# Patient Record
Sex: FEMALE | Race: WHITE | NOT HISPANIC OR LATINO | Employment: FULL TIME | URBAN - METROPOLITAN AREA
[De-identification: names, ages, dates, MRNs, and addresses within clinical notes are randomized per-mention and may not be internally consistent; named-entity substitution may affect disease eponyms.]

---

## 2019-04-25 ENCOUNTER — HOSPITAL ENCOUNTER (OUTPATIENT)
Facility: HOSPITAL | Age: 44
Setting detail: OBSERVATION
End: 2019-04-27
Attending: EMERGENCY MEDICINE | Admitting: INTERNAL MEDICINE
Payer: COMMERCIAL

## 2019-04-25 DIAGNOSIS — N88.8 CERVICAL MASS: Primary | ICD-10-CM

## 2019-04-25 DIAGNOSIS — N93.9 ABNORMAL VAGINAL BLEEDING: ICD-10-CM

## 2019-04-25 PROCEDURE — 99285 EMERGENCY DEPT VISIT HI MDM: CPT

## 2019-04-26 ENCOUNTER — APPOINTMENT (OUTPATIENT)
Dept: CT IMAGING | Facility: HOSPITAL | Age: 44
End: 2019-04-26
Payer: COMMERCIAL

## 2019-04-26 DIAGNOSIS — N88.8 CERVICAL MASS: Primary | ICD-10-CM

## 2019-04-26 PROBLEM — I10 HIGH BLOOD PRESSURE: Status: ACTIVE | Noted: 2019-04-26

## 2019-04-26 PROBLEM — E87.6 HYPOKALEMIA: Status: ACTIVE | Noted: 2019-04-26

## 2019-04-26 PROBLEM — N93.9 VAGINAL BLEEDING: Status: ACTIVE | Noted: 2019-04-26

## 2019-04-26 LAB
ABO GROUP BLD: NORMAL
ALBUMIN SERPL BCP-MCNC: 3.1 G/DL (ref 3.5–5)
ALP SERPL-CCNC: 45 U/L (ref 46–116)
ALT SERPL W P-5'-P-CCNC: 17 U/L (ref 12–78)
ANION GAP SERPL CALCULATED.3IONS-SCNC: 6 MMOL/L (ref 4–13)
ANION GAP SERPL CALCULATED.3IONS-SCNC: 8 MMOL/L (ref 4–13)
APTT PPP: 30 SECONDS (ref 26–38)
AST SERPL W P-5'-P-CCNC: 12 U/L (ref 5–45)
BACTERIA UR QL AUTO: ABNORMAL /HPF
BASOPHILS # BLD AUTO: 0.06 THOUSANDS/ΜL (ref 0–0.1)
BASOPHILS # BLD AUTO: 0.07 THOUSANDS/ΜL (ref 0–0.1)
BASOPHILS NFR BLD AUTO: 1 % (ref 0–1)
BASOPHILS NFR BLD AUTO: 1 % (ref 0–1)
BILIRUB SERPL-MCNC: 0.5 MG/DL (ref 0.2–1)
BILIRUB UR QL STRIP: NEGATIVE
BLD GP AB SCN SERPL QL: NEGATIVE
BUN SERPL-MCNC: 6 MG/DL (ref 5–25)
BUN SERPL-MCNC: 7 MG/DL (ref 5–25)
CALCIUM SERPL-MCNC: 9.6 MG/DL (ref 8.3–10.1)
CALCIUM SERPL-MCNC: 9.7 MG/DL (ref 8.3–10.1)
CHLORIDE SERPL-SCNC: 104 MMOL/L (ref 100–108)
CHLORIDE SERPL-SCNC: 106 MMOL/L (ref 100–108)
CLARITY UR: ABNORMAL
CO2 SERPL-SCNC: 29 MMOL/L (ref 21–32)
CO2 SERPL-SCNC: 29 MMOL/L (ref 21–32)
COLOR UR: YELLOW
CREAT SERPL-MCNC: 0.66 MG/DL (ref 0.6–1.3)
CREAT SERPL-MCNC: 0.76 MG/DL (ref 0.6–1.3)
EOSINOPHIL # BLD AUTO: 0.29 THOUSAND/ΜL (ref 0–0.61)
EOSINOPHIL # BLD AUTO: 0.34 THOUSAND/ΜL (ref 0–0.61)
EOSINOPHIL NFR BLD AUTO: 3 % (ref 0–6)
EOSINOPHIL NFR BLD AUTO: 5 % (ref 0–6)
ERYTHROCYTE [DISTWIDTH] IN BLOOD BY AUTOMATED COUNT: 13.9 % (ref 11.6–15.1)
ERYTHROCYTE [DISTWIDTH] IN BLOOD BY AUTOMATED COUNT: 13.9 % (ref 11.6–15.1)
EXT PREG TEST URINE: NEGATIVE
GFR SERPL CREATININE-BSD FRML MDRD: 109 ML/MIN/1.73SQ M
GFR SERPL CREATININE-BSD FRML MDRD: 97 ML/MIN/1.73SQ M
GLUCOSE SERPL-MCNC: 124 MG/DL (ref 65–140)
GLUCOSE SERPL-MCNC: 90 MG/DL (ref 65–140)
GLUCOSE UR STRIP-MCNC: NEGATIVE MG/DL
HCT VFR BLD AUTO: 39.3 % (ref 34.8–46.1)
HCT VFR BLD AUTO: 41.4 % (ref 34.8–46.1)
HGB BLD-MCNC: 12.9 G/DL (ref 11.5–15.4)
HGB BLD-MCNC: 13.7 G/DL (ref 11.5–15.4)
HGB UR QL STRIP.AUTO: ABNORMAL
IMM GRANULOCYTES # BLD AUTO: 0.03 THOUSAND/UL (ref 0–0.2)
IMM GRANULOCYTES # BLD AUTO: 0.03 THOUSAND/UL (ref 0–0.2)
IMM GRANULOCYTES NFR BLD AUTO: 0 % (ref 0–2)
IMM GRANULOCYTES NFR BLD AUTO: 0 % (ref 0–2)
INR PPP: 1.11 (ref 0.86–1.17)
KETONES UR STRIP-MCNC: ABNORMAL MG/DL
LEUKOCYTE ESTERASE UR QL STRIP: ABNORMAL
LYMPHOCYTES # BLD AUTO: 1.76 THOUSANDS/ΜL (ref 0.6–4.47)
LYMPHOCYTES # BLD AUTO: 2.29 THOUSANDS/ΜL (ref 0.6–4.47)
LYMPHOCYTES NFR BLD AUTO: 23 % (ref 14–44)
LYMPHOCYTES NFR BLD AUTO: 24 % (ref 14–44)
MAGNESIUM SERPL-MCNC: 2 MG/DL (ref 1.6–2.6)
MCH RBC QN AUTO: 27.7 PG (ref 26.8–34.3)
MCH RBC QN AUTO: 27.8 PG (ref 26.8–34.3)
MCHC RBC AUTO-ENTMCNC: 32.8 G/DL (ref 31.4–37.4)
MCHC RBC AUTO-ENTMCNC: 33.1 G/DL (ref 31.4–37.4)
MCV RBC AUTO: 84 FL (ref 82–98)
MCV RBC AUTO: 84 FL (ref 82–98)
MONOCYTES # BLD AUTO: 0.64 THOUSAND/ΜL (ref 0.17–1.22)
MONOCYTES # BLD AUTO: 0.84 THOUSAND/ΜL (ref 0.17–1.22)
MONOCYTES NFR BLD AUTO: 9 % (ref 4–12)
MONOCYTES NFR BLD AUTO: 9 % (ref 4–12)
NEUTROPHILS # BLD AUTO: 4.51 THOUSANDS/ΜL (ref 1.85–7.62)
NEUTROPHILS # BLD AUTO: 6.33 THOUSANDS/ΜL (ref 1.85–7.62)
NEUTS SEG NFR BLD AUTO: 61 % (ref 43–75)
NEUTS SEG NFR BLD AUTO: 64 % (ref 43–75)
NITRITE UR QL STRIP: NEGATIVE
NON-SQ EPI CELLS URNS QL MICRO: ABNORMAL /HPF
NRBC BLD AUTO-RTO: 0 /100 WBCS
NRBC BLD AUTO-RTO: 0 /100 WBCS
PH UR STRIP.AUTO: 7 [PH]
PHOSPHATE SERPL-MCNC: 3.7 MG/DL (ref 2.7–4.5)
PLATELET # BLD AUTO: 208 THOUSANDS/UL (ref 149–390)
PLATELET # BLD AUTO: 227 THOUSANDS/UL (ref 149–390)
PMV BLD AUTO: 11.2 FL (ref 8.9–12.7)
PMV BLD AUTO: 11.8 FL (ref 8.9–12.7)
POTASSIUM SERPL-SCNC: 3 MMOL/L (ref 3.5–5.3)
POTASSIUM SERPL-SCNC: 3.3 MMOL/L (ref 3.5–5.3)
PROT SERPL-MCNC: 6.1 G/DL (ref 6.4–8.2)
PROT UR STRIP-MCNC: ABNORMAL MG/DL
PROTHROMBIN TIME: 14.2 SECONDS (ref 11.8–14.2)
RBC # BLD AUTO: 4.66 MILLION/UL (ref 3.81–5.12)
RBC # BLD AUTO: 4.93 MILLION/UL (ref 3.81–5.12)
RBC #/AREA URNS AUTO: ABNORMAL /HPF
RH BLD: POSITIVE
SODIUM SERPL-SCNC: 141 MMOL/L (ref 136–145)
SODIUM SERPL-SCNC: 141 MMOL/L (ref 136–145)
SP GR UR STRIP.AUTO: 1.02 (ref 1–1.03)
SPECIMEN EXPIRATION DATE: NORMAL
UROBILINOGEN UR QL STRIP.AUTO: 0.2 E.U./DL
WBC # BLD AUTO: 7.35 THOUSAND/UL (ref 4.31–10.16)
WBC # BLD AUTO: 9.84 THOUSAND/UL (ref 4.31–10.16)
WBC #/AREA URNS AUTO: ABNORMAL /HPF

## 2019-04-26 PROCEDURE — 84100 ASSAY OF PHOSPHORUS: CPT | Performed by: PHYSICIAN ASSISTANT

## 2019-04-26 PROCEDURE — 86850 RBC ANTIBODY SCREEN: CPT | Performed by: EMERGENCY MEDICINE

## 2019-04-26 PROCEDURE — 88305 TISSUE EXAM BY PATHOLOGIST: CPT | Performed by: PATHOLOGY

## 2019-04-26 PROCEDURE — 86901 BLOOD TYPING SEROLOGIC RH(D): CPT | Performed by: EMERGENCY MEDICINE

## 2019-04-26 PROCEDURE — 85730 THROMBOPLASTIN TIME PARTIAL: CPT | Performed by: EMERGENCY MEDICINE

## 2019-04-26 PROCEDURE — 85025 COMPLETE CBC W/AUTO DIFF WBC: CPT | Performed by: PHYSICIAN ASSISTANT

## 2019-04-26 PROCEDURE — 88342 IMHCHEM/IMCYTCHM 1ST ANTB: CPT | Performed by: PATHOLOGY

## 2019-04-26 PROCEDURE — 81025 URINE PREGNANCY TEST: CPT | Performed by: EMERGENCY MEDICINE

## 2019-04-26 PROCEDURE — 99218 PR INITIAL OBSERVATION CARE/DAY 30 MINUTES: CPT | Performed by: INTERNAL MEDICINE

## 2019-04-26 PROCEDURE — 83735 ASSAY OF MAGNESIUM: CPT | Performed by: PHYSICIAN ASSISTANT

## 2019-04-26 PROCEDURE — 36415 COLL VENOUS BLD VENIPUNCTURE: CPT | Performed by: EMERGENCY MEDICINE

## 2019-04-26 PROCEDURE — 74177 CT ABD & PELVIS W/CONTRAST: CPT

## 2019-04-26 PROCEDURE — 99244 OFF/OP CNSLTJ NEW/EST MOD 40: CPT | Performed by: OBSTETRICS & GYNECOLOGY

## 2019-04-26 PROCEDURE — 85025 COMPLETE CBC W/AUTO DIFF WBC: CPT | Performed by: EMERGENCY MEDICINE

## 2019-04-26 PROCEDURE — 88344 IMHCHEM/IMCYTCHM EA MLT ANTB: CPT | Performed by: PATHOLOGY

## 2019-04-26 PROCEDURE — 85610 PROTHROMBIN TIME: CPT | Performed by: EMERGENCY MEDICINE

## 2019-04-26 PROCEDURE — 80053 COMPREHEN METABOLIC PANEL: CPT | Performed by: PHYSICIAN ASSISTANT

## 2019-04-26 PROCEDURE — 87147 CULTURE TYPE IMMUNOLOGIC: CPT | Performed by: INTERNAL MEDICINE

## 2019-04-26 PROCEDURE — 57500 BIOPSY OF CERVIX: CPT | Performed by: OBSTETRICS & GYNECOLOGY

## 2019-04-26 PROCEDURE — 86900 BLOOD TYPING SEROLOGIC ABO: CPT | Performed by: EMERGENCY MEDICINE

## 2019-04-26 PROCEDURE — 81001 URINALYSIS AUTO W/SCOPE: CPT | Performed by: EMERGENCY MEDICINE

## 2019-04-26 PROCEDURE — 80048 BASIC METABOLIC PNL TOTAL CA: CPT | Performed by: EMERGENCY MEDICINE

## 2019-04-26 PROCEDURE — 99285 EMERGENCY DEPT VISIT HI MDM: CPT | Performed by: EMERGENCY MEDICINE

## 2019-04-26 PROCEDURE — 87086 URINE CULTURE/COLONY COUNT: CPT | Performed by: INTERNAL MEDICINE

## 2019-04-26 RX ORDER — POTASSIUM CHLORIDE 20 MEQ/1
40 TABLET, EXTENDED RELEASE ORAL
Status: DISPENSED | OUTPATIENT
Start: 2019-04-26 | End: 2019-04-27

## 2019-04-26 RX ORDER — POTASSIUM CHLORIDE 20 MEQ/1
40 TABLET, EXTENDED RELEASE ORAL ONCE
Status: COMPLETED | OUTPATIENT
Start: 2019-04-26 | End: 2019-04-26

## 2019-04-26 RX ORDER — ACETAMINOPHEN 325 MG/1
650 TABLET ORAL EVERY 6 HOURS PRN
Status: DISCONTINUED | OUTPATIENT
Start: 2019-04-26 | End: 2019-04-27 | Stop reason: HOSPADM

## 2019-04-26 RX ORDER — SODIUM CHLORIDE, SODIUM LACTATE, POTASSIUM CHLORIDE, CALCIUM CHLORIDE 600; 310; 30; 20 MG/100ML; MG/100ML; MG/100ML; MG/100ML
100 INJECTION, SOLUTION INTRAVENOUS CONTINUOUS
Status: DISCONTINUED | OUTPATIENT
Start: 2019-04-26 | End: 2019-04-27

## 2019-04-26 RX ADMIN — POTASSIUM CHLORIDE 40 MEQ: 1500 TABLET, EXTENDED RELEASE ORAL at 09:53

## 2019-04-26 RX ADMIN — POTASSIUM CHLORIDE 40 MEQ: 1500 TABLET, EXTENDED RELEASE ORAL at 15:44

## 2019-04-26 RX ADMIN — IOHEXOL 100 ML: 350 INJECTION, SOLUTION INTRAVENOUS at 14:16

## 2019-04-26 RX ADMIN — SODIUM CHLORIDE, SODIUM LACTATE, POTASSIUM CHLORIDE, AND CALCIUM CHLORIDE 100 ML/HR: .6; .31; .03; .02 INJECTION, SOLUTION INTRAVENOUS at 21:38

## 2019-04-26 RX ADMIN — POTASSIUM CHLORIDE 40 MEQ: 1500 TABLET, EXTENDED RELEASE ORAL at 03:26

## 2019-04-26 RX ADMIN — SODIUM CHLORIDE, SODIUM LACTATE, POTASSIUM CHLORIDE, AND CALCIUM CHLORIDE 100 ML/HR: .6; .31; .03; .02 INJECTION, SOLUTION INTRAVENOUS at 11:55

## 2019-04-27 ENCOUNTER — HOSPITAL ENCOUNTER (OUTPATIENT)
Facility: HOSPITAL | Age: 44
Setting detail: OBSERVATION
LOS: 1 days | Discharge: HOME | End: 2019-04-29
Attending: OBSTETRICS & GYNECOLOGY | Admitting: OBSTETRICS & GYNECOLOGY
Payer: COMMERCIAL

## 2019-04-27 ENCOUNTER — APPOINTMENT (OUTPATIENT)
Dept: MRI IMAGING | Facility: HOSPITAL | Age: 44
End: 2019-04-27
Payer: COMMERCIAL

## 2019-04-27 VITALS
WEIGHT: 126 LBS | DIASTOLIC BLOOD PRESSURE: 92 MMHG | OXYGEN SATURATION: 99 % | HEART RATE: 63 BPM | SYSTOLIC BLOOD PRESSURE: 154 MMHG | RESPIRATION RATE: 18 BRPM | HEIGHT: 61 IN | BODY MASS INDEX: 23.79 KG/M2 | TEMPERATURE: 98.4 F

## 2019-04-27 PROBLEM — R82.90 ABNORMAL URINE: Status: ACTIVE | Noted: 2019-04-27

## 2019-04-27 LAB
ANION GAP SERPL CALCULATED.3IONS-SCNC: 7 MMOL/L (ref 4–13)
BASOPHILS # BLD AUTO: 0.05 THOUSANDS/ΜL (ref 0–0.1)
BASOPHILS NFR BLD AUTO: 1 % (ref 0–1)
BUN SERPL-MCNC: 9 MG/DL (ref 5–25)
CALCIUM SERPL-MCNC: 9.3 MG/DL (ref 8.3–10.1)
CHLORIDE SERPL-SCNC: 106 MMOL/L (ref 100–108)
CO2 SERPL-SCNC: 27 MMOL/L (ref 21–32)
CREAT SERPL-MCNC: 0.71 MG/DL (ref 0.6–1.3)
EOSINOPHIL # BLD AUTO: 0.32 THOUSAND/ΜL (ref 0–0.61)
EOSINOPHIL NFR BLD AUTO: 3 % (ref 0–6)
ERYTHROCYTE [DISTWIDTH] IN BLOOD BY AUTOMATED COUNT: 13.6 % (ref 11.6–15.1)
ERYTHROCYTE [DISTWIDTH] IN BLOOD BY AUTOMATED COUNT: 13.9 % (ref 11.6–15.1)
GFR SERPL CREATININE-BSD FRML MDRD: 105 ML/MIN/1.73SQ M
GLUCOSE P FAST SERPL-MCNC: 98 MG/DL (ref 65–99)
GLUCOSE SERPL-MCNC: 98 MG/DL (ref 65–140)
HCT VFR BLD AUTO: 36.8 % (ref 34.8–46.1)
HCT VFR BLD AUTO: 37.1 % (ref 34.8–46.1)
HGB BLD-MCNC: 11.9 G/DL (ref 11.5–15.4)
HGB BLD-MCNC: 12.3 G/DL (ref 11.5–15.4)
IMM GRANULOCYTES # BLD AUTO: 0.05 THOUSAND/UL (ref 0–0.2)
IMM GRANULOCYTES NFR BLD AUTO: 1 % (ref 0–2)
LYMPHOCYTES # BLD AUTO: 1.98 THOUSANDS/ΜL (ref 0.6–4.47)
LYMPHOCYTES NFR BLD AUTO: 21 % (ref 14–44)
MAGNESIUM SERPL-MCNC: 1.9 MG/DL (ref 1.6–2.6)
MCH RBC QN AUTO: 27.6 PG (ref 26.8–34.3)
MCH RBC QN AUTO: 28.1 PG (ref 26.8–34.3)
MCHC RBC AUTO-ENTMCNC: 32.3 G/DL (ref 31.4–37.4)
MCHC RBC AUTO-ENTMCNC: 33.2 G/DL (ref 31.4–37.4)
MCV RBC AUTO: 85 FL (ref 82–98)
MCV RBC AUTO: 85 FL (ref 82–98)
MONOCYTES # BLD AUTO: 0.86 THOUSAND/ΜL (ref 0.17–1.22)
MONOCYTES NFR BLD AUTO: 9 % (ref 4–12)
NEUTROPHILS # BLD AUTO: 6.18 THOUSANDS/ΜL (ref 1.85–7.62)
NEUTS SEG NFR BLD AUTO: 65 % (ref 43–75)
NRBC BLD AUTO-RTO: 0 /100 WBCS
PLATELET # BLD AUTO: 187 THOUSANDS/UL (ref 149–390)
PLATELET # BLD AUTO: 213 THOUSANDS/UL (ref 149–390)
PMV BLD AUTO: 11.4 FL (ref 8.9–12.7)
PMV BLD AUTO: 11.5 FL (ref 8.9–12.7)
POTASSIUM SERPL-SCNC: 3.8 MMOL/L (ref 3.5–5.3)
RBC # BLD AUTO: 4.31 MILLION/UL (ref 3.81–5.12)
RBC # BLD AUTO: 4.38 MILLION/UL (ref 3.81–5.12)
SODIUM SERPL-SCNC: 140 MMOL/L (ref 136–145)
WBC # BLD AUTO: 8.07 THOUSAND/UL (ref 4.31–10.16)
WBC # BLD AUTO: 9.44 THOUSAND/UL (ref 4.31–10.16)

## 2019-04-27 PROCEDURE — 86900 BLOOD TYPING SEROLOGIC ABO: CPT | Performed by: OBSTETRICS & GYNECOLOGY

## 2019-04-27 PROCEDURE — 86901 BLOOD TYPING SEROLOGIC RH(D): CPT | Performed by: OBSTETRICS & GYNECOLOGY

## 2019-04-27 PROCEDURE — 85025 COMPLETE CBC W/AUTO DIFF WBC: CPT | Performed by: OBSTETRICS & GYNECOLOGY

## 2019-04-27 PROCEDURE — 86850 RBC ANTIBODY SCREEN: CPT | Performed by: OBSTETRICS & GYNECOLOGY

## 2019-04-27 PROCEDURE — 80048 BASIC METABOLIC PNL TOTAL CA: CPT | Performed by: INTERNAL MEDICINE

## 2019-04-27 PROCEDURE — 86920 COMPATIBILITY TEST SPIN: CPT

## 2019-04-27 PROCEDURE — 72197 MRI PELVIS W/O & W/DYE: CPT

## 2019-04-27 PROCEDURE — 85027 COMPLETE CBC AUTOMATED: CPT | Performed by: INTERNAL MEDICINE

## 2019-04-27 PROCEDURE — 83735 ASSAY OF MAGNESIUM: CPT | Performed by: INTERNAL MEDICINE

## 2019-04-27 PROCEDURE — 99217 PR OBSERVATION CARE DISCHARGE MANAGEMENT: CPT | Performed by: INTERNAL MEDICINE

## 2019-04-27 PROCEDURE — A9585 GADOBUTROL INJECTION: HCPCS | Performed by: OBSTETRICS & GYNECOLOGY

## 2019-04-27 RX ORDER — ACETAMINOPHEN 325 MG/1
650 TABLET ORAL EVERY 6 HOURS PRN
Status: DISCONTINUED | OUTPATIENT
Start: 2019-04-27 | End: 2019-04-29 | Stop reason: HOSPADM

## 2019-04-27 RX ADMIN — GADOBUTROL 5 ML: 604.72 INJECTION INTRAVENOUS at 13:21

## 2019-04-27 RX ADMIN — CEFTRIAXONE SODIUM 1000 MG: 10 INJECTION, POWDER, FOR SOLUTION INTRAVENOUS at 11:45

## 2019-04-27 RX ADMIN — POTASSIUM CHLORIDE 40 MEQ: 1500 TABLET, EXTENDED RELEASE ORAL at 08:15

## 2019-04-28 PROBLEM — N88.8 CERVICAL MASS: Status: ACTIVE | Noted: 2019-04-28

## 2019-04-28 LAB
ANION GAP SERPL CALCULATED.3IONS-SCNC: 4 MMOL/L (ref 4–13)
BACTERIA UR CULT: ABNORMAL
BACTERIA UR CULT: ABNORMAL
BASOPHILS # BLD AUTO: 0.06 THOUSANDS/ΜL (ref 0–0.1)
BASOPHILS NFR BLD AUTO: 1 % (ref 0–1)
BUN SERPL-MCNC: 11 MG/DL (ref 5–25)
CALCIUM SERPL-MCNC: 8.9 MG/DL (ref 8.3–10.1)
CHLORIDE SERPL-SCNC: 107 MMOL/L (ref 100–108)
CO2 SERPL-SCNC: 27 MMOL/L (ref 21–32)
CREAT SERPL-MCNC: 0.71 MG/DL (ref 0.6–1.3)
EOSINOPHIL # BLD AUTO: 0.3 THOUSAND/ΜL (ref 0–0.61)
EOSINOPHIL NFR BLD AUTO: 4 % (ref 0–6)
ERYTHROCYTE [DISTWIDTH] IN BLOOD BY AUTOMATED COUNT: 13.8 % (ref 11.6–15.1)
GFR SERPL CREATININE-BSD FRML MDRD: 105 ML/MIN/1.73SQ M
GLUCOSE SERPL-MCNC: 94 MG/DL (ref 65–140)
HCT VFR BLD AUTO: 36.8 % (ref 34.8–46.1)
HGB BLD-MCNC: 11.6 G/DL (ref 11.5–15.4)
IMM GRANULOCYTES # BLD AUTO: 0.05 THOUSAND/UL (ref 0–0.2)
IMM GRANULOCYTES NFR BLD AUTO: 1 % (ref 0–2)
LYMPHOCYTES # BLD AUTO: 1.94 THOUSANDS/ΜL (ref 0.6–4.47)
LYMPHOCYTES NFR BLD AUTO: 24 % (ref 14–44)
MCH RBC QN AUTO: 27.5 PG (ref 26.8–34.3)
MCHC RBC AUTO-ENTMCNC: 31.5 G/DL (ref 31.4–37.4)
MCV RBC AUTO: 87 FL (ref 82–98)
MONOCYTES # BLD AUTO: 0.72 THOUSAND/ΜL (ref 0.17–1.22)
MONOCYTES NFR BLD AUTO: 9 % (ref 4–12)
NEUTROPHILS # BLD AUTO: 5.19 THOUSANDS/ΜL (ref 1.85–7.62)
NEUTS SEG NFR BLD AUTO: 61 % (ref 43–75)
NRBC BLD AUTO-RTO: 0 /100 WBCS
PLATELET # BLD AUTO: 185 THOUSANDS/UL (ref 149–390)
PMV BLD AUTO: 12 FL (ref 8.9–12.7)
POTASSIUM SERPL-SCNC: 3.7 MMOL/L (ref 3.5–5.3)
RBC # BLD AUTO: 4.22 MILLION/UL (ref 3.81–5.12)
SODIUM SERPL-SCNC: 138 MMOL/L (ref 136–145)
WBC # BLD AUTO: 8.26 THOUSAND/UL (ref 4.31–10.16)

## 2019-04-28 PROCEDURE — 99218 PR INITIAL OBSERVATION CARE/DAY 30 MINUTES: CPT | Performed by: OBSTETRICS & GYNECOLOGY

## 2019-04-28 PROCEDURE — 85025 COMPLETE CBC W/AUTO DIFF WBC: CPT | Performed by: OBSTETRICS & GYNECOLOGY

## 2019-04-28 PROCEDURE — 80048 BASIC METABOLIC PNL TOTAL CA: CPT | Performed by: OBSTETRICS & GYNECOLOGY

## 2019-04-28 PROCEDURE — G0379 DIRECT REFER HOSPITAL OBSERV: HCPCS

## 2019-04-28 PROCEDURE — NC001 PR NO CHARGE: Performed by: OBSTETRICS & GYNECOLOGY

## 2019-04-29 VITALS
WEIGHT: 127.21 LBS | DIASTOLIC BLOOD PRESSURE: 66 MMHG | RESPIRATION RATE: 18 BRPM | OXYGEN SATURATION: 98 % | BODY MASS INDEX: 24.02 KG/M2 | SYSTOLIC BLOOD PRESSURE: 123 MMHG | TEMPERATURE: 97.9 F | HEART RATE: 50 BPM | HEIGHT: 61 IN

## 2019-04-29 LAB
ABO GROUP BLD: NORMAL
BLD GP AB SCN SERPL QL: NEGATIVE
ERYTHROCYTE [DISTWIDTH] IN BLOOD BY AUTOMATED COUNT: 13.8 % (ref 11.6–15.1)
HCT VFR BLD AUTO: 36.4 % (ref 34.8–46.1)
HGB BLD-MCNC: 12 G/DL (ref 11.5–15.4)
MCH RBC QN AUTO: 28.2 PG (ref 26.8–34.3)
MCHC RBC AUTO-ENTMCNC: 33 G/DL (ref 31.4–37.4)
MCV RBC AUTO: 85 FL (ref 82–98)
PLATELET # BLD AUTO: 203 THOUSANDS/UL (ref 149–390)
PMV BLD AUTO: 12 FL (ref 8.9–12.7)
RBC # BLD AUTO: 4.26 MILLION/UL (ref 3.81–5.12)
RH BLD: POSITIVE
SPECIMEN EXPIRATION DATE: NORMAL
WBC # BLD AUTO: 8.55 THOUSAND/UL (ref 4.31–10.16)

## 2019-04-29 PROCEDURE — NC001 PR NO CHARGE: Performed by: OBSTETRICS & GYNECOLOGY

## 2019-04-29 PROCEDURE — 99217 PR OBSERVATION CARE DISCHARGE MANAGEMENT: CPT | Performed by: OBSTETRICS & GYNECOLOGY

## 2019-04-29 PROCEDURE — 85027 COMPLETE CBC AUTOMATED: CPT | Performed by: OBSTETRICS & GYNECOLOGY

## 2019-05-01 ENCOUNTER — APPOINTMENT (OUTPATIENT)
Dept: LAB | Facility: HOSPITAL | Age: 44
End: 2019-05-01
Attending: OBSTETRICS & GYNECOLOGY
Payer: COMMERCIAL

## 2019-05-01 ENCOUNTER — OFFICE VISIT (OUTPATIENT)
Dept: GYNECOLOGIC ONCOLOGY | Facility: CLINIC | Age: 44
End: 2019-05-01
Payer: COMMERCIAL

## 2019-05-01 VITALS
HEIGHT: 61 IN | BODY MASS INDEX: 24.55 KG/M2 | RESPIRATION RATE: 16 BRPM | WEIGHT: 130 LBS | TEMPERATURE: 99 F | HEART RATE: 84 BPM | SYSTOLIC BLOOD PRESSURE: 140 MMHG | DIASTOLIC BLOOD PRESSURE: 102 MMHG

## 2019-05-01 DIAGNOSIS — C53.8 MALIGNANT NEOPLASM OF OVERLAPPING SITES OF CERVIX (HCC): Primary | ICD-10-CM

## 2019-05-01 DIAGNOSIS — C53.8 MALIGNANT NEOPLASM OF OVERLAPPING SITES OF CERVIX (HCC): ICD-10-CM

## 2019-05-01 LAB
ABO GROUP BLD BPU: NORMAL
ABO GROUP BLD BPU: NORMAL
ALBUMIN SERPL BCP-MCNC: 3.9 G/DL (ref 3.5–5)
ALP SERPL-CCNC: 50 U/L (ref 46–116)
ALT SERPL W P-5'-P-CCNC: 20 U/L (ref 12–78)
ANION GAP SERPL CALCULATED.3IONS-SCNC: 7 MMOL/L (ref 4–13)
AST SERPL W P-5'-P-CCNC: 11 U/L (ref 5–45)
BASOPHILS # BLD AUTO: 0.07 THOUSANDS/ΜL (ref 0–0.1)
BASOPHILS NFR BLD AUTO: 1 % (ref 0–1)
BILIRUB SERPL-MCNC: 0.5 MG/DL (ref 0.2–1)
BPU ID: NORMAL
BPU ID: NORMAL
BUN SERPL-MCNC: 10 MG/DL (ref 5–25)
CALCIUM SERPL-MCNC: 9.7 MG/DL (ref 8.3–10.1)
CHLORIDE SERPL-SCNC: 105 MMOL/L (ref 100–108)
CO2 SERPL-SCNC: 27 MMOL/L (ref 21–32)
CREAT SERPL-MCNC: 0.74 MG/DL (ref 0.6–1.3)
CROSSMATCH: NORMAL
CROSSMATCH: NORMAL
EOSINOPHIL # BLD AUTO: 0.17 THOUSAND/ΜL (ref 0–0.61)
EOSINOPHIL NFR BLD AUTO: 2 % (ref 0–6)
ERYTHROCYTE [DISTWIDTH] IN BLOOD BY AUTOMATED COUNT: 14 % (ref 11.6–15.1)
GFR SERPL CREATININE-BSD FRML MDRD: 100 ML/MIN/1.73SQ M
GLUCOSE SERPL-MCNC: 92 MG/DL (ref 65–140)
HCT VFR BLD AUTO: 39.5 % (ref 34.8–46.1)
HGB BLD-MCNC: 12.8 G/DL (ref 11.5–15.4)
IMM GRANULOCYTES # BLD AUTO: 0.07 THOUSAND/UL (ref 0–0.2)
IMM GRANULOCYTES NFR BLD AUTO: 1 % (ref 0–2)
LYMPHOCYTES # BLD AUTO: 1.72 THOUSANDS/ΜL (ref 0.6–4.47)
LYMPHOCYTES NFR BLD AUTO: 15 % (ref 14–44)
MCH RBC QN AUTO: 27.4 PG (ref 26.8–34.3)
MCHC RBC AUTO-ENTMCNC: 32.4 G/DL (ref 31.4–37.4)
MCV RBC AUTO: 85 FL (ref 82–98)
MONOCYTES # BLD AUTO: 0.75 THOUSAND/ΜL (ref 0.17–1.22)
MONOCYTES NFR BLD AUTO: 7 % (ref 4–12)
NEUTROPHILS # BLD AUTO: 8.72 THOUSANDS/ΜL (ref 1.85–7.62)
NEUTS SEG NFR BLD AUTO: 74 % (ref 43–75)
NRBC BLD AUTO-RTO: 0 /100 WBCS
PLATELET # BLD AUTO: 243 THOUSANDS/UL (ref 149–390)
PMV BLD AUTO: 11.9 FL (ref 8.9–12.7)
POTASSIUM SERPL-SCNC: 3.7 MMOL/L (ref 3.5–5.3)
PROT SERPL-MCNC: 7.3 G/DL (ref 6.4–8.2)
RBC # BLD AUTO: 4.67 MILLION/UL (ref 3.81–5.12)
SODIUM SERPL-SCNC: 139 MMOL/L (ref 136–145)
UNIT DISPENSE STATUS: NORMAL
UNIT DISPENSE STATUS: NORMAL
UNIT PRODUCT CODE: NORMAL
UNIT PRODUCT CODE: NORMAL
UNIT RH: NORMAL
UNIT RH: NORMAL
WBC # BLD AUTO: 11.5 THOUSAND/UL (ref 4.31–10.16)

## 2019-05-01 PROCEDURE — 36415 COLL VENOUS BLD VENIPUNCTURE: CPT

## 2019-05-01 PROCEDURE — 85025 COMPLETE CBC W/AUTO DIFF WBC: CPT

## 2019-05-01 PROCEDURE — 80053 COMPREHEN METABOLIC PANEL: CPT

## 2019-05-01 PROCEDURE — 99215 OFFICE O/P EST HI 40 MIN: CPT | Performed by: OBSTETRICS & GYNECOLOGY

## 2019-05-03 ENCOUNTER — CLINICAL SUPPORT (OUTPATIENT)
Dept: RADIATION ONCOLOGY | Facility: CLINIC | Age: 44
End: 2019-05-03
Attending: RADIOLOGY

## 2019-05-03 ENCOUNTER — DOCUMENTATION (OUTPATIENT)
Dept: INFUSION CENTER | Facility: CLINIC | Age: 44
End: 2019-05-03

## 2019-05-03 ENCOUNTER — RADIATION ONCOLOGY CONSULT (OUTPATIENT)
Dept: RADIATION ONCOLOGY | Facility: CLINIC | Age: 44
End: 2019-05-03
Attending: RADIOLOGY

## 2019-05-03 VITALS
DIASTOLIC BLOOD PRESSURE: 80 MMHG | WEIGHT: 128 LBS | BODY MASS INDEX: 24.17 KG/M2 | RESPIRATION RATE: 16 BRPM | HEIGHT: 61 IN | HEART RATE: 67 BPM | SYSTOLIC BLOOD PRESSURE: 152 MMHG

## 2019-05-03 DIAGNOSIS — C53.8 MALIGNANT NEOPLASM OF OVERLAPPING SITES OF CERVIX (HCC): ICD-10-CM

## 2019-05-03 DIAGNOSIS — C53.8 MALIGNANT NEOPLASM OF OVERLAPPING SITES OF CERVIX (HCC): Primary | ICD-10-CM

## 2019-05-06 ENCOUNTER — TELEPHONE (OUTPATIENT)
Dept: INTERVENTIONAL RADIOLOGY/VASCULAR | Facility: HOSPITAL | Age: 44
End: 2019-05-06

## 2019-05-06 ENCOUNTER — HOSPITAL ENCOUNTER (OUTPATIENT)
Dept: RADIOLOGY | Age: 44
Discharge: HOME | End: 2019-05-06
Payer: COMMERCIAL

## 2019-05-06 DIAGNOSIS — C53.8 MALIGNANT NEOPLASM OF OVERLAPPING SITES OF CERVIX (HCC): ICD-10-CM

## 2019-05-06 DIAGNOSIS — C53.8 MALIGNANT NEOPLASM OF OVERLAPPING SITES OF CERVIX (HCC): Primary | ICD-10-CM

## 2019-05-06 LAB — GLUCOSE SERPL-MCNC: 92 MG/DL (ref 65–140)

## 2019-05-06 PROCEDURE — A9552 F18 FDG: HCPCS

## 2019-05-06 PROCEDURE — 82948 REAGENT STRIP/BLOOD GLUCOSE: CPT

## 2019-05-06 PROCEDURE — 78815 PET IMAGE W/CT SKULL-THIGH: CPT

## 2019-05-06 RX ORDER — ONDANSETRON HYDROCHLORIDE 8 MG/1
8 TABLET, FILM COATED ORAL EVERY 8 HOURS PRN
Qty: 30 TABLET | Refills: 1 | Status: SHIPPED | OUTPATIENT
Start: 2019-05-06 | End: 2019-08-20

## 2019-05-06 RX ORDER — LORAZEPAM 1 MG/1
1 TABLET ORAL EVERY 6 HOURS PRN
Qty: 20 TABLET | Refills: 1 | Status: SHIPPED | OUTPATIENT
Start: 2019-05-06 | End: 2019-10-23 | Stop reason: HOSPADM

## 2019-05-08 ENCOUNTER — APPOINTMENT (OUTPATIENT)
Dept: RADIATION ONCOLOGY | Facility: CLINIC | Age: 44
End: 2019-05-08
Attending: RADIOLOGY
Payer: COMMERCIAL

## 2019-05-08 PROCEDURE — 77470 SPECIAL RADIATION TREATMENT: CPT | Performed by: RADIOLOGY

## 2019-05-08 PROCEDURE — 77334 RADIATION TREATMENT AID(S): CPT | Performed by: RADIOLOGY

## 2019-05-09 ENCOUNTER — DOCUMENTATION (OUTPATIENT)
Dept: INFUSION CENTER | Facility: CLINIC | Age: 44
End: 2019-05-09

## 2019-05-10 ENCOUNTER — TELEPHONE (OUTPATIENT)
Dept: HEMATOLOGY ONCOLOGY | Facility: CLINIC | Age: 44
End: 2019-05-10

## 2019-05-10 PROCEDURE — 99211 OFF/OP EST MAY X REQ PHY/QHP: CPT | Performed by: RADIOLOGY

## 2019-05-16 PROCEDURE — 77295 3-D RADIOTHERAPY PLAN: CPT | Performed by: RADIOLOGY

## 2019-05-16 PROCEDURE — 77300 RADIATION THERAPY DOSE PLAN: CPT | Performed by: RADIOLOGY

## 2019-05-16 PROCEDURE — 77334 RADIATION TREATMENT AID(S): CPT | Performed by: RADIOLOGY

## 2019-05-16 RX ORDER — SODIUM CHLORIDE 9 MG/ML
20 INJECTION, SOLUTION INTRAVENOUS ONCE
Status: CANCELLED | OUTPATIENT
Start: 2019-05-21

## 2019-05-16 RX ORDER — PALONOSETRON 0.05 MG/ML
0.25 INJECTION, SOLUTION INTRAVENOUS ONCE
Status: CANCELLED | OUTPATIENT
Start: 2019-05-21

## 2019-05-17 ENCOUNTER — APPOINTMENT (OUTPATIENT)
Dept: LAB | Facility: HOSPITAL | Age: 44
End: 2019-05-17
Attending: OBSTETRICS & GYNECOLOGY
Payer: COMMERCIAL

## 2019-05-17 DIAGNOSIS — C53.8 MALIGNANT NEOPLASM OF OVERLAPPING SITES OF CERVIX (HCC): ICD-10-CM

## 2019-05-17 LAB
ALBUMIN SERPL BCP-MCNC: 3.8 G/DL (ref 3.5–5)
ALP SERPL-CCNC: 53 U/L (ref 46–116)
ALT SERPL W P-5'-P-CCNC: 17 U/L (ref 12–78)
ANION GAP SERPL CALCULATED.3IONS-SCNC: 8 MMOL/L (ref 4–13)
AST SERPL W P-5'-P-CCNC: 10 U/L (ref 5–45)
BASOPHILS # BLD AUTO: 0.05 THOUSANDS/ΜL (ref 0–0.1)
BASOPHILS NFR BLD AUTO: 1 % (ref 0–1)
BILIRUB SERPL-MCNC: 0.6 MG/DL (ref 0.2–1)
BUN SERPL-MCNC: 9 MG/DL (ref 5–25)
CALCIUM SERPL-MCNC: 9.7 MG/DL (ref 8.3–10.1)
CHLORIDE SERPL-SCNC: 104 MMOL/L (ref 100–108)
CO2 SERPL-SCNC: 29 MMOL/L (ref 21–32)
CREAT SERPL-MCNC: 0.62 MG/DL (ref 0.6–1.3)
EOSINOPHIL # BLD AUTO: 0.22 THOUSAND/ΜL (ref 0–0.61)
EOSINOPHIL NFR BLD AUTO: 3 % (ref 0–6)
ERYTHROCYTE [DISTWIDTH] IN BLOOD BY AUTOMATED COUNT: 13.3 % (ref 11.6–15.1)
GFR SERPL CREATININE-BSD FRML MDRD: 112 ML/MIN/1.73SQ M
GLUCOSE SERPL-MCNC: 83 MG/DL (ref 65–140)
HCT VFR BLD AUTO: 38.8 % (ref 34.8–46.1)
HGB BLD-MCNC: 12.4 G/DL (ref 11.5–15.4)
IMM GRANULOCYTES # BLD AUTO: 0.03 THOUSAND/UL (ref 0–0.2)
IMM GRANULOCYTES NFR BLD AUTO: 0 % (ref 0–2)
LYMPHOCYTES # BLD AUTO: 1.36 THOUSANDS/ΜL (ref 0.6–4.47)
LYMPHOCYTES NFR BLD AUTO: 17 % (ref 14–44)
MAGNESIUM SERPL-MCNC: 2.2 MG/DL (ref 1.6–2.6)
MCH RBC QN AUTO: 27.5 PG (ref 26.8–34.3)
MCHC RBC AUTO-ENTMCNC: 32 G/DL (ref 31.4–37.4)
MCV RBC AUTO: 86 FL (ref 82–98)
MONOCYTES # BLD AUTO: 0.67 THOUSAND/ΜL (ref 0.17–1.22)
MONOCYTES NFR BLD AUTO: 9 % (ref 4–12)
NEUTROPHILS # BLD AUTO: 5.58 THOUSANDS/ΜL (ref 1.85–7.62)
NEUTS SEG NFR BLD AUTO: 70 % (ref 43–75)
NRBC BLD AUTO-RTO: 0 /100 WBCS
PLATELET # BLD AUTO: 251 THOUSANDS/UL (ref 149–390)
PMV BLD AUTO: 11.7 FL (ref 8.9–12.7)
POTASSIUM SERPL-SCNC: 3.6 MMOL/L (ref 3.5–5.3)
PROT SERPL-MCNC: 7.3 G/DL (ref 6.4–8.2)
RBC # BLD AUTO: 4.51 MILLION/UL (ref 3.81–5.12)
SODIUM SERPL-SCNC: 141 MMOL/L (ref 136–145)
WBC # BLD AUTO: 7.91 THOUSAND/UL (ref 4.31–10.16)

## 2019-05-17 PROCEDURE — 83735 ASSAY OF MAGNESIUM: CPT

## 2019-05-17 PROCEDURE — 80053 COMPREHEN METABOLIC PANEL: CPT

## 2019-05-17 PROCEDURE — 85025 COMPLETE CBC W/AUTO DIFF WBC: CPT

## 2019-05-17 PROCEDURE — 36415 COLL VENOUS BLD VENIPUNCTURE: CPT

## 2019-05-20 PROCEDURE — 77412 RADIATION TX DELIVERY LVL 3: CPT | Performed by: RADIOLOGY

## 2019-05-20 PROCEDURE — 77331 SPECIAL RADIATION DOSIMETRY: CPT | Performed by: RADIOLOGY

## 2019-05-21 ENCOUNTER — HOSPITAL ENCOUNTER (OUTPATIENT)
Dept: INFUSION CENTER | Facility: CLINIC | Age: 44
Discharge: HOME | End: 2019-05-21
Payer: COMMERCIAL

## 2019-05-21 VITALS
SYSTOLIC BLOOD PRESSURE: 146 MMHG | TEMPERATURE: 97.9 F | WEIGHT: 123.46 LBS | HEIGHT: 62 IN | HEART RATE: 58 BPM | RESPIRATION RATE: 20 BRPM | DIASTOLIC BLOOD PRESSURE: 80 MMHG | BODY MASS INDEX: 22.72 KG/M2

## 2019-05-21 DIAGNOSIS — C53.8 MALIGNANT NEOPLASM OF OVERLAPPING SITES OF CERVIX (HCC): Primary | ICD-10-CM

## 2019-05-21 PROCEDURE — 96361 HYDRATE IV INFUSION ADD-ON: CPT

## 2019-05-21 PROCEDURE — 77412 RADIATION TX DELIVERY LVL 3: CPT | Performed by: RADIOLOGY

## 2019-05-21 PROCEDURE — 96375 TX/PRO/DX INJ NEW DRUG ADDON: CPT

## 2019-05-21 PROCEDURE — 96367 TX/PROPH/DG ADDL SEQ IV INF: CPT

## 2019-05-21 PROCEDURE — 96413 CHEMO IV INFUSION 1 HR: CPT

## 2019-05-21 RX ORDER — SODIUM CHLORIDE 9 MG/ML
20 INJECTION, SOLUTION INTRAVENOUS ONCE
Status: DISCONTINUED | OUTPATIENT
Start: 2019-05-21 | End: 2019-05-24 | Stop reason: HOSPADM

## 2019-05-21 RX ORDER — PALONOSETRON 0.05 MG/ML
0.25 INJECTION, SOLUTION INTRAVENOUS ONCE
Status: COMPLETED | OUTPATIENT
Start: 2019-05-21 | End: 2019-05-21

## 2019-05-21 RX ADMIN — DEXAMETHASONE SODIUM PHOSPHATE 20 MG: 10 INJECTION, SOLUTION INTRAMUSCULAR; INTRAVENOUS at 12:03

## 2019-05-21 RX ADMIN — CISPLATIN 62.4 MG: 1 INJECTION INTRAVENOUS at 13:10

## 2019-05-21 RX ADMIN — SODIUM CHLORIDE 1000 ML: 0.9 INJECTION, SOLUTION INTRAVENOUS at 10:48

## 2019-05-21 RX ADMIN — SODIUM CHLORIDE 1000 ML: 0.9 INJECTION, SOLUTION INTRAVENOUS at 14:19

## 2019-05-21 RX ADMIN — PALONOSETRON 0.25 MG: 0.05 INJECTION, SOLUTION INTRAVENOUS at 12:03

## 2019-05-21 RX ADMIN — SODIUM CHLORIDE 150 MG: 0.9 INJECTION, SOLUTION INTRAVENOUS at 12:26

## 2019-05-21 NOTE — SOCIAL WORK
MSW met with pt in the infusion center today, she was in for her first treatment and says that all went well and she slept through most of her treatment today  She has several questions regarding her eviction and what assistance she is eligible for through [employer]/ [employer]  She is set to be evicted on 7/2, even if she were to be able to catch up on the rent the landlord is not willing to have her stay  She has been working with [employer] and her [employer] company that manages the property, but feels like she has more questions than answers at this point  MSW reached out to Curtis at [employer] to review  Curtis states that they are definitely able to help pt, however her eviction date is not until July so she has some time until she is in their window to be able to provide help  She confirmed that a referral from [employer] is necessary  She suggested that pt call [employer] [facility] and ask to s/w Jillian to provide her answers to any of her questions going forward  MSW reviewed all of the above with pt, she will call Jillian tomorrow  She found a rental online in [place] that is only $900 a month and is going to tour it later this week  MSW will remain available to help pt throughout her treatment time as I am able

## 2019-05-22 PROCEDURE — 77387 GUIDANCE FOR RADJ TX DLVR: CPT | Performed by: RADIOLOGY

## 2019-05-22 PROCEDURE — 77412 RADIATION TX DELIVERY LVL 3: CPT | Performed by: RADIOLOGY

## 2019-05-23 PROCEDURE — 77387 GUIDANCE FOR RADJ TX DLVR: CPT | Performed by: RADIOLOGY

## 2019-05-23 PROCEDURE — 77412 RADIATION TX DELIVERY LVL 3: CPT | Performed by: RADIOLOGY

## 2019-05-23 RX ORDER — PALONOSETRON 0.05 MG/ML
0.25 INJECTION, SOLUTION INTRAVENOUS ONCE
Status: CANCELLED | OUTPATIENT
Start: 2019-05-29

## 2019-05-23 RX ORDER — SODIUM CHLORIDE 9 MG/ML
20 INJECTION, SOLUTION INTRAVENOUS ONCE
Status: CANCELLED | OUTPATIENT
Start: 2019-05-29

## 2019-05-24 ENCOUNTER — APPOINTMENT (OUTPATIENT)
Dept: LAB | Facility: HOSPITAL | Age: 44
End: 2019-05-24
Attending: OBSTETRICS & GYNECOLOGY
Payer: COMMERCIAL

## 2019-05-24 DIAGNOSIS — C53.8 MALIGNANT NEOPLASM OF OVERLAPPING SITES OF CERVIX (HCC): ICD-10-CM

## 2019-05-24 LAB
ALBUMIN SERPL BCP-MCNC: 3.3 G/DL (ref 3.5–5)
ALP SERPL-CCNC: 47 U/L (ref 46–116)
ALT SERPL W P-5'-P-CCNC: 15 U/L (ref 12–78)
ANION GAP SERPL CALCULATED.3IONS-SCNC: 5 MMOL/L (ref 4–13)
AST SERPL W P-5'-P-CCNC: 8 U/L (ref 5–45)
BASOPHILS # BLD AUTO: 0.02 THOUSANDS/ΜL (ref 0–0.1)
BASOPHILS NFR BLD AUTO: 0 % (ref 0–1)
BILIRUB SERPL-MCNC: 0.7 MG/DL (ref 0.2–1)
BUN SERPL-MCNC: 13 MG/DL (ref 5–25)
CALCIUM SERPL-MCNC: 9.1 MG/DL (ref 8.3–10.1)
CHLORIDE SERPL-SCNC: 106 MMOL/L (ref 100–108)
CO2 SERPL-SCNC: 30 MMOL/L (ref 21–32)
CREAT SERPL-MCNC: 0.6 MG/DL (ref 0.6–1.3)
EOSINOPHIL # BLD AUTO: 0.11 THOUSAND/ΜL (ref 0–0.61)
EOSINOPHIL NFR BLD AUTO: 2 % (ref 0–6)
ERYTHROCYTE [DISTWIDTH] IN BLOOD BY AUTOMATED COUNT: 13 % (ref 11.6–15.1)
GFR SERPL CREATININE-BSD FRML MDRD: 113 ML/MIN/1.73SQ M
GLUCOSE SERPL-MCNC: 89 MG/DL (ref 65–140)
HCT VFR BLD AUTO: 35.6 % (ref 34.8–46.1)
HGB BLD-MCNC: 11.1 G/DL (ref 11.5–15.4)
IMM GRANULOCYTES # BLD AUTO: 0.03 THOUSAND/UL (ref 0–0.2)
IMM GRANULOCYTES NFR BLD AUTO: 1 % (ref 0–2)
LYMPHOCYTES # BLD AUTO: 0.63 THOUSANDS/ΜL (ref 0.6–4.47)
LYMPHOCYTES NFR BLD AUTO: 10 % (ref 14–44)
MAGNESIUM SERPL-MCNC: 2.1 MG/DL (ref 1.6–2.6)
MCH RBC QN AUTO: 26.9 PG (ref 26.8–34.3)
MCHC RBC AUTO-ENTMCNC: 31.2 G/DL (ref 31.4–37.4)
MCV RBC AUTO: 86 FL (ref 82–98)
MONOCYTES # BLD AUTO: 0.47 THOUSAND/ΜL (ref 0.17–1.22)
MONOCYTES NFR BLD AUTO: 8 % (ref 4–12)
NEUTROPHILS # BLD AUTO: 4.84 THOUSANDS/ΜL (ref 1.85–7.62)
NEUTS SEG NFR BLD AUTO: 79 % (ref 43–75)
NRBC BLD AUTO-RTO: 0 /100 WBCS
PLATELET # BLD AUTO: 197 THOUSANDS/UL (ref 149–390)
PMV BLD AUTO: 11.1 FL (ref 8.9–12.7)
POTASSIUM SERPL-SCNC: 3.6 MMOL/L (ref 3.5–5.3)
PROT SERPL-MCNC: 6.5 G/DL (ref 6.4–8.2)
RBC # BLD AUTO: 4.12 MILLION/UL (ref 3.81–5.12)
SODIUM SERPL-SCNC: 141 MMOL/L (ref 136–145)
WBC # BLD AUTO: 6.1 THOUSAND/UL (ref 4.31–10.16)

## 2019-05-24 PROCEDURE — 83735 ASSAY OF MAGNESIUM: CPT

## 2019-05-24 PROCEDURE — 80053 COMPREHEN METABOLIC PANEL: CPT

## 2019-05-24 PROCEDURE — 85025 COMPLETE CBC W/AUTO DIFF WBC: CPT

## 2019-05-24 PROCEDURE — 77336 RADIATION PHYSICS CONSULT: CPT | Performed by: RADIOLOGY

## 2019-05-24 PROCEDURE — 77412 RADIATION TX DELIVERY LVL 3: CPT | Performed by: RADIOLOGY

## 2019-05-24 PROCEDURE — 77387 GUIDANCE FOR RADJ TX DLVR: CPT | Performed by: RADIOLOGY

## 2019-05-24 PROCEDURE — 36415 COLL VENOUS BLD VENIPUNCTURE: CPT

## 2019-05-24 PROCEDURE — 77417 THER RADIOLOGY PORT IMAGE(S): CPT | Performed by: RADIOLOGY

## 2019-05-28 PROCEDURE — 77412 RADIATION TX DELIVERY LVL 3: CPT | Performed by: RADIOLOGY

## 2019-05-28 PROCEDURE — 77387 GUIDANCE FOR RADJ TX DLVR: CPT | Performed by: RADIOLOGY

## 2019-05-29 ENCOUNTER — HOSPITAL ENCOUNTER (OUTPATIENT)
Dept: INFUSION CENTER | Facility: CLINIC | Age: 44
Discharge: HOME | End: 2019-05-29
Payer: COMMERCIAL

## 2019-05-29 VITALS
TEMPERATURE: 98.3 F | RESPIRATION RATE: 16 BRPM | HEIGHT: 62 IN | SYSTOLIC BLOOD PRESSURE: 154 MMHG | WEIGHT: 125.66 LBS | DIASTOLIC BLOOD PRESSURE: 80 MMHG | BODY MASS INDEX: 23.12 KG/M2 | HEART RATE: 69 BPM

## 2019-05-29 DIAGNOSIS — C53.8 MALIGNANT NEOPLASM OF OVERLAPPING SITES OF CERVIX (HCC): Primary | ICD-10-CM

## 2019-05-29 PROCEDURE — 77412 RADIATION TX DELIVERY LVL 3: CPT | Performed by: RADIOLOGY

## 2019-05-29 PROCEDURE — 96413 CHEMO IV INFUSION 1 HR: CPT

## 2019-05-29 PROCEDURE — 77387 GUIDANCE FOR RADJ TX DLVR: CPT | Performed by: RADIOLOGY

## 2019-05-29 PROCEDURE — 96361 HYDRATE IV INFUSION ADD-ON: CPT

## 2019-05-29 PROCEDURE — 96367 TX/PROPH/DG ADDL SEQ IV INF: CPT

## 2019-05-29 PROCEDURE — 96375 TX/PRO/DX INJ NEW DRUG ADDON: CPT

## 2019-05-29 RX ORDER — SODIUM CHLORIDE 9 MG/ML
20 INJECTION, SOLUTION INTRAVENOUS ONCE
Status: COMPLETED | OUTPATIENT
Start: 2019-05-29 | End: 2019-05-29

## 2019-05-29 RX ORDER — PALONOSETRON 0.05 MG/ML
0.25 INJECTION, SOLUTION INTRAVENOUS ONCE
Status: COMPLETED | OUTPATIENT
Start: 2019-05-29 | End: 2019-05-29

## 2019-05-29 RX ADMIN — SODIUM CHLORIDE 150 MG: 0.9 INJECTION, SOLUTION INTRAVENOUS at 11:31

## 2019-05-29 RX ADMIN — SODIUM CHLORIDE 1000 ML: 0.9 INJECTION, SOLUTION INTRAVENOUS at 09:56

## 2019-05-29 RX ADMIN — PALONOSETRON 0.25 MG: 0.05 INJECTION, SOLUTION INTRAVENOUS at 11:04

## 2019-05-29 RX ADMIN — DEXAMETHASONE SODIUM PHOSPHATE 20 MG: 10 INJECTION, SOLUTION INTRAMUSCULAR; INTRAVENOUS at 11:10

## 2019-05-29 RX ADMIN — SODIUM CHLORIDE 20 ML/HR: 0.9 INJECTION, SOLUTION INTRAVENOUS at 09:53

## 2019-05-29 RX ADMIN — CISPLATIN 62.4 MG: 1 INJECTION INTRAVENOUS at 12:28

## 2019-05-29 RX ADMIN — SODIUM CHLORIDE 1000 ML: 0.9 INJECTION, SOLUTION INTRAVENOUS at 13:39

## 2019-05-29 NOTE — PROGRESS NOTES
Pt tolerated chemotherapy infusion well without incident  Offers no complaints  Discharged in stable condition and pt aware of next appointment  AVS provided

## 2019-05-30 ENCOUNTER — DOCUMENTATION (OUTPATIENT)
Dept: INFUSION CENTER | Facility: CLINIC | Age: 44
End: 2019-05-30

## 2019-05-30 PROCEDURE — 77412 RADIATION TX DELIVERY LVL 3: CPT | Performed by: RADIOLOGY

## 2019-05-30 PROCEDURE — 77387 GUIDANCE FOR RADJ TX DLVR: CPT | Performed by: RADIOLOGY

## 2019-05-31 ENCOUNTER — APPOINTMENT (OUTPATIENT)
Dept: LAB | Facility: HOSPITAL | Age: 44
End: 2019-05-31
Attending: OBSTETRICS & GYNECOLOGY
Payer: COMMERCIAL

## 2019-05-31 DIAGNOSIS — C53.8 MALIGNANT NEOPLASM OF OVERLAPPING SITES OF CERVIX (HCC): ICD-10-CM

## 2019-05-31 LAB
ALBUMIN SERPL BCP-MCNC: 3.5 G/DL (ref 3.5–5)
ALP SERPL-CCNC: 43 U/L (ref 46–116)
ALT SERPL W P-5'-P-CCNC: 25 U/L (ref 12–78)
ANION GAP SERPL CALCULATED.3IONS-SCNC: 10 MMOL/L (ref 4–13)
AST SERPL W P-5'-P-CCNC: 15 U/L (ref 5–45)
BASOPHILS # BLD AUTO: 0.02 THOUSANDS/ΜL (ref 0–0.1)
BASOPHILS NFR BLD AUTO: 0 % (ref 0–1)
BILIRUB SERPL-MCNC: 0.4 MG/DL (ref 0.2–1)
BUN SERPL-MCNC: 17 MG/DL (ref 5–25)
CALCIUM SERPL-MCNC: 9 MG/DL (ref 8.3–10.1)
CHLORIDE SERPL-SCNC: 101 MMOL/L (ref 100–108)
CO2 SERPL-SCNC: 26 MMOL/L (ref 21–32)
CREAT SERPL-MCNC: 0.63 MG/DL (ref 0.6–1.3)
EOSINOPHIL # BLD AUTO: 0.06 THOUSAND/ΜL (ref 0–0.61)
EOSINOPHIL NFR BLD AUTO: 1 % (ref 0–6)
ERYTHROCYTE [DISTWIDTH] IN BLOOD BY AUTOMATED COUNT: 14.1 % (ref 11.6–15.1)
GFR SERPL CREATININE-BSD FRML MDRD: 111 ML/MIN/1.73SQ M
GLUCOSE P FAST SERPL-MCNC: 78 MG/DL (ref 65–99)
HCT VFR BLD AUTO: 34.8 % (ref 34.8–46.1)
HGB BLD-MCNC: 10.9 G/DL (ref 11.5–15.4)
IMM GRANULOCYTES # BLD AUTO: 0.04 THOUSAND/UL (ref 0–0.2)
IMM GRANULOCYTES NFR BLD AUTO: 1 % (ref 0–2)
LYMPHOCYTES # BLD AUTO: 0.45 THOUSANDS/ΜL (ref 0.6–4.47)
LYMPHOCYTES NFR BLD AUTO: 10 % (ref 14–44)
MCH RBC QN AUTO: 27.3 PG (ref 26.8–34.3)
MCHC RBC AUTO-ENTMCNC: 31.3 G/DL (ref 31.4–37.4)
MCV RBC AUTO: 87 FL (ref 82–98)
MONOCYTES # BLD AUTO: 0.6 THOUSAND/ΜL (ref 0.17–1.22)
MONOCYTES NFR BLD AUTO: 13 % (ref 4–12)
NEUTROPHILS # BLD AUTO: 3.49 THOUSANDS/ΜL (ref 1.85–7.62)
NEUTS SEG NFR BLD AUTO: 75 % (ref 43–75)
NRBC BLD AUTO-RTO: 0 /100 WBCS
PLATELET # BLD AUTO: 136 THOUSANDS/UL (ref 149–390)
PMV BLD AUTO: 11.7 FL (ref 8.9–12.7)
POTASSIUM SERPL-SCNC: 3.5 MMOL/L (ref 3.5–5.3)
PROT SERPL-MCNC: 6.4 G/DL (ref 6.4–8.2)
RBC # BLD AUTO: 4 MILLION/UL (ref 3.81–5.12)
SODIUM SERPL-SCNC: 137 MMOL/L (ref 136–145)
WBC # BLD AUTO: 4.66 THOUSAND/UL (ref 4.31–10.16)

## 2019-05-31 PROCEDURE — 85025 COMPLETE CBC W/AUTO DIFF WBC: CPT

## 2019-05-31 PROCEDURE — 36415 COLL VENOUS BLD VENIPUNCTURE: CPT

## 2019-05-31 PROCEDURE — 77412 RADIATION TX DELIVERY LVL 3: CPT | Performed by: RADIOLOGY

## 2019-05-31 PROCEDURE — 77387 GUIDANCE FOR RADJ TX DLVR: CPT | Performed by: RADIOLOGY

## 2019-05-31 PROCEDURE — 80053 COMPREHEN METABOLIC PANEL: CPT

## 2019-05-31 RX ORDER — SODIUM CHLORIDE 9 MG/ML
20 INJECTION, SOLUTION INTRAVENOUS ONCE
Status: CANCELLED | OUTPATIENT
Start: 2019-06-04

## 2019-05-31 RX ORDER — PALONOSETRON 0.05 MG/ML
0.25 INJECTION, SOLUTION INTRAVENOUS ONCE
Status: CANCELLED | OUTPATIENT
Start: 2019-06-04

## 2019-06-03 ENCOUNTER — APPOINTMENT (OUTPATIENT)
Dept: RADIATION ONCOLOGY | Facility: CLINIC | Age: 44
End: 2019-06-03
Attending: RADIOLOGY
Payer: COMMERCIAL

## 2019-06-03 ENCOUNTER — OFFICE VISIT (OUTPATIENT)
Dept: GYNECOLOGIC ONCOLOGY | Facility: CLINIC | Age: 44
End: 2019-06-03
Payer: COMMERCIAL

## 2019-06-03 VITALS
TEMPERATURE: 98.5 F | HEART RATE: 58 BPM | BODY MASS INDEX: 23 KG/M2 | WEIGHT: 125 LBS | DIASTOLIC BLOOD PRESSURE: 72 MMHG | RESPIRATION RATE: 16 BRPM | HEIGHT: 62 IN | SYSTOLIC BLOOD PRESSURE: 116 MMHG

## 2019-06-03 DIAGNOSIS — C53.8 MALIGNANT NEOPLASM OF OVERLAPPING SITES OF CERVIX (HCC): Primary | ICD-10-CM

## 2019-06-03 PROBLEM — N93.9 VAGINAL BLEEDING: Status: RESOLVED | Noted: 2019-04-26 | Resolved: 2019-06-03

## 2019-06-03 PROBLEM — R82.90 ABNORMAL URINE: Status: RESOLVED | Noted: 2019-04-27 | Resolved: 2019-06-03

## 2019-06-03 PROBLEM — N88.8 CERVICAL MASS: Status: RESOLVED | Noted: 2019-04-28 | Resolved: 2019-06-03

## 2019-06-03 PROBLEM — I10 HIGH BLOOD PRESSURE: Status: RESOLVED | Noted: 2019-04-26 | Resolved: 2019-06-03

## 2019-06-03 PROBLEM — E87.6 HYPOKALEMIA: Status: RESOLVED | Noted: 2019-04-26 | Resolved: 2019-06-03

## 2019-06-03 PROCEDURE — 99214 OFFICE O/P EST MOD 30 MIN: CPT | Performed by: OBSTETRICS & GYNECOLOGY

## 2019-06-03 PROCEDURE — 77412 RADIATION TX DELIVERY LVL 3: CPT | Performed by: RADIOLOGY

## 2019-06-03 PROCEDURE — 77336 RADIATION PHYSICS CONSULT: CPT | Performed by: RADIOLOGY

## 2019-06-03 PROCEDURE — 77387 GUIDANCE FOR RADJ TX DLVR: CPT | Performed by: RADIOLOGY

## 2019-06-03 PROCEDURE — 77417 THER RADIOLOGY PORT IMAGE(S): CPT | Performed by: RADIOLOGY

## 2019-06-03 RX ORDER — GABAPENTIN 100 MG/1
100 CAPSULE ORAL ONCE
Status: CANCELLED | OUTPATIENT
Start: 2019-06-03 | End: 2019-06-03

## 2019-06-03 RX ORDER — ACETAMINOPHEN 325 MG/1
975 TABLET ORAL ONCE
Status: CANCELLED | OUTPATIENT
Start: 2019-06-03 | End: 2019-06-03

## 2019-06-03 RX ORDER — HEPARIN SODIUM 5000 [USP'U]/ML
5000 INJECTION, SOLUTION INTRAVENOUS; SUBCUTANEOUS
Status: CANCELLED | OUTPATIENT
Start: 2019-06-03 | End: 2019-06-04

## 2019-06-04 ENCOUNTER — APPOINTMENT (OUTPATIENT)
Dept: RADIATION ONCOLOGY | Facility: CLINIC | Age: 44
End: 2019-06-04
Attending: RADIOLOGY
Payer: COMMERCIAL

## 2019-06-04 ENCOUNTER — HOSPITAL ENCOUNTER (OUTPATIENT)
Dept: INFUSION CENTER | Facility: CLINIC | Age: 44
Discharge: HOME | End: 2019-06-04
Payer: COMMERCIAL

## 2019-06-04 VITALS
TEMPERATURE: 98 F | SYSTOLIC BLOOD PRESSURE: 150 MMHG | HEART RATE: 73 BPM | BODY MASS INDEX: 23.45 KG/M2 | HEIGHT: 62 IN | WEIGHT: 127.43 LBS | RESPIRATION RATE: 18 BRPM | DIASTOLIC BLOOD PRESSURE: 86 MMHG

## 2019-06-04 DIAGNOSIS — C53.8 MALIGNANT NEOPLASM OF OVERLAPPING SITES OF CERVIX (HCC): Primary | ICD-10-CM

## 2019-06-04 LAB — MAGNESIUM SERPL-MCNC: 2.2 MG/DL (ref 1.6–2.6)

## 2019-06-04 PROCEDURE — 96367 TX/PROPH/DG ADDL SEQ IV INF: CPT

## 2019-06-04 PROCEDURE — 77387 GUIDANCE FOR RADJ TX DLVR: CPT | Performed by: RADIOLOGY

## 2019-06-04 PROCEDURE — 83735 ASSAY OF MAGNESIUM: CPT | Performed by: OBSTETRICS & GYNECOLOGY

## 2019-06-04 PROCEDURE — 96375 TX/PRO/DX INJ NEW DRUG ADDON: CPT

## 2019-06-04 PROCEDURE — 96361 HYDRATE IV INFUSION ADD-ON: CPT

## 2019-06-04 PROCEDURE — 77412 RADIATION TX DELIVERY LVL 3: CPT | Performed by: RADIOLOGY

## 2019-06-04 PROCEDURE — 96413 CHEMO IV INFUSION 1 HR: CPT

## 2019-06-04 RX ORDER — PALONOSETRON 0.05 MG/ML
0.25 INJECTION, SOLUTION INTRAVENOUS ONCE
Status: COMPLETED | OUTPATIENT
Start: 2019-06-04 | End: 2019-06-04

## 2019-06-04 RX ORDER — SODIUM CHLORIDE 9 MG/ML
20 INJECTION, SOLUTION INTRAVENOUS ONCE
Status: COMPLETED | OUTPATIENT
Start: 2019-06-04 | End: 2019-06-04

## 2019-06-04 RX ADMIN — SODIUM CHLORIDE 20 ML/HR: 0.9 INJECTION, SOLUTION INTRAVENOUS at 10:06

## 2019-06-04 RX ADMIN — DEXAMETHASONE SODIUM PHOSPHATE 20 MG: 10 INJECTION, SOLUTION INTRAMUSCULAR; INTRAVENOUS at 11:03

## 2019-06-04 RX ADMIN — SODIUM CHLORIDE 150 MG: 0.9 INJECTION, SOLUTION INTRAVENOUS at 11:30

## 2019-06-04 RX ADMIN — CISPLATIN 62.4 MG: 1 INJECTION INTRAVENOUS at 12:11

## 2019-06-04 RX ADMIN — PALONOSETRON 0.25 MG: 0.05 INJECTION, SOLUTION INTRAVENOUS at 11:25

## 2019-06-04 RX ADMIN — SODIUM CHLORIDE 1000 ML: 0.9 INJECTION, SOLUTION INTRAVENOUS at 13:23

## 2019-06-04 RX ADMIN — SODIUM CHLORIDE 1000 ML: 0.9 INJECTION, SOLUTION INTRAVENOUS at 10:06

## 2019-06-04 NOTE — SOCIAL WORK
MARA met with pt at her infusion appointment today to follow up on yesterday's visit to [facility]  Pt reports that she is going to have her first treatment on 6/20 in [place], and she needs to be there at [place]   MSW had previously contacted [address] [place] [address] transport who stated that they have drivers available for early transports like this, and getting pt to [place] should not be an issue  Pt is very happy to hear that  She says that she does have family in the [place] area, however staying with them would be uncomfortable and she doesn't have a way to get there and back as her car is unreliable and she cannot afford the gas at this time  She says that she plans on having her son spend time with family in [place] while she is doing her treatments at [place], as that takes one thing off of her plate  She is meeting with [facility] tomorrow after her radiation tx to discuss rental homes in her price range  She has one place she would like to call today and see if the landlord could come down on the rent somewhat  She is hopeful that he will, because her other option is a home that she says she would be responsible for replacing the appliances if they break  Overall, she says that she is stressed but physically she feels ok  She is hopeful that her home situation will stabilize and she can focus more on herself and her treatments and recovery  MARA will remain available to pt throughout her treatment time as needed

## 2019-06-04 NOTE — PROGRESS NOTES
Pt offers no complaints and tolerated chemotherapy infusion well without incident  Discharged in stable condition and is aware of next appointment  AVS provided

## 2019-06-05 ENCOUNTER — APPOINTMENT (OUTPATIENT)
Dept: RADIATION ONCOLOGY | Facility: CLINIC | Age: 44
End: 2019-06-05
Attending: RADIOLOGY
Payer: COMMERCIAL

## 2019-06-05 PROCEDURE — 77412 RADIATION TX DELIVERY LVL 3: CPT | Performed by: RADIOLOGY

## 2019-06-05 PROCEDURE — 77387 GUIDANCE FOR RADJ TX DLVR: CPT | Performed by: RADIOLOGY

## 2019-06-06 ENCOUNTER — APPOINTMENT (OUTPATIENT)
Dept: RADIATION ONCOLOGY | Facility: CLINIC | Age: 44
End: 2019-06-06
Attending: RADIOLOGY
Payer: COMMERCIAL

## 2019-06-06 PROCEDURE — 77387 GUIDANCE FOR RADJ TX DLVR: CPT | Performed by: RADIOLOGY

## 2019-06-06 PROCEDURE — 77412 RADIATION TX DELIVERY LVL 3: CPT | Performed by: RADIOLOGY

## 2019-06-07 ENCOUNTER — TRANSCRIBE ORDERS (OUTPATIENT)
Dept: RADIATION ONCOLOGY | Facility: HOSPITAL | Age: 44
End: 2019-06-07

## 2019-06-07 ENCOUNTER — APPOINTMENT (OUTPATIENT)
Dept: RADIATION ONCOLOGY | Facility: CLINIC | Age: 44
End: 2019-06-07
Attending: RADIOLOGY
Payer: COMMERCIAL

## 2019-06-07 ENCOUNTER — APPOINTMENT (OUTPATIENT)
Dept: LAB | Facility: HOSPITAL | Age: 44
End: 2019-06-07
Attending: OBSTETRICS & GYNECOLOGY
Payer: COMMERCIAL

## 2019-06-07 DIAGNOSIS — C53.8 MALIGNANT NEOPLASM OVERLAPPING CERVIX UTERI SITE (HCC): Primary | ICD-10-CM

## 2019-06-07 DIAGNOSIS — C53.8 MALIGNANT NEOPLASM OF OVERLAPPING SITES OF CERVIX (HCC): ICD-10-CM

## 2019-06-07 DIAGNOSIS — C53.8 MALIGNANT NEOPLASM OF OVERLAPPING SITES OF CERVIX (HCC): Primary | ICD-10-CM

## 2019-06-07 LAB
ALBUMIN SERPL BCP-MCNC: 3.7 G/DL (ref 3.5–5)
ALP SERPL-CCNC: 46 U/L (ref 46–116)
ALT SERPL W P-5'-P-CCNC: 16 U/L (ref 12–78)
ANION GAP SERPL CALCULATED.3IONS-SCNC: 8 MMOL/L (ref 4–13)
AST SERPL W P-5'-P-CCNC: 15 U/L (ref 5–45)
BASOPHILS # BLD AUTO: 0.02 THOUSANDS/ΜL (ref 0–0.1)
BASOPHILS NFR BLD AUTO: 1 % (ref 0–1)
BILIRUB SERPL-MCNC: 0.6 MG/DL (ref 0.2–1)
BUN SERPL-MCNC: 13 MG/DL (ref 5–25)
CALCIUM SERPL-MCNC: 9.4 MG/DL (ref 8.3–10.1)
CHLORIDE SERPL-SCNC: 101 MMOL/L (ref 100–108)
CO2 SERPL-SCNC: 28 MMOL/L (ref 21–32)
CREAT SERPL-MCNC: 0.61 MG/DL (ref 0.6–1.3)
EOSINOPHIL # BLD AUTO: 0.32 THOUSAND/ΜL (ref 0–0.61)
EOSINOPHIL NFR BLD AUTO: 9 % (ref 0–6)
ERYTHROCYTE [DISTWIDTH] IN BLOOD BY AUTOMATED COUNT: 14.6 % (ref 11.6–15.1)
GFR SERPL CREATININE-BSD FRML MDRD: 112 ML/MIN/1.73SQ M
GLUCOSE SERPL-MCNC: 77 MG/DL (ref 65–140)
HCT VFR BLD AUTO: 38.9 % (ref 34.8–46.1)
HGB BLD-MCNC: 12.4 G/DL (ref 11.5–15.4)
IMM GRANULOCYTES # BLD AUTO: 0.03 THOUSAND/UL (ref 0–0.2)
IMM GRANULOCYTES NFR BLD AUTO: 1 % (ref 0–2)
LYMPHOCYTES # BLD AUTO: 0.3 THOUSANDS/ΜL (ref 0.6–4.47)
LYMPHOCYTES NFR BLD AUTO: 8 % (ref 14–44)
MAGNESIUM SERPL-MCNC: 2 MG/DL (ref 1.6–2.6)
MCH RBC QN AUTO: 27.8 PG (ref 26.8–34.3)
MCHC RBC AUTO-ENTMCNC: 31.9 G/DL (ref 31.4–37.4)
MCV RBC AUTO: 87 FL (ref 82–98)
MONOCYTES # BLD AUTO: 0.5 THOUSAND/ΜL (ref 0.17–1.22)
MONOCYTES NFR BLD AUTO: 14 % (ref 4–12)
NEUTROPHILS # BLD AUTO: 2.53 THOUSANDS/ΜL (ref 1.85–7.62)
NEUTS SEG NFR BLD AUTO: 67 % (ref 43–75)
NRBC BLD AUTO-RTO: 0 /100 WBCS
PLATELET # BLD AUTO: 163 THOUSANDS/UL (ref 149–390)
PMV BLD AUTO: 11.1 FL (ref 8.9–12.7)
POTASSIUM SERPL-SCNC: 3.9 MMOL/L (ref 3.5–5.3)
PROT SERPL-MCNC: 6.6 G/DL (ref 6.4–8.2)
RBC # BLD AUTO: 4.46 MILLION/UL (ref 3.81–5.12)
SODIUM SERPL-SCNC: 137 MMOL/L (ref 136–145)
WBC # BLD AUTO: 3.7 THOUSAND/UL (ref 4.31–10.16)

## 2019-06-07 PROCEDURE — 77387 GUIDANCE FOR RADJ TX DLVR: CPT | Performed by: RADIOLOGY

## 2019-06-07 PROCEDURE — 77412 RADIATION TX DELIVERY LVL 3: CPT | Performed by: RADIOLOGY

## 2019-06-07 PROCEDURE — 36415 COLL VENOUS BLD VENIPUNCTURE: CPT

## 2019-06-07 PROCEDURE — 85025 COMPLETE CBC W/AUTO DIFF WBC: CPT

## 2019-06-07 PROCEDURE — 83735 ASSAY OF MAGNESIUM: CPT

## 2019-06-07 PROCEDURE — 80053 COMPREHEN METABOLIC PANEL: CPT

## 2019-06-07 RX ORDER — SODIUM CHLORIDE 9 MG/ML
20 INJECTION, SOLUTION INTRAVENOUS ONCE
Status: CANCELLED | OUTPATIENT
Start: 2019-06-11

## 2019-06-07 RX ORDER — PALONOSETRON 0.05 MG/ML
0.25 INJECTION, SOLUTION INTRAVENOUS ONCE
Status: CANCELLED | OUTPATIENT
Start: 2019-06-11

## 2019-06-10 ENCOUNTER — APPOINTMENT (OUTPATIENT)
Dept: RADIATION ONCOLOGY | Facility: CLINIC | Age: 44
End: 2019-06-10
Attending: RADIOLOGY
Payer: COMMERCIAL

## 2019-06-10 PROCEDURE — 77412 RADIATION TX DELIVERY LVL 3: CPT | Performed by: RADIOLOGY

## 2019-06-10 PROCEDURE — 77417 THER RADIOLOGY PORT IMAGE(S): CPT | Performed by: RADIOLOGY

## 2019-06-10 PROCEDURE — 77387 GUIDANCE FOR RADJ TX DLVR: CPT | Performed by: RADIOLOGY

## 2019-06-10 PROCEDURE — 77336 RADIATION PHYSICS CONSULT: CPT | Performed by: RADIOLOGY

## 2019-06-11 ENCOUNTER — APPOINTMENT (OUTPATIENT)
Dept: RADIATION ONCOLOGY | Facility: CLINIC | Age: 44
End: 2019-06-11
Attending: RADIOLOGY
Payer: COMMERCIAL

## 2019-06-11 ENCOUNTER — HOSPITAL ENCOUNTER (OUTPATIENT)
Dept: INFUSION CENTER | Facility: CLINIC | Age: 44
Discharge: HOME | End: 2019-06-11
Payer: COMMERCIAL

## 2019-06-11 VITALS
HEART RATE: 65 BPM | TEMPERATURE: 98.6 F | SYSTOLIC BLOOD PRESSURE: 142 MMHG | RESPIRATION RATE: 18 BRPM | WEIGHT: 125.66 LBS | DIASTOLIC BLOOD PRESSURE: 88 MMHG | BODY MASS INDEX: 23.12 KG/M2 | HEIGHT: 62 IN

## 2019-06-11 DIAGNOSIS — C53.8 MALIGNANT NEOPLASM OF OVERLAPPING SITES OF CERVIX (HCC): Primary | ICD-10-CM

## 2019-06-11 PROCEDURE — 96413 CHEMO IV INFUSION 1 HR: CPT

## 2019-06-11 PROCEDURE — 77387 GUIDANCE FOR RADJ TX DLVR: CPT | Performed by: RADIOLOGY

## 2019-06-11 PROCEDURE — 96375 TX/PRO/DX INJ NEW DRUG ADDON: CPT

## 2019-06-11 PROCEDURE — 96367 TX/PROPH/DG ADDL SEQ IV INF: CPT

## 2019-06-11 PROCEDURE — 77412 RADIATION TX DELIVERY LVL 3: CPT | Performed by: RADIOLOGY

## 2019-06-11 RX ORDER — SODIUM CHLORIDE 9 MG/ML
20 INJECTION, SOLUTION INTRAVENOUS ONCE
Status: COMPLETED | OUTPATIENT
Start: 2019-06-11 | End: 2019-06-11

## 2019-06-11 RX ORDER — PALONOSETRON 0.05 MG/ML
0.25 INJECTION, SOLUTION INTRAVENOUS ONCE
Status: COMPLETED | OUTPATIENT
Start: 2019-06-11 | End: 2019-06-11

## 2019-06-11 RX ADMIN — SODIUM CHLORIDE 1000 ML: 0.9 INJECTION, SOLUTION INTRAVENOUS at 10:27

## 2019-06-11 RX ADMIN — SODIUM CHLORIDE 1000 ML: 0.9 INJECTION, SOLUTION INTRAVENOUS at 13:11

## 2019-06-11 RX ADMIN — SODIUM CHLORIDE 20 ML/HR: 0.9 INJECTION, SOLUTION INTRAVENOUS at 10:27

## 2019-06-11 RX ADMIN — CISPLATIN 62.4 MG: 1 INJECTION INTRAVENOUS at 11:59

## 2019-06-11 RX ADMIN — DEXAMETHASONE SODIUM PHOSPHATE 20 MG: 10 INJECTION, SOLUTION INTRAMUSCULAR; INTRAVENOUS at 10:40

## 2019-06-11 RX ADMIN — SODIUM CHLORIDE 150 MG: 0.9 INJECTION, SOLUTION INTRAVENOUS at 11:17

## 2019-06-11 RX ADMIN — PALONOSETRON 0.25 MG: 0.05 INJECTION, SOLUTION INTRAVENOUS at 11:13

## 2019-06-11 NOTE — PROGRESS NOTES
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

## 2019-06-12 ENCOUNTER — APPOINTMENT (OUTPATIENT)
Dept: RADIATION ONCOLOGY | Facility: CLINIC | Age: 44
End: 2019-06-12
Attending: RADIOLOGY
Payer: COMMERCIAL

## 2019-06-12 ENCOUNTER — TRANSCRIBE ORDERS (OUTPATIENT)
Dept: RADIATION ONCOLOGY | Facility: HOSPITAL | Age: 44
End: 2019-06-12

## 2019-06-12 ENCOUNTER — OFFICE VISIT (OUTPATIENT)
Dept: GYNECOLOGIC ONCOLOGY | Facility: CLINIC | Age: 44
End: 2019-06-12
Payer: COMMERCIAL

## 2019-06-12 VITALS
HEART RATE: 66 BPM | SYSTOLIC BLOOD PRESSURE: 124 MMHG | RESPIRATION RATE: 18 BRPM | HEIGHT: 62 IN | WEIGHT: 131 LBS | BODY MASS INDEX: 24.11 KG/M2 | TEMPERATURE: 98.9 F | DIASTOLIC BLOOD PRESSURE: 94 MMHG

## 2019-06-12 DIAGNOSIS — C53.8 MALIGNANT NEOPLASM OF OVERLAPPING SITES OF CERVIX (HCC): Primary | ICD-10-CM

## 2019-06-12 DIAGNOSIS — C53.8 MALIGNANT NEOPLASM OF OVERLAPPING SITES OF CERVIX UTERI (HCC): Primary | ICD-10-CM

## 2019-06-12 PROCEDURE — 77412 RADIATION TX DELIVERY LVL 3: CPT | Performed by: RADIOLOGY

## 2019-06-12 PROCEDURE — 99214 OFFICE O/P EST MOD 30 MIN: CPT | Performed by: OBSTETRICS & GYNECOLOGY

## 2019-06-12 PROCEDURE — 77387 GUIDANCE FOR RADJ TX DLVR: CPT | Performed by: RADIOLOGY

## 2019-06-13 ENCOUNTER — APPOINTMENT (OUTPATIENT)
Dept: RADIATION ONCOLOGY | Facility: CLINIC | Age: 44
End: 2019-06-13
Attending: RADIOLOGY
Payer: COMMERCIAL

## 2019-06-13 PROCEDURE — 77387 GUIDANCE FOR RADJ TX DLVR: CPT | Performed by: RADIOLOGY

## 2019-06-13 PROCEDURE — 77412 RADIATION TX DELIVERY LVL 3: CPT | Performed by: RADIOLOGY

## 2019-06-13 RX ORDER — SODIUM CHLORIDE 9 MG/ML
20 INJECTION, SOLUTION INTRAVENOUS ONCE
Status: CANCELLED | OUTPATIENT
Start: 2019-06-18

## 2019-06-13 RX ORDER — PALONOSETRON 0.05 MG/ML
0.25 INJECTION, SOLUTION INTRAVENOUS ONCE
Status: CANCELLED | OUTPATIENT
Start: 2019-06-18

## 2019-06-14 ENCOUNTER — APPOINTMENT (OUTPATIENT)
Dept: RADIATION ONCOLOGY | Facility: CLINIC | Age: 44
End: 2019-06-14
Attending: RADIOLOGY
Payer: COMMERCIAL

## 2019-06-14 ENCOUNTER — APPOINTMENT (OUTPATIENT)
Dept: LAB | Facility: HOSPITAL | Age: 44
End: 2019-06-14
Attending: OBSTETRICS & GYNECOLOGY
Payer: COMMERCIAL

## 2019-06-14 DIAGNOSIS — C53.8 MALIGNANT NEOPLASM OF OVERLAPPING SITES OF CERVIX (HCC): ICD-10-CM

## 2019-06-14 LAB
ALBUMIN SERPL BCP-MCNC: 3.6 G/DL (ref 3.5–5)
ALP SERPL-CCNC: 48 U/L (ref 46–116)
ALT SERPL W P-5'-P-CCNC: 16 U/L (ref 12–78)
ANION GAP SERPL CALCULATED.3IONS-SCNC: 8 MMOL/L (ref 4–13)
AST SERPL W P-5'-P-CCNC: <5 U/L (ref 5–45)
BASOPHILS # BLD AUTO: 0.03 THOUSANDS/ΜL (ref 0–0.1)
BASOPHILS NFR BLD AUTO: 1 % (ref 0–1)
BILIRUB SERPL-MCNC: 0.8 MG/DL (ref 0.2–1)
BUN SERPL-MCNC: 12 MG/DL (ref 5–25)
CALCIUM SERPL-MCNC: 9.1 MG/DL (ref 8.3–10.1)
CHLORIDE SERPL-SCNC: 100 MMOL/L (ref 100–108)
CO2 SERPL-SCNC: 29 MMOL/L (ref 21–32)
CREAT SERPL-MCNC: 0.62 MG/DL (ref 0.6–1.3)
EOSINOPHIL # BLD AUTO: 0.42 THOUSAND/ΜL (ref 0–0.61)
EOSINOPHIL NFR BLD AUTO: 11 % (ref 0–6)
ERYTHROCYTE [DISTWIDTH] IN BLOOD BY AUTOMATED COUNT: 15.4 % (ref 11.6–15.1)
GFR SERPL CREATININE-BSD FRML MDRD: 112 ML/MIN/1.73SQ M
GLUCOSE P FAST SERPL-MCNC: 87 MG/DL (ref 65–99)
HCT VFR BLD AUTO: 36.6 % (ref 34.8–46.1)
HGB BLD-MCNC: 11.9 G/DL (ref 11.5–15.4)
IMM GRANULOCYTES # BLD AUTO: 0.01 THOUSAND/UL (ref 0–0.2)
IMM GRANULOCYTES NFR BLD AUTO: 0 % (ref 0–2)
LYMPHOCYTES # BLD AUTO: 0.27 THOUSANDS/ΜL (ref 0.6–4.47)
LYMPHOCYTES NFR BLD AUTO: 7 % (ref 14–44)
MAGNESIUM SERPL-MCNC: 1.9 MG/DL (ref 1.6–2.6)
MCH RBC QN AUTO: 28.4 PG (ref 26.8–34.3)
MCHC RBC AUTO-ENTMCNC: 32.5 G/DL (ref 31.4–37.4)
MCV RBC AUTO: 87 FL (ref 82–98)
MONOCYTES # BLD AUTO: 0.57 THOUSAND/ΜL (ref 0.17–1.22)
MONOCYTES NFR BLD AUTO: 14 % (ref 4–12)
NEUTROPHILS # BLD AUTO: 2.7 THOUSANDS/ΜL (ref 1.85–7.62)
NEUTS SEG NFR BLD AUTO: 67 % (ref 43–75)
NRBC BLD AUTO-RTO: 0 /100 WBCS
PLATELET # BLD AUTO: 130 THOUSANDS/UL (ref 149–390)
PMV BLD AUTO: 9.9 FL (ref 8.9–12.7)
POTASSIUM SERPL-SCNC: 3.5 MMOL/L (ref 3.5–5.3)
PROT SERPL-MCNC: 6.7 G/DL (ref 6.4–8.2)
RBC # BLD AUTO: 4.19 MILLION/UL (ref 3.81–5.12)
SODIUM SERPL-SCNC: 137 MMOL/L (ref 136–145)
WBC # BLD AUTO: 4 THOUSAND/UL (ref 4.31–10.16)

## 2019-06-14 PROCEDURE — 83735 ASSAY OF MAGNESIUM: CPT

## 2019-06-14 PROCEDURE — 80053 COMPREHEN METABOLIC PANEL: CPT

## 2019-06-14 PROCEDURE — 77334 RADIATION TREATMENT AID(S): CPT | Performed by: RADIOLOGY

## 2019-06-14 PROCEDURE — 77387 GUIDANCE FOR RADJ TX DLVR: CPT | Performed by: RADIOLOGY

## 2019-06-14 PROCEDURE — 77412 RADIATION TX DELIVERY LVL 3: CPT | Performed by: RADIOLOGY

## 2019-06-14 PROCEDURE — 77307 TELETHX ISODOSE PLAN CPLX: CPT | Performed by: RADIOLOGY

## 2019-06-14 PROCEDURE — 36415 COLL VENOUS BLD VENIPUNCTURE: CPT

## 2019-06-14 PROCEDURE — 85025 COMPLETE CBC W/AUTO DIFF WBC: CPT

## 2019-06-17 ENCOUNTER — APPOINTMENT (OUTPATIENT)
Dept: RADIATION ONCOLOGY | Facility: CLINIC | Age: 44
End: 2019-06-17
Attending: RADIOLOGY
Payer: COMMERCIAL

## 2019-06-17 ENCOUNTER — DOCUMENTATION (OUTPATIENT)
Dept: INFUSION CENTER | Facility: HOSPITAL | Age: 44
End: 2019-06-17

## 2019-06-17 PROCEDURE — 77387 GUIDANCE FOR RADJ TX DLVR: CPT | Performed by: RADIOLOGY

## 2019-06-17 PROCEDURE — 77412 RADIATION TX DELIVERY LVL 3: CPT | Performed by: RADIOLOGY

## 2019-06-17 PROCEDURE — 77336 RADIATION PHYSICS CONSULT: CPT | Performed by: RADIOLOGY

## 2019-06-17 PROCEDURE — 77417 THER RADIOLOGY PORT IMAGE(S): CPT | Performed by: RADIOLOGY

## 2019-06-18 ENCOUNTER — HOSPITAL ENCOUNTER (OUTPATIENT)
Dept: INFUSION CENTER | Facility: CLINIC | Age: 44
Discharge: HOME | End: 2019-06-18
Payer: COMMERCIAL

## 2019-06-18 ENCOUNTER — APPOINTMENT (OUTPATIENT)
Dept: RADIATION ONCOLOGY | Facility: CLINIC | Age: 44
End: 2019-06-18
Attending: RADIOLOGY
Payer: COMMERCIAL

## 2019-06-18 VITALS
HEIGHT: 62 IN | HEART RATE: 62 BPM | SYSTOLIC BLOOD PRESSURE: 120 MMHG | DIASTOLIC BLOOD PRESSURE: 80 MMHG | WEIGHT: 130.07 LBS | BODY MASS INDEX: 23.94 KG/M2 | RESPIRATION RATE: 16 BRPM | TEMPERATURE: 98 F

## 2019-06-18 DIAGNOSIS — C53.8 MALIGNANT NEOPLASM OF OVERLAPPING SITES OF CERVIX (HCC): Primary | ICD-10-CM

## 2019-06-18 PROCEDURE — 96375 TX/PRO/DX INJ NEW DRUG ADDON: CPT

## 2019-06-18 PROCEDURE — 96361 HYDRATE IV INFUSION ADD-ON: CPT

## 2019-06-18 PROCEDURE — 96413 CHEMO IV INFUSION 1 HR: CPT

## 2019-06-18 PROCEDURE — 77412 RADIATION TX DELIVERY LVL 3: CPT | Performed by: RADIOLOGY

## 2019-06-18 PROCEDURE — 96367 TX/PROPH/DG ADDL SEQ IV INF: CPT

## 2019-06-18 PROCEDURE — 77387 GUIDANCE FOR RADJ TX DLVR: CPT | Performed by: RADIOLOGY

## 2019-06-18 RX ORDER — SODIUM CHLORIDE 9 MG/ML
20 INJECTION, SOLUTION INTRAVENOUS ONCE
Status: COMPLETED | OUTPATIENT
Start: 2019-06-18 | End: 2019-06-18

## 2019-06-18 RX ORDER — PALONOSETRON 0.05 MG/ML
0.25 INJECTION, SOLUTION INTRAVENOUS ONCE
Status: COMPLETED | OUTPATIENT
Start: 2019-06-18 | End: 2019-06-18

## 2019-06-18 RX ADMIN — SODIUM CHLORIDE 1000 ML: 0.9 INJECTION, SOLUTION INTRAVENOUS at 11:26

## 2019-06-18 RX ADMIN — CISPLATIN 62.4 MG: 1 INJECTION INTRAVENOUS at 12:44

## 2019-06-18 RX ADMIN — SODIUM CHLORIDE 20 ML/HR: 0.9 INJECTION, SOLUTION INTRAVENOUS at 10:24

## 2019-06-18 RX ADMIN — SODIUM CHLORIDE 150 MG: 0.9 INJECTION, SOLUTION INTRAVENOUS at 10:56

## 2019-06-18 RX ADMIN — SODIUM CHLORIDE 1000 ML: 0.9 INJECTION, SOLUTION INTRAVENOUS at 13:48

## 2019-06-18 RX ADMIN — DEXAMETHASONE SODIUM PHOSPHATE 20 MG: 10 INJECTION, SOLUTION INTRAMUSCULAR; INTRAVENOUS at 10:36

## 2019-06-18 RX ADMIN — PALONOSETRON 0.25 MG: 0.05 INJECTION, SOLUTION INTRAVENOUS at 10:20

## 2019-06-18 NOTE — SOCIAL WORK
MARA met with pt in the infusion center today  She is happy to share that she found a new house to rent and will be moving 6/28-7/1  She was able to provide me her new address today  She is grateful that she has found a home and will be moving forward, and is able to let go of the stress of her eviction  She plans on using friends and her son, who is 15, to move  Her roommate is coming with her for now but may end up living with another friend in the future, they are unsure at this point  He plans on staying through her treatment time, as pt's son is only 15years old  Pt says that she will be able to afford the house even if he moves out, because things at work have been picking up for her  Her boss is almost caught up with what money he owes her, and she is trying to get all of her bills caught up and late fees taken care of  She has a 21year old son as well, he is in college in [place]  He recently told her that his girlfriend is pregnant and they had an argument, and they have not spoken since  She does not feel that he is capable of supporting a child at this point in his life, but also recognizes that it is beyond her control at this point  She mentioned some estrangement from her other family in [place] and says "It's whatever dude I really don't care, I'm not about that kind of drama in my life "  I did let her know that her [facility] transport to go to [place] for internal radiation had been arranged and she was very appreciative  I also provided her with MARA Madison's contact information, in case she needed to talk to someone on the days that she is in [place]  MSW emailed pt's updated address as of 7/1 to [facility] transport team as well  No other needs or concerns at this time  Pt is aware to contact myself or MARA Contreras with any issues or needs in the future

## 2019-06-19 ENCOUNTER — ANESTHESIA EVENT (OUTPATIENT)
Dept: PERIOP | Facility: HOSPITAL | Age: 44
End: 2019-06-19
Payer: COMMERCIAL

## 2019-06-19 ENCOUNTER — APPOINTMENT (OUTPATIENT)
Dept: RADIATION ONCOLOGY | Facility: CLINIC | Age: 44
End: 2019-06-19
Attending: RADIOLOGY
Payer: COMMERCIAL

## 2019-06-19 PROCEDURE — 77387 GUIDANCE FOR RADJ TX DLVR: CPT | Performed by: RADIOLOGY

## 2019-06-19 PROCEDURE — 77412 RADIATION TX DELIVERY LVL 3: CPT | Performed by: RADIOLOGY

## 2019-06-20 ENCOUNTER — HOSPITAL ENCOUNTER (OUTPATIENT)
Facility: HOSPITAL | Age: 44
Setting detail: OUTPATIENT SURGERY
Discharge: HOME | End: 2019-06-20
Attending: OBSTETRICS & GYNECOLOGY | Admitting: OBSTETRICS & GYNECOLOGY
Payer: COMMERCIAL

## 2019-06-20 ENCOUNTER — APPOINTMENT (OUTPATIENT)
Dept: RADIATION ONCOLOGY | Facility: HOSPITAL | Age: 44
End: 2019-06-20
Attending: RADIOLOGY
Payer: COMMERCIAL

## 2019-06-20 ENCOUNTER — APPOINTMENT (OUTPATIENT)
Dept: RADIATION ONCOLOGY | Facility: CLINIC | Age: 44
End: 2019-06-20
Attending: RADIOLOGY
Payer: COMMERCIAL

## 2019-06-20 ENCOUNTER — HOSPITAL ENCOUNTER (OUTPATIENT)
Dept: RADIOLOGY | Facility: HOSPITAL | Age: 44
Discharge: HOME | End: 2019-06-20
Attending: OBSTETRICS & GYNECOLOGY
Payer: COMMERCIAL

## 2019-06-20 ENCOUNTER — APPOINTMENT (OUTPATIENT)
Dept: RADIATION ONCOLOGY | Facility: HOSPITAL | Age: 44
End: 2019-06-20
Attending: STUDENT IN AN ORGANIZED HEALTH CARE EDUCATION/TRAINING PROGRAM
Payer: COMMERCIAL

## 2019-06-20 ENCOUNTER — ANESTHESIA (OUTPATIENT)
Dept: PERIOP | Facility: HOSPITAL | Age: 44
End: 2019-06-20
Payer: COMMERCIAL

## 2019-06-20 ENCOUNTER — HOSPITAL ENCOUNTER (OUTPATIENT)
Dept: RADIOLOGY | Facility: HOSPITAL | Age: 44
Setting detail: RADIATION/ONCOLOGY SERIES
Discharge: HOME | End: 2019-06-20
Attending: RADIOLOGY
Payer: COMMERCIAL

## 2019-06-20 VITALS
HEART RATE: 74 BPM | HEIGHT: 62 IN | DIASTOLIC BLOOD PRESSURE: 89 MMHG | OXYGEN SATURATION: 97 % | BODY MASS INDEX: 23 KG/M2 | SYSTOLIC BLOOD PRESSURE: 146 MMHG | WEIGHT: 125 LBS | TEMPERATURE: 99.1 F | RESPIRATION RATE: 18 BRPM

## 2019-06-20 DIAGNOSIS — C53.8 MALIGNANT NEOPLASM OF OVERLAPPING SITES OF CERVIX (HCC): ICD-10-CM

## 2019-06-20 DIAGNOSIS — C53.8 MALIGNANT NEOPLASM OF CERVICAL STUMP (HCC): ICD-10-CM

## 2019-06-20 LAB
ABO GROUP BLD: NORMAL
BLD GP AB SCN SERPL QL: NEGATIVE
ERYTHROCYTE [DISTWIDTH] IN BLOOD BY AUTOMATED COUNT: 15.6 % (ref 11.6–15.1)
HCT VFR BLD AUTO: 32.1 % (ref 34.8–46.1)
HGB BLD-MCNC: 10.5 G/DL (ref 11.5–15.4)
MCH RBC QN AUTO: 28.1 PG (ref 26.8–34.3)
MCHC RBC AUTO-ENTMCNC: 32.7 G/DL (ref 31.4–37.4)
MCV RBC AUTO: 86 FL (ref 82–98)
PLATELET # BLD AUTO: 117 THOUSANDS/UL (ref 149–390)
PMV BLD AUTO: 9.5 FL (ref 8.9–12.7)
RBC # BLD AUTO: 3.74 MILLION/UL (ref 3.81–5.12)
RH BLD: POSITIVE
SPECIMEN EXPIRATION DATE: NORMAL
WBC # BLD AUTO: 4.4 THOUSAND/UL (ref 4.31–10.16)

## 2019-06-20 PROCEDURE — 86901 BLOOD TYPING SEROLOGIC RH(D): CPT | Performed by: OBSTETRICS & GYNECOLOGY

## 2019-06-20 PROCEDURE — 76942 ECHO GUIDE FOR BIOPSY: CPT

## 2019-06-20 PROCEDURE — C1717 BRACHYTX, NON-STR,HDR IR-192: HCPCS | Performed by: RADIOLOGY

## 2019-06-20 PROCEDURE — 77771 HDR RDNCL NTRSTL/ICAV BRCHTX: CPT | Performed by: RADIOLOGY

## 2019-06-20 PROCEDURE — 77014 HB CT SCAN FOR THERAPY GUIDE: CPT

## 2019-06-20 PROCEDURE — 77290 THER RAD SIMULAJ FIELD CPLX: CPT | Performed by: RADIOLOGY

## 2019-06-20 PROCEDURE — 86850 RBC ANTIBODY SCREEN: CPT | Performed by: OBSTETRICS & GYNECOLOGY

## 2019-06-20 PROCEDURE — 58120 DILATION AND CURETTAGE: CPT | Performed by: OBSTETRICS & GYNECOLOGY

## 2019-06-20 PROCEDURE — 86900 BLOOD TYPING SEROLOGIC ABO: CPT | Performed by: OBSTETRICS & GYNECOLOGY

## 2019-06-20 PROCEDURE — 77295 3-D RADIOTHERAPY PLAN: CPT | Performed by: RADIOLOGY

## 2019-06-20 PROCEDURE — 85027 COMPLETE CBC AUTOMATED: CPT | Performed by: OBSTETRICS & GYNECOLOGY

## 2019-06-20 RX ORDER — ONDANSETRON 2 MG/ML
4 INJECTION INTRAMUSCULAR; INTRAVENOUS EVERY 6 HOURS PRN
Status: DISCONTINUED | OUTPATIENT
Start: 2019-06-20 | End: 2019-06-20 | Stop reason: HOSPADM

## 2019-06-20 RX ORDER — ALBUTEROL SULFATE 2.5 MG/3ML
2.5 SOLUTION RESPIRATORY (INHALATION) ONCE AS NEEDED
Status: DISCONTINUED | OUTPATIENT
Start: 2019-06-20 | End: 2019-06-20 | Stop reason: HOSPADM

## 2019-06-20 RX ORDER — HYDROMORPHONE HCL/PF 1 MG/ML
0.4 SYRINGE (ML) INJECTION
Status: DISCONTINUED | OUTPATIENT
Start: 2019-06-20 | End: 2019-06-20 | Stop reason: HOSPADM

## 2019-06-20 RX ORDER — SODIUM CHLORIDE, SODIUM LACTATE, POTASSIUM CHLORIDE, CALCIUM CHLORIDE 600; 310; 30; 20 MG/100ML; MG/100ML; MG/100ML; MG/100ML
INJECTION, SOLUTION INTRAVENOUS CONTINUOUS PRN
Status: DISCONTINUED | OUTPATIENT
Start: 2019-06-20 | End: 2019-06-20 | Stop reason: SURG

## 2019-06-20 RX ORDER — ONDANSETRON 2 MG/ML
INJECTION INTRAMUSCULAR; INTRAVENOUS AS NEEDED
Status: DISCONTINUED | OUTPATIENT
Start: 2019-06-20 | End: 2019-06-20 | Stop reason: SURG

## 2019-06-20 RX ORDER — HYDROMORPHONE HCL/PF 1 MG/ML
0.5 SYRINGE (ML) INJECTION EVERY 4 HOURS PRN
Status: DISCONTINUED | OUTPATIENT
Start: 2019-06-20 | End: 2019-06-20 | Stop reason: HOSPADM

## 2019-06-20 RX ORDER — ONDANSETRON 2 MG/ML
4 INJECTION INTRAMUSCULAR; INTRAVENOUS ONCE AS NEEDED
Status: DISCONTINUED | OUTPATIENT
Start: 2019-06-20 | End: 2019-06-20 | Stop reason: HOSPADM

## 2019-06-20 RX ORDER — ACETAMINOPHEN 325 MG/1
975 TABLET ORAL ONCE
Status: COMPLETED | OUTPATIENT
Start: 2019-06-20 | End: 2019-06-20

## 2019-06-20 RX ORDER — ACETAMINOPHEN 325 MG/1
650 TABLET ORAL EVERY 6 HOURS PRN
Status: DISCONTINUED | OUTPATIENT
Start: 2019-06-20 | End: 2019-06-20 | Stop reason: HOSPADM

## 2019-06-20 RX ORDER — DIPHENHYDRAMINE HYDROCHLORIDE 50 MG/ML
INJECTION INTRAMUSCULAR; INTRAVENOUS AS NEEDED
Status: DISCONTINUED | OUTPATIENT
Start: 2019-06-20 | End: 2019-06-20 | Stop reason: SURG

## 2019-06-20 RX ORDER — GABAPENTIN 100 MG/1
100 CAPSULE ORAL ONCE
Status: COMPLETED | OUTPATIENT
Start: 2019-06-20 | End: 2019-06-20

## 2019-06-20 RX ORDER — SODIUM CHLORIDE, SODIUM LACTATE, POTASSIUM CHLORIDE, CALCIUM CHLORIDE 600; 310; 30; 20 MG/100ML; MG/100ML; MG/100ML; MG/100ML
50 INJECTION, SOLUTION INTRAVENOUS CONTINUOUS
Status: DISCONTINUED | OUTPATIENT
Start: 2019-06-20 | End: 2019-06-20 | Stop reason: HOSPADM

## 2019-06-20 RX ORDER — HEPARIN SODIUM 5000 [USP'U]/ML
5000 INJECTION, SOLUTION INTRAVENOUS; SUBCUTANEOUS
Status: COMPLETED | OUTPATIENT
Start: 2019-06-20 | End: 2019-06-20

## 2019-06-20 RX ORDER — FENTANYL CITRATE/PF 50 MCG/ML
50 SYRINGE (ML) INJECTION
Status: DISCONTINUED | OUTPATIENT
Start: 2019-06-20 | End: 2019-06-20 | Stop reason: HOSPADM

## 2019-06-20 RX ORDER — OXYCODONE HYDROCHLORIDE 5 MG/1
5 TABLET ORAL EVERY 4 HOURS PRN
Status: DISCONTINUED | OUTPATIENT
Start: 2019-06-20 | End: 2019-06-20 | Stop reason: HOSPADM

## 2019-06-20 RX ORDER — OXYCODONE HYDROCHLORIDE 5 MG/1
10 TABLET ORAL EVERY 4 HOURS PRN
Status: DISCONTINUED | OUTPATIENT
Start: 2019-06-20 | End: 2019-06-20 | Stop reason: HOSPADM

## 2019-06-20 RX ORDER — PROMETHAZINE HYDROCHLORIDE 25 MG/ML
12.5 INJECTION, SOLUTION INTRAMUSCULAR; INTRAVENOUS ONCE AS NEEDED
Status: DISCONTINUED | OUTPATIENT
Start: 2019-06-20 | End: 2019-06-20 | Stop reason: HOSPADM

## 2019-06-20 RX ORDER — LIDOCAINE HYDROCHLORIDE 10 MG/ML
INJECTION, SOLUTION INFILTRATION; PERINEURAL AS NEEDED
Status: DISCONTINUED | OUTPATIENT
Start: 2019-06-20 | End: 2019-06-20 | Stop reason: SURG

## 2019-06-20 RX ORDER — PROPOFOL 10 MG/ML
INJECTION, EMULSION INTRAVENOUS AS NEEDED
Status: DISCONTINUED | OUTPATIENT
Start: 2019-06-20 | End: 2019-06-20 | Stop reason: SURG

## 2019-06-20 RX ORDER — MIDAZOLAM HYDROCHLORIDE 1 MG/ML
INJECTION INTRAMUSCULAR; INTRAVENOUS AS NEEDED
Status: DISCONTINUED | OUTPATIENT
Start: 2019-06-20 | End: 2019-06-20 | Stop reason: SURG

## 2019-06-20 RX ORDER — IBUPROFEN 600 MG/1
600 TABLET ORAL EVERY 6 HOURS PRN
Status: DISCONTINUED | OUTPATIENT
Start: 2019-06-20 | End: 2019-06-20 | Stop reason: HOSPADM

## 2019-06-20 RX ORDER — KETOROLAC TROMETHAMINE 30 MG/ML
30 INJECTION, SOLUTION INTRAMUSCULAR; INTRAVENOUS EVERY 6 HOURS PRN
Status: DISCONTINUED | OUTPATIENT
Start: 2019-06-20 | End: 2019-06-20 | Stop reason: HOSPADM

## 2019-06-20 RX ORDER — EPHEDRINE SULFATE 50 MG/ML
INJECTION INTRAVENOUS AS NEEDED
Status: DISCONTINUED | OUTPATIENT
Start: 2019-06-20 | End: 2019-06-20 | Stop reason: SURG

## 2019-06-20 RX ORDER — FENTANYL CITRATE 50 UG/ML
INJECTION, SOLUTION INTRAMUSCULAR; INTRAVENOUS AS NEEDED
Status: DISCONTINUED | OUTPATIENT
Start: 2019-06-20 | End: 2019-06-20 | Stop reason: SURG

## 2019-06-20 RX ORDER — METOCLOPRAMIDE HYDROCHLORIDE 5 MG/ML
10 INJECTION INTRAMUSCULAR; INTRAVENOUS ONCE AS NEEDED
Status: DISCONTINUED | OUTPATIENT
Start: 2019-06-20 | End: 2019-06-20 | Stop reason: HOSPADM

## 2019-06-20 RX ORDER — DEXAMETHASONE SODIUM PHOSPHATE 10 MG/ML
INJECTION, SOLUTION INTRAMUSCULAR; INTRAVENOUS AS NEEDED
Status: DISCONTINUED | OUTPATIENT
Start: 2019-06-20 | End: 2019-06-20 | Stop reason: SURG

## 2019-06-20 RX ADMIN — GABAPENTIN 100 MG: 100 CAPSULE ORAL at 06:42

## 2019-06-20 RX ADMIN — EPHEDRINE SULFATE 5 MG: 50 INJECTION, SOLUTION INTRAVENOUS at 07:50

## 2019-06-20 RX ADMIN — FENTANYL CITRATE 50 MCG: 50 INJECTION, SOLUTION INTRAMUSCULAR; INTRAVENOUS at 07:46

## 2019-06-20 RX ADMIN — SODIUM CHLORIDE, SODIUM LACTATE, POTASSIUM CHLORIDE, AND CALCIUM CHLORIDE: .6; .31; .03; .02 INJECTION, SOLUTION INTRAVENOUS at 07:10

## 2019-06-20 RX ADMIN — PROPOFOL 200 MG: 10 INJECTION, EMULSION INTRAVENOUS at 07:39

## 2019-06-20 RX ADMIN — MIDAZOLAM 2 MG: 1 INJECTION INTRAMUSCULAR; INTRAVENOUS at 07:27

## 2019-06-20 RX ADMIN — ACETAMINOPHEN 975 MG: 325 TABLET ORAL at 06:41

## 2019-06-20 RX ADMIN — IBUPROFEN 600 MG: 600 TABLET ORAL at 13:35

## 2019-06-20 RX ADMIN — OXYCODONE HYDROCHLORIDE 5 MG: 5 TABLET ORAL at 12:44

## 2019-06-20 RX ADMIN — HEPARIN SODIUM 5000 UNITS: 5000 INJECTION INTRAVENOUS; SUBCUTANEOUS at 06:42

## 2019-06-20 RX ADMIN — ONDANSETRON 4 MG: 2 INJECTION INTRAMUSCULAR; INTRAVENOUS at 07:44

## 2019-06-20 RX ADMIN — LIDOCAINE HYDROCHLORIDE 50 MG: 10 INJECTION, SOLUTION INFILTRATION; PERINEURAL at 07:39

## 2019-06-20 RX ADMIN — HYDROMORPHONE HYDROCHLORIDE 0.5 MG: 1 INJECTION, SOLUTION INTRAMUSCULAR; INTRAVENOUS; SUBCUTANEOUS at 12:00

## 2019-06-20 RX ADMIN — DIPHENHYDRAMINE HYDROCHLORIDE 25 MG: 50 INJECTION, SOLUTION INTRAMUSCULAR; INTRAVENOUS at 07:50

## 2019-06-20 RX ADMIN — EPHEDRINE SULFATE 10 MG: 50 INJECTION, SOLUTION INTRAVENOUS at 08:03

## 2019-06-20 RX ADMIN — KETOROLAC TROMETHAMINE 30 MG: 30 INJECTION, SOLUTION INTRAMUSCULAR at 09:44

## 2019-06-20 RX ADMIN — FENTANYL CITRATE 50 MCG: 50 INJECTION, SOLUTION INTRAMUSCULAR; INTRAVENOUS at 07:39

## 2019-06-20 RX ADMIN — DEXAMETHASONE SODIUM PHOSPHATE 10 MG: 10 INJECTION, SOLUTION INTRAMUSCULAR; INTRAVENOUS at 07:39

## 2019-06-20 RX ADMIN — EPHEDRINE SULFATE 10 MG: 50 INJECTION, SOLUTION INTRAVENOUS at 07:53

## 2019-06-21 ENCOUNTER — APPOINTMENT (OUTPATIENT)
Dept: LAB | Facility: HOSPITAL | Age: 44
End: 2019-06-21
Attending: OBSTETRICS & GYNECOLOGY
Payer: COMMERCIAL

## 2019-06-21 DIAGNOSIS — C53.8 MALIGNANT NEOPLASM OF OVERLAPPING SITES OF CERVIX (HCC): ICD-10-CM

## 2019-06-21 LAB
ALBUMIN SERPL BCP-MCNC: 3.6 G/DL (ref 3.5–5)
ALP SERPL-CCNC: 54 U/L (ref 46–116)
ALT SERPL W P-5'-P-CCNC: 16 U/L (ref 12–78)
ANION GAP SERPL CALCULATED.3IONS-SCNC: 8 MMOL/L (ref 4–13)
AST SERPL W P-5'-P-CCNC: 8 U/L (ref 5–45)
BASOPHILS # BLD AUTO: 0.01 THOUSANDS/ΜL (ref 0–0.1)
BASOPHILS NFR BLD AUTO: 0 % (ref 0–1)
BILIRUB SERPL-MCNC: 0.4 MG/DL (ref 0.2–1)
BUN SERPL-MCNC: 12 MG/DL (ref 5–25)
CALCIUM SERPL-MCNC: 9.3 MG/DL (ref 8.3–10.1)
CHLORIDE SERPL-SCNC: 102 MMOL/L (ref 100–108)
CO2 SERPL-SCNC: 30 MMOL/L (ref 21–32)
CREAT SERPL-MCNC: 0.71 MG/DL (ref 0.6–1.3)
EOSINOPHIL # BLD AUTO: 0.04 THOUSAND/ΜL (ref 0–0.61)
EOSINOPHIL NFR BLD AUTO: 1 % (ref 0–6)
ERYTHROCYTE [DISTWIDTH] IN BLOOD BY AUTOMATED COUNT: 15.6 % (ref 11.6–15.1)
GFR SERPL CREATININE-BSD FRML MDRD: 105 ML/MIN/1.73SQ M
GLUCOSE SERPL-MCNC: 66 MG/DL (ref 65–140)
HCT VFR BLD AUTO: 35.3 % (ref 34.8–46.1)
HGB BLD-MCNC: 11.5 G/DL (ref 11.5–15.4)
IMM GRANULOCYTES # BLD AUTO: 0.02 THOUSAND/UL (ref 0–0.2)
IMM GRANULOCYTES NFR BLD AUTO: 0 % (ref 0–2)
LYMPHOCYTES # BLD AUTO: 0.25 THOUSANDS/ΜL (ref 0.6–4.47)
LYMPHOCYTES NFR BLD AUTO: 5 % (ref 14–44)
MAGNESIUM SERPL-MCNC: 1.7 MG/DL (ref 1.6–2.6)
MCH RBC QN AUTO: 28.5 PG (ref 26.8–34.3)
MCHC RBC AUTO-ENTMCNC: 32.6 G/DL (ref 31.4–37.4)
MCV RBC AUTO: 88 FL (ref 82–98)
MONOCYTES # BLD AUTO: 0.64 THOUSAND/ΜL (ref 0.17–1.22)
MONOCYTES NFR BLD AUTO: 12 % (ref 4–12)
NEUTROPHILS # BLD AUTO: 4.43 THOUSANDS/ΜL (ref 1.85–7.62)
NEUTS SEG NFR BLD AUTO: 82 % (ref 43–75)
NRBC BLD AUTO-RTO: 0 /100 WBCS
PLATELET # BLD AUTO: 127 THOUSANDS/UL (ref 149–390)
PMV BLD AUTO: 9.7 FL (ref 8.9–12.7)
POTASSIUM SERPL-SCNC: 2.9 MMOL/L (ref 3.5–5.3)
PROT SERPL-MCNC: 6.6 G/DL (ref 6.4–8.2)
RBC # BLD AUTO: 4.03 MILLION/UL (ref 3.81–5.12)
SODIUM SERPL-SCNC: 140 MMOL/L (ref 136–145)
WBC # BLD AUTO: 5.39 THOUSAND/UL (ref 4.31–10.16)

## 2019-06-21 PROCEDURE — 85025 COMPLETE CBC W/AUTO DIFF WBC: CPT

## 2019-06-21 PROCEDURE — 83735 ASSAY OF MAGNESIUM: CPT

## 2019-06-21 PROCEDURE — 77412 RADIATION TX DELIVERY LVL 3: CPT | Performed by: STUDENT IN AN ORGANIZED HEALTH CARE EDUCATION/TRAINING PROGRAM

## 2019-06-21 PROCEDURE — 80053 COMPREHEN METABOLIC PANEL: CPT

## 2019-06-21 PROCEDURE — 36415 COLL VENOUS BLD VENIPUNCTURE: CPT

## 2019-06-21 PROCEDURE — 77387 GUIDANCE FOR RADJ TX DLVR: CPT | Performed by: STUDENT IN AN ORGANIZED HEALTH CARE EDUCATION/TRAINING PROGRAM

## 2019-06-24 ENCOUNTER — APPOINTMENT (OUTPATIENT)
Dept: RADIATION ONCOLOGY | Facility: CLINIC | Age: 44
End: 2019-06-24
Attending: RADIOLOGY
Payer: COMMERCIAL

## 2019-06-24 DIAGNOSIS — E87.6 HYPOKALEMIA: Primary | ICD-10-CM

## 2019-06-24 PROCEDURE — 77412 RADIATION TX DELIVERY LVL 3: CPT | Performed by: RADIOLOGY

## 2019-06-24 PROCEDURE — 77387 GUIDANCE FOR RADJ TX DLVR: CPT | Performed by: RADIOLOGY

## 2019-06-24 RX ORDER — POTASSIUM CHLORIDE 20 MEQ/1
TABLET, EXTENDED RELEASE ORAL
Qty: 26 TABLET | Refills: 0 | Status: SHIPPED | OUTPATIENT
Start: 2019-06-24 | End: 2019-07-05 | Stop reason: ALTCHOICE

## 2019-06-24 RX ORDER — PALONOSETRON 0.05 MG/ML
0.25 INJECTION, SOLUTION INTRAVENOUS ONCE
Status: CANCELLED | OUTPATIENT
Start: 2019-06-26

## 2019-06-24 RX ORDER — SODIUM CHLORIDE 9 MG/ML
20 INJECTION, SOLUTION INTRAVENOUS ONCE
Status: CANCELLED | OUTPATIENT
Start: 2019-06-26

## 2019-06-25 ENCOUNTER — APPOINTMENT (OUTPATIENT)
Dept: RADIATION ONCOLOGY | Facility: HOSPITAL | Age: 44
End: 2019-06-25
Attending: RADIOLOGY
Payer: COMMERCIAL

## 2019-06-25 ENCOUNTER — ANESTHESIA (OUTPATIENT)
Dept: SURGERY | Facility: HOSPITAL | Age: 44
End: 2019-06-25

## 2019-06-25 ENCOUNTER — HOSPITAL ENCOUNTER (OUTPATIENT)
Dept: SURGERY | Facility: HOSPITAL | Age: 44
Setting detail: OUTPATIENT SURGERY
Discharge: HOME | End: 2019-06-25
Admitting: STUDENT IN AN ORGANIZED HEALTH CARE EDUCATION/TRAINING PROGRAM
Payer: COMMERCIAL

## 2019-06-25 ENCOUNTER — HOSPITAL ENCOUNTER (OUTPATIENT)
Dept: RADIOLOGY | Facility: HOSPITAL | Age: 44
Setting detail: RADIATION/ONCOLOGY SERIES
Discharge: HOME | End: 2019-06-25
Attending: RADIOLOGY
Payer: COMMERCIAL

## 2019-06-25 ENCOUNTER — ANESTHESIA EVENT (OUTPATIENT)
Dept: SURGERY | Facility: HOSPITAL | Age: 44
End: 2019-06-25

## 2019-06-25 VITALS
DIASTOLIC BLOOD PRESSURE: 89 MMHG | SYSTOLIC BLOOD PRESSURE: 147 MMHG | WEIGHT: 125 LBS | TEMPERATURE: 98.9 F | HEIGHT: 62 IN | HEART RATE: 76 BPM | OXYGEN SATURATION: 100 % | RESPIRATION RATE: 18 BRPM | BODY MASS INDEX: 23 KG/M2

## 2019-06-25 DIAGNOSIS — C53.8 MALIGNANT NEOPLASM OVERLAPPING CERVIX UTERI SITE (HCC): ICD-10-CM

## 2019-06-25 LAB
EXT PREGNANCY TEST URINE: NEGATIVE
EXT. CONTROL: NORMAL

## 2019-06-25 PROCEDURE — 77334 RADIATION TREATMENT AID(S): CPT | Performed by: RADIOLOGY

## 2019-06-25 PROCEDURE — 77771 HDR RDNCL NTRSTL/ICAV BRCHTX: CPT | Performed by: STUDENT IN AN ORGANIZED HEALTH CARE EDUCATION/TRAINING PROGRAM

## 2019-06-25 PROCEDURE — 81025 URINE PREGNANCY TEST: CPT | Performed by: STUDENT IN AN ORGANIZED HEALTH CARE EDUCATION/TRAINING PROGRAM

## 2019-06-25 PROCEDURE — C1717 BRACHYTX, NON-STR,HDR IR-192: HCPCS | Performed by: STUDENT IN AN ORGANIZED HEALTH CARE EDUCATION/TRAINING PROGRAM

## 2019-06-25 PROCEDURE — 77295 3-D RADIOTHERAPY PLAN: CPT | Performed by: STUDENT IN AN ORGANIZED HEALTH CARE EDUCATION/TRAINING PROGRAM

## 2019-06-25 RX ORDER — KETOROLAC TROMETHAMINE 30 MG/ML
INJECTION, SOLUTION INTRAMUSCULAR; INTRAVENOUS AS NEEDED
Status: DISCONTINUED | OUTPATIENT
Start: 2019-06-25 | End: 2019-06-25 | Stop reason: SURG

## 2019-06-25 RX ORDER — FENTANYL CITRATE/PF 50 MCG/ML
25 SYRINGE (ML) INJECTION
Status: CANCELLED | OUTPATIENT
Start: 2019-06-25

## 2019-06-25 RX ORDER — PROPOFOL 10 MG/ML
INJECTION, EMULSION INTRAVENOUS AS NEEDED
Status: DISCONTINUED | OUTPATIENT
Start: 2019-06-25 | End: 2019-06-25 | Stop reason: SURG

## 2019-06-25 RX ORDER — SODIUM CHLORIDE, SODIUM LACTATE, POTASSIUM CHLORIDE, CALCIUM CHLORIDE 600; 310; 30; 20 MG/100ML; MG/100ML; MG/100ML; MG/100ML
125 INJECTION, SOLUTION INTRAVENOUS CONTINUOUS
Status: DISCONTINUED | OUTPATIENT
Start: 2019-06-25 | End: 2019-06-29 | Stop reason: HOSPADM

## 2019-06-25 RX ORDER — CEFAZOLIN SODIUM 1 G/3ML
INJECTION, POWDER, FOR SOLUTION INTRAMUSCULAR; INTRAVENOUS AS NEEDED
Status: DISCONTINUED | OUTPATIENT
Start: 2019-06-25 | End: 2019-06-25 | Stop reason: SURG

## 2019-06-25 RX ORDER — OXYCODONE HYDROCHLORIDE AND ACETAMINOPHEN 5; 325 MG/1; MG/1
1 TABLET ORAL EVERY 4 HOURS PRN
Status: DISCONTINUED | OUTPATIENT
Start: 2019-06-25 | End: 2019-06-29 | Stop reason: HOSPADM

## 2019-06-25 RX ORDER — ONDANSETRON 2 MG/ML
4 INJECTION INTRAMUSCULAR; INTRAVENOUS ONCE AS NEEDED
Status: CANCELLED | OUTPATIENT
Start: 2019-06-25

## 2019-06-25 RX ORDER — IBUPROFEN 600 MG/1
600 TABLET ORAL ONCE
Status: COMPLETED | OUTPATIENT
Start: 2019-06-25 | End: 2019-06-25

## 2019-06-25 RX ORDER — HYDROMORPHONE HCL/PF 1 MG/ML
SYRINGE (ML) INJECTION
Status: COMPLETED
Start: 2019-06-25 | End: 2019-06-25

## 2019-06-25 RX ORDER — ONDANSETRON 2 MG/ML
INJECTION INTRAMUSCULAR; INTRAVENOUS AS NEEDED
Status: DISCONTINUED | OUTPATIENT
Start: 2019-06-25 | End: 2019-06-25 | Stop reason: SURG

## 2019-06-25 RX ORDER — LIDOCAINE HYDROCHLORIDE 10 MG/ML
INJECTION, SOLUTION INFILTRATION; PERINEURAL AS NEEDED
Status: DISCONTINUED | OUTPATIENT
Start: 2019-06-25 | End: 2019-06-25 | Stop reason: SURG

## 2019-06-25 RX ORDER — FENTANYL CITRATE 50 UG/ML
INJECTION, SOLUTION INTRAMUSCULAR; INTRAVENOUS AS NEEDED
Status: DISCONTINUED | OUTPATIENT
Start: 2019-06-25 | End: 2019-06-25 | Stop reason: SURG

## 2019-06-25 RX ORDER — SODIUM CHLORIDE, SODIUM LACTATE, POTASSIUM CHLORIDE, CALCIUM CHLORIDE 600; 310; 30; 20 MG/100ML; MG/100ML; MG/100ML; MG/100ML
20 INJECTION, SOLUTION INTRAVENOUS CONTINUOUS
Status: CANCELLED | OUTPATIENT
Start: 2019-06-25

## 2019-06-25 RX ORDER — MIDAZOLAM HYDROCHLORIDE 1 MG/ML
INJECTION INTRAMUSCULAR; INTRAVENOUS AS NEEDED
Status: DISCONTINUED | OUTPATIENT
Start: 2019-06-25 | End: 2019-06-25 | Stop reason: SURG

## 2019-06-25 RX ORDER — HYDROMORPHONE HCL/PF 1 MG/ML
0.5 SYRINGE (ML) INJECTION
Status: DISCONTINUED | OUTPATIENT
Start: 2019-06-25 | End: 2019-06-29 | Stop reason: HOSPADM

## 2019-06-25 RX ORDER — DEXAMETHASONE SODIUM PHOSPHATE 10 MG/ML
INJECTION, SOLUTION INTRAMUSCULAR; INTRAVENOUS AS NEEDED
Status: DISCONTINUED | OUTPATIENT
Start: 2019-06-25 | End: 2019-06-25 | Stop reason: SURG

## 2019-06-25 RX ORDER — HYDROMORPHONE HCL/PF 1 MG/ML
SYRINGE (ML) INJECTION AS NEEDED
Status: DISCONTINUED | OUTPATIENT
Start: 2019-06-25 | End: 2019-06-25 | Stop reason: SURG

## 2019-06-25 RX ORDER — CEFAZOLIN SODIUM 1 G/50ML
1000 SOLUTION INTRAVENOUS ONCE
Status: DISCONTINUED | OUTPATIENT
Start: 2019-06-25 | End: 2019-06-29 | Stop reason: HOSPADM

## 2019-06-25 RX ORDER — SODIUM CHLORIDE, SODIUM LACTATE, POTASSIUM CHLORIDE, CALCIUM CHLORIDE 600; 310; 30; 20 MG/100ML; MG/100ML; MG/100ML; MG/100ML
20 INJECTION, SOLUTION INTRAVENOUS CONTINUOUS
Status: DISCONTINUED | OUTPATIENT
Start: 2019-06-25 | End: 2019-06-29 | Stop reason: HOSPADM

## 2019-06-25 RX ADMIN — MIDAZOLAM 2 MG: 1 INJECTION INTRAMUSCULAR; INTRAVENOUS at 08:32

## 2019-06-25 RX ADMIN — OXYCODONE HYDROCHLORIDE AND ACETAMINOPHEN 1 TABLET: 5; 325 TABLET ORAL at 11:32

## 2019-06-25 RX ADMIN — LIDOCAINE HYDROCHLORIDE 50 MG: 10 INJECTION, SOLUTION INFILTRATION; PERINEURAL at 08:37

## 2019-06-25 RX ADMIN — CEFAZOLIN 1000 MG: 1 INJECTION, POWDER, FOR SOLUTION INTRAVENOUS at 08:43

## 2019-06-25 RX ADMIN — HYDROMORPHONE HYDROCHLORIDE 0.5 MG: 1 INJECTION, SOLUTION INTRAMUSCULAR; INTRAVENOUS; SUBCUTANEOUS at 09:52

## 2019-06-25 RX ADMIN — FENTANYL CITRATE 50 MCG: 50 INJECTION, SOLUTION INTRAMUSCULAR; INTRAVENOUS at 09:28

## 2019-06-25 RX ADMIN — HYDROMORPHONE HYDROCHLORIDE 0.5 MG: 1 INJECTION, SOLUTION INTRAMUSCULAR; INTRAVENOUS; SUBCUTANEOUS at 12:33

## 2019-06-25 RX ADMIN — PROPOFOL 200 MG: 10 INJECTION, EMULSION INTRAVENOUS at 08:37

## 2019-06-25 RX ADMIN — SODIUM CHLORIDE, SODIUM LACTATE, POTASSIUM CHLORIDE, AND CALCIUM CHLORIDE: .6; .31; .03; .02 INJECTION, SOLUTION INTRAVENOUS at 08:27

## 2019-06-25 RX ADMIN — FENTANYL CITRATE 50 MCG: 50 INJECTION, SOLUTION INTRAMUSCULAR; INTRAVENOUS at 09:07

## 2019-06-25 RX ADMIN — FENTANYL CITRATE 50 MCG: 50 INJECTION, SOLUTION INTRAMUSCULAR; INTRAVENOUS at 08:37

## 2019-06-25 RX ADMIN — KETOROLAC TROMETHAMINE 30 MG: 30 INJECTION, SOLUTION INTRAMUSCULAR at 09:20

## 2019-06-25 RX ADMIN — ONDANSETRON 4 MG: 2 INJECTION INTRAMUSCULAR; INTRAVENOUS at 09:00

## 2019-06-25 RX ADMIN — IBUPROFEN 600 MG: 600 TABLET ORAL at 13:50

## 2019-06-25 RX ADMIN — DEXAMETHASONE SODIUM PHOSPHATE 5 MG: 10 INJECTION, SOLUTION INTRAMUSCULAR; INTRAVENOUS at 09:00

## 2019-06-25 RX ADMIN — FENTANYL CITRATE 50 MCG: 50 INJECTION, SOLUTION INTRAMUSCULAR; INTRAVENOUS at 09:49

## 2019-06-26 ENCOUNTER — HOSPITAL ENCOUNTER (OUTPATIENT)
Dept: INFUSION CENTER | Facility: CLINIC | Age: 44
Discharge: HOME | End: 2019-06-26
Payer: COMMERCIAL

## 2019-06-26 ENCOUNTER — APPOINTMENT (OUTPATIENT)
Dept: RADIATION ONCOLOGY | Facility: CLINIC | Age: 44
End: 2019-06-26
Attending: RADIOLOGY
Payer: COMMERCIAL

## 2019-06-26 VITALS
WEIGHT: 125.66 LBS | SYSTOLIC BLOOD PRESSURE: 146 MMHG | DIASTOLIC BLOOD PRESSURE: 79 MMHG | HEIGHT: 61 IN | BODY MASS INDEX: 23.73 KG/M2 | HEART RATE: 80 BPM | RESPIRATION RATE: 18 BRPM | TEMPERATURE: 98.7 F

## 2019-06-26 DIAGNOSIS — C53.8 MALIGNANT NEOPLASM OF OVERLAPPING SITES OF CERVIX (HCC): Primary | ICD-10-CM

## 2019-06-26 DIAGNOSIS — R30.0 DYSURIA: Primary | ICD-10-CM

## 2019-06-26 PROCEDURE — 77387 GUIDANCE FOR RADJ TX DLVR: CPT | Performed by: RADIOLOGY

## 2019-06-26 PROCEDURE — 96361 HYDRATE IV INFUSION ADD-ON: CPT

## 2019-06-26 PROCEDURE — 96375 TX/PRO/DX INJ NEW DRUG ADDON: CPT

## 2019-06-26 PROCEDURE — 96367 TX/PROPH/DG ADDL SEQ IV INF: CPT

## 2019-06-26 PROCEDURE — 77336 RADIATION PHYSICS CONSULT: CPT | Performed by: RADIOLOGY

## 2019-06-26 PROCEDURE — 96413 CHEMO IV INFUSION 1 HR: CPT

## 2019-06-26 PROCEDURE — 77412 RADIATION TX DELIVERY LVL 3: CPT | Performed by: RADIOLOGY

## 2019-06-26 RX ORDER — PALONOSETRON 0.05 MG/ML
0.25 INJECTION, SOLUTION INTRAVENOUS ONCE
Status: COMPLETED | OUTPATIENT
Start: 2019-06-26 | End: 2019-06-26

## 2019-06-26 RX ORDER — SODIUM CHLORIDE 9 MG/ML
20 INJECTION, SOLUTION INTRAVENOUS ONCE
Status: COMPLETED | OUTPATIENT
Start: 2019-06-26 | End: 2019-06-26

## 2019-06-26 RX ADMIN — CISPLATIN 62.4 MG: 1 INJECTION INTRAVENOUS at 13:48

## 2019-06-26 RX ADMIN — SODIUM CHLORIDE 1000 ML: 0.9 INJECTION, SOLUTION INTRAVENOUS at 14:53

## 2019-06-26 RX ADMIN — DEXAMETHASONE SODIUM PHOSPHATE 20 MG: 10 INJECTION, SOLUTION INTRAMUSCULAR; INTRAVENOUS at 12:36

## 2019-06-26 RX ADMIN — SODIUM CHLORIDE 1000 ML: 0.9 INJECTION, SOLUTION INTRAVENOUS at 11:22

## 2019-06-26 RX ADMIN — SODIUM CHLORIDE 150 MG: 0.9 INJECTION, SOLUTION INTRAVENOUS at 13:01

## 2019-06-26 RX ADMIN — SODIUM CHLORIDE 20 ML/HR: 0.9 INJECTION, SOLUTION INTRAVENOUS at 11:15

## 2019-06-26 RX ADMIN — PALONOSETRON 0.25 MG: 0.05 INJECTION, SOLUTION INTRAVENOUS at 12:40

## 2019-06-26 NOTE — PROGRESS NOTES
Spoke with patient confirmed she was taking PO potassium replacement as prescribed by  Shruthi Knapp PA-C

## 2019-06-26 NOTE — PROGRESS NOTES
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

## 2019-06-27 ENCOUNTER — TELEPHONE (OUTPATIENT)
Dept: RADIATION ONCOLOGY | Facility: CLINIC | Age: 44
End: 2019-06-27

## 2019-06-27 ENCOUNTER — APPOINTMENT (OUTPATIENT)
Dept: RADIATION ONCOLOGY | Facility: CLINIC | Age: 44
End: 2019-06-27
Attending: RADIOLOGY
Payer: COMMERCIAL

## 2019-06-27 ENCOUNTER — APPOINTMENT (OUTPATIENT)
Dept: LAB | Facility: HOSPITAL | Age: 44
End: 2019-06-27
Attending: RADIOLOGY
Payer: COMMERCIAL

## 2019-06-27 LAB
BACTERIA UR QL AUTO: ABNORMAL /HPF
BILIRUB UR QL STRIP: NEGATIVE
CLARITY UR: CLEAR
COLOR UR: YELLOW
GLUCOSE UR STRIP-MCNC: NEGATIVE MG/DL
HGB UR QL STRIP.AUTO: ABNORMAL
KETONES UR STRIP-MCNC: NEGATIVE MG/DL
LEUKOCYTE ESTERASE UR QL STRIP: ABNORMAL
NITRITE UR QL STRIP: NEGATIVE
NON-SQ EPI CELLS URNS QL MICRO: ABNORMAL /HPF
PH UR STRIP.AUTO: 7 [PH]
PROT UR STRIP-MCNC: NEGATIVE MG/DL
RBC #/AREA URNS AUTO: ABNORMAL /HPF
SP GR UR STRIP.AUTO: <=1.005 (ref 1–1.03)
UROBILINOGEN UR QL STRIP.AUTO: 0.2 E.U./DL
WBC #/AREA URNS AUTO: ABNORMAL /HPF

## 2019-06-27 PROCEDURE — 81001 URINALYSIS AUTO W/SCOPE: CPT | Performed by: RADIOLOGY

## 2019-06-27 PROCEDURE — 77412 RADIATION TX DELIVERY LVL 3: CPT | Performed by: RADIOLOGY

## 2019-06-27 PROCEDURE — 77331 SPECIAL RADIATION DOSIMETRY: CPT | Performed by: RADIOLOGY

## 2019-06-27 PROCEDURE — 77280 THER RAD SIMULAJ FIELD SMPL: CPT | Performed by: RADIOLOGY

## 2019-06-28 ENCOUNTER — APPOINTMENT (OUTPATIENT)
Dept: RADIATION ONCOLOGY | Facility: HOSPITAL | Age: 44
End: 2019-06-28
Attending: RADIOLOGY
Payer: COMMERCIAL

## 2019-06-28 ENCOUNTER — HOSPITAL ENCOUNTER (OUTPATIENT)
Dept: SURGERY | Facility: HOSPITAL | Age: 44
Setting detail: OUTPATIENT SURGERY
Discharge: HOME | End: 2019-06-28
Admitting: STUDENT IN AN ORGANIZED HEALTH CARE EDUCATION/TRAINING PROGRAM
Payer: COMMERCIAL

## 2019-06-28 ENCOUNTER — HOSPITAL ENCOUNTER (OUTPATIENT)
Dept: RADIOLOGY | Facility: HOSPITAL | Age: 44
Setting detail: RADIATION/ONCOLOGY SERIES
Discharge: HOME | End: 2019-06-28
Attending: RADIOLOGY
Payer: COMMERCIAL

## 2019-06-28 ENCOUNTER — APPOINTMENT (OUTPATIENT)
Dept: RADIATION ONCOLOGY | Facility: HOSPITAL | Age: 44
End: 2019-06-28
Attending: STUDENT IN AN ORGANIZED HEALTH CARE EDUCATION/TRAINING PROGRAM
Payer: COMMERCIAL

## 2019-06-28 ENCOUNTER — ANESTHESIA (OUTPATIENT)
Dept: SURGERY | Facility: HOSPITAL | Age: 44
End: 2019-06-28

## 2019-06-28 ENCOUNTER — ANESTHESIA EVENT (OUTPATIENT)
Dept: SURGERY | Facility: HOSPITAL | Age: 44
End: 2019-06-28

## 2019-06-28 VITALS
BODY MASS INDEX: 23.6 KG/M2 | HEIGHT: 61 IN | WEIGHT: 125 LBS | TEMPERATURE: 98.7 F | SYSTOLIC BLOOD PRESSURE: 145 MMHG | DIASTOLIC BLOOD PRESSURE: 89 MMHG | HEART RATE: 72 BPM | OXYGEN SATURATION: 99 % | RESPIRATION RATE: 16 BRPM

## 2019-06-28 DIAGNOSIS — C53.8 MALIGNANT NEOPLASM OVERLAPPING CERVIX UTERI SITE (HCC): ICD-10-CM

## 2019-06-28 PROCEDURE — C1717 BRACHYTX, NON-STR,HDR IR-192: HCPCS | Performed by: STUDENT IN AN ORGANIZED HEALTH CARE EDUCATION/TRAINING PROGRAM

## 2019-06-28 PROCEDURE — 77771 HDR RDNCL NTRSTL/ICAV BRCHTX: CPT | Performed by: STUDENT IN AN ORGANIZED HEALTH CARE EDUCATION/TRAINING PROGRAM

## 2019-06-28 PROCEDURE — 77295 3-D RADIOTHERAPY PLAN: CPT | Performed by: STUDENT IN AN ORGANIZED HEALTH CARE EDUCATION/TRAINING PROGRAM

## 2019-06-28 RX ORDER — IBUPROFEN 600 MG/1
600 TABLET ORAL EVERY 4 HOURS PRN
Status: DISCONTINUED | OUTPATIENT
Start: 2019-06-28 | End: 2019-07-02 | Stop reason: HOSPADM

## 2019-06-28 RX ORDER — MIDAZOLAM HYDROCHLORIDE 1 MG/ML
INJECTION INTRAMUSCULAR; INTRAVENOUS AS NEEDED
Status: DISCONTINUED | OUTPATIENT
Start: 2019-06-28 | End: 2019-06-28 | Stop reason: SURG

## 2019-06-28 RX ORDER — SODIUM CHLORIDE, SODIUM LACTATE, POTASSIUM CHLORIDE, AND CALCIUM CHLORIDE .6; .31; .03; .02 G/100ML; G/100ML; G/100ML; G/100ML
IRRIGANT IRRIGATION ONCE
Status: DISCONTINUED | OUTPATIENT
Start: 2019-06-28 | End: 2019-07-02 | Stop reason: HOSPADM

## 2019-06-28 RX ORDER — ONDANSETRON 2 MG/ML
INJECTION INTRAMUSCULAR; INTRAVENOUS AS NEEDED
Status: DISCONTINUED | OUTPATIENT
Start: 2019-06-28 | End: 2019-06-28 | Stop reason: SURG

## 2019-06-28 RX ORDER — FENTANYL CITRATE 50 UG/ML
INJECTION, SOLUTION INTRAMUSCULAR; INTRAVENOUS AS NEEDED
Status: DISCONTINUED | OUTPATIENT
Start: 2019-06-28 | End: 2019-06-28 | Stop reason: SURG

## 2019-06-28 RX ORDER — OXYCODONE HYDROCHLORIDE 10 MG/1
10 TABLET ORAL ONCE
Status: DISCONTINUED | OUTPATIENT
Start: 2019-06-28 | End: 2019-07-02 | Stop reason: HOSPADM

## 2019-06-28 RX ORDER — HYDROMORPHONE HCL/PF 1 MG/ML
1 SYRINGE (ML) INJECTION EVERY 4 HOURS PRN
Status: DISCONTINUED | OUTPATIENT
Start: 2019-06-28 | End: 2019-07-02 | Stop reason: HOSPADM

## 2019-06-28 RX ORDER — SODIUM CHLORIDE, SODIUM LACTATE, POTASSIUM CHLORIDE, CALCIUM CHLORIDE 600; 310; 30; 20 MG/100ML; MG/100ML; MG/100ML; MG/100ML
20 INJECTION, SOLUTION INTRAVENOUS CONTINUOUS
Status: DISCONTINUED | OUTPATIENT
Start: 2019-06-28 | End: 2019-07-02 | Stop reason: HOSPADM

## 2019-06-28 RX ORDER — CEFAZOLIN SODIUM 1 G/50ML
1000 SOLUTION INTRAVENOUS ONCE
Status: COMPLETED | OUTPATIENT
Start: 2019-06-28 | End: 2019-06-28

## 2019-06-28 RX ORDER — PROPOFOL 10 MG/ML
INJECTION, EMULSION INTRAVENOUS AS NEEDED
Status: DISCONTINUED | OUTPATIENT
Start: 2019-06-28 | End: 2019-06-28 | Stop reason: SURG

## 2019-06-28 RX ORDER — METOCLOPRAMIDE HYDROCHLORIDE 5 MG/ML
10 INJECTION INTRAMUSCULAR; INTRAVENOUS ONCE AS NEEDED
Status: CANCELLED | OUTPATIENT
Start: 2019-06-28

## 2019-06-28 RX ORDER — HYDROMORPHONE HCL/PF 1 MG/ML
0.5 SYRINGE (ML) INJECTION
Status: CANCELLED | OUTPATIENT
Start: 2019-06-28

## 2019-06-28 RX ORDER — LIDOCAINE HYDROCHLORIDE 10 MG/ML
INJECTION, SOLUTION INFILTRATION; PERINEURAL AS NEEDED
Status: DISCONTINUED | OUTPATIENT
Start: 2019-06-28 | End: 2019-06-28 | Stop reason: SURG

## 2019-06-28 RX ORDER — SODIUM CHLORIDE, SODIUM LACTATE, POTASSIUM CHLORIDE, CALCIUM CHLORIDE 600; 310; 30; 20 MG/100ML; MG/100ML; MG/100ML; MG/100ML
50 INJECTION, SOLUTION INTRAVENOUS CONTINUOUS
Status: CANCELLED | OUTPATIENT
Start: 2019-06-28

## 2019-06-28 RX ORDER — LORAZEPAM 2 MG/ML
0.5 INJECTION INTRAMUSCULAR ONCE
Status: COMPLETED | OUTPATIENT
Start: 2019-06-28 | End: 2019-06-28

## 2019-06-28 RX ORDER — KETOROLAC TROMETHAMINE 30 MG/ML
30 INJECTION, SOLUTION INTRAMUSCULAR; INTRAVENOUS EVERY 6 HOURS PRN
Status: DISCONTINUED | OUTPATIENT
Start: 2019-06-28 | End: 2019-06-29 | Stop reason: HOSPADM

## 2019-06-28 RX ORDER — EPHEDRINE SULFATE 50 MG/ML
INJECTION INTRAVENOUS AS NEEDED
Status: DISCONTINUED | OUTPATIENT
Start: 2019-06-28 | End: 2019-06-28 | Stop reason: SURG

## 2019-06-28 RX ORDER — DEXAMETHASONE SODIUM PHOSPHATE 10 MG/ML
INJECTION, SOLUTION INTRAMUSCULAR; INTRAVENOUS AS NEEDED
Status: DISCONTINUED | OUTPATIENT
Start: 2019-06-28 | End: 2019-06-28 | Stop reason: SURG

## 2019-06-28 RX ORDER — ONDANSETRON 2 MG/ML
4 INJECTION INTRAMUSCULAR; INTRAVENOUS ONCE AS NEEDED
Status: CANCELLED | OUTPATIENT
Start: 2019-06-28

## 2019-06-28 RX ADMIN — CEFAZOLIN SODIUM 1000 MG: 1 SOLUTION INTRAVENOUS at 08:20

## 2019-06-28 RX ADMIN — PROPOFOL 200 MG: 10 INJECTION, EMULSION INTRAVENOUS at 08:25

## 2019-06-28 RX ADMIN — FENTANYL CITRATE 50 MCG: 50 INJECTION, SOLUTION INTRAMUSCULAR; INTRAVENOUS at 08:25

## 2019-06-28 RX ADMIN — LIDOCAINE HYDROCHLORIDE 50 MG: 10 INJECTION, SOLUTION INFILTRATION; PERINEURAL at 08:25

## 2019-06-28 RX ADMIN — FENTANYL CITRATE 50 MCG: 50 INJECTION, SOLUTION INTRAMUSCULAR; INTRAVENOUS at 09:28

## 2019-06-28 RX ADMIN — SODIUM CHLORIDE, SODIUM LACTATE, POTASSIUM CHLORIDE, AND CALCIUM CHLORIDE: .6; .31; .03; .02 INJECTION, SOLUTION INTRAVENOUS at 08:17

## 2019-06-28 RX ADMIN — FENTANYL CITRATE 25 MCG: 50 INJECTION, SOLUTION INTRAMUSCULAR; INTRAVENOUS at 08:32

## 2019-06-28 RX ADMIN — FENTANYL CITRATE 50 MCG: 50 INJECTION, SOLUTION INTRAMUSCULAR; INTRAVENOUS at 09:40

## 2019-06-28 RX ADMIN — FENTANYL CITRATE 25 MCG: 50 INJECTION, SOLUTION INTRAMUSCULAR; INTRAVENOUS at 08:34

## 2019-06-28 RX ADMIN — EPHEDRINE SULFATE 10 MG: 50 INJECTION, SOLUTION INTRAVENOUS at 08:42

## 2019-06-28 RX ADMIN — KETOROLAC TROMETHAMINE 30 MG: 30 INJECTION, SOLUTION INTRAMUSCULAR at 08:54

## 2019-06-28 RX ADMIN — HYDROMORPHONE HYDROCHLORIDE 1 MG: 1 INJECTION, SOLUTION INTRAMUSCULAR; INTRAVENOUS; SUBCUTANEOUS at 11:33

## 2019-06-28 RX ADMIN — MIDAZOLAM 2 MG: 1 INJECTION INTRAMUSCULAR; INTRAVENOUS at 08:18

## 2019-06-28 RX ADMIN — ONDANSETRON 4 MG: 2 INJECTION INTRAMUSCULAR; INTRAVENOUS at 08:40

## 2019-06-28 RX ADMIN — DEXAMETHASONE SODIUM PHOSPHATE 5 MG: 10 INJECTION, SOLUTION INTRAMUSCULAR; INTRAVENOUS at 08:40

## 2019-06-28 RX ADMIN — LORAZEPAM 0.5 MG: 2 INJECTION INTRAMUSCULAR; INTRAVENOUS at 11:29

## 2019-06-28 NOTE — PERIOPERATIVE NURSING NOTE
Received pt from radiation oncology metal device and paz dc'd in radiation oncology no drainage noted

## 2019-07-01 ENCOUNTER — APPOINTMENT (OUTPATIENT)
Dept: RADIATION ONCOLOGY | Facility: CLINIC | Age: 44
End: 2019-07-01
Attending: RADIOLOGY
Payer: COMMERCIAL

## 2019-07-01 PROCEDURE — 77412 RADIATION TX DELIVERY LVL 3: CPT | Performed by: STUDENT IN AN ORGANIZED HEALTH CARE EDUCATION/TRAINING PROGRAM

## 2019-07-01 PROCEDURE — 77387 GUIDANCE FOR RADJ TX DLVR: CPT | Performed by: STUDENT IN AN ORGANIZED HEALTH CARE EDUCATION/TRAINING PROGRAM

## 2019-07-02 ENCOUNTER — ANESTHESIA (OUTPATIENT)
Dept: SURGERY | Facility: HOSPITAL | Age: 44
End: 2019-07-02

## 2019-07-02 ENCOUNTER — HOSPITAL ENCOUNTER (OUTPATIENT)
Dept: RADIOLOGY | Facility: HOSPITAL | Age: 44
Setting detail: RADIATION/ONCOLOGY SERIES
Discharge: HOME | End: 2019-07-02
Attending: RADIOLOGY | Admitting: STUDENT IN AN ORGANIZED HEALTH CARE EDUCATION/TRAINING PROGRAM
Payer: COMMERCIAL

## 2019-07-02 ENCOUNTER — APPOINTMENT (OUTPATIENT)
Dept: RADIATION ONCOLOGY | Facility: HOSPITAL | Age: 44
End: 2019-07-02
Attending: RADIOLOGY
Payer: COMMERCIAL

## 2019-07-02 ENCOUNTER — ANESTHESIA EVENT (OUTPATIENT)
Dept: SURGERY | Facility: HOSPITAL | Age: 44
End: 2019-07-02

## 2019-07-02 ENCOUNTER — APPOINTMENT (OUTPATIENT)
Dept: RADIATION ONCOLOGY | Facility: HOSPITAL | Age: 44
End: 2019-07-02
Attending: STUDENT IN AN ORGANIZED HEALTH CARE EDUCATION/TRAINING PROGRAM
Payer: COMMERCIAL

## 2019-07-02 ENCOUNTER — HOSPITAL ENCOUNTER (OUTPATIENT)
Dept: SURGERY | Facility: HOSPITAL | Age: 44
Setting detail: OUTPATIENT SURGERY
Discharge: HOME | End: 2019-07-02
Admitting: STUDENT IN AN ORGANIZED HEALTH CARE EDUCATION/TRAINING PROGRAM
Payer: COMMERCIAL

## 2019-07-02 ENCOUNTER — DOCUMENTATION (OUTPATIENT)
Dept: RADIATION ONCOLOGY | Facility: HOSPITAL | Age: 44
End: 2019-07-02

## 2019-07-02 VITALS
HEART RATE: 72 BPM | BODY MASS INDEX: 23 KG/M2 | DIASTOLIC BLOOD PRESSURE: 80 MMHG | RESPIRATION RATE: 18 BRPM | TEMPERATURE: 98.2 F | HEIGHT: 62 IN | OXYGEN SATURATION: 98 % | SYSTOLIC BLOOD PRESSURE: 136 MMHG | WEIGHT: 125 LBS

## 2019-07-02 DIAGNOSIS — C53.8 MALIGNANT NEOPLASM OVERLAPPING CERVIX UTERI SITE (HCC): ICD-10-CM

## 2019-07-02 LAB
EXT PREGNANCY TEST URINE: NEGATIVE
EXT. CONTROL: NORMAL

## 2019-07-02 PROCEDURE — 77771 HDR RDNCL NTRSTL/ICAV BRCHTX: CPT | Performed by: RADIOLOGY

## 2019-07-02 PROCEDURE — C1717 BRACHYTX, NON-STR,HDR IR-192: HCPCS | Performed by: RADIOLOGY

## 2019-07-02 PROCEDURE — 77280 THER RAD SIMULAJ FIELD SMPL: CPT | Performed by: RADIOLOGY

## 2019-07-02 PROCEDURE — 77280 THER RAD SIMULAJ FIELD SMPL: CPT | Performed by: STUDENT IN AN ORGANIZED HEALTH CARE EDUCATION/TRAINING PROGRAM

## 2019-07-02 PROCEDURE — 81025 URINE PREGNANCY TEST: CPT | Performed by: STUDENT IN AN ORGANIZED HEALTH CARE EDUCATION/TRAINING PROGRAM

## 2019-07-02 PROCEDURE — 77014 HB CT SCAN FOR THERAPY GUIDE: CPT

## 2019-07-02 PROCEDURE — 77295 3-D RADIOTHERAPY PLAN: CPT | Performed by: RADIOLOGY

## 2019-07-02 RX ORDER — LORAZEPAM 2 MG/ML
0.5 INJECTION INTRAMUSCULAR ONCE
Status: COMPLETED | OUTPATIENT
Start: 2019-07-02 | End: 2019-07-02

## 2019-07-02 RX ORDER — OXYCODONE HYDROCHLORIDE 10 MG/1
10 TABLET ORAL ONCE
Status: COMPLETED | OUTPATIENT
Start: 2019-07-02 | End: 2019-07-02

## 2019-07-02 RX ORDER — HYDROMORPHONE HCL/PF 1 MG/ML
0.2 SYRINGE (ML) INJECTION
Status: DISCONTINUED | OUTPATIENT
Start: 2019-07-02 | End: 2019-07-06 | Stop reason: HOSPADM

## 2019-07-02 RX ORDER — PROPOFOL 10 MG/ML
INJECTION, EMULSION INTRAVENOUS AS NEEDED
Status: DISCONTINUED | OUTPATIENT
Start: 2019-07-02 | End: 2019-07-02 | Stop reason: SURG

## 2019-07-02 RX ORDER — LIDOCAINE HYDROCHLORIDE 10 MG/ML
INJECTION, SOLUTION INFILTRATION; PERINEURAL AS NEEDED
Status: DISCONTINUED | OUTPATIENT
Start: 2019-07-02 | End: 2019-07-02 | Stop reason: SURG

## 2019-07-02 RX ORDER — DIPHENHYDRAMINE HYDROCHLORIDE 50 MG/ML
12.5 INJECTION INTRAMUSCULAR; INTRAVENOUS ONCE AS NEEDED
Status: DISCONTINUED | OUTPATIENT
Start: 2019-07-02 | End: 2019-07-06 | Stop reason: HOSPADM

## 2019-07-02 RX ORDER — CEFAZOLIN SODIUM 1 G/50ML
1000 SOLUTION INTRAVENOUS ONCE
Status: COMPLETED | OUTPATIENT
Start: 2019-07-02 | End: 2019-07-05

## 2019-07-02 RX ORDER — HYDROMORPHONE HCL/PF 1 MG/ML
1 SYRINGE (ML) INJECTION EVERY 4 HOURS PRN
Status: DISCONTINUED | OUTPATIENT
Start: 2019-07-02 | End: 2019-07-06 | Stop reason: HOSPADM

## 2019-07-02 RX ORDER — LIDOCAINE HYDROCHLORIDE 10 MG/ML
0.5 INJECTION, SOLUTION EPIDURAL; INFILTRATION; INTRACAUDAL; PERINEURAL ONCE AS NEEDED
Status: DISCONTINUED | OUTPATIENT
Start: 2019-07-02 | End: 2019-07-06 | Stop reason: HOSPADM

## 2019-07-02 RX ORDER — KETOROLAC TROMETHAMINE 30 MG/ML
30 INJECTION, SOLUTION INTRAMUSCULAR; INTRAVENOUS EVERY 6 HOURS PRN
Status: DISCONTINUED | OUTPATIENT
Start: 2019-07-02 | End: 2019-07-02 | Stop reason: HOSPADM

## 2019-07-02 RX ORDER — FENTANYL CITRATE 50 UG/ML
INJECTION, SOLUTION INTRAMUSCULAR; INTRAVENOUS AS NEEDED
Status: DISCONTINUED | OUTPATIENT
Start: 2019-07-02 | End: 2019-07-02 | Stop reason: SURG

## 2019-07-02 RX ORDER — LIDOCAINE HYDROCHLORIDE 10 MG/ML
INJECTION, SOLUTION EPIDURAL; INFILTRATION; INTRACAUDAL; PERINEURAL
Status: DISCONTINUED
Start: 2019-07-02 | End: 2019-07-02 | Stop reason: HOSPADM

## 2019-07-02 RX ORDER — HYDROMORPHONE HCL/PF 1 MG/ML
0.5 SYRINGE (ML) INJECTION
Status: DISCONTINUED | OUTPATIENT
Start: 2019-07-02 | End: 2019-07-06 | Stop reason: HOSPADM

## 2019-07-02 RX ORDER — IBUPROFEN 600 MG/1
600 TABLET ORAL EVERY 4 HOURS PRN
Status: DISCONTINUED | OUTPATIENT
Start: 2019-07-02 | End: 2019-07-06 | Stop reason: HOSPADM

## 2019-07-02 RX ORDER — ONDANSETRON 2 MG/ML
INJECTION INTRAMUSCULAR; INTRAVENOUS AS NEEDED
Status: DISCONTINUED | OUTPATIENT
Start: 2019-07-02 | End: 2019-07-02 | Stop reason: SURG

## 2019-07-02 RX ORDER — CEFAZOLIN SODIUM 1 G/3ML
INJECTION, POWDER, FOR SOLUTION INTRAMUSCULAR; INTRAVENOUS AS NEEDED
Status: DISCONTINUED | OUTPATIENT
Start: 2019-07-02 | End: 2019-07-02 | Stop reason: SURG

## 2019-07-02 RX ORDER — METOCLOPRAMIDE HYDROCHLORIDE 5 MG/ML
10 INJECTION INTRAMUSCULAR; INTRAVENOUS ONCE AS NEEDED
Status: DISCONTINUED | OUTPATIENT
Start: 2019-07-02 | End: 2019-07-06 | Stop reason: HOSPADM

## 2019-07-02 RX ORDER — FENTANYL CITRATE/PF 50 MCG/ML
50 SYRINGE (ML) INJECTION
Status: DISCONTINUED | OUTPATIENT
Start: 2019-07-02 | End: 2019-07-06 | Stop reason: HOSPADM

## 2019-07-02 RX ORDER — SODIUM CHLORIDE, SODIUM LACTATE, POTASSIUM CHLORIDE, CALCIUM CHLORIDE 600; 310; 30; 20 MG/100ML; MG/100ML; MG/100ML; MG/100ML
20 INJECTION, SOLUTION INTRAVENOUS CONTINUOUS
Status: DISCONTINUED | OUTPATIENT
Start: 2019-07-02 | End: 2019-07-06 | Stop reason: HOSPADM

## 2019-07-02 RX ORDER — EPHEDRINE SULFATE 50 MG/ML
INJECTION INTRAVENOUS AS NEEDED
Status: DISCONTINUED | OUTPATIENT
Start: 2019-07-02 | End: 2019-07-02 | Stop reason: SURG

## 2019-07-02 RX ORDER — DEXAMETHASONE SODIUM PHOSPHATE 10 MG/ML
INJECTION, SOLUTION INTRAMUSCULAR; INTRAVENOUS AS NEEDED
Status: DISCONTINUED | OUTPATIENT
Start: 2019-07-02 | End: 2019-07-02 | Stop reason: SURG

## 2019-07-02 RX ORDER — FENTANYL CITRATE 50 UG/ML
INJECTION, SOLUTION INTRAMUSCULAR; INTRAVENOUS
Status: COMPLETED
Start: 2019-07-02 | End: 2019-07-02

## 2019-07-02 RX ORDER — ONDANSETRON 2 MG/ML
4 INJECTION INTRAMUSCULAR; INTRAVENOUS ONCE AS NEEDED
Status: DISCONTINUED | OUTPATIENT
Start: 2019-07-02 | End: 2019-07-06 | Stop reason: HOSPADM

## 2019-07-02 RX ADMIN — LORAZEPAM 0.5 MG: 2 INJECTION INTRAMUSCULAR; INTRAVENOUS at 09:53

## 2019-07-02 RX ADMIN — FENTANYL CITRATE 50 MCG: 50 INJECTION, SOLUTION INTRAMUSCULAR; INTRAVENOUS at 08:39

## 2019-07-02 RX ADMIN — SODIUM CHLORIDE, SODIUM LACTATE, POTASSIUM CHLORIDE, AND CALCIUM CHLORIDE 20 ML/HR: .6; .31; .03; .02 INJECTION, SOLUTION INTRAVENOUS at 10:20

## 2019-07-02 RX ADMIN — FENTANYL CITRATE 100 MCG: 50 INJECTION, SOLUTION INTRAMUSCULAR; INTRAVENOUS at 09:22

## 2019-07-02 RX ADMIN — CEFAZOLIN 1000 MG: 1 INJECTION, POWDER, FOR SOLUTION INTRAVENOUS at 08:22

## 2019-07-02 RX ADMIN — EPHEDRINE SULFATE 5 MG: 50 INJECTION, SOLUTION INTRAVENOUS at 08:34

## 2019-07-02 RX ADMIN — ONDANSETRON 4 MG: 2 INJECTION INTRAMUSCULAR; INTRAVENOUS at 08:29

## 2019-07-02 RX ADMIN — HYDROMORPHONE HYDROCHLORIDE 0.5 MG: 1 INJECTION, SOLUTION INTRAMUSCULAR; INTRAVENOUS; SUBCUTANEOUS at 10:13

## 2019-07-02 RX ADMIN — SODIUM CHLORIDE, SODIUM LACTATE, POTASSIUM CHLORIDE, AND CALCIUM CHLORIDE: .6; .31; .03; .02 INJECTION, SOLUTION INTRAVENOUS at 07:44

## 2019-07-02 RX ADMIN — DEXAMETHASONE SODIUM PHOSPHATE 5 MG: 10 INJECTION, SOLUTION INTRAMUSCULAR; INTRAVENOUS at 08:29

## 2019-07-02 RX ADMIN — HYDROMORPHONE HYDROCHLORIDE 0.5 MG: 1 INJECTION, SOLUTION INTRAMUSCULAR; INTRAVENOUS; SUBCUTANEOUS at 12:01

## 2019-07-02 RX ADMIN — FENTANYL CITRATE 50 MCG: 50 INJECTION, SOLUTION INTRAMUSCULAR; INTRAVENOUS at 08:24

## 2019-07-02 RX ADMIN — EPHEDRINE SULFATE 5 MG: 50 INJECTION, SOLUTION INTRAVENOUS at 08:31

## 2019-07-02 RX ADMIN — IBUPROFEN 600 MG: 600 TABLET ORAL at 12:59

## 2019-07-02 RX ADMIN — PROPOFOL 200 MG: 10 INJECTION, EMULSION INTRAVENOUS at 08:16

## 2019-07-02 RX ADMIN — EPHEDRINE SULFATE 10 MG: 50 INJECTION, SOLUTION INTRAVENOUS at 08:38

## 2019-07-02 RX ADMIN — LIDOCAINE HYDROCHLORIDE 50 MG: 10 INJECTION, SOLUTION INFILTRATION; PERINEURAL at 08:16

## 2019-07-02 RX ADMIN — KETOROLAC TROMETHAMINE 30 MG: 30 INJECTION, SOLUTION INTRAMUSCULAR at 09:58

## 2019-07-02 RX ADMIN — OXYCODONE HYDROCHLORIDE 10 MG: 10 TABLET ORAL at 12:59

## 2019-07-02 NOTE — ANESTHESIA PREPROCEDURE EVALUATION
Review of Systems/Medical History  Patient summary reviewed  Chart reviewed  No history of anesthetic complications     Cardiovascular  Negative cardio ROS    Pulmonary  Smoker cigarette smoker  , Tobacco cessation counseling given ,        GI/Hepatic  Negative GI/hepatic ROS          Negative  ROS        Endo/Other  Negative endo/other ROS      GYN      Comment: Cervical cancer     Hematology  Negative hematology ROS      Musculoskeletal  Negative musculoskeletal ROS        Neurology  Negative neurology ROS      Psychology   Negative psychology ROS              Physical Exam    Airway    Mallampati score: II  TM Distance: >3 FB  Neck ROM: full     Dental   No notable dental hx     Cardiovascular  Comment: Negative ROS, Rhythm: regular, Rate: normal, Cardiovascular exam normal    Pulmonary  Pulmonary exam normal Breath sounds clear to auscultation,     Other Findings        Anesthesia Plan  ASA Score- 3     Anesthesia Type- general with ASA Monitors  Additional Monitors:   Airway Plan: LMA  Plan Factors-    Induction- intravenous  Postoperative Plan- Plan for postoperative opioid use  Informed Consent- Anesthetic plan and risks discussed with patient  I personally reviewed this patient with the CRNA  Discussed and agreed on the Anesthesia Plan with the CRNA  Terence Chang Recent labs personally reviewed:  Lab Results   Component Value Date    WBC 5 39 06/21/2019    HGB 11 5 06/21/2019     (L) 06/21/2019     Lab Results   Component Value Date    K 2 9 (L) 06/21/2019    BUN 12 06/21/2019    CREATININE 0 71 06/21/2019     Lab Results   Component Value Date    PTT 30 04/26/2019      Lab Results   Component Value Date    INR 1 11 04/26/2019       Blood type O    No results found for: Kamala Walsh MD, have personally seen and evaluated the patient prior to anesthetic care    I have reviewed the pre-anesthetic record, and other medical records if appropriate to the anesthetic care   If a CRNA is involved in the case, I have reviewed the CRNA assessment, if present, and agree  Risks/benefits and alternatives discussed with patient including possible PONV, sore throat, and possibility of rare anesthetic and surgical emergencies

## 2019-07-02 NOTE — ANESTHESIA POSTPROCEDURE EVALUATION
Post-Op Assessment Note    CV Status:  Stable  Pain Score: 6    Pain management: inadequate     Mental Status:  Alert and awake   Hydration Status:  Euvolemic   PONV Controlled:  Controlled   Airway Patency:  Patent   Post Op Vitals Reviewed: Yes      Staff: CRNA           /66 (07/02/19 0935)    Temp      Pulse 68 (07/02/19 0935)   Resp (!) 24 (07/02/19 0935)    SpO2 100 % (07/02/19 0935)

## 2019-07-03 ENCOUNTER — APPOINTMENT (OUTPATIENT)
Dept: RADIATION ONCOLOGY | Facility: CLINIC | Age: 44
End: 2019-07-03
Attending: RADIOLOGY
Payer: COMMERCIAL

## 2019-07-03 ENCOUNTER — DOCUMENTATION (OUTPATIENT)
Dept: INFUSION CENTER | Facility: CLINIC | Age: 44
End: 2019-07-03

## 2019-07-03 PROCEDURE — 77412 RADIATION TX DELIVERY LVL 3: CPT | Performed by: RADIOLOGY

## 2019-07-03 PROCEDURE — 77387 GUIDANCE FOR RADJ TX DLVR: CPT | Performed by: RADIOLOGY

## 2019-07-03 NOTE — SOCIAL WORK
MARA met with pt in [address] [place] [address] today, she is here for her final external radiation tx  She says that her move went "OK", she still has things in her old house that she is hoping she can get  She has been doing interal radiation tx in [place] and says "it's like torture  They should use that on war criminals "  Unfortunately she was forced to move during the same time period she was doing the internal radiation, and she has not been feeling well  She is uncomfortable with the internal sleeve and says that she feels it constantly  She has been having N/V as well  Friday 7/5 is her final internal tx and she says that she's looking forward to that more than anything  She is also having issues with her car, saying that it will not go in reverse or neutral at this point so running errands has become very difficult  She is relying on other people taking her places or borrowing a car  Overall, she says that she is hopeful that she can soon return to normal life, working and exercising like she was previously  She saw Dr Alondra Blum and then rang the bell afterward and thanked all of the staff for their support to her throughout her treatment time  MARA instructed pt to call me with any questions, concerns, or needs in the future and she agreed that she would  MSW will remain available to pt in the future as needed

## 2019-07-05 ENCOUNTER — APPOINTMENT (OUTPATIENT)
Dept: RADIATION ONCOLOGY | Facility: HOSPITAL | Age: 44
End: 2019-07-05
Attending: RADIOLOGY
Payer: COMMERCIAL

## 2019-07-05 ENCOUNTER — HOSPITAL ENCOUNTER (OUTPATIENT)
Dept: RADIOLOGY | Facility: HOSPITAL | Age: 44
Setting detail: RADIATION/ONCOLOGY SERIES
Discharge: HOME | End: 2019-07-05
Attending: RADIOLOGY | Admitting: STUDENT IN AN ORGANIZED HEALTH CARE EDUCATION/TRAINING PROGRAM
Payer: COMMERCIAL

## 2019-07-05 ENCOUNTER — ANESTHESIA EVENT (OUTPATIENT)
Dept: SURGERY | Facility: HOSPITAL | Age: 44
End: 2019-07-05

## 2019-07-05 ENCOUNTER — ANESTHESIA (OUTPATIENT)
Dept: SURGERY | Facility: HOSPITAL | Age: 44
End: 2019-07-05

## 2019-07-05 ENCOUNTER — HOSPITAL ENCOUNTER (OUTPATIENT)
Dept: SURGERY | Facility: HOSPITAL | Age: 44
Setting detail: OUTPATIENT SURGERY
Discharge: HOME | End: 2019-07-05
Admitting: STUDENT IN AN ORGANIZED HEALTH CARE EDUCATION/TRAINING PROGRAM
Payer: COMMERCIAL

## 2019-07-05 VITALS
SYSTOLIC BLOOD PRESSURE: 137 MMHG | TEMPERATURE: 98 F | HEIGHT: 62 IN | RESPIRATION RATE: 16 BRPM | OXYGEN SATURATION: 99 % | WEIGHT: 125 LBS | BODY MASS INDEX: 23 KG/M2 | HEART RATE: 82 BPM | DIASTOLIC BLOOD PRESSURE: 77 MMHG

## 2019-07-05 DIAGNOSIS — C53.8 MALIGNANT NEOPLASM OF OVERLAPPING SITES OF CERVIX (HCC): Primary | ICD-10-CM

## 2019-07-05 DIAGNOSIS — C53.8 MALIGNANT NEOPLASM OVERLAPPING CERVIX UTERI SITE (HCC): ICD-10-CM

## 2019-07-05 LAB
EXT PREGNANCY TEST URINE: NEGATIVE
EXT. CONTROL: NORMAL

## 2019-07-05 PROCEDURE — 77771 HDR RDNCL NTRSTL/ICAV BRCHTX: CPT | Performed by: STUDENT IN AN ORGANIZED HEALTH CARE EDUCATION/TRAINING PROGRAM

## 2019-07-05 PROCEDURE — 77014 HB CT SCAN FOR THERAPY GUIDE: CPT

## 2019-07-05 PROCEDURE — 81025 URINE PREGNANCY TEST: CPT | Performed by: ANESTHESIOLOGY

## 2019-07-05 PROCEDURE — 77295 3-D RADIOTHERAPY PLAN: CPT | Performed by: STUDENT IN AN ORGANIZED HEALTH CARE EDUCATION/TRAINING PROGRAM

## 2019-07-05 PROCEDURE — C1717 BRACHYTX, NON-STR,HDR IR-192: HCPCS | Performed by: STUDENT IN AN ORGANIZED HEALTH CARE EDUCATION/TRAINING PROGRAM

## 2019-07-05 PROCEDURE — 77280 THER RAD SIMULAJ FIELD SMPL: CPT | Performed by: STUDENT IN AN ORGANIZED HEALTH CARE EDUCATION/TRAINING PROGRAM

## 2019-07-05 RX ORDER — DEXAMETHASONE SODIUM PHOSPHATE 4 MG/ML
INJECTION, SOLUTION INTRA-ARTICULAR; INTRALESIONAL; INTRAMUSCULAR; INTRAVENOUS; SOFT TISSUE AS NEEDED
Status: DISCONTINUED | OUTPATIENT
Start: 2019-07-05 | End: 2019-07-05 | Stop reason: SURG

## 2019-07-05 RX ORDER — HYDROMORPHONE HCL/PF 1 MG/ML
0.5 SYRINGE (ML) INJECTION EVERY 4 HOURS PRN
Status: DISCONTINUED | OUTPATIENT
Start: 2019-07-05 | End: 2019-07-09 | Stop reason: HOSPADM

## 2019-07-05 RX ORDER — LIDOCAINE HYDROCHLORIDE 10 MG/ML
0.5 INJECTION, SOLUTION EPIDURAL; INFILTRATION; INTRACAUDAL; PERINEURAL ONCE AS NEEDED
Status: DISCONTINUED | OUTPATIENT
Start: 2019-07-05 | End: 2019-07-09 | Stop reason: HOSPADM

## 2019-07-05 RX ORDER — KETOROLAC TROMETHAMINE 30 MG/ML
INJECTION, SOLUTION INTRAMUSCULAR; INTRAVENOUS AS NEEDED
Status: DISCONTINUED | OUTPATIENT
Start: 2019-07-05 | End: 2019-07-05 | Stop reason: SURG

## 2019-07-05 RX ORDER — EPHEDRINE SULFATE 50 MG/ML
INJECTION INTRAVENOUS AS NEEDED
Status: DISCONTINUED | OUTPATIENT
Start: 2019-07-05 | End: 2019-07-05 | Stop reason: SURG

## 2019-07-05 RX ORDER — LORAZEPAM 2 MG/ML
0.5 INJECTION INTRAMUSCULAR ONCE
Status: COMPLETED | OUTPATIENT
Start: 2019-07-05 | End: 2019-07-05

## 2019-07-05 RX ORDER — PROPOFOL 10 MG/ML
INJECTION, EMULSION INTRAVENOUS AS NEEDED
Status: DISCONTINUED | OUTPATIENT
Start: 2019-07-05 | End: 2019-07-05 | Stop reason: SURG

## 2019-07-05 RX ORDER — FENTANYL CITRATE 50 UG/ML
INJECTION, SOLUTION INTRAMUSCULAR; INTRAVENOUS AS NEEDED
Status: DISCONTINUED | OUTPATIENT
Start: 2019-07-05 | End: 2019-07-05 | Stop reason: SURG

## 2019-07-05 RX ORDER — MIDAZOLAM HYDROCHLORIDE 1 MG/ML
INJECTION INTRAMUSCULAR; INTRAVENOUS AS NEEDED
Status: DISCONTINUED | OUTPATIENT
Start: 2019-07-05 | End: 2019-07-05 | Stop reason: SURG

## 2019-07-05 RX ORDER — SODIUM CHLORIDE, SODIUM LACTATE, POTASSIUM CHLORIDE, CALCIUM CHLORIDE 600; 310; 30; 20 MG/100ML; MG/100ML; MG/100ML; MG/100ML
125 INJECTION, SOLUTION INTRAVENOUS CONTINUOUS
Status: DISCONTINUED | OUTPATIENT
Start: 2019-07-05 | End: 2019-07-09 | Stop reason: HOSPADM

## 2019-07-05 RX ORDER — GLYCOPYRROLATE 0.2 MG/ML
INJECTION INTRAMUSCULAR; INTRAVENOUS AS NEEDED
Status: DISCONTINUED | OUTPATIENT
Start: 2019-07-05 | End: 2019-07-05 | Stop reason: SURG

## 2019-07-05 RX ORDER — ONDANSETRON 2 MG/ML
INJECTION INTRAMUSCULAR; INTRAVENOUS AS NEEDED
Status: DISCONTINUED | OUTPATIENT
Start: 2019-07-05 | End: 2019-07-05 | Stop reason: SURG

## 2019-07-05 RX ORDER — OXYCODONE HYDROCHLORIDE 10 MG/1
10 TABLET ORAL EVERY 4 HOURS PRN
Status: DISCONTINUED | OUTPATIENT
Start: 2019-07-05 | End: 2019-07-09 | Stop reason: HOSPADM

## 2019-07-05 RX ORDER — CEFAZOLIN SODIUM 1 G/50ML
1000 SOLUTION INTRAVENOUS ONCE
Status: DISCONTINUED | OUTPATIENT
Start: 2019-07-05 | End: 2019-07-09 | Stop reason: HOSPADM

## 2019-07-05 RX ADMIN — FENTANYL CITRATE 50 MCG: 50 INJECTION, SOLUTION INTRAMUSCULAR; INTRAVENOUS at 08:40

## 2019-07-05 RX ADMIN — OXYCODONE HYDROCHLORIDE 10 MG: 10 TABLET ORAL at 13:48

## 2019-07-05 RX ADMIN — HYDROMORPHONE HYDROCHLORIDE 0.5 MG: 1 INJECTION, SOLUTION INTRAMUSCULAR; INTRAVENOUS; SUBCUTANEOUS at 09:51

## 2019-07-05 RX ADMIN — PROPOFOL 50 MG: 10 INJECTION, EMULSION INTRAVENOUS at 08:17

## 2019-07-05 RX ADMIN — KETOROLAC TROMETHAMINE 30 MG: 30 INJECTION, SOLUTION INTRAMUSCULAR at 09:15

## 2019-07-05 RX ADMIN — SODIUM CHLORIDE, SODIUM LACTATE, POTASSIUM CHLORIDE, AND CALCIUM CHLORIDE: .6; .31; .03; .02 INJECTION, SOLUTION INTRAVENOUS at 07:55

## 2019-07-05 RX ADMIN — GLYCOPYRROLATE 0.1 MG: 0.2 INJECTION, SOLUTION INTRAMUSCULAR; INTRAVENOUS at 08:04

## 2019-07-05 RX ADMIN — HYDROMORPHONE HYDROCHLORIDE 0.5 MG: 1 INJECTION, SOLUTION INTRAMUSCULAR; INTRAVENOUS; SUBCUTANEOUS at 11:52

## 2019-07-05 RX ADMIN — FENTANYL CITRATE 50 MCG: 50 INJECTION, SOLUTION INTRAMUSCULAR; INTRAVENOUS at 09:08

## 2019-07-05 RX ADMIN — DEXAMETHASONE SODIUM PHOSPHATE 5 MG: 4 INJECTION, SOLUTION INTRAMUSCULAR; INTRAVENOUS at 08:20

## 2019-07-05 RX ADMIN — PROPOFOL 150 MG: 10 INJECTION, EMULSION INTRAVENOUS at 08:13

## 2019-07-05 RX ADMIN — MIDAZOLAM 2 MG: 1 INJECTION INTRAMUSCULAR; INTRAVENOUS at 08:04

## 2019-07-05 RX ADMIN — EPHEDRINE SULFATE 10 MG: 50 INJECTION, SOLUTION INTRAVENOUS at 08:36

## 2019-07-05 RX ADMIN — EPHEDRINE SULFATE 5 MG: 50 INJECTION, SOLUTION INTRAVENOUS at 08:31

## 2019-07-05 RX ADMIN — CEFAZOLIN SODIUM 1000 MG: 1 SOLUTION INTRAVENOUS at 08:01

## 2019-07-05 RX ADMIN — FENTANYL CITRATE 50 MCG: 50 INJECTION, SOLUTION INTRAMUSCULAR; INTRAVENOUS at 09:15

## 2019-07-05 RX ADMIN — EPHEDRINE SULFATE 5 MG: 50 INJECTION, SOLUTION INTRAVENOUS at 08:28

## 2019-07-05 RX ADMIN — LORAZEPAM 0.5 MG: 2 INJECTION INTRAMUSCULAR; INTRAVENOUS at 11:58

## 2019-07-05 RX ADMIN — FENTANYL CITRATE 50 MCG: 50 INJECTION, SOLUTION INTRAMUSCULAR; INTRAVENOUS at 08:17

## 2019-07-05 RX ADMIN — ONDANSETRON 4 MG: 2 INJECTION INTRAMUSCULAR; INTRAVENOUS at 08:04

## 2019-07-05 RX ADMIN — LIDOCAINE HYDROCHLORIDE 100 MG: 20 INJECTION, SOLUTION INTRAVENOUS at 08:13

## 2019-07-05 NOTE — ANESTHESIA POSTPROCEDURE EVALUATION
Post-Op Assessment Note    CV Status:  Stable  Pain Score: 5    Pain management: adequate     Mental Status:  Alert and awake   Hydration Status:  Euvolemic   PONV Controlled:  Controlled   Airway Patency:  Patent   Post Op Vitals Reviewed: Yes      Staff: CRNA           BP      Temp      Pulse     Resp      SpO2   100

## 2019-07-05 NOTE — ANESTHESIA PREPROCEDURE EVALUATION
Review of Systems/Medical History  Patient summary reviewed        Cardiovascular   Pulmonary  Smoker ,        GI/Hepatic            Endo/Other     GYN       Hematology   Musculoskeletal       Neurology   Psychology           Physical Exam    Airway    Mallampati score: II  TM Distance: >3 FB  Neck ROM: full     Dental       Cardiovascular      Pulmonary      Other Findings        Anesthesia Plan  ASA Score- 2     Anesthesia Type- general with ASA Monitors  Additional Monitors:   Airway Plan: LMA  Plan Factors-    Induction- intravenous  Postoperative Plan-     Informed Consent- Anesthetic plan and risks discussed with patient  I personally reviewed this patient with the CRNA  Discussed and agreed on the Anesthesia Plan with the CRNA  Terence Chang

## 2019-07-05 NOTE — PROGRESS NOTES
Medicated at 1348 hours with Roxicodone 10 mg PO for c/o vaginal pain  Report received from [facility] in [address] at 1335 hours  Await patient transport/ [facility]

## 2019-07-15 NOTE — PROGRESS NOTES
Assessment/Plan:    Problem List Items Addressed This Visit        Genitourinary    Malignant neoplasm of overlapping sites of cervix [facility])     The patient has completed combined chemo radiation therapy  She is having moderate side effect specifically difficulty falling asleep diffuse pain pelvic swelling and discomfort  She does not wish to take any further pain medicine  She would prefer medical marijuana  I will make a referral to palliative care  Additionally thrush was diagnosed on this visit  Nystatin swish and swallow was recommended  The patient continues to have difficulty with psychosocial issues  She is being removed from her home  She has had a financial issues related to difficulty working and car troubles  She however seems to be managing these multiple difficulties [facility] well  In exam was not possible today due to the patient's discomfort  We will see her back in 1 month and a post treatment exam will be performed at that time  We will plan a PET-CT scan approximately 3-4 months upon completion of treatment  Other Visit Diagnoses     [facility]    -  Primary    Relevant Medications    nystatin (MYCOSTATIN) 500,000 units/5 mL suspension    Other Relevant Orders    Ambulatory referral to Palliative Care              CHIEF COMPLAINT:  Continued treatment of cervical cancer      Subjective:     Problem:  Cancer Staging  Malignant neoplasm of overlapping sites of cervix [facility])  Staging form: Cervix Uteri, AJCC 8th Edition  - Clinical stage from 5/1/2019: FIGO Stage IB2 (cT1b2, cN1, cM0) - Signed by August Mas MD on 5/1/2019      Previous therapy:     Malignant neoplasm of overlapping sites of cervix [facility] )    4/26/2019 Biopsy     Final Diagnosis   A  Cervix, 12:00, biopsy:  -  Invasive moderately to poorly differentiated non-keratinizing squamous cell carcinoma (see note)             5/1/2019 Initial Diagnosis     Malignant neoplasm of overlapping sites of cervix ([facility] ) 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 with potential metastatic disease to the left iliac region and sacral region  5/1/2019 -  Cancer Staged     Staging form: Cervix Uteri, AJCC 8th Edition  - Clinical stage from 5/1/2019: FIGO Stage IB2 (cT1b2, cN1, cM0) - Signed by Margie Go MD on 5/1/2019  Histologic grade (G): G3  Histologic grading system: 3 grade system        5/2019 -  Radiation     Whole pelvic radiation therapy with vaginal therapy brachy implants      5/6/2019 -  Chemotherapy     CISplatin (PLATINOL) 62 4 mg in sodium chloride 0 9 % 250 mL infusion, 40 mg/m2 = 62 4 mg, Intravenous, Once, 6 of 6 cycles  Administration: 62 4 mg (5/21/2019), 62 4 mg (5/29/2019), 62 4 mg (6/4/2019), 62 4 mg (6/11/2019), 62 4 mg (6/18/2019), 62 4 mg (6/26/2019)           Patient ID: Rashawn Howard is a 44 y o  female  Patient is very pleasant 56-year-old white female with a diagnosis of stage IB to squamous cell carcinoma of the cervix  She was diagnosed after heavy vaginal bleeding and pelvic pain  Since that time she has been undergoing treatment with weekly cis-platinum and concurrent whole pelvic radiation therapy  She has been tolerating her treatment well  She recently completed vaginal brachytherapy  She tolerated this with a moderate degree of difficulty pain, discomfort and swelling  She continues to have low appetite mild nausea pain in the hips insomnia  She is having no difficulties with voiding or bowel function  She has no vaginal bleeding  Review of Systems   Constitutional: Negative  HENT: Negative  Eyes: Negative  Respiratory: Negative  Cardiovascular: Negative  Gastrointestinal: Negative  Endocrine: Negative  Genitourinary: Positive for pelvic pain and vaginal pain  Musculoskeletal: Positive for arthralgias  Skin: Negative  Neurological: Negative  Hematological: Negative  Psychiatric/Behavioral: Negative  Current Outpatient Medications   Medication Sig Dispense Refill    oxyCODONE (ROXICODONE) 5 mg immediate release tablet Take 1 tablet (5 mg total) by mouth every 4 (four) hours as needed for moderate painMax Daily Amount: 30 mg 30 tablet 0    LORazepam (ATIVAN) 1 mg tablet Take 1 tablet (1 mg total) by mouth every 6 (six) hours as needed (nausea or anxiety) (Patient not taking: Reported on 2019) 20 tablet 1    nystatin (MYCOSTATIN) 500,000 units/5 mL suspension Apply 5 mL (500,000 Units total) to the mouth or throat 4 (four) times a day for 7 days 140 mL 0    ondansetron (ZOFRAN) 8 mg tablet Take 1 tablet (8 mg total) by mouth every 8 (eight) hours as needed for nausea or vomiting (Patient not taking: Reported on 2019) 30 tablet 1     No current facility-administered medications for this visit  Allergies   Allergen Reactions    Latex Hives    Other      Tomatoes   Vomiting and diarhhea       Past Medical History:   Diagnosis Date    Abnormal Pap smear of cervix     Cancer [facility] )     cervix       Past Surgical History:   Procedure Laterality Date    SD DILATION OF VAGINA W ANESTH N/A 2019    Procedure: EXAM UNDER ANESTHESIA (EUA);   Surgeon: Indra Amaya MD;  [facility]: [facility];  Service: Gynecology Oncology    SD INSERT VAGINAL RADIATION DEVICE N/A 2019    Procedure: [address];  Surgeon: Indra Amaya MD;  [facility];  Service: Gynecology Oncology    TONSILLECTOMY      US GUIDANCE  2019       OB History        3    Para   2    Term   2       0    AB   1    Living   2       SAB   1    TAB   0    Ectopic   0    Multiple   0    Live Births   2                 Family History   Problem Relation Age of Onset    Uterine cancer Maternal Grandmother        The following portions of the patient's history were reviewed and updated as appropriate: allergies, current medications, past family history, past medical history, past social history, past surgical history and problem list       Objective:    Blood pressure 110/64, pulse 88, temperature 98 9 °F (37 2 °C), resp  rate 18, height 5' 1 5" (1 562 m), weight 57 6 kg (127 lb), not currently breastfeeding  Body mass index is 23 61 kg/m²  Physical Exam   Constitutional: She is oriented to person, place, and time  She appears well-developed and well-nourished  HENT:   Head: Normocephalic and atraumatic  Thrush on tongue   Eyes: Pupils are equal, round, and reactive to light  EOM are normal    Neck: Normal range of motion  Neck supple  Cardiovascular: Normal rate, regular rhythm and normal heart sounds  Pulmonary/Chest: Effort normal and breath sounds normal  No respiratory distress  Abdominal: Soft  Bowel sounds are normal  She exhibits no distension and no ascites  There is no tenderness  There is no rigidity, no rebound and no guarding  Genitourinary:   Genitourinary Comments: Deferred due to discomfort   Musculoskeletal: Normal range of motion  Lymphadenopathy:     She has no cervical adenopathy  She has no axillary adenopathy  Right: No inguinal and no supraclavicular adenopathy present  Left: No inguinal and no supraclavicular adenopathy present  Neurological: She is alert and oriented to person, place, and time  Skin: Skin is warm and dry  Psychiatric: She has a normal mood and affect   Her behavior is normal          No results found for:   Lab Results   Component Value Date    WBC 5 39 06/21/2019    HGB 11 5 06/21/2019    HCT 35 3 06/21/2019    MCV 88 06/21/2019     (L) 06/21/2019     Lab Results   Component Value Date    K 2 9 (L) 06/21/2019     06/21/2019    CO2 30 06/21/2019    BUN 12 06/21/2019    CREATININE 0 71 06/21/2019    GLUF 87 06/14/2019    CALCIUM 9 3 06/21/2019    AST 8 06/21/2019    ALT 16 06/21/2019    ALKPHOS 54 06/21/2019    EGFR 105 06/21/2019

## 2019-07-16 ENCOUNTER — OFFICE VISIT (OUTPATIENT)
Dept: GYNECOLOGIC ONCOLOGY | Facility: CLINIC | Age: 44
End: 2019-07-16
Payer: COMMERCIAL

## 2019-07-16 VITALS
RESPIRATION RATE: 18 BRPM | SYSTOLIC BLOOD PRESSURE: 110 MMHG | DIASTOLIC BLOOD PRESSURE: 64 MMHG | HEIGHT: 62 IN | WEIGHT: 127 LBS | TEMPERATURE: 98.9 F | BODY MASS INDEX: 23.37 KG/M2 | HEART RATE: 88 BPM

## 2019-07-16 DIAGNOSIS — C53.8 MALIGNANT NEOPLASM OF OVERLAPPING SITES OF CERVIX (HCC): ICD-10-CM

## 2019-07-16 DIAGNOSIS — C53.0 MALIGNANT NEOPLASM OF ENDOCERVIX (HCC): Primary | ICD-10-CM

## 2019-07-16 PROCEDURE — 99214 OFFICE O/P EST MOD 30 MIN: CPT | Performed by: OBSTETRICS & GYNECOLOGY

## 2019-07-16 NOTE — LETTER
July 16, 2019     Darius Serna DO  [address]    Patient: Sriram Moe   YOB: 1975   Date of Visit: 7/16/2019       Dear Dr Vladislav Sigala: Thank you for referring Sriram Moe to me for evaluation  Below are my notes for this consultation  If you have questions, please do not hesitate to call me  I look forward to following your patient along with you  Sincerely,        Jose Faith MD        CC: No Recipients  Jose Faith MD  7/16/2019  9:44 AM  Sign at close encounter  Assessment/Plan:    Problem List Items Addressed This Visit        Genitourinary    Malignant neoplasm of overlapping sites of cervix [facility])     The patient has completed combined chemo radiation therapy  She is having moderate side effect specifically difficulty falling asleep diffuse pain pelvic swelling and discomfort  She does not wish to take any further pain medicine  She would prefer medical marijuana  I will make a referral to palliative care  Additionally thrush was diagnosed on this visit  Nystatin swish and swallow was recommended  The patient continues to have difficulty with psychosocial issues  She is being removed from her home  She has had a financial issues related to difficulty working and car troubles  She however seems to be managing these multiple difficulties surprising [place] well  In exam was not possible today due to the patient's discomfort  We will see her back in 1 month and a post treatment exam will be performed at that time  We will plan a PET-CT scan approximately 3-4 months upon completion of treatment               Other Visit Diagnoses     Malignant neoplasm of endocervix ([address] )    -  Primary    Relevant Medications    nystatin (MYCOSTATIN) 500,000 units/5 mL suspension    Other Relevant Orders    Ambulatory referral to Palliative Care              CHIEF COMPLAINT:  Continued treatment of cervical cancer      Subjective:     Problem:  Cancer Staging  Malignant neoplasm of overlapping sites of cervix [facility])  Staging form: Cervix Uteri, AJCC 8th Edition  - Clinical stage from 5/1/2019: FIGO Stage IB2 (cT1b2, cN1, cM0) - Signed by Ronen Mcmillan MD on 5/1/2019      Previous therapy:     Malignant neoplasm of overlapping sites of cervix ([facility] [address] )    4/26/2019 Biopsy     Final Diagnosis   A  Cervix, 12:00, biopsy:  -  Invasive moderately to poorly differentiated non-keratinizing squamous cell carcinoma (see note)  5/1/2019 Initial Diagnosis     Malignant neoplasm of overlapping sites of cervix (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 with potential metastatic disease to the left iliac region and sacral region  5/1/2019 -  Cancer Staged     Staging form: Cervix Uteri, AJCC 8th Edition  - Clinical stage from 5/1/2019: FIGO Stage IB2 (cT1b2, cN1, cM0) - Signed by Ronen Mcmillan MD on 5/1/2019  Histologic grade (G): G3  Histologic grading system: 3 grade system        5/2019 -  Radiation     Whole pelvic radiation therapy with vaginal therapy brachy implants      5/6/2019 -  Chemotherapy     CISplatin (PLATINOL) 62 4 mg in sodium chloride 0 9 % 250 mL infusion, 40 mg/m2 = 62 4 mg, Intravenous, Once, 6 of 6 cycles  Administration: 62 4 mg (5/21/2019), 62 4 mg (5/29/2019), 62 4 mg (6/4/2019), 62 4 mg (6/11/2019), 62 4 mg (6/18/2019), 62 4 mg (6/26/2019)           Patient ID: Lucía Dejesus is a 44 y o  female  Patient is very pleasant 42-year-old white female with a diagnosis of stage IB to squamous cell carcinoma of the cervix  She was diagnosed after heavy vaginal bleeding and pelvic pain  Since that time she has been undergoing treatment with weekly cis-platinum and concurrent whole pelvic radiation therapy  She has been tolerating her treatment well  She recently completed vaginal brachytherapy  She tolerated this with a moderate degree of difficulty pain, discomfort and swelling    She continues to have low appetite mild nausea pain in the hips insomnia  She is having no difficulties with voiding or bowel function  She has no vaginal bleeding  Review of Systems   Constitutional: Negative  HENT: Negative  Eyes: Negative  Respiratory: Negative  Cardiovascular: Negative  Gastrointestinal: Negative  Endocrine: Negative  Genitourinary: Positive for pelvic pain and vaginal pain  Musculoskeletal: Positive for arthralgias  Skin: Negative  Neurological: Negative  Hematological: Negative  Psychiatric/Behavioral: Negative  Current Outpatient Medications   Medication Sig Dispense Refill    oxyCODONE (ROXICODONE) 5 mg immediate release tablet Take 1 tablet (5 mg total) by mouth every 4 (four) hours as needed for moderate painMax Daily Amount: 30 mg 30 tablet 0    LORazepam (ATIVAN) 1 mg tablet Take 1 tablet (1 mg total) by mouth every 6 (six) hours as needed (nausea or anxiety) (Patient not taking: Reported on 7/16/2019) 20 tablet 1    nystatin (MYCOSTATIN) 500,000 units/5 mL suspension Apply 5 mL (500,000 Units total) to the mouth or throat 4 (four) times a day for 7 days 140 mL 0    ondansetron (ZOFRAN) 8 mg tablet Take 1 tablet (8 mg total) by mouth every 8 (eight) hours as needed for nausea or vomiting (Patient not taking: Reported on 7/16/2019) 30 tablet 1     No current facility-administered medications for this visit  Allergies   Allergen Reactions    Latex Hives    Other      Tomatoes   Vomiting and diarhhea       Past Medical History:   Diagnosis Date    Abnormal Pap smear of cervix     Cancer ([facility] [address] )     cervix       Past Surgical History:   Procedure Laterality Date    FL DILATION OF VAGINA W ANESTH N/A 6/20/2019    Procedure: EXAM UNDER ANESTHESIA (EUA);   Surgeon: Guerita Ang MD;  [facility];  Service: Gynecology Oncology    FL INSERT VAGINAL RADIATION DEVICE N/A 6/20/2019    Procedure: PIERRE SLEEVE PLACEMENT;  Surgeon: Guerita Ang MD;  [facility];  Service: Gynecology Oncology    TONSILLECTOMY      US GUIDANCE  2019       OB History        3    Para   2    Term   2       0    AB   1    Living   2       SAB   1    TAB   0    Ectopic   0    Multiple   0    Live Births   2                 Family History   Problem Relation Age of Onset    Uterine cancer Maternal Grandmother        The following portions of the patient's history were reviewed and updated as appropriate: allergies, current medications, past family history, past medical history, past social history, past surgical history and problem list       Objective:    Blood pressure 110/64, pulse 88, temperature 98 9 °F (37 2 °C), resp  rate 18, height 5' 1 5" (1 562 m), weight 57 6 kg (127 lb), not currently breastfeeding  Body mass index is 23 61 kg/m²  Physical Exam   Constitutional: She is oriented to person, place, and time  She appears well-developed and well-nourished  HENT:   Head: Normocephalic and atraumatic  Thrush on tongue   Eyes: Pupils are equal, round, and reactive to light  EOM are normal    Neck: Normal range of motion  Neck supple  Cardiovascular: Normal rate, regular rhythm and normal heart sounds  Pulmonary/Chest: Effort normal and breath sounds normal  No respiratory distress  Abdominal: Soft  Bowel sounds are normal  She exhibits no distension and no ascites  There is no tenderness  There is no rigidity, no rebound and no guarding  Genitourinary:   Genitourinary Comments: Deferred due to discomfort   Musculoskeletal: Normal range of motion  Lymphadenopathy:     She has no cervical adenopathy  She has no axillary adenopathy  Right: No inguinal and no supraclavicular adenopathy present  Left: No inguinal and no supraclavicular adenopathy present  Neurological: She is alert and oriented to person, place, and time  Skin: Skin is warm and dry  Psychiatric: She has a normal mood and affect   Her behavior is normal  No results found for:   Lab Results   Component Value Date    WBC 5 39 06/21/2019    HGB 11 5 06/21/2019    HCT 35 3 06/21/2019    MCV 88 06/21/2019     (L) 06/21/2019     Lab Results   Component Value Date    K 2 9 (L) 06/21/2019     06/21/2019    CO2 30 06/21/2019    BUN 12 06/21/2019    CREATININE 0 71 06/21/2019    GLUF 87 06/14/2019    CALCIUM 9 3 06/21/2019    AST 8 06/21/2019    ALT 16 06/21/2019    ALKPHOS 54 06/21/2019    EGFR 105 06/21/2019

## 2019-07-16 NOTE — ASSESSMENT & PLAN NOTE
The patient has completed combined chemo radiation therapy  She is having moderate side effect specifically difficulty falling asleep diffuse pain pelvic swelling and discomfort  She does not wish to take any further pain medicine  She would prefer medical marijuana  I will make a referral to palliative care  Additionally thrush was diagnosed on this visit  Nystatin swish and swallow was recommended  The patient continues to have difficulty with psychosocial issues  She is being removed from her home  She has had a financial issues related to difficulty working and car troubles  She however seems to be managing these multiple difficulties surprising [place] well  In exam was not possible today due to the patient's discomfort  We will see her back in 1 month and a post treatment exam will be performed at that time  We will plan a PET-CT scan approximately 3-4 months upon completion of treatment

## 2019-07-23 ENCOUNTER — OFFICE VISIT (OUTPATIENT)
Dept: PALLIATIVE MEDICINE | Facility: CLINIC | Age: 44
End: 2019-07-23
Payer: COMMERCIAL

## 2019-07-23 VITALS
HEIGHT: 61 IN | BODY MASS INDEX: 23.98 KG/M2 | RESPIRATION RATE: 16 BRPM | SYSTOLIC BLOOD PRESSURE: 146 MMHG | TEMPERATURE: 98.4 F | WEIGHT: 127 LBS | HEART RATE: 64 BPM | DIASTOLIC BLOOD PRESSURE: 90 MMHG | OXYGEN SATURATION: 97 %

## 2019-07-23 DIAGNOSIS — G89.3 CANCER ASSOCIATED PAIN: ICD-10-CM

## 2019-07-23 DIAGNOSIS — Z79.899 MEDICAL MARIJUANA USE: ICD-10-CM

## 2019-07-23 DIAGNOSIS — C53.8 MALIGNANT NEOPLASM OF OVERLAPPING SITES OF CERVIX (HCC): Primary | ICD-10-CM

## 2019-07-23 DIAGNOSIS — R63.0 LOSS OF APPETITE: ICD-10-CM

## 2019-07-23 DIAGNOSIS — C53.0 MALIGNANT NEOPLASM OF ENDOCERVIX (HCC): ICD-10-CM

## 2019-07-23 DIAGNOSIS — G47.01 INSOMNIA DUE TO MEDICAL CONDITION: ICD-10-CM

## 2019-07-23 PROCEDURE — 99244 OFF/OP CNSLTJ NEW/EST MOD 40: CPT | Performed by: INTERNAL MEDICINE

## 2019-07-23 RX ORDER — LIDOCAINE 40 MG/G
CREAM TOPICAL AS NEEDED
Qty: 30 G | Refills: 0 | Status: SHIPPED | OUTPATIENT
Start: 2019-07-23 | End: 2020-03-03 | Stop reason: HOSPADM

## 2019-07-23 NOTE — PROGRESS NOTES
Palliative and Supportive Care   Della Hillman 44 y o  female [id number]    Assessment/Plan:  1  Malignant neoplasm of overlapping sites of cervix ([facility] [address] )    2  Cancer associated pain    3  Malignant neoplasm of endocervix ([facility] [address] )    4  Insomnia due to medical condition    5  Loss of appetite    6  Medical marijuana use      Advised on use of tylenol and ibprofen  Topical lidocaine cream   MMJ pending  F/u in 2 months  The patient qualifies for use of MMJ in the state of [place] by having the following medical condition - cervical cancer  From a palliative care stand point the patient is suffering with pain, insomnia, loss of appetite,   These symptoms and side effects might be alleviated with use of MMJ products  The patient registered online  The patient read and I reviewed the informed consent document with the patient  I answered all questions related to it before the patient signed it  The patient's medical certification was completed on this date  The patient was given a signed copy of the informed consent and medical certification  I issued a certification for MMJ use with palliative intent -  To help alleviate cancer related symptoms and cancer treatment related side effects  I do not endorse the belief that MMJ can treat cancer and strongly encouraged the patient to continue treatments and surveillance as recommend by cancer specialists  Requested Prescriptions     Signed Prescriptions Disp Refills    lidocaine (LMX) 4 % cream 30 g 0     Sig: Apply topically as needed for mild pain     There are no discontinued medications  Representatives have queried the patient's controlled substance dispensing history in the Prescription Drug Monitoring Program in compliance with regulations before I have prescribed any controlled substances  The prescription history is consistent with prescribed therapy and our practice policies        45+ minutes were spent face to face with [facility] with greater than 50% of the time spent in counseling or coordination of care including discussions of treatment instructions   All of the patient's questions were answered during this discussion  No follow-ups on file  Subjective:   Chief Complaint  New consultation for:  symptom management  HPI     Liss Jennifer is a 44 y o  female with cervical CA now s/p combined chemo/radiation  Patient had followed up with primary oncologist and had reported substantial side effect burden from oxycodone  She is still experiencing discomfort in the area of her radiation therapy and insomnia  A referral was made to [facility] for [facility] certification  She continues to have hip/pelvic pain  Reports she has taken ibprofen with some relief  She is very much against medications that she feels will cause side effects  She states that oxycodone causes her constipation, nausea, and anxiety  She has increased her tobacco smoking, and has tried recreational marijuana to deal with the stress of this situation  Appetite is poor  She states she only sleeps approx  3 hours at a time  The following portions of the medical history were reviewed: past medical history, problem list, medication list, and social history      Current Outpatient Medications:     nystatin (MYCOSTATIN) 500,000 units/5 mL suspension, Apply 5 mL (500,000 Units total) to the mouth or throat 4 (four) times a day for 7 days, Disp: 140 mL, Rfl: 0    oxyCODONE (ROXICODONE) 5 mg immediate release tablet, Take 1 tablet (5 mg total) by mouth every 4 (four) hours as needed for moderate painMax Daily Amount: 30 mg, Disp: 30 tablet, Rfl: 0    lidocaine (LMX) 4 % cream, Apply topically as needed for mild pain, Disp: 30 g, Rfl: 0    LORazepam (ATIVAN) 1 mg tablet, Take 1 tablet (1 mg total) by mouth every 6 (six) hours as needed (nausea or anxiety) (Patient not taking: Reported on 7/16/2019), Disp: 20 tablet, Rfl: 1    ondansetron (ZOFRAN) 8 mg tablet, Take 1 tablet (8 mg total) by mouth every 8 (eight) hours as needed for nausea or vomiting (Patient not taking: Reported on 7/16/2019), Disp: 30 tablet, Rfl: 1  Review of Systems   Constitutional: Positive for activity change, appetite change, fatigue and unexpected weight change  Respiratory: Negative for shortness of breath  Cardiovascular: Negative for chest pain and leg swelling  Gastrointestinal: Positive for abdominal pain, constipation, diarrhea and nausea  Genitourinary: Positive for pelvic pain  Psychiatric/Behavioral: Positive for sleep disturbance  All other systems reviewed and are negative  All other systems negative    Objective:  Vital Signs  /90 (BP Location: Left arm, Patient Position: Sitting, Cuff Size: Standard)   Pulse 64   Temp 98 4 °F (36 9 °C) (Oral)   Resp 16   Ht 5' 1" (1 549 m)   Wt 57 6 kg (127 lb)   SpO2 97%   BMI 24 00 kg/m²    Physical Exam    Constitutional: Appears thin but well-nourished  In no acute distress  Head: Normocephalic and atraumatic  Eyes: EOM are normal  Right eye exhibits no discharge  Left eye exhibits no discharge  No scleral icterus  Pulmonary/Chest: Effort normal  No stridor  No respiratory distress  Abdominal: No distension  Musculoskeletal: No edema  Neurological: Alert, oriented and appropriately conversant  Skin: Skin is dry, not diaphoretic  Psychiatric: Displays a normal mood and affect  Behavior, judgement and thought content appear normal    Vitals reviewed

## 2019-07-24 ENCOUNTER — TELEPHONE (OUTPATIENT)
Dept: PALLIATIVE MEDICINE | Facility: CLINIC | Age: 44
End: 2019-07-24

## 2019-07-24 NOTE — TELEPHONE ENCOUNTER
Medical Marijuana Pre-Visit Screening for Palliative & Supportive Care    Referral Source: Dr Laurie Mcmahon  Diagnosis: Cancer  Is the diagnosis an approved serious medical condition as outlined by [place] Act 16: Yes  History/Symptoms: Pain    Does the patient's diagnosis fall within the current scope of our Palliative & Supportive Care practice: Yes  Does the patient intend to use MMJ with palliative intent: Yes    Does the patient currently have a signed controlled substances contract with another provider: no  Is the patient a resident of [place] with a valid state ID or 's license: She needs to go to [place] [facility] to update address on license  Has the patient registered on the [address] [place] [address]  website: No, needs license updated first   [email or web address]     Prior to any scheduled visit, the patient has been informed of the following:  · [facility] providers are knowledgeable about many ways to help people  A visit to discuss MMJ does not mean that the provider agrees that this is the best way to help you and may make other recommendations  · There is an expectation and requirement by the [place] MMJ law for continuity of care if your certification is completed  · You will be expected to sign an informed consent  · A PDMP report will be reviewed before your visit  · This may effect your ability to purchase a handgun  · Medical marijuana products are not covered by insurance  The dispensaries do not accept credit cards  · The certification does not exempt you from any employer based drug screening programs and may effect your ability to participate in federally funded programs  · [facility] does not allow for medical marijuana possession or use at any of it's inpatient facilities  If it is brought it you will be asked to send it home with a designated representative (friend or family member)    If not one is available to take the product(s) home they will be stored in a secure location until you are discharged  · Please spend time becoming familiar with the information on the website before your visit: [employer]    Date of scheduled visit: To call the office once she has successfully registered to make appointment

## 2019-08-05 ENCOUNTER — ONCOLOGY SURVIVORSHIP (OUTPATIENT)
Dept: RADIATION ONCOLOGY | Facility: CLINIC | Age: 44
End: 2019-08-05

## 2019-08-05 ENCOUNTER — RADIATION ONCOLOGY FOLLOW-UP (OUTPATIENT)
Dept: RADIATION ONCOLOGY | Facility: CLINIC | Age: 44
End: 2019-08-05
Attending: RADIOLOGY
Payer: COMMERCIAL

## 2019-08-05 VITALS
DIASTOLIC BLOOD PRESSURE: 78 MMHG | SYSTOLIC BLOOD PRESSURE: 132 MMHG | BODY MASS INDEX: 22.82 KG/M2 | RESPIRATION RATE: 16 BRPM | HEIGHT: 62 IN | HEART RATE: 76 BPM | WEIGHT: 124 LBS

## 2019-08-05 DIAGNOSIS — C53.8 MALIGNANT NEOPLASM OF OVERLAPPING SITES OF CERVIX (HCC): Primary | ICD-10-CM

## 2019-08-05 PROCEDURE — 99211 OFF/OP EST MAY X REQ PHY/QHP: CPT | Performed by: RADIOLOGY

## 2019-08-05 NOTE — PROGRESS NOTES
Roxanne Doe 1975 is a 38 y o  female     Follow up visit     Oncology History    Returns for first follow up post pelvic/paraaortic and tandem and ring radiation completed on 7/5/19 7/16/19 Dr Valeri Nguyen  Complains of  diffuse pain, pelvic swelling and discomfort; no exam performed  Plan PET-CT in 3-4 months post treatment    7/23/19 Dr Alexandria Ly certification issued    8/20/19 Dr Valeri Nguyen  9/17/19 Dr Cierra Roe        Malignant neoplasm of overlapping sites of cervix ([facility] [address] )    4/26/2019 Biopsy     Final Diagnosis   A  Cervix, 12:00, biopsy:  -  Invasive moderately to poorly differentiated non-keratinizing squamous cell carcinoma (see note)  5/1/2019 Initial Diagnosis     Malignant neoplasm of overlapping sites of cervix ([facility] [address] ) 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 with potential metastatic disease to the left iliac region and sacral region  5/1/2019 -  Cancer Staged     Staging form: Cervix Uteri, AJCC 8th Edition  - Clinical stage from 5/1/2019: FIGO Stage IB2 (cT1b2, cN1, cM0) - Signed by Jose M Amos MD on 5/1/2019  Histologic grade (G): G3  Histologic grading system: 3 grade system        5/6/2019 -  Chemotherapy     CISplatin (PLATINOL) 62 4 mg in sodium chloride 0 9 % 250 mL infusion, 40 mg/m2 = 62 4 mg, Intravenous, Once, 6 of 6 cycles  Administration: 62 4 mg (5/21/2019), 62 4 mg (5/29/2019), 62 4 mg (6/4/2019), 62 4 mg (6/11/2019), 62 4 mg (6/18/2019), 62 4 mg (6/26/2019)      5/20/2019 - 7/3/2019 Radiation     Whole pelvic radiation therapy  45Gy in 25 daily fractions  With a parametrial boost of an additional 5 4Gy in 3 daily fractions  6/20/2019 - 7/5/2019 Radiation     HDR brachytherapy utilizing tandem and ring  6Gy in 5 fractions on dates: 6/20/19, 6/28/19, 6/25/19, 7/2/19, 7/5/19             Clinical Trial: no      Health Maintenance   Topic Date Due    Pneumococcal Vaccine: Pediatrics (0 to 5 Years) and At-Risk Patients (6 to 59 Years) (1 of 3 - PCV13) 08/04/1982    DTaP,Tdap,and Td Vaccines (1 - Tdap) 08/04/1997    INFLUENZA VACCINE  07/01/2019    MAMMOGRAM  04/02/2020    Depression Screening PHQ  05/03/2020    BMI: Adult  07/23/2020    Pneumococcal Vaccine: 65+ Years (1 of 2 - PCV13) 08/04/2041    HEPATITIS B VACCINES  Aged Out       Patient Active Problem List   Diagnosis    Malignant neoplasm of overlapping sites of cervix ([facility] )    Cancer associated pain    Insomnia due to medical condition    Loss of appetite    Medical marijuana use     Past Medical History:   Diagnosis Date    Abnormal Pap smear of cervix     Cancer ([facility] )     cervix     Past Surgical History:   Procedure Laterality Date    DE DILATION OF VAGINA W ANESTH N/A 6/20/2019    Procedure: EXAM UNDER ANESTHESIA (EUA);   Surgeon: Mark Moore MD;  [facility];  Service: Gynecology Oncology    DE INSERT VAGINAL RADIATION DEVICE N/A 6/20/2019    Procedure: PIERRE SLEEVE PLACEMENT;  Surgeon: Mark Moore MD;  Location: [facility];  Service: Gynecology Oncology    TONSILLECTOMY      US GUIDANCE  6/20/2019     Family History   Problem Relation Age of Onset    Uterine cancer Maternal Grandmother      Social History     Socioeconomic History    Marital status: Significant Other     Spouse name: Not on file    Number of children: Not on file    Years of education: Not on file    Highest education level: Not on file   Occupational History    Not on file   Social Needs    Financial resource strain: Not on file    Food insecurity:     Worry: Not on file     Inability: Not on file    Transportation needs:     Medical: Not on file     Non-medical: Not on file   Tobacco Use    Smoking status: Current Every Day Smoker     Packs/day: 1 00     Types: Cigarettes    Smokeless tobacco: Never Used   Substance and Sexual Activity    Alcohol use: Not Currently    Drug use: Never    Sexual activity: Yes   Lifestyle    Physical activity:     Days per week: Not on file Minutes per session: Not on file    Stress: Not on file   Relationships    Social connections:     Talks on phone: Not on file     Gets together: Not on file     Attends Orthodox service: Not on file     Active member of club or organization: Not on file     Attends meetings of clubs or organizations: Not on file     Relationship status: Not on file    Intimate partner violence:     Fear of current or ex partner: Not on file     Emotionally abused: Not on file     Physically abused: Not on file     Forced sexual activity: Not on file   Other Topics Concern    Not on file   Social History Narrative    Not on file       Current Outpatient Medications:     lidocaine (LMX) 4 % cream, Apply topically as needed for mild pain (Patient not taking: Reported on 8/5/2019), Disp: 30 g, Rfl: 0    LORazepam (ATIVAN) 1 mg tablet, Take 1 tablet (1 mg total) by mouth every 6 (six) hours as needed (nausea or anxiety) (Patient not taking: Reported on 7/16/2019), Disp: 20 tablet, Rfl: 1    ondansetron (ZOFRAN) 8 mg tablet, Take 1 tablet (8 mg total) by mouth every 8 (eight) hours as needed for nausea or vomiting (Patient not taking: Reported on 7/16/2019), Disp: 30 tablet, Rfl: 1    oxyCODONE (ROXICODONE) 5 mg immediate release tablet, Take 1 tablet (5 mg total) by mouth every 4 (four) hours as needed for moderate painMax Daily Amount: 30 mg (Patient not taking: Reported on 8/5/2019), Disp: 30 tablet, Rfl: 0  Allergies   Allergen Reactions    Latex Hives    Other      Tomatoes   Vomiting and diarhhea       Review of Systems:  Review of Systems   Constitutional: Negative  HENT: Negative  Eyes: Negative  Respiratory: Negative  Cardiovascular: Negative  Gastrointestinal: Positive for diarrhea (mornings from CBD)  Endocrine: Negative  Genitourinary: Negative  Musculoskeletal: Positive for arthralgias (right hip>left  Has improved)  Skin: Negative  Allergic/Immunologic: Negative      Neurological: Negative  Hematological: Negative  Psychiatric/Behavioral: Negative  Vitals:    08/05/19 1120   BP: 132/78   BP Location: Left arm   Patient Position: Sitting   Cuff Size: Standard   Pulse: 76   Resp: 16   Weight: 56 2 kg (124 lb)   Height: 5' 1 5" (1 562 m)   Oxygen 99%    Pain Score:   2    Imaging:No results found

## 2019-08-05 NOTE — PROGRESS NOTES
Follow-up - Radiation Oncology   Della Hillman 1975 38 y o  female [id number]      History of Present Illness   Cancer Staging  Malignant neoplasm of overlapping sites of cervix [facility])  Staging form: Cervix Uteri, AJCC 8th Edition  - Clinical stage from 5/1/2019: FIGO Stage IB2 (cT1b2, cN1, cM0) - Signed by Shahla Avelar MD on 5/1/2019  Histologic grade (G): G3  Histologic grading system: 3 grade system      Tammie Ramirez is a 38y o  year old female with a history of bulky stage IB2-3 ([id number]) grade 2 cervical carcinoma  She recently completed a course of definitive chemotherapy and radiation on 7/5/2019  She presents today for follow-up evaluation  The patient continues to complain of bilateral hip and lower back and extremity hip pain  Pain is mild to moderate and episodic  This began during treatment and has persisted, but improved since therapy was completed  She is planning to resume exercising  She is requesting a doctor's note to return to her gym  She denies any other sites of pain  Pelvic pain has resolved  She denies abdominal or pelvic cramping  She denies vaginal bleeding, discharge, or irritation  Diarrhea has improved  She has some loose bowel movements in the morning  She is taking CBD gummies at night and feels this may be related  She denies diarrhea otherwise  She denies rectal bleeding  She denies significant urinary symptoms including hematuria, dysuria, incontinence  She denies lower extremity edema  She saw Dr Luis Carlos More, but deferred pelvic examination that day  She will see him again on 8/20/2019 and follow-up PET scan will be ordered at that time  The patient follows pain management and has been provided a MM[facility] card  Next follow-up with Dr Gabino Ewing is on 9/17/19  The patient wishes to know if she has entered menopause post treatment      Historical Information   Oncology History    Returns for first follow up post pelvic/paraaortic and tandem and ring radiation completed on 7/5/19 7/16/19 Dr Jenelle Ponce of  diffuse pain, pelvic swelling and discomfort; no exam performed  Plan PET-CT in 3-4 months post treatment    7/23/19 Dr Manuel Trujillo certification issued    8/20/19 Dr Warren Pan  9/17/19 Dr Lacy Jenkins        Malign[facility] )    4/26/2019 Biopsy     Final Diagnosis   A  Cervix, 12:00, biopsy:  -  Invasive moderately to poorly differentiated non-keratinizing squamous cell carcinoma (see note)  5/1/2019 Initial Diagnosis     Malignant neoplasm of overlapping sites of [facility]ix [facility] ) 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 with potential metastatic disease to the left iliac region and sacral region  5/1/2019 -  Cancer Staged     Staging form: Cervix Uteri, AJCC 8th Edition  - Clinical stage from 5/1/2019: FIGO Stage IB2 (cT1b2, cN1, cM0) - Signed by Margie Go MD on 5/1/2019  Histologic grade (G): G3  Histologic grading system: 3 grade system        5/6/2019 -  Chemotherapy     CISplatin (PLATINOL) 62 4 mg in sodium chloride 0 9 % 250 mL infusion, 40 mg/m2 = 62 4 mg, Intravenous, Once, 6 of 6 cycles  Administration: 62 4 mg (5/21/2019), 62 4 mg (5/29/2019), 62 4 mg (6/4/2019), 62 4 mg (6/11/2019), 62 4 mg (6/18/2019), 62 4 mg (6/26/2019)      5/20/2019 - 7/3/2019 Radiation     Whole pelvic radiation therapy  45Gy in 25 daily fractions  With a parametrial boost of an additional 5 4Gy in 3 daily fractions  6/20/2019 - 7/5/2019 Radiation     HDR brachytherapy utilizing tandem and ring  6Gy in 5 fractions on dates: 6/20/19, 6/28/19, 6/25/19, 7/2/19, 7/5/19  Past Medical History:   Diagnosis Date    Abnormal Pap smear of cervix     [facility] )     cervix     Past Surgical History:   Procedure Laterality Date    GA DILATION OF VAGINA W ANESTH N/A 6/20/2019    Procedure: EXAM UNDER ANESTHESIA (EUA);   Surgeon: Adry Benitez MD;  [facility];  Service: Gynecology Oncology    IN INSERT VAGINAL RADIATION DEVICE N/A 6/20/2019    Procedure: PIERRE SLEEVE PLACEMENT;  Surgeon: Stacy Cui MD;  [facility];  Service: Gynecology Oncology    TONSILLECTOMY      US GUIDANCE  6/20/2019       Social History   Social History     Substance and Sexual Activity   Alcohol Use Not Currently     Social History     Substance and Sexual Activity   Drug Use Never     Social History     Tobacco Use   Smoking Status Current Every Day Smoker    Packs/day: 1 00    Types: Cigarettes   Smokeless Tobacco Never Used         Meds/Allergies     Current Outpatient Medications:     lidocaine (LMX) 4 % cream, Apply topically as needed for mild pain (Patient not taking: Reported on 8/5/2019), Disp: 30 g, Rfl: 0    LORazepam (ATIVAN) 1 mg tablet, Take 1 tablet (1 mg total) by mouth every 6 (six) hours as needed (nausea or anxiety) (Patient not taking: Reported on 7/16/2019), Disp: 20 tablet, Rfl: 1    ondansetron (ZOFRAN) 8 mg tablet, Take 1 tablet (8 mg total) by mouth every 8 (eight) hours as needed for nausea or vomiting (Patient not taking: Reported on 7/16/2019), Disp: 30 tablet, Rfl: 1    oxyCODONE (ROXICODONE) 5 mg immediate release tablet, Take 1 tablet (5 mg total) by mouth every 4 (four) hours as needed for moderate painMax Daily Amount: 30 mg (Patient not taking: Reported on 8/5/2019), Disp: 30 tablet, Rfl: 0  Allergies   Allergen Reactions    Latex Hives    Other      Tomatoes   Vomiting and diarhhea         Review of Systems  Constitutional: Negative  HENT: Negative  Eyes: Negative  Respiratory: Negative  Cardiovascular: Negative  Gastrointestinal: Positive for diarrhea in mornings as per HPI  Endocrine: Negative  Genitourinary: Negative  Musculoskeletal: Positive for arthralgias bilateral hips as per HPI  Skin: Negative  Allergic/Immunologic: Negative  Neurological: Negative  Hematological: Negative      Psychiatric/Behavioral: Negative  OBJECTIVE:   /78 (BP Location: Left arm, Patient Position: Sitting, Cuff Size: Standard)   Pulse 76   Resp 16   Ht 5' 1 5" (1 562 m)   Wt 56 2 kg (124 lb)   BMI 23 05 kg/m²   Pain Assessment:  2  Karnofsky: 80 - Normal activity with effort; some signs or symptoms of disease    Physical Exam   Constitutional: She is oriented to person, place, and time  She appears well-developed and well-nourished  No distress  HENT:   Mouth/Throat: Oropharynx is clear and moist    Eyes: No scleral icterus  Cardiovascular: Normal rate, regular rhythm and normal heart sounds  No murmur heard  Pulmonary/Chest: Effort normal and breath sounds normal  She has no wheezes  She has no rhonchi  She has no rales  Abdominal: Soft  She exhibits no distension  There is no tenderness  Genitourinary:   Genitourinary Comments: Normal external female genitalia  There is no foreshortening, stenosis, or adhesions of the vagina  The cervix is palpated and is smooth and soft  Speculum examination demonstrates desquamation over cervical surface, but no visible mass  Cervical os was not appreciated  Musculoskeletal: She exhibits no edema  Lymphadenopathy:     She has no cervical adenopathy  Right: No inguinal and no supraclavicular adenopathy present  Left: No inguinal and no supraclavicular adenopathy present  Neurological: She is alert and oriented to person, place, and time  Skin: Skin is warm and dry  No erythema  Nursing note and vitals reviewed  Assessment/Plan:  Orders Placed This Encounter   Procedures    [address] [place] [address] and [facility] Eric is a 38y o  year old female with bulky stage IB2-3 ([id number]) grade 2 cervical carcinoma  She recently completed a course of definitive chemotherapy and radiation on 7/5/2019  The patient continues to recover from treatment    Examination demonstrates good clinical response to therapy with no definite residual tumor on today's exam, but the area has not returned to normal       I recommended holding on sexual activity until healing is more complete  She will see Dr Missy Parker later this month and depending on exam findings may receive clearance at that time  I have provided her with a medium size vaginal dilator and instructions on use  Dilator use to be started once she has been cleared by Dr Missy Parker  The patient expressed resistance to use of vaginal dilator  I explained the importance of use of dilator and that sexual activity can be used instead  We discussed use of lubricants during sexual intercourse and that she should report persistent dyspareunia to her team     I recommended long acting lubricants for vaginal irritation or dryness symptoms  I have provided her a medical release letter to allow her to resume exercise  We will test FSH/LH as we expect radiation induced menopause, but this may take time to occur  She will return in 3-4 months for follow-up after her PET imaging is completed  She knows to contact us with any questions or concerns that may arise in the interim  Porsha Rabago MD  8/5/2019,1:29 PM    Portions of the record may have been created with voice recognition software   Occasional wrong word or "sound a like" substitutions may have occurred due to the inherent limitations of voice recognition software   Read the chart carefully and recognize, using context, where substitutions have occurred

## 2019-08-07 NOTE — PROGRESS NOTES
Survivorship treatment summary and care plan provided to patient and reviewed  All questions were answered and form signed    Copies made and sent to PCP on record and to [employer] Fax for scanning to be placed into electronic chart

## 2019-08-20 ENCOUNTER — OFFICE VISIT (OUTPATIENT)
Dept: GYNECOLOGIC ONCOLOGY | Facility: CLINIC | Age: 44
End: 2019-08-20
Payer: COMMERCIAL

## 2019-08-20 VITALS
SYSTOLIC BLOOD PRESSURE: 160 MMHG | BODY MASS INDEX: 23.41 KG/M2 | TEMPERATURE: 98.8 F | WEIGHT: 124 LBS | HEIGHT: 61 IN | DIASTOLIC BLOOD PRESSURE: 100 MMHG | HEART RATE: 70 BPM | RESPIRATION RATE: 16 BRPM

## 2019-08-20 DIAGNOSIS — N95.1 MENOPAUSAL SYMPTOMS: ICD-10-CM

## 2019-08-20 DIAGNOSIS — C53.9 MALIGNANT NEOPLASM OF CERVIX, UNSPECIFIED SITE (HCC): Primary | ICD-10-CM

## 2019-08-20 DIAGNOSIS — C53.8 MALIGNANT NEOPLASM OF OVERLAPPING SITES OF CERVIX (HCC): ICD-10-CM

## 2019-08-20 PROCEDURE — 99214 OFFICE O/P EST MOD 30 MIN: CPT | Performed by: OBSTETRICS & GYNECOLOGY

## 2019-08-20 NOTE — LETTER
August 20, 2019     Patient: Isaiah Melton   YOB: 1975   Date of Visit: 8/20/2019       To Whom it May Concern:    Isaiah Melton is under my professional care  She was seen in my office on 8/20/2019  She may return to weight lifting with no restrictions  If you have any questions or concerns, please don't hesitate to call           Sincerely,          Severiano Stearns MD

## 2019-08-20 NOTE — PROGRESS NOTES
Assessment/Plan:    Problem List Items Addressed This Visit        Genitourinary    Malignant neoplasm of overlapping sites of cervix [facility])     The patient has recovered from her cervical cancer treatment  She has completed treatment and is in a clinical remission by exam   We have recommended a PET-CT scan to be performed in approximately 2 months which would be about 3-4 months since treatment  Her last potassium on the chart is low at 2 9  This would be related to her cancer treatment with cisplatin  The patient has not been taking a supplement I will recheck that  We have discussed her pain  She is interested in medical marijuana but is somewhat swayed by the cost   Her pain is improving anyway  There is no need follow-up with palliative care at this point  We recommended a followup treatment plan of visits every 3 months for 2 years followed by every 6 months for 3 years thereafter  A PET scan will be performed at the 1st visit  Pap smears will be performed with every visit subsequently to that  I have told the patient that she will likely have intermittent abnormal Pap smears but these will just require evaluation and may not indicate cervix cancer recurrence  Other    Menopausal symptoms     Patient's menopausal symptoms are related to her whole pelvic radiation therapy  I have discussed with her that the use of hormone replacement therapy  and recommended this highly  We have discussed the risks and benefits and an order for Prempro 0 625/2 5 was sent to the pharmacy  The patient was told that she is no longer capable of bearing children which she was happy with  And finally we have recommended calcium between 1000 and 1500 mg p o  Daily             Other Visit Diagnoses     Malignant neoplasm of cervix, unspecified site [facility])    -  Primary    Relevant Medications    estrogen, conjugated,-medroxyprogesterone (PREMPRO) 0 625-2 5 MG per tablet    Other Relevant Orders    Basic metabolic panel    NM PET CT skull base to mid thigh            CHIEF COMPLAINT:  Patient presents today post treatment evaluation for her cervical cancer      Problem:  Cancer Staging  Malignant neoplasm of overlapping sites of cervix [facility])  Staging form: Cervix Uteri, AJCC 8th Edition  - Clinical stage from 5/1/2019: FIGO Stage IB2 (cT1b2, cN1, cM0) - Signed by Caridad Love MD on 5/1/2019        Previous therapy:  Oncology History    Returns for first follow up post pelvic/paraaortic and tandem and ring radiation completed on 7/5/19 7/16/19 Dr Peter Linn of  diffuse pain, pelvic swelling and discomfort; no exam performed  Plan PET-CT in 3-4 months post treatment    7/23/19 Dr Jacqulin Dakins certification issued    8/20/19 Dr Rollene Bloch  9/17/19 Dr Daniel Pugh        [facility]    4/26/2019 Biopsy     Final Diagnosis   A  Cervix, 12:00, biopsy:  -  Invasive moderately to poorly differentiated non-keratinizing squamous cell carcinoma (see note)  5/1/2019 Initial Diagnosis     Malignant neoplasm of overlapping sites of [facility] ) 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 with potential metastatic disease to the left iliac region and sacral region  5/1/2019 -  Cancer Staged     Staging form: Cervix Uteri, AJCC 8th Edition  - Clinical stage from 5/1/2019: FIGO Stage IB2 (cT1b2, cN1, cM0) - Signed by Caridad Love MD on 5/1/2019  Histologic grade (G): G3  Histologic grading system: 3 grade system        5/6/2019 -  Chemotherapy     CISplatin (PLATINOL) 62 4 mg in sodium chloride 0 9 % 250 mL infusion, 40 mg/m2 = 62 4 mg, Intravenous, Once, 6 of 6 cycles  Administration: 62 4 mg (5/21/2019), 62 4 mg (5/29/2019), 62 4 mg (6/4/2019), 62 4 mg (6/11/2019), 62 4 mg (6/18/2019), 62 4 mg (6/26/2019)      5/20/2019 - 7/3/2019 Radiation     Whole pelvic radiation therapy  45Gy in 25 daily fractions    With a parametrial boost of an additional 5 4Gy in 3 daily fractions  6/20/2019 - 7/5/2019 Radiation     HDR brachytherapy utilizing tandem and ring  6Gy in 5 fractions on dates: 6/20/19, 6/28/19, 6/25/19, 7/2/19, 7/5/19  Patient ID: Gerald Mcneil is a 38 y o  female  Patient presents for follow-up evaluation  She has recently completed treatment for stage 2 B cervical cancer  Overall she is doing far better than last month  Her pain is improving  She is returning to activities of daily living  Her attitude is good  She would like a note stating that she could  go back to the gym  She does have some hot flashes and night sweats consistent with menopausal symptoms  The following portions of the patient's history were reviewed and updated as appropriate: allergies, current medications, past family history, past medical history, past social history, past surgical history and problem list     Review of Systems   Constitutional: Negative  HENT: Negative  Eyes: Negative  Respiratory: Negative  Cardiovascular: Negative  Gastrointestinal: Negative  Endocrine: Negative  Hot flashes   Genitourinary: Negative  Musculoskeletal: Negative  Skin: Negative  Neurological: Negative  Hematological: Negative  Psychiatric/Behavioral: Negative  Current Outpatient Medications   Medication Sig Dispense Refill    LORazepam (ATIVAN) 1 mg tablet Take 1 tablet (1 mg total) by mouth every 6 (six) hours as needed (nausea or anxiety) 20 tablet 1    estrogen, conjugated,-medroxyprogesterone (PREMPRO) 0 625-2 5 MG per tablet Take 1 tablet by mouth daily 30 tablet 6    lidocaine (LMX) 4 % cream Apply topically as needed for mild pain (Patient not taking: Reported on 8/5/2019) 30 g 0     No current facility-administered medications for this visit  Objective:    Blood pressure 160/100, pulse 70, temperature 98 8 °F (37 1 °C), resp   rate 16, height 5' 1" (1 549 m), weight 56 2 kg (124 lb), not currently breastfeeding  Body mass index is 23 43 kg/m²  Body surface area is 1 54 meters squared  Physical Exam   Constitutional: She is oriented to person, place, and time  She appears well-developed and well-nourished  HENT:   Head: Normocephalic and atraumatic  Eyes: Pupils are equal, round, and reactive to light  EOM are normal    Neck: Normal range of motion  Neck supple  Cardiovascular: Normal rate, regular rhythm and normal heart sounds  Pulmonary/Chest: Effort normal and breath sounds normal  No respiratory distress  Abdominal: Soft  Bowel sounds are normal  She exhibits no distension and no ascites  There is no tenderness  There is no rigidity, no rebound and no guarding  Genitourinary:   Genitourinary Comments: -Normal external female genitalia, normal Bartholin's and McNabb's glands                  -Normal midline urethral meatus  No lesions notes                  -Bladder without fullness mass or tenderness                  -Vagina without lesion or discharge No significant cystocele or rectocele noted                  -Cervix normal appearing without visible lesions                  -Uterus with normal contour, mobility  No tenderness,                  -Adnexae without  mass or tenderness                  - Anus without fissure of lesion     Musculoskeletal: Normal range of motion  Lymphadenopathy:     She has no cervical adenopathy  She has no axillary adenopathy  Right: No inguinal and no supraclavicular adenopathy present  Left: No inguinal and no supraclavicular adenopathy present  Neurological: She is alert and oriented to person, place, and time  Skin: Skin is warm and dry  Psychiatric: She has a normal mood and affect   Her behavior is normal        No results found for:   Lab Results   Component Value Date    K 2 9 (L) 06/21/2019     06/21/2019    CO2 30 06/21/2019    BUN 12 06/21/2019    CREATININE 0 71 06/21/2019    GLUF 87 06/14/2019 CALCIUM 9 3 06/21/2019    AST 8 06/21/2019    ALT 16 06/21/2019    ALKPHOS 54 06/21/2019    EGFR 105 06/21/2019     Lab Results   Component Value Date    WBC 5 39 06/21/2019    HGB 11 5 06/21/2019    HCT 35 3 06/21/2019    MCV 88 06/21/2019     (L) 06/21/2019     Lab Results   Component Value Date    NEUTROABS 4 43 06/21/2019

## 2019-08-20 NOTE — ASSESSMENT & PLAN NOTE
The patient has recovered from her cervical cancer treatment  She has completed treatment and is in a clinical remission by exam   We have recommended a PET-CT scan to be performed in approximately 2 months which would be about 3-4 months since treatment  Her last potassium on the chart is low at 2 9  This would be related to her cancer treatment with cisplatin  The patient has not been taking a supplement I will recheck that  We have discussed her pain  She is interested in medical marijuana but is somewhat swayed by the cost   Her pain is improving anyway  There is no need follow-up with palliative care at this point  We recommended a followup treatment plan of visits every 3 months for 2 years followed by every 6 months for 3 years thereafter  A PET scan will be performed at the 1st visit  Pap smears will be performed with every visit subsequently to that  I have told the patient that she will likely have intermittent abnormal Pap smears but these will just require evaluation and may not indicate cervix cancer recurrence

## 2019-08-20 NOTE — ASSESSMENT & PLAN NOTE
Patient's menopausal symptoms are related to her whole pelvic radiation therapy  I have discussed with her that the use of hormone replacement therapy  and recommended this highly  We have discussed the risks and benefits and an order for Prempro 0 625/2 5 was sent to the pharmacy  The patient was told that she is no longer capable of bearing children which she was happy with  And finally we have recommended calcium between 1000 and 1500 mg p o  Daily

## 2019-08-20 NOTE — LETTER
August 20, 2019     Darius Serna DO  [address]    Patient: Mike Asher   YOB: 1975   Date of Visit: 8/20/2019       Dear Dr Luz Pelaez: Thank you for referring Mike Asher to me for evaluation  Below are my notes for this consultation  If you have questions, please do not hesitate to call me  I look forward to following your patient along with you  Sincerely,        Ashanti Rothman MD        CC: No Recipients  Ashanti Rothman MD  8/20/2019  9:04 AM  Sign at close encounter  Assessment/Plan:    Problem List Items Addressed This Visit        Genitourinary    Malignant neoplasm of overlapping sites of cervix [facility])     The patient has recovered from her cervical cancer treatment  She has completed treatment and is in a clinical remission by exam   We have recommended a PET-CT scan to be performed in approximately 2 months which would be about 3-4 months since treatment  Her last potassium on the chart is low at 2 9  This would be related to her cancer treatment with cisplatin  The patient has not been taking a supplement I will recheck that  We have discussed her pain  She is interested in medical marijuana but is somewhat swayed by the cost   Her pain is improving anyway  There is no need follow-up with palliative care at this point  We recommended a followup treatment plan of visits every 3 months for 2 years followed by every 6 months for 3 years thereafter  A PET scan will be performed at the 1st visit  Pap smears will be performed with every visit subsequently to that  I have told the patient that she will likely have intermittent abnormal Pap smears but these will just require evaluation and may not indicate cervix cancer recurrence  Other    Menopausal symptoms     Patient's menopausal symptoms are related to her whole pelvic radiation therapy    I have discussed with her that the use of hormone replacement therapy  and recommended this highly  We have discussed the risks and benefits and an order for Prempro 0 625/2 5 was sent to the pharmacy  The patient was told that she is no longer capable of bearing children which she was happy with  And finally we have recommended calcium between 1000 and 1500 mg p o  Daily  Other Visit Diagnoses     Malignant neoplasm of cervix, unspecified site [facility])    -  Primary    Relevant Medications    estrogen, conjugated,-medroxyprogesterone (PREMPRO) 0 625-2 5 MG per tablet    Other Relevant Orders    Basic metabolic panel    NM PET CT skull base to mid thigh            CHIEF COMPLAINT:  Patient presents today post treatment evaluation for her cervical cancer      Problem:  Cancer Staging  Malignant neoplasm of overlapping sites of cervix [facility])  Staging form: Cervix Uteri, AJCC 8th Edition  - Clinical stage from 5/1/2019: FIGO Stage IB2 (cT1b2, cN1, cM0) - Signed by Ma Collet, MD on 5/1/2019        Previous therapy:  Oncology History    Returns for first follow up post pelvic/paraaortic and tandem and ring radiation completed on 7/5/19 7/16/19 Dr Mays  of  diffuse pain, pelvic swelling and discomfort; no exam performed  Plan PET-CT in 3-4 months post treatment    7/23/19 Dr Burnetta Favre certification issued    8/20/19 [facility]  9/17/19 Dr Adán Stewart        Malignant neoplasm of overlapping sites of cervix ([facility] )    4/26/2019 Biopsy     Final Diagnosis   A  Cervix, 12:00, biopsy:  -  Invasive moderately to poorly differentiated non-keratinizing squamous cell carcinoma (see note)  5/1/2019 Initial Diagnosis     Malignant neoplasm of overlapping sites of cervix ([facility] ) 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 with potential metastatic disease to the left iliac region and sacral region        5/1/2019 -  Cancer Staged     Staging form: Cervix Uteri, AJCC 8th Edition  - Clinical stage from 5/1/2019: FIGO Stage IB2 (cT1b2, cN1, cM0) - Signed by Connor Valdez MD on 5/1/2019  Histologic grade (G): G3  Histologic grading system: 3 grade system        5/6/2019 -  Chemotherapy     CISplatin (PLATINOL) 62 4 mg in sodium chloride 0 9 % 250 mL infusion, 40 mg/m2 = 62 4 mg, Intravenous, Once, 6 of 6 cycles  Administration: 62 4 mg (5/21/2019), 62 4 mg (5/29/2019), 62 4 mg (6/4/2019), 62 4 mg (6/11/2019), 62 4 mg (6/18/2019), 62 4 mg (6/26/2019)      5/20/2019 - 7/3/2019 Radiation     Whole pelvic radiation therapy  45Gy in 25 daily fractions  With a parametrial boost of an additional 5 4Gy in 3 daily fractions  6/20/2019 - 7/5/2019 Radiation     HDR brachytherapy utilizing tandem and ring  6Gy in 5 fractions on dates: 6/20/19, 6/28/19, 6/25/19, 7/2/19, 7/5/19  Patient ID: Arash Gonzalez is a 38 y o  female  Patient presents for follow-up evaluation  She has recently completed treatment for stage 2 B cervical cancer  Overall she is doing far better than last month  Her pain is improving  She is returning to activities of daily living  Her attitude is good  She would like a note stating that she could  go back to the gym  She does have some hot flashes and night sweats consistent with menopausal symptoms  The following portions of the patient's history were reviewed and updated as appropriate: allergies, current medications, past family history, past medical history, past social history, past surgical history and problem list     Review of Systems   Constitutional: Negative  HENT: Negative  Eyes: Negative  Respiratory: Negative  Cardiovascular: Negative  Gastrointestinal: Negative  Endocrine: Negative  Hot flashes   Genitourinary: Negative  Musculoskeletal: Negative  Skin: Negative  Neurological: Negative  Hematological: Negative  Psychiatric/Behavioral: Negative          Current Outpatient Medications   Medication Sig Dispense Refill    LORazepam (ATIVAN) 1 mg tablet Take 1 tablet (1 mg total) by mouth every 6 (six) hours as needed (nausea or anxiety) 20 tablet 1    estrogen, conjugated,-medroxyprogesterone (PREMPRO) 0 625-2 5 MG per tablet Take 1 tablet by mouth daily 30 tablet 6    lidocaine (LMX) 4 % cream Apply topically as needed for mild pain (Patient not taking: Reported on 8/5/2019) 30 g 0     No current facility-administered medications for this visit  Objective:    Blood pressure 160/100, pulse 70, temperature 98 8 °F (37 1 °C), resp  rate 16, height 5' 1" (1 549 m), weight 56 2 kg (124 lb), not currently breastfeeding  Body mass index is 23 43 kg/m²  Body surface area is 1 54 meters squared  Physical Exam   Constitutional: She is oriented to person, place, and time  She appears well-developed and well-nourished  HENT:   Head: Normocephalic and atraumatic  Eyes: Pupils are equal, round, and reactive to light  EOM are normal    Neck: Normal range of motion  Neck supple  Cardiovascular: Normal rate, regular rhythm and normal heart sounds  Pulmonary/Chest: Effort normal and breath sounds normal  No respiratory distress  Abdominal: Soft  Bowel sounds are normal  She exhibits no distension and no ascites  There is no tenderness  There is no rigidity, no rebound and no guarding  Genitourinary:   Genitourinary Comments: -Normal external female genitalia, normal Bartholin's and [place]'s glands                  -Normal midline urethral meatus  No lesions notes                  -Bladder without fullness mass or tenderness                  -Vagina without lesion or discharge No significant cystocele or rectocele noted                  -Cervix normal appearing without visible lesions                  -Uterus with normal contour, mobility  No tenderness,                  -Adnexae without  mass or tenderness                  - Anus without fissure of lesion     Musculoskeletal: Normal range of motion  Lymphadenopathy:     She has no cervical adenopathy       She has no axillary adenopathy  Right: No inguinal and no supraclavicular adenopathy present  Left: No inguinal and no supraclavicular adenopathy present  Neurological: She is alert and oriented to person, place, and time  Skin: Skin is warm and dry  Psychiatric: She has a normal mood and affect   Her behavior is normal        No results found for:   Lab Results   Component Value Date    K 2 9 (L) 06/21/2019     06/21/2019    CO2 30 06/21/2019    BUN 12 06/21/2019    CREATININE 0 71 06/21/2019    GLUF 87 06/14/2019    CALCIUM 9 3 06/21/2019    AST 8 06/21/2019    ALT 16 06/21/2019    ALKPHOS 54 06/21/2019    EGFR 105 06/21/2019     Lab Results   Component Value Date    WBC 5 39 06/21/2019    HGB 11 5 06/21/2019    HCT 35 3 06/21/2019    MCV 88 06/21/2019     (L) 06/21/2019     Lab Results   Component Value Date    NEUTROABS 4 43 06/21/2019

## 2019-10-21 ENCOUNTER — HOSPITAL ENCOUNTER (OUTPATIENT)
Dept: RADIOLOGY | Age: 44
Discharge: HOME | End: 2019-10-21
Payer: COMMERCIAL

## 2019-10-21 DIAGNOSIS — C53.9 MALIGNANT NEOPLASM OF CERVIX, UNSPECIFIED SITE (HCC): ICD-10-CM

## 2019-10-21 LAB — GLUCOSE SERPL-MCNC: 84 MG/DL (ref 65–140)

## 2019-10-21 PROCEDURE — 78815 PET IMAGE W/CT SKULL-THIGH: CPT

## 2019-10-21 PROCEDURE — 82948 REAGENT STRIP/BLOOD GLUCOSE: CPT

## 2019-10-21 PROCEDURE — A9552 F18 FDG: HCPCS

## 2019-10-23 ENCOUNTER — DOCUMENTATION (OUTPATIENT)
Dept: INFUSION CENTER | Facility: CLINIC | Age: 44
End: 2019-10-23

## 2019-10-23 ENCOUNTER — OFFICE VISIT (OUTPATIENT)
Dept: GYNECOLOGIC ONCOLOGY | Facility: CLINIC | Age: 44
End: 2019-10-23
Payer: COMMERCIAL

## 2019-10-23 VITALS
BODY MASS INDEX: 23.79 KG/M2 | SYSTOLIC BLOOD PRESSURE: 180 MMHG | HEIGHT: 61 IN | WEIGHT: 126 LBS | HEART RATE: 68 BPM | TEMPERATURE: 99.4 F | DIASTOLIC BLOOD PRESSURE: 110 MMHG | RESPIRATION RATE: 18 BRPM

## 2019-10-23 DIAGNOSIS — C53.8 MALIGNANT NEOPLASM OF OVERLAPPING SITES OF CERVIX (HCC): ICD-10-CM

## 2019-10-23 DIAGNOSIS — R94.6 ABNORMAL THYROID SCAN: Primary | ICD-10-CM

## 2019-10-23 PROCEDURE — 99214 OFFICE O/P EST MOD 30 MIN: CPT | Performed by: OBSTETRICS & GYNECOLOGY

## 2019-10-23 PROCEDURE — 88141 CYTOPATH C/V INTERPRET: CPT | Performed by: PATHOLOGY

## 2019-10-23 PROCEDURE — 88175 CYTOPATH C/V AUTO FLUID REDO: CPT | Performed by: PATHOLOGY

## 2019-10-23 NOTE — LETTER
October 23, 2019     Darius Serna DO  [address]    Patient: Gracia Lawrence   YOB: 1975   Date of Visit: 10/23/2019       Dear Dr Phuong Rodríguez: Thank you for referring Gracia Lawrence to me for evaluation  Below are my notes for this consultation  If you have questions, please do not hesitate to call me  I look forward to following your patient along with you  Sincerely,        Dennise Steele MD        CC: MD Dennise Osborn MD  10/23/2019  5:05 PM  Sign at close encounter  Assessment/Plan:    Problem List Items Addressed This Visit        Genitourinary    Malignant neoplasm of overlapping sites of cervix [facility])     Patient is a pleasant 50-year-old white female with a history of stage 1 B to cervical carcinoma  She has completed therapy in approximately July of 2019  Her PET scan reveals significant reduction of disease  Upon my review there were not any significant hot spots and this most likely represents resolution of disease  Her exam today is consistent with completion of treatment  Routine Pap smear was performed  I have recommended a follow-up PET-CT scan in approximately 3 months  This point I see no benefit to adding needle biopsies  Regarding the patient's multiple social issues, an urgent consult to  was performed today  Additionally the patient's PET scan this is notable for a thyroid nodule  We have recommended a thyroid ultrasound    The patient will follow up in 3 months for repeat evaluation           Other Visit Diagnoses     Abnormal thyroid scan    -  Primary    Relevant Orders    US thyroid    NM PET CT skull base to mid thigh    Liquid-based pap, diagnostic            CHIEF COMPLAINT:  Surveillance cervical cancer      Problem:  Cancer Staging  Malignant neoplasm of overlapping sites of cervix [facility])  Staging form: Cervix Uteri, AJCC 8th Edition  - Clinical stage from 5/1/2019: FIGO Stage IB2 (cT1b2, cN1, cM0) - Signed by Jannette Velez MD on 5/1/2019        Previous therapy:  Oncology History    Returns for first follow up post pelvic/paraaortic and tandem and ring radiation completed on 7/5/19 7/16/19 Dr Toro Haste of  diffuse pain, pelvic swelling and discomfort; no exam performed  Plan PET-CT in 3-4 months post treatment    7/23/19 Dr Rene Orozco certification issued    8/20/19 Dr [facility]  9/17/19 Dr Maddi Florez        Malignant ne[facility] overlapping sites of [facility] )    4/26/2019 Biopsy     Final Diagnosis   A  Cervix, 12:00, biopsy:  -  Invasive moderately to poorly differentiated non-keratinizing squamous cell carcinoma (see note)  5/1/2019 Initial Diagnosis     Malignant neoplasm of overlapping sites of cervix [facility] ) 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 with potential metastatic disease to the left iliac region and sacral region  5/1/2019 -  Cancer Staged     Staging form: Cervix Uteri, AJCC 8th Edition  - Clinical stage from 5/1/2019: FIGO Stage IB2 (cT1b2, cN1, cM0) - Signed by Jannette Velez MD on 5/1/2019  Histologic grade (G): G3  Histologic grading system: 3 grade system        5/6/2019 -  Chemotherapy     CISplatin (PLATINOL) 62 4 mg in sodium chloride 0 9 % 250 mL infusion, 40 mg/m2 = 62 4 mg, Intravenous, Once, 6 of 6 cycles  Administration: 62 4 mg (5/21/2019), 62 4 mg (5/29/2019), 62 4 mg (6/4/2019), 62 4 mg (6/11/2019), 62 4 mg (6/18/2019), 62 4 mg (6/26/2019)      5/20/2019 - 7/3/2019 Radiation     Whole pelvic radiation therapy  45Gy in 25 daily fractions  With a parametrial boost of an additional 5 4Gy in 3 daily fractions  6/20/2019 - 7/5/2019 Radiation     HDR brachytherapy utilizing tandem and ring  6Gy in 5 fractions on dates: 6/20/19, 6/28/19, 6/25/19, 7/2/19, 7/5/19               Patient ID: Jose E Schultz is a 38 y o  female  Patient presents today in 1st follow-up from combination chemo radiation therapy for stage IIB cervical cancer with treatment completed in July of 2019  Since her last visit the patient has been doing physically well  She has no significant complaints  She does have a number of psychosocial situation including possible loss of housing and spouse will support issues which are causing significant stressors  Physically she is doing well  She has recently  undergone a PET-CT scan which reveals the following:    IMPRESSION:  1  Significantly decreased FDG activity in the region of the cervix, suggesting response to therapy  Mildly hypermetabolic left pelvic nodes also have decreased  2   No new hypermetabolic metastases demonstrated  3   Persistent hypermetabolic nodule along the right lower posterior thyroid lesion  Ultrasound evaluation may be considered if not ready performed  Today, the patient is doing well  She denies significant abdominal pain, pelvic pain, nausea, vomiting, constipation, diarrhea, fevers, chills, or vaginal bleeding  The following portions of the patient's history were reviewed and updated as appropriate: allergies, current medications, past medical history, past social history, past surgical history and problem list     Review of Systems   Constitutional: Negative  HENT: Negative  Eyes: Negative  Respiratory: Negative  Cardiovascular: Negative  Gastrointestinal: Negative  Endocrine: Negative  Genitourinary: Negative  Musculoskeletal: Negative  Skin: Negative  Neurological: Negative  Hematological: Negative  Psychiatric/Behavioral: Negative  Current Outpatient Medications   Medication Sig Dispense Refill    estrogen, conjugated,-medroxyprogesterone (PREMPRO) 0 625-2 5 MG per tablet Take 1 tablet by mouth daily 30 tablet 6    lidocaine (LMX) 4 % cream Apply topically as needed for mild pain (Patient not taking: Reported on 8/5/2019) 30 g 0     No current facility-administered medications for this visit  Objective:    Blood pressure (!) 180/110, pulse 68, temperature 99 4 °F (37 4 °C), resp  rate 18, height 5' 1" (1 549 m), weight 57 2 kg (126 lb), not currently breastfeeding  Body mass index is 23 81 kg/m²  Body surface area is 1 55 meters squared  Physical Exam   Constitutional: She is oriented to person, place, and time  She appears well-developed and well-nourished  HENT:   Head: Normocephalic and atraumatic  Eyes: Pupils are equal, round, and reactive to light  EOM are normal    Neck: Normal range of motion  Neck supple  Cardiovascular: Normal rate, regular rhythm and normal heart sounds  Pulmonary/Chest: Effort normal and breath sounds normal  No respiratory distress  Abdominal: Soft  Bowel sounds are normal  She exhibits no distension and no ascites  There is no tenderness  There is no rigidity, no rebound and no guarding  Genitourinary:   Genitourinary Comments: -Normal external female genitalia, normal Bartholin's and [place]'s glands                  -Normal midline urethral meatus  No lesions notes                  -Bladder without fullness mass or tenderness                  -Vagina without lesion or discharge No significant cystocele or rectocele noted                  -Cervix approximately 2 x 3 centimeters without visible lesion  The parametria are unremarkable however the left parametria is foreshortened  There is no palpable residual disease  Routine Pap smear was performed                   -Uterus with normal contour, mobility  No tenderness,                  -Adnexae without  mass or tenderness                  - Anus without fissure of lesion     Musculoskeletal: Normal range of motion  Lymphadenopathy:     She has no cervical adenopathy  She has no axillary adenopathy  Right: No inguinal and no supraclavicular adenopathy present  Left: No inguinal and no supraclavicular adenopathy present     Neurological: She is alert and oriented to person, place, and time  Skin: Skin is warm and dry  Psychiatric: She has a normal mood and affect   Her behavior is normal

## 2019-10-23 NOTE — LETTER
October 23, 2019     Darius Serna DO  [address]    Patient: Alberto Card   YOB: 1975   Date of Visit: 10/23/2019       Dear Dr Lynne Amaya: Thank you for referring Alberto Card to me for evaluation  Below are my notes for this consultation  If you have questions, please do not hesitate to call me  I look forward to following your patient along with you  Sincerely,        Seda Sanches MD        CC: No Recipients  Seda Sanches MD  10/23/2019  5:05 PM  Sign at close encounter  Assessment/Plan:    Problem List Items Addressed This Visit        Genitourinary    Malignant neoplasm of overlapping sites of cervix [facility])     Patient is a pleasant 52-year-old white female with a history of stage 1 B to cervical carcinoma  She has completed therapy in approximately July of 2019  Her PET scan reveals significant reduction of disease  Upon my review there were not any significant hot spots and this most likely represents resolution of disease  Her exam today is consistent with completion of treatment  Routine Pap smear was performed  I have recommended a follow-up PET-CT scan in approximately 3 months  This point I see no benefit to adding needle biopsies  Regarding the patient's multiple social issues, an urgent consult to  was performed today  Additionally the patient's PET scan this is notable for a thyroid nodule  We have recommended a thyroid ultrasound    The patient will follow up in 3 months for repeat evaluation           Other Visit Diagnoses     Abnormal thyroid scan    -  Primary    Relevant Orders    US thyroid    NM PET CT skull base to mid thigh    Liquid-based pap, diagnostic            CHIEF COMPLAINT:  Surveillance cervical cancer      Problem:  Cancer Staging  Malignant neoplasm of overlapping sites of cervix [facility])  Staging form: Cervix Uteri, AJCC 8th Edition  - Clinical stage from 5/1/2019: FIGO Stage IB2 (cT1b2, cN1, cM0) - Signed by Henrik Harris MD on 5/1/2019        Previous therapy:  Oncology History    Returns for first follow up post pelvic/paraaortic and tandem and ring radiation completed on 7/5/19 7/16/19 Dr Fantasma Kim of  diffuse pain, pelvic swelling and discomfort; no exam performed  Plan PET-CT in 3-4 months post treatment    7/23/19 Dr Socorro Skinner certification issued    8/20/19 Dr Reza Brown  9/17/19 Dr Maia Ma        [facility]    4/26/2019 Biopsy     Final Diagnosis   A  Cervix, 12:00, biopsy:  -  Invasive moderately to poorly differentiated non-keratinizing squamous cell carcinoma (see note)  5/1/2019 Initial Diagnosis     Malignant neoplasm of overlapping sites of cerv[facility] ) 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 with potential metastatic disease to the left iliac region and sacral region  5/1/2019 -  Cancer Staged     Staging form: Cervix Uteri, AJCC 8th Edition  - Clinical stage from 5/1/2019: FIGO Stage IB2 (cT1b2, cN1, cM0) - Signed by Henrik Harris MD on 5/1/2019  Histologic grade (G): G3  Histologic grading system: 3 grade system        5/6/2019 -  Chemotherapy     CISplatin (PLATINOL) 62 4 mg in sodium chloride 0 9 % 250 mL infusion, 40 mg/m2 = 62 4 mg, Intravenous, Once, 6 of 6 cycles  Administration: 62 4 mg (5/21/2019), 62 4 mg (5/29/2019), 62 4 mg (6/4/2019), 62 4 mg (6/11/2019), 62 4 mg (6/18/2019), 62 4 mg (6/26/2019)      5/20/2019 - 7/3/2019 Radiation     Whole pelvic radiation therapy  45Gy in 25 daily fractions  With a parametrial boost of an additional 5 4Gy in 3 daily fractions  6/20/2019 - 7/5/2019 Radiation     HDR brachytherapy utilizing tandem and ring  6Gy in 5 fractions on dates: 6/20/19, 6/28/19, 6/25/19, 7/2/19, 7/5/19               Patient ID: Nata Mcknight is a 38 y o  female  Patient presents today in 1st follow-up from combination chemo radiation therapy for stage IIB cervical cancer with treatment completed in July of 2019  Since her last visit the patient has been doing physically well  She has no significant complaints  She does have a number of psychosocial situation including possible loss of housing and spouse will support issues which are causing significant stressors  Physically she is doing well  She has recently  undergone a PET-CT scan which reveals the following:    IMPRESSION:  1  Significantly decreased FDG activity in the region of the cervix, suggesting response to therapy  Mildly hypermetabolic left pelvic nodes also have decreased  2   No new hypermetabolic metastases demonstrated  3   Persistent hypermetabolic nodule along the right lower posterior thyroid lesion  Ultrasound evaluation may be considered if not ready performed  Today, the patient is doing well  She denies significant abdominal pain, pelvic pain, nausea, vomiting, constipation, diarrhea, fevers, chills, or vaginal bleeding  The following portions of the patient's history were reviewed and updated as appropriate: allergies, current medications, past medical history, past social history, past surgical history and problem list     Review of Systems   Constitutional: Negative  HENT: Negative  Eyes: Negative  Respiratory: Negative  Cardiovascular: Negative  Gastrointestinal: Negative  Endocrine: Negative  Genitourinary: Negative  Musculoskeletal: Negative  Skin: Negative  Neurological: Negative  Hematological: Negative  Psychiatric/Behavioral: Negative  Current Outpatient Medications   Medication Sig Dispense Refill    estrogen, conjugated,-medroxyprogesterone (PREMPRO) 0 625-2 5 MG per tablet Take 1 tablet by mouth daily 30 tablet 6    lidocaine (LMX) 4 % cream Apply topically as needed for mild pain (Patient not taking: Reported on 8/5/2019) 30 g 0     No current facility-administered medications for this visit  Objective:    Blood pressure (!) 180/110, pulse 68, temperature 99 4 °F (37 4 °C), resp  rate 18, height 5' 1" (1 549 m), weight 57 2 kg (126 lb), not currently breastfeeding  Body mass index is 23 81 kg/m²  Body surface area is 1 55 meters squared  Physical Exam   Constitutional: She is oriented to person, place, and time  She appears well-developed and well-nourished  HENT:   Head: Normocephalic and atraumatic  Eyes: Pupils are equal, round, and reactive to light  EOM are normal    Neck: Normal range of motion  Neck supple  Cardiovascular: Normal rate, regular rhythm and normal heart sounds  Pulmonary/Chest: Effort normal and breath sounds normal  No respiratory distress  Abdominal: Soft  Bowel sounds are normal  She exhibits no distension and no ascites  There is no tenderness  There is no rigidity, no rebound and no guarding  Genitourinary:   Genitourinary Comments: -Normal external female genitalia, normal Bartholin's and Henning's glands                  -Normal midline urethral meatus  No lesions notes                  -Bladder without fullness mass or tenderness                  -Vagina without lesion or discharge No significant cystocele or rectocele noted                  -Cervix approximately 2 x 3 centimeters without visible lesion  The parametria are unremarkable however the left parametria is foreshortened  There is no palpable residual disease  Routine Pap smear was performed                   -Uterus with normal contour, mobility  No tenderness,                  -Adnexae without  mass or tenderness                  - Anus without fissure of lesion     Musculoskeletal: Normal range of motion  Lymphadenopathy:     She has no cervical adenopathy  She has no axillary adenopathy  Right: No inguinal and no supraclavicular adenopathy present  Left: No inguinal and no supraclavicular adenopathy present     Neurological: She is alert and oriented to person, place, and time  Skin: Skin is warm and dry  Psychiatric: She has a normal mood and affect   Her behavior is normal

## 2019-10-23 NOTE — SOCIAL WORK
MSW called to [facility] office today to see pt, who is very stressed and upset  Pt is here today for a follow up and PET scan results  She says that her son's father has taken her to court to gain custody of him, alleging that she can't care for him due to her illnesses  Pt feels that he is trying to get custody because he owes her $3600 in unpaid child support and if he has custody he will no longer have to pay it  She has been taking her son to visit with his father every weekend, however she has to drive him the full distance as pt's ex refuses to meet her somewhere to ease her expenses with gas  This has caused her son significant stress and anxiety and she has been taking him to see doctors, which causes her to miss work, on top of the work that she misses for her own appointments  She is very financially stressed at this point, and has been past due on her rent  Her roommate has moved out at this point as well  She gave her entire paycheck to her property management company last week, and still owes $422 by Friday or she needs to vacate  MSW will apply for [employer] funds to help pt cover this cost   S/w [employer] and [place] [id number], compassion fund request submitted today via email  Pt says physically she feels well and she is hopeful that her PET scan results will be good  MSW offered to stay for her appointment and she did not feel that was necessary  MSW will f/u with pt by phone tomorrow regarding her funding request   No other needs today

## 2019-10-23 NOTE — ASSESSMENT & PLAN NOTE
Patient is a pleasant 71-year-old white female with a history of stage 1 B to cervical carcinoma  She has completed therapy in approximately July of 2019  Her PET scan reveals significant reduction of disease  Upon my review there were not any significant hot spots and this most likely represents resolution of disease  Her exam today is consistent with completion of treatment  Routine Pap smear was performed  I have recommended a follow-up PET-CT scan in approximately 3 months  This point I see no benefit to adding needle biopsies  Regarding the patient's multiple social issues, an urgent consult to  was performed today  Additionally the patient's PET scan this is notable for a thyroid nodule  We have recommended a thyroid ultrasound    The patient will follow up in 3 months for repeat evaluation

## 2019-10-24 ENCOUNTER — DOCUMENTATION (OUTPATIENT)
Dept: INFUSION CENTER | Facility: CLINIC | Age: 44
End: 2019-10-24

## 2019-10-24 NOTE — SOCIAL WORK
MSW s/w pt by phone today and let her know that her funding request has been approved and the check with be sent [employer], so it will most likely be there by tomorrow, which is the deadline  Pt is very appreciative of the assistance and expressed that multiple times  MSW discussed with pt my concern that her rent of $1050 will be due again on 11/1, which is a week away  Pt says that she s/w her water and electric company and they agreed to push her payment off for this month onto next month  She also sent a small payment to her car insurance company and the balance will carry over to next month as well  Pt gets paid weekly and plans on sending her paycheck from this week and next to the rental company to cover the rent  She says that work has been slow, and she works as a commission based position, but she is expecting the amount of work to increase, as this seems to be their busy season  MSW will continue to research any resources in the community that pt may be able to utilize for assistance moving forward  No other needs or concerns at this time

## 2019-10-29 LAB
LAB AP GYN PRIMARY INTERPRETATION: ABNORMAL
Lab: ABNORMAL
PATH INTERP SPEC-IMP: ABNORMAL

## 2019-11-06 ENCOUNTER — HOSPITAL ENCOUNTER (OUTPATIENT)
Dept: ULTRASOUND IMAGING | Facility: HOSPITAL | Age: 44
Discharge: HOME | End: 2019-11-06
Attending: OBSTETRICS & GYNECOLOGY
Payer: COMMERCIAL

## 2019-11-06 DIAGNOSIS — R94.6 ABNORMAL THYROID SCAN: ICD-10-CM

## 2019-11-06 PROCEDURE — 76536 US EXAM OF HEAD AND NECK: CPT

## 2019-11-20 ENCOUNTER — TELEPHONE (OUTPATIENT)
Dept: GYNECOLOGIC ONCOLOGY | Facility: CLINIC | Age: 44
End: 2019-11-20

## 2019-11-20 NOTE — TELEPHONE ENCOUNTER
Pt called w c/o pain  She states that it's primary in her hips but generates through out her body  She states that this has started more recently however has become so severe that she has problems even going to the grocery store  She would like to speak to you regarding pain management      #[phone]

## 2019-11-22 ENCOUNTER — TELEPHONE (OUTPATIENT)
Dept: PALLIATIVE MEDICINE | Facility: CLINIC | Age: 44
End: 2019-11-22

## 2019-11-22 DIAGNOSIS — C53.1 MALIGNANT NEOPLASM OF EXOCERVIX (HCC): Primary | ICD-10-CM

## 2019-11-22 RX ORDER — OXYCODONE HYDROCHLORIDE 10 MG/1
10 TABLET ORAL EVERY 4 HOURS PRN
Qty: 30 TABLET | Refills: 0 | Status: SHIPPED | OUTPATIENT
Start: 2019-11-22 | End: 2020-03-03 | Stop reason: HOSPADM

## 2019-11-22 NOTE — TELEPHONE ENCOUNTER
I remember this patient- she did not follow up after our last visit  I am happy to see her in clinic      Vince Jacques

## 2019-11-22 NOTE — TELEPHONE ENCOUNTER
I discussed with the patient her hip pain by phone today  The patient complains of a deep hip pain  Deep in her bones  Is worse when she does light house work  She states this has been on and off since her initial diagnosis  She has used nonsteroidals and Tylenol with minimal effect  She has used oxycodone 5 mg up to 3 tablets at a time with some affect and is interested in a trial of OxyContin 30 mg p o  Q 12  She has heard this on her message board that was recommending this for pain relief  I feel this is a good option  I would prefer that she see palliative Care to see if there are any other options  The patient is not interested in medical marijuana due to the expense  I have placed a consult for palliative care in in the interim have called in oxycodone 10 mg 1 tablet p o  Q 4-6 hours p r n  Pain  I have recommended she use a stool softener daily with Colace or Senokot

## 2019-11-22 NOTE — TELEPHONE ENCOUNTER
Dr Tawanda Zimmerman office called- patient called looking for pain medication  this morning  Dr Valerie May gave a temporary fill and reached out to us to schedule a Dr Jennie owens apt her  we would be managing her pain meds for Dr Valerie May   I asked her to call us back to schedule a Etta crooks

## 2019-11-25 ENCOUNTER — SOCIAL WORK (OUTPATIENT)
Dept: PALLIATIVE MEDICINE | Facility: CLINIC | Age: 44
End: 2019-11-25
Payer: COMMERCIAL

## 2019-11-25 ENCOUNTER — OFFICE VISIT (OUTPATIENT)
Dept: PALLIATIVE MEDICINE | Facility: CLINIC | Age: 44
End: 2019-11-25
Payer: COMMERCIAL

## 2019-11-25 VITALS
DIASTOLIC BLOOD PRESSURE: 100 MMHG | RESPIRATION RATE: 20 BRPM | TEMPERATURE: 98.1 F | OXYGEN SATURATION: 98 % | SYSTOLIC BLOOD PRESSURE: 180 MMHG | HEART RATE: 60 BPM

## 2019-11-25 DIAGNOSIS — Z71.89 COUNSELING AND COORDINATION OF CARE: Primary | ICD-10-CM

## 2019-11-25 DIAGNOSIS — C53.8 MALIGNANT NEOPLASM OF OVERLAPPING SITES OF CERVIX (HCC): ICD-10-CM

## 2019-11-25 DIAGNOSIS — M25.551 ACUTE HIP PAIN, BILATERAL: Primary | ICD-10-CM

## 2019-11-25 DIAGNOSIS — M25.552 ACUTE HIP PAIN, BILATERAL: Primary | ICD-10-CM

## 2019-11-25 PROCEDURE — NC001 PR NO CHARGE

## 2019-11-25 PROCEDURE — 99214 OFFICE O/P EST MOD 30 MIN: CPT | Performed by: INTERNAL MEDICINE

## 2019-11-25 RX ORDER — DEXAMETHASONE 4 MG/1
4 TABLET ORAL 2 TIMES DAILY WITH MEALS
Qty: 10 TABLET | Refills: 0 | Status: SHIPPED | OUTPATIENT
Start: 2019-11-25 | End: 2020-02-05 | Stop reason: ALTCHOICE

## 2019-11-25 NOTE — PROGRESS NOTES
Della presents for uncontrolled pain  She is a 38year old woman s/p cervical cancer treatment now completed  She is in survivorship at this point  [facility]/o severe bilateral hip pain preventing her from completing home making tasks and sleeping  Upon presentation today, she requested Oxycodone 30 mg  She stated her insurance told her to ask for this dosage as well as her support group friends  Dr Chantel Benitez explained opioids would not be prescribed and provided education about this  He offered continued workup for this pain including x-rays, labs and a referral to [facility] pain management  He prescribed steroids as an anti-inflammatory  Throughout the visit, Scott Pop moved about the room with exaggerated movements  She would bend and kneel, then stretch out her body  She stated she cannot find a position where she is comfortable  [facility] RN noted her BP to be elevated  LSW inquired about her work and home  She has a 15year old son whom she describes as "very helpful "  She works from home and stated she can sit at her desk for a period of time before she has to move because of pain  She states she sleeps about 2-3 hours/night  Strongly encouraged pt to return in 2 weeks  Updated [facility] Counselor

## 2019-11-25 NOTE — PROGRESS NOTES
Late addendum:  I noted patient's calcium elevated on labs which may be causing her diffuse aches  Called patient on 11/27 and left VM  Have attempted to reach PCP x2 without success and forwarded labs to gyn/onc  Will request that Palliative RN call patient today  If her smyptoms are still present or worse she should proceed to ER for IV hydration  Advised to stop any supplements containing Ca2+ as well  Palliative and Supportive Care   Della Hillman 38 y o  female [phone]    Assessment/Plan:  1  Acute hip pain, bilateral    2  Malignant neoplasm of overlapping sites of cervix [facility])      For symptom control will trial a burst of steroids decadron 4mg BID w/ food  Will perform initial workup including CBC/BMP/Mg/bilateral hip XRs  Will place a pain mgmt referral as well  Will follow up on results of testing in 2 weeks or sooner if anything comes to my attention  Patient's pain is not from an active cancer diagnosis and she is NIDHI on most recent PET CT therefor opioid therapy is not indicated  Requested Prescriptions     Signed Prescriptions Disp Refills    dexamethasone (DECADRON) 4 mg tablet 10 tablet 0     Sig: Take 1 tablet (4 mg total) by mouth 2 (two) times a day with meals     There are no discontinued medications  Representatives have queried the patient's controlled substance dispensing history in the Prescription Drug Monitoring Program in compliance with regulations before I have prescribed any controlled substances  The prescription history is consistent with prescribed therapy and our practice policies  25 minutes were spent face to face with Laura Lisandro with greater than 50% of the time spent in counseling or coordination of care including discussions of treatment instructions   All of the patient's questions were answered during this discussion  No follow-ups on file      Subjective:   Chief Complaint  Follow up visit for:  symptom management  CHARISSA Hull Zachary Duff is a 38 y o  female with history of stage IB cervical cancer  Last imaging in October showed no evidence of residual disease  Patient last seen in July when she was to register for [facility] so I could complete the certification  Unfortunately she was lost to follow up in the interim  She developed onset of full body pain and was given a prescription for oxycodone 10mg tablets by Dr Phil Londono office and now presents for [facility] follow up  Patient reports multiple months of worsening hip pain bilaterally that acutely worsened when the weather changed  She states it is exacerbated by cold  Describes it as a "Twisting knife" in her hips but also describes the sensation of pain all over- not radiating or pins-and-needles per se  She has tried topicals including manuel-samuels and lidocaine which are ineffective  Tylenol and NSAIDs no longer effective  Has been stretching with some relief  Has gone to the gym as well some  Appetite, weight, and mood are reported as good - though she does recount  from her significant other  Pain is disrupting sleep  Did receive oxycodone 10mg tablets that "take the edge off" for about 2 hours  She did hear from her support group that she should be receiving oxycodone 30mg tablets  I did explain that unless her pain was revealed to be associated with an active cancer diagnosis, that I would not be providing opioids  The following portions of the medical history were reviewed: past medical history, problem list, medication list, and social history      Current Outpatient Medications:     dexamethasone (DECADRON) 4 mg tablet, Take 1 tablet (4 mg total) by mouth 2 (two) times a day with meals, Disp: 10 tablet, Rfl: 0    estrogen, conjugated,-medroxyprogesterone (PREMPRO) 0 625-2 5 MG per tablet, Take 1 tablet by mouth daily, Disp: 30 tablet, Rfl: 6    lidocaine (LMX) 4 % cream, Apply topically as needed for mild pain (Patient not taking: Reported on 8/5/2019), Disp: 30 g, Rfl: 0    oxyCODONE (ROXICODONE) 10 MG TABS, Take 1 tablet (10 mg total) by mouth every 4 (four) hours as needed for moderate painMax Daily Amount: 60 mg, Disp: 30 tablet, Rfl: 0      Objective:  Vital Signs  BP (!) 180/100 (BP Location: Right arm, Cuff Size: Standard)   Pulse 60   Temp 98 1 °F (36 7 °C) (Oral)   Resp 20   SpO2 98%    Physical Exam    Constitutional: Appears well-developed and well-nourished  In no acute distress  Head: Normocephalic and atraumatic  Eyes: EOM are normal  Right eye exhibits no discharge  Left eye exhibits no discharge  No scleral icterus  Pulmonary/Chest: Effort normal  No stridor  No respiratory distress  Abdominal: No distension  Musculoskeletal: Moving about the room uncomfortably  No obvious swelling and bilateral symmetry  Neurological: Alert, oriented and appropriately conversant  Skin: Skin is dry, not diaphoretic  Psychiatric: Displays a normal mood and affect  Behavior, judgement and thought content appear normal    Vitals reviewed

## 2019-11-27 ENCOUNTER — HOSPITAL ENCOUNTER (OUTPATIENT)
Dept: RADIOLOGY | Facility: HOSPITAL | Age: 44
Discharge: HOME | End: 2019-11-27
Attending: INTERNAL MEDICINE
Payer: COMMERCIAL

## 2019-11-27 ENCOUNTER — APPOINTMENT (OUTPATIENT)
Dept: LAB | Facility: HOSPITAL | Age: 44
End: 2019-11-27
Attending: INTERNAL MEDICINE
Payer: COMMERCIAL

## 2019-11-27 DIAGNOSIS — M25.551 ACUTE HIP PAIN, BILATERAL: ICD-10-CM

## 2019-11-27 DIAGNOSIS — M25.552 ACUTE HIP PAIN, BILATERAL: ICD-10-CM

## 2019-11-27 LAB
ALBUMIN SERPL BCP-MCNC: 4.1 G/DL (ref 3.5–5)
ANION GAP SERPL CALCULATED.3IONS-SCNC: 10 MMOL/L (ref 4–13)
BASOPHILS # BLD AUTO: 0.02 THOUSANDS/ΜL (ref 0–0.1)
BASOPHILS NFR BLD AUTO: 0 % (ref 0–1)
BUN SERPL-MCNC: 11 MG/DL (ref 5–25)
CALCIUM ALBUM COR SERPL-MCNC: 11.6 MG/DL (ref 8.3–10.1)
CALCIUM SERPL-MCNC: 11.7 MG/DL (ref 8.3–10.1)
CALCIUM SERPL-MCNC: 11.7 MG/DL (ref 8.3–10.1)
CHLORIDE SERPL-SCNC: 102 MMOL/L (ref 100–108)
CO2 SERPL-SCNC: 31 MMOL/L (ref 21–32)
CREAT SERPL-MCNC: 0.81 MG/DL (ref 0.6–1.3)
CRP SERPL QL: <3 MG/L
EOSINOPHIL # BLD AUTO: 0 THOUSAND/ΜL (ref 0–0.61)
EOSINOPHIL NFR BLD AUTO: 0 % (ref 0–6)
ERYTHROCYTE [DISTWIDTH] IN BLOOD BY AUTOMATED COUNT: 12.8 % (ref 11.6–15.1)
ERYTHROCYTE [SEDIMENTATION RATE] IN BLOOD: 20 MM/HOUR (ref 0–20)
GFR SERPL CREATININE-BSD FRML MDRD: 89 ML/MIN/1.73SQ M
GLUCOSE SERPL-MCNC: 135 MG/DL (ref 65–140)
HCT VFR BLD AUTO: 43.7 % (ref 34.8–46.1)
HGB BLD-MCNC: 14.7 G/DL (ref 11.5–15.4)
IMM GRANULOCYTES # BLD AUTO: 0.07 THOUSAND/UL (ref 0–0.2)
IMM GRANULOCYTES NFR BLD AUTO: 1 % (ref 0–2)
LYMPHOCYTES # BLD AUTO: 0.37 THOUSANDS/ΜL (ref 0.6–4.47)
LYMPHOCYTES NFR BLD AUTO: 4 % (ref 14–44)
MAGNESIUM SERPL-MCNC: 2.1 MG/DL (ref 1.6–2.6)
MCH RBC QN AUTO: 28.4 PG (ref 26.8–34.3)
MCHC RBC AUTO-ENTMCNC: 33.6 G/DL (ref 31.4–37.4)
MCV RBC AUTO: 85 FL (ref 82–98)
MONOCYTES # BLD AUTO: 0.33 THOUSAND/ΜL (ref 0.17–1.22)
MONOCYTES NFR BLD AUTO: 4 % (ref 4–12)
NEUTROPHILS # BLD AUTO: 8.6 THOUSANDS/ΜL (ref 1.85–7.62)
NEUTS SEG NFR BLD AUTO: 91 % (ref 43–75)
NRBC BLD AUTO-RTO: 0 /100 WBCS
PLATELET # BLD AUTO: 205 THOUSANDS/UL (ref 149–390)
PMV BLD AUTO: 9.9 FL (ref 8.9–12.7)
POTASSIUM SERPL-SCNC: 3.1 MMOL/L (ref 3.5–5.3)
RBC # BLD AUTO: 5.17 MILLION/UL (ref 3.81–5.12)
SODIUM SERPL-SCNC: 143 MMOL/L (ref 136–145)
TSH SERPL DL<=0.05 MIU/L-ACNC: 0.75 UIU/ML (ref 0.36–3.74)
WBC # BLD AUTO: 9.39 THOUSAND/UL (ref 4.31–10.16)

## 2019-11-27 PROCEDURE — 83735 ASSAY OF MAGNESIUM: CPT

## 2019-11-27 PROCEDURE — 73522 X-RAY EXAM HIPS BI 3-4 VIEWS: CPT

## 2019-11-27 PROCEDURE — 84443 ASSAY THYROID STIM HORMONE: CPT

## 2019-11-27 PROCEDURE — 85025 COMPLETE CBC W/AUTO DIFF WBC: CPT

## 2019-11-27 PROCEDURE — 36415 COLL VENOUS BLD VENIPUNCTURE: CPT

## 2019-11-27 PROCEDURE — 86140 C-REACTIVE PROTEIN: CPT

## 2019-11-27 PROCEDURE — 80048 BASIC METABOLIC PNL TOTAL CA: CPT

## 2019-11-27 PROCEDURE — 85652 RBC SED RATE AUTOMATED: CPT

## 2019-11-27 PROCEDURE — 82040 ASSAY OF SERUM ALBUMIN: CPT

## 2019-11-29 ENCOUNTER — TELEPHONE (OUTPATIENT)
Dept: PALLIATIVE MEDICINE | Facility: CLINIC | Age: 44
End: 2019-11-29

## 2019-11-29 ENCOUNTER — HOSPITAL ENCOUNTER (EMERGENCY)
Facility: HOSPITAL | Age: 44
Discharge: HOME | End: 2019-11-29
Attending: EMERGENCY MEDICINE | Admitting: EMERGENCY MEDICINE
Payer: COMMERCIAL

## 2019-11-29 VITALS
HEART RATE: 52 BPM | SYSTOLIC BLOOD PRESSURE: 171 MMHG | OXYGEN SATURATION: 98 % | BODY MASS INDEX: 23.98 KG/M2 | WEIGHT: 127 LBS | RESPIRATION RATE: 18 BRPM | DIASTOLIC BLOOD PRESSURE: 95 MMHG | HEIGHT: 61 IN | TEMPERATURE: 98.2 F

## 2019-11-29 DIAGNOSIS — M25.559 HIP PAIN: Primary | ICD-10-CM

## 2019-11-29 DIAGNOSIS — E87.6 HYPOKALEMIA: ICD-10-CM

## 2019-11-29 LAB
ALBUMIN SERPL BCP-MCNC: 4.3 G/DL (ref 3.5–5)
ALP SERPL-CCNC: 61 U/L (ref 46–116)
ALT SERPL W P-5'-P-CCNC: 48 U/L (ref 12–78)
ANION GAP SERPL CALCULATED.3IONS-SCNC: 7 MMOL/L (ref 4–13)
AST SERPL W P-5'-P-CCNC: 20 U/L (ref 5–45)
BASOPHILS # BLD AUTO: 0.02 THOUSANDS/ΜL (ref 0–0.1)
BASOPHILS NFR BLD AUTO: 0 % (ref 0–1)
BILIRUB SERPL-MCNC: 0.5 MG/DL (ref 0.2–1)
BUN SERPL-MCNC: 17 MG/DL (ref 5–25)
CA-I BLD-SCNC: 1.26 MMOL/L (ref 1.12–1.32)
CALCIUM SERPL-MCNC: 10.1 MG/DL (ref 8.3–10.1)
CHLORIDE SERPL-SCNC: 102 MMOL/L (ref 100–108)
CO2 SERPL-SCNC: 33 MMOL/L (ref 21–32)
CREAT SERPL-MCNC: 0.64 MG/DL (ref 0.6–1.3)
EOSINOPHIL # BLD AUTO: 0 THOUSAND/ΜL (ref 0–0.61)
EOSINOPHIL NFR BLD AUTO: 0 % (ref 0–6)
ERYTHROCYTE [DISTWIDTH] IN BLOOD BY AUTOMATED COUNT: 13.2 % (ref 11.6–15.1)
GFR SERPL CREATININE-BSD FRML MDRD: 110 ML/MIN/1.73SQ M
GLUCOSE SERPL-MCNC: 92 MG/DL (ref 65–140)
HCT VFR BLD AUTO: 43.6 % (ref 34.8–46.1)
HGB BLD-MCNC: 14.4 G/DL (ref 11.5–15.4)
IMM GRANULOCYTES # BLD AUTO: 0.07 THOUSAND/UL (ref 0–0.2)
IMM GRANULOCYTES NFR BLD AUTO: 1 % (ref 0–2)
LYMPHOCYTES # BLD AUTO: 0.5 THOUSANDS/ΜL (ref 0.6–4.47)
LYMPHOCYTES NFR BLD AUTO: 6 % (ref 14–44)
MAGNESIUM SERPL-MCNC: 2.2 MG/DL (ref 1.6–2.6)
MCH RBC QN AUTO: 28.3 PG (ref 26.8–34.3)
MCHC RBC AUTO-ENTMCNC: 33 G/DL (ref 31.4–37.4)
MCV RBC AUTO: 86 FL (ref 82–98)
MONOCYTES # BLD AUTO: 0.64 THOUSAND/ΜL (ref 0.17–1.22)
MONOCYTES NFR BLD AUTO: 7 % (ref 4–12)
NEUTROPHILS # BLD AUTO: 7.61 THOUSANDS/ΜL (ref 1.85–7.62)
NEUTS SEG NFR BLD AUTO: 86 % (ref 43–75)
NRBC BLD AUTO-RTO: 0 /100 WBCS
PLATELET # BLD AUTO: 202 THOUSANDS/UL (ref 149–390)
PMV BLD AUTO: 10.1 FL (ref 8.9–12.7)
POTASSIUM SERPL-SCNC: 3 MMOL/L (ref 3.5–5.3)
PROT SERPL-MCNC: 7.9 G/DL (ref 6.4–8.2)
RBC # BLD AUTO: 5.08 MILLION/UL (ref 3.81–5.12)
SODIUM SERPL-SCNC: 142 MMOL/L (ref 136–145)
WBC # BLD AUTO: 8.84 THOUSAND/UL (ref 4.31–10.16)

## 2019-11-29 PROCEDURE — 85025 COMPLETE CBC W/AUTO DIFF WBC: CPT | Performed by: EMERGENCY MEDICINE

## 2019-11-29 PROCEDURE — 99283 EMERGENCY DEPT VISIT LOW MDM: CPT

## 2019-11-29 PROCEDURE — 96360 HYDRATION IV INFUSION INIT: CPT

## 2019-11-29 PROCEDURE — 96361 HYDRATE IV INFUSION ADD-ON: CPT

## 2019-11-29 PROCEDURE — 82330 ASSAY OF CALCIUM: CPT | Performed by: EMERGENCY MEDICINE

## 2019-11-29 PROCEDURE — 36415 COLL VENOUS BLD VENIPUNCTURE: CPT | Performed by: EMERGENCY MEDICINE

## 2019-11-29 PROCEDURE — 99284 EMERGENCY DEPT VISIT MOD MDM: CPT | Performed by: EMERGENCY MEDICINE

## 2019-11-29 PROCEDURE — 80053 COMPREHEN METABOLIC PANEL: CPT | Performed by: EMERGENCY MEDICINE

## 2019-11-29 PROCEDURE — 83735 ASSAY OF MAGNESIUM: CPT | Performed by: EMERGENCY MEDICINE

## 2019-11-29 RX ORDER — MORPHINE SULFATE 15 MG/1
15 TABLET ORAL EVERY 4 HOURS PRN
Qty: 7 TABLET | Refills: 0 | Status: SHIPPED | OUTPATIENT
Start: 2019-11-29 | End: 2020-03-03 | Stop reason: HOSPADM

## 2019-11-29 RX ORDER — MAGNESIUM HYDROXIDE/ALUMINUM HYDROXICE/SIMETHICONE 120; 1200; 1200 MG/30ML; MG/30ML; MG/30ML
30 SUSPENSION ORAL ONCE
Status: COMPLETED | OUTPATIENT
Start: 2019-11-29 | End: 2019-11-29

## 2019-11-29 RX ORDER — FAMOTIDINE 20 MG/1
20 TABLET, FILM COATED ORAL ONCE
Status: COMPLETED | OUTPATIENT
Start: 2019-11-29 | End: 2019-11-29

## 2019-11-29 RX ORDER — MELOXICAM 7.5 MG/1
7.5 TABLET ORAL DAILY
Qty: 15 TABLET | Refills: 0 | Status: SHIPPED | OUTPATIENT
Start: 2019-11-29 | End: 2020-03-03 | Stop reason: HOSPADM

## 2019-11-29 RX ADMIN — FAMOTIDINE 20 MG: 20 TABLET ORAL at 17:53

## 2019-11-29 RX ADMIN — ALUMINUM HYDROXIDE, MAGNESIUM HYDROXIDE, AND SIMETHICONE 30 ML: 200; 200; 20 SUSPENSION ORAL at 17:54

## 2019-11-29 RX ADMIN — SODIUM CHLORIDE 1000 ML: 0.9 INJECTION, SOLUTION INTRAVENOUS at 17:47

## 2019-11-29 NOTE — ED PROVIDER NOTES
Pt Name: Genesis Myles  MRN: [id number]  [place] 1975  Age/Sex: 38 y o  female  Date of evaluation: 11/29/2019  PCP: Yifan Escobar [facility]    Chief Complaint   Patient presents with    Pain     pt reports worsening pain b/l hips  hx of cervical cancer, reports currently not recieving treatment, reports being placed on steroids without relief  HPI    38 y o  female presenting with bilateral hip pain  Patient has chronic pain in her hips since completing radiation chemotherapy for cervical cancer states this been worsening past week  She states that she was on a brief course of narcotics from her oncologist, but was then referred to palliative care for possible medical marijuana, she is not interested in that therapy, was referred to pain management but has not yet been able to see them as no appointments available  The pain is now severe, worse on the right hip than left, worse with any movement of the hip, better with oxycodone  Patient still able to walk although she has pain doing so  She denies fever, nausea, vomiting, diarrhea, other symptoms  HPI      Past Medical and Surgical History    Past Medical History:   Diagnosis Date    Abnormal Pap smear of cervix     Cancer ([facility] [address] )     cervix       Past Surgical History:   Procedure Laterality Date    CA DILATION OF VAGINA W ANESTH N/A 6/20/2019    Procedure: EXAM UNDER ANESTHESIA (EUA);   Surgeon: Kayleen Herndon MD;  [facility];  Service: Gynecology Oncology    CA INSERT VAGINAL RADIATION DEVICE N/A 6/20/2019    Procedure: Heather Hurt;  Surgeon: Kayleen Herndon MD;  [facility];  Service: Gynecology Oncology    TONSILLECTOMY      US GUIDANCE  6/20/2019       Family History   Problem Relation Age of Onset    Uterine cancer Maternal Grandmother        Social History     Tobacco Use    Smoking status: Former Smoker     Packs/day: 1 00     Types: Cigarettes    Smokeless tobacco: Never Used Substance Use Topics    Alcohol use: Not Currently    Drug use: Never           Allergies    Allergies   Allergen Reactions    Latex Hives    Other      Tomatoes   Vomiting and diarhhea       Home Medications    Prior to Admission medications    Medication Sig Start Date End Date Taking? Authorizing Provider   dexamethasone (DECADRON) 4 mg tablet Take 1 tablet (4 mg total) by mouth 2 (two) times a day with meals 11/25/19   Yuval Ma MD   estrogen, conjugated,-medroxyprogesterone (PREMPRO) 0 625-2 5 MG per tablet Take 1 tablet by mouth daily 8/20/19   Sherrie Acevedo MD   lidocaine (LMX) 4 % cream Apply topically as needed for mild pain  Patient not taking: Reported on 8/5/2019 7/23/19   Yuval Ma MD   oxyCODONE (ROXICODONE) 10 MG TABS Take 1 tablet (10 mg total) by mouth every 4 (four) hours as needed for moderate painMax Daily Amount: 60 mg 11/22/19   Sherrie Acevedo MD           Review of Systems    Review of Systems   Constitutional: Negative for activity change, chills and fever  HENT: Negative for drooling and facial swelling  Eyes: Negative for pain, discharge and visual disturbance  Respiratory: Negative for apnea, cough, chest tightness, shortness of breath and wheezing  Cardiovascular: Negative for chest pain and leg swelling  Gastrointestinal: Negative for abdominal pain, constipation, diarrhea, nausea and vomiting  Genitourinary: Negative for difficulty urinating, dysuria and urgency  Musculoskeletal: Positive for arthralgias and gait problem  Negative for back pain  Skin: Negative for color change and rash  Neurological: Negative for dizziness, speech difficulty, weakness and headaches  Psychiatric/Behavioral: Negative for agitation, behavioral problems and confusion  All other systems reviewed and negative      Physical Exam      ED Triage Vitals   Temperature Pulse Respirations Blood Pressure SpO2   11/29/19 1443 11/29/19 1443 11/29/19 1800 11/29/19 1443 11/29/19 1443   98 2 °F (36 8 °C) 55 18 (!) 200/109 99 %      Temp Source Heart Rate Source Patient Position - Orthostatic VS BP Location FiO2 (%)   11/29/19 1443 11/29/19 1443 11/29/19 1443 11/29/19 1443 --   Oral Monitor Sitting Left arm       Pain Score       --                      Physical Exam   Constitutional: She is oriented to person, place, and time  She appears well-developed and well-nourished  HENT:   Head: Normocephalic and atraumatic  Eyes: Pupils are equal, round, and reactive to light  Conjunctivae and EOM are normal    Neck: Normal range of motion  Neck supple  Cardiovascular: Normal rate, regular rhythm, normal heart sounds and intact distal pulses  Pulmonary/Chest: Effort normal and breath sounds normal  No respiratory distress  She has no wheezes  She has no rales  Abdominal: Soft  She exhibits no distension  There is no tenderness  There is no rebound and no guarding  Musculoskeletal: Normal range of motion  She exhibits tenderness  She exhibits no edema or deformity  Tender to palpation of both hips, worse on the right  Passive and active range of motion intact but painful  Strength 5/5 in bilateral lower extremities  Neurological: She is alert and oriented to person, place, and time  Skin: Skin is warm and dry  No rash noted  No erythema  Psychiatric: She has a normal mood and affect  Her behavior is normal  Judgment and thought content normal    Nursing note and vitals reviewed             Diagnostic Results      Labs:    Results Reviewed     Procedure Component Value Units Date/Time    Comprehensive metabolic panel [[id number]]  (Abnormal) Collected:  11/29/19 1747    Lab Status:  Final result Specimen:  Blood from Arm, Right Updated:  11/29/19 1814     Sodium 142 mmol/L      Potassium 3 0 mmol/L      Chloride 102 mmol/L      CO2 33 mmol/L      ANION GAP 7 mmol/L      BUN 17 mg/dL      Creatinine 0 64 mg/dL      Glucose 92 mg/dL      Calcium 10 1 mg/dL AST 20 U/L      ALT 48 U/L      Alkaline Phosphatase 61 U/L      Total Protein 7 9 g/dL      Albumin 4 3 g/dL      Total Bilirubin 0 50 mg/dL      eGFR 110 ml/min/1 73sq m     Narrative:       [place] guidelines for Chronic Kidney Disease (CKD):     Stage 1 with normal or high GFR (GFR > 90 mL/min/1 73 square meters)    Stage 2 Mild CKD (GFR = 60-89 mL/min/1 73 square meters)    Stage 3A Moderate CKD (GFR = 45-59 mL/min/1 73 square meters)    Stage 3B Moderate CKD (GFR = 30-44 mL/min/1 73 square meters)    Stage 4 Severe CKD (GFR = 15-29 mL/min/1 73 square meters)    Stage 5 End Stage CKD (GFR <15 mL/min/1 73 square meters)  Note: GFR calculation is accurate only with a steady state creatinine    Magnesium [[id number]]  (Normal) Collected:  11/29/19 1747    Lab Status:  Final result Specimen:  Blood from Arm, Right Updated:  11/29/19 1814     Magnesium 2 2 mg/dL     CBC and differential [[id number]]  (Abnormal) Collected:  11/29/19 1747    Lab Status:  Final result Specimen:  Blood from Arm, Right Updated:  11/29/19 1754     WBC 8 84 Thousand/uL      RBC 5 08 Million/uL      Hemoglobin 14 4 g/dL      Hematocrit 43 6 %      MCV 86 fL      MCH 28 3 pg      MCHC 33 0 g/dL      RDW 13 2 %      MPV 10 1 fL      Platelets 739 Thousands/uL      nRBC 0 /100 WBCs      Neutrophils Relative 86 %      Immat GRANS % 1 %      Lymphocytes Relative 6 %      Monocytes Relative 7 %      Eosinophils Relative 0 %      Basophils Relative 0 %      Neutrophils Absolute 7 61 Thousands/µL      Immature Grans Absolute 0 07 Thousand/uL      Lymphocytes Absolute 0 50 Thousands/µL      Monocytes Absolute 0 64 Thousand/µL      Eosinophils Absolute 0 00 Thousand/µL      Basophils Absolute 0 02 Thousands/µL     Calcium, ionized [[id number]]  (Normal) Collected:  11/29/19 1747    Lab Status:  Final result Specimen:  Blood from Arm, Right Updated:  11/29/19 1754     Calcium, Ionized 1 26 mmol/L           All labs reviewed and utilized in the medical decision making process    Radiology:    No orders to display       All radiology studies independently viewed by me and interpreted by the radiologist     Procedure    Procedures        ED Course of Care and Re-Assessments      Patient complained of some acid reflux symptoms improved with Mylanta and famotidine  Discussed case with Dr Cyril Canela of palliative care, patient known to him, he states that he was consult primarily for consideration of medical marijuana, patient had stage I ovarian cancer and is considered cured  He notes that patient had a history of elevated calcium, recommended rechecking labs and giving IV hydration with concern for hypercalcemia  Medications   famotidine (PEPCID) tablet 20 mg (20 mg Oral Given 11/29/19 1753)   aluminum-magnesium hydroxide-simethicone (MYLANTA) 200-200-20 mg/5 mL oral suspension 30 mL (30 mL Oral Given 11/29/19 1754)   sodium chloride 0 9 % bolus 1,000 mL (0 mL Intravenous Stopped 11/29/19 2021)           FINAL IMPRESSION    Final diagnoses:   Hip pain   Hypokalemia - mild         DISPOSITION/PLAN    Patient hip pain mild hypokalemia as above  Pain consistent with acute exacerbation chronic pain, patient had recent x-rays for same pain with no abnormality seen, very low suspicion for septic arthritis, unstable fracture or dislocation, critical cord or nerve root compression, other acute life threat at this time  Labs reassuring, low suspicion for significant hypercalcemia  Patient states that she is pursuing follow-up with other pain management doctors and hopes to be seen within next 1 to 2 weeks,  given short course of immediate release morphine tree use for breakthrough pain as bridge until that appointment  Hemodynamically stable and comfortable time of discharge    Time reflects when diagnosis was documented in both MDM as applicable and the Disposition within this note     Time User Action Codes Description Comment 11/29/2019  8:05 PM Roel MARTINEZ Add [M25 559] Hip pain     11/29/2019  8:05 PM Shaylee Raymond Add [E87 6] Hypokalemia     11/29/2019  8:05 PM Roel MARTINEZ Modify [E87 6] Hypokalemia mild      ED Disposition     ED Disposition Condition Date/Time Comment    Discharge Stable Fri Nov 29, 2019  8:05 PM Della Kady discharge to home/self care  Follow-up Information     Follow up With Specialties Details Why Contact Info Additional [address] [facility] Internal Medicine Call in 3 days To discuss your symptoms and further care [address] Emergency Department Emergency Medicine Go to  If symptoms worsen [address] [facility] [id number] [phone] [place] ED, [address] [place] [address], [facility]            PATIENT REFERRED TO:    Kandis Mitchell  [address]  [phone]    Call in 3 days  To discuss your symptoms and further care    [facility] Emergency Department  [address] [facility] [address] [phone]  Go to   If symptoms worsen      DISCHARGE MEDICATIONS:    Discharge Medication List as of 11/29/2019  8:08 PM      START taking these medications    Details   meloxicam (MOBIC) 7 5 mg tablet Take 1 tablet (7 5 mg total) by mouth daily, Starting Fri 11/29/2019, Normal      morphine (MSIR) 15 mg tablet Take 1 tablet (15 mg total) by mouth every 4 (four) hours as needed for severe pain for up to 7 dosesMax Daily Amount: 90 mg, Starting Fri 11/29/2019, Normal         CONTINUE these medications which have NOT CHANGED    Details   dexamethasone (DECADRON) 4 mg tablet Take 1 tablet (4 mg total) by mouth 2 (two) times a day with meals, Starting Mon 11/25/2019, Normal      estrogen, conjugated,-medroxyprogesterone (PREMPRO) 0 625-2 5 MG per tablet Take 1 tablet by mouth daily, Starting Tue 8/20/2019, Normal      lidocaine (LMX) 4 % cream Apply topically as needed for mild pain, Starting Tue 7/23/2019, Normal      oxyCODONE (ROXICODONE) 10 MG TABS Take 1 tablet (10 mg total) by mouth every 4 (four) hours as needed for moderate painMax Daily Amount: 60 mg, Starting Fri 11/22/2019, Normal             No discharge procedures on file           MD Emily Dallas MD  11/30/19 2348

## 2019-11-29 NOTE — TELEPHONE ENCOUNTER
Follow up phone call made to patient She stated she received Dr Elzbieta Jenkins message about elevated calcium level and she stopped her calcium supplement  Instructed to follow up with her PCP and go to ER if symptoms worsen

## 2019-12-03 DIAGNOSIS — E83.52 HYPERCALCEMIA: Primary | ICD-10-CM

## 2019-12-06 ENCOUNTER — TELEPHONE (OUTPATIENT)
Dept: PALLIATIVE MEDICINE | Facility: CLINIC | Age: 44
End: 2019-12-06

## 2019-12-06 ENCOUNTER — DOCUMENTATION (OUTPATIENT)
Dept: INFUSION CENTER | Facility: CLINIC | Age: 44
End: 2019-12-06

## 2019-12-06 NOTE — SOCIAL WORK
Pt called [facility]W today and says that her job has laid her off and they are not paying her over $600 that she's owed  She is asking for a phone number for  in our area  MSW provided her the number to [facility], and she said that someone else had referred her there as well  She says that she is starting a new job on 12/16 and once she starts that, she should be able to get caught up as this job pays better  She is upset that she is struggling with pain in her hip and can't see pain management until 1/21  She is still having to drive her son to [place] every weekend to see his father, however she is owed a significant amount in child support that he refuses to pay  MSW provided supportive listening and encouraged her to call [facility] today to try and get some advice  She agreed to let me know how that goes  No other concerns at this time

## 2019-12-06 NOTE — TELEPHONE ENCOUNTER
Understood  Dr Marisa Walsh notes reflect opioids are not indicated for this patient; he did place a Pain Management referral (which is more appropriate for her)

## 2019-12-06 NOTE — TELEPHONE ENCOUNTER
Called pt to confirm appointment on Monday 12/09, but wanted to cancel as Pt is in need of Opioids which she was told would not be considered to manage pain  She states she can't afford to drive to follow up if  isn't prescribing opioids for pain  Pt stated she can't afford MMJ and d/t considerable amount of commute to [place], is not able to travel with [employer] products

## 2019-12-20 ENCOUNTER — HOSPITAL ENCOUNTER (EMERGENCY)
Facility: HOSPITAL | Age: 44
Discharge: HOME | End: 2019-12-20
Attending: EMERGENCY MEDICINE
Payer: COMMERCIAL

## 2019-12-20 VITALS
OXYGEN SATURATION: 99 % | TEMPERATURE: 98.8 F | BODY MASS INDEX: 25.51 KG/M2 | SYSTOLIC BLOOD PRESSURE: 191 MMHG | DIASTOLIC BLOOD PRESSURE: 96 MMHG | HEART RATE: 73 BPM | RESPIRATION RATE: 16 BRPM | WEIGHT: 135 LBS

## 2019-12-20 DIAGNOSIS — R10.2 PELVIC PAIN: Primary | ICD-10-CM

## 2019-12-20 DIAGNOSIS — G89.3 CANCER ASSOCIATED PAIN: ICD-10-CM

## 2019-12-20 PROCEDURE — 99283 EMERGENCY DEPT VISIT LOW MDM: CPT

## 2019-12-20 PROCEDURE — 99284 EMERGENCY DEPT VISIT MOD MDM: CPT | Performed by: NURSE PRACTITIONER

## 2019-12-20 RX ORDER — MORPHINE SULFATE 15 MG/1
15 TABLET ORAL EVERY 4 HOURS PRN
Qty: 30 TABLET | Refills: 0 | Status: SHIPPED | OUTPATIENT
Start: 2019-12-20 | End: 2020-03-03 | Stop reason: HOSPADM

## 2019-12-21 NOTE — ED PROVIDER NOTES
History  Chief Complaint   Patient presents with    Hip Pain     bilateral pain that is in her hips and goes up to her neck states she has chronic pain and is waiting on pain managment      This is a 52-year-old female who presents here with pelvic pain from her cervical cancer and history of radiation and chemotherapy  She is currently waiting for pain management to review her case she reports she was initially referred to palliative care but does not want to use marijuana therapy  She did well previously with [facility] in his requesting refill of this          Prior to Admission Medications   Prescriptions Last Dose Informant Patient Reported? Taking?   dexamethasone (DECADRON) 4 mg tablet   No No   Sig: Take 1 tablet (4 mg total) by mouth 2 (two) times a day with meals   estrogen, conjugated,-medroxyprogesterone (PREMPRO) 0 625-2 5 MG per tablet   No No   Sig: Take 1 tablet by mouth daily   lidocaine (LMX) 4 % cream   No No   Sig: Apply topically as needed for mild pain   Patient not taking: Reported on 8/5/2019   meloxicam (MOBIC) 7 5 mg tablet   No No   Sig: Take 1 tablet (7 5 mg total) by mouth daily   morphine (MSIR) 15 mg tablet   No No   Sig: Take 1 tablet (15 mg total) by mouth every 4 (four) hours as needed for severe pain for up to 7 dosesMax Daily Amount: 90 mg   oxyCODONE (ROXICODONE) 10 MG TABS   No No   Sig: Take 1 tablet (10 mg total) by mouth every 4 (four) hours as needed for moderate painMax Daily Amount: 60 mg      Facility-Administered Medications: None       Past Medical History:   Diagnosis Date    Abnormal Pap smear of cervix     Cancer ([facility] [address] )     cervix       Past Surgical History:   Procedure Laterality Date    CO DILATION OF VAGINA W ANESTH N/A 6/20/2019    Procedure: EXAM UNDER ANESTHESIA (EUA);   Surgeon: MD Donnell;  [facility];  Service: Gynecology Oncology    CO INSERT VAGINAL RADIATION DEVICE N/A 6/20/2019    Procedure: PIERRE SLEEVE PLACEMENT;  Surgeon: Emelina MD Gemini;  [facility];  Service: Gynecology Oncology    TONSILLECTOMY      US GUIDANCE  6/20/2019       Family History   Problem Relation Age of Onset    Uterine cancer Maternal Grandmother      I have reviewed and agree with the history as documented  Social History     Tobacco Use    Smoking status: Former Smoker     Packs/day: 1 00     Types: Cigarettes    Smokeless tobacco: Never Used   Substance Use Topics    Alcohol use: Not Currently    Drug use: Never        Review of Systems   Constitutional: Negative for activity change, fatigue and fever  HENT: Negative for congestion, ear pain, rhinorrhea and sore throat  Eyes: Negative  Respiratory: Negative for cough, shortness of breath and wheezing  Gastrointestinal: Negative for abdominal pain, diarrhea, nausea and vomiting  Endocrine: Negative  Genitourinary: Negative for difficulty urinating, dyspareunia, dysuria, flank pain, frequency, menstrual problem, pelvic pain, urgency, vaginal bleeding, vaginal discharge and vaginal pain  Musculoskeletal: Negative for arthralgias and myalgias  Skin: Negative for color change and pallor  Neurological: Negative for dizziness, speech difficulty, weakness and headaches  Hematological: Negative for adenopathy  Psychiatric/Behavioral: Negative for confusion  Physical Exam  Physical Exam   Constitutional: She is oriented to person, place, and time  She appears well-developed and well-nourished  She is cooperative  Non-toxic appearance  She does not have a sickly appearance  She does not appear ill  No distress  HENT:   Head: Normocephalic and atraumatic  Right Ear: Tympanic membrane and external ear normal    Left Ear: Tympanic membrane and external ear normal    Nose: No rhinorrhea, sinus tenderness or nasal deformity  No epistaxis  Right sinus exhibits no maxillary sinus tenderness and no frontal sinus tenderness   Left sinus exhibits no maxillary sinus tenderness and no frontal sinus tenderness  Mouth/Throat: Oropharynx is clear and moist and mucous membranes are normal  Normal dentition  Eyes: Pupils are equal, round, and reactive to light  EOM are normal    Neck: Normal range of motion  Neck supple  Cardiovascular: Normal rate, regular rhythm and normal heart sounds  No murmur heard  Pulmonary/Chest: Effort normal and breath sounds normal  No accessory muscle usage  No respiratory distress  She has no wheezes  She has no rales  She exhibits no tenderness  Abdominal: Soft  She exhibits no distension  There is no guarding  Musculoskeletal: Normal range of motion  She exhibits no edema or tenderness  Lymphadenopathy:     She has no cervical adenopathy  Neurological: She is alert and oriented to person, place, and time  She exhibits normal muscle tone  Skin: Skin is warm and dry  No rash noted  No erythema  Psychiatric: She has a normal mood and affect  Nursing note and vitals reviewed        Vital Signs  ED Triage Vitals [12/20/19 1822]   Temperature Pulse Respirations Blood Pressure SpO2   98 8 °F (37 1 °C) 73 16 (!) 191/96 99 %      Temp Source Heart Rate Source Patient Position - Orthostatic VS BP Location FiO2 (%)   Oral Monitor Sitting Left arm --      Pain Score       --           Vitals:    12/20/19 1822   BP: (!) 191/96   Pulse: 73   Patient Position - Orthostatic VS: Sitting         Visual Acuity      ED Medications  Medications - No data to display    Diagnostic Studies  Results Reviewed     None                 No orders to display              Procedures  Procedures         ED Course                               MDM  Number of Diagnoses or Management Options  Cancer associated pain: new and requires workup  Pelvic pain: new and requires workup  Patient Progress  Patient progress: stable        Disposition  Final diagnoses:   Pelvic pain   Cancer associated pain     Time reflects when diagnosis was documented in both MDM as applicable and the Disposition within this note     Time User Action Codes Description Comment    12/20/2019  7:24 PM Deidre Colby Add [R10 2] Pelvic pain     12/20/2019  7:24 PM Rebrichi Colby Add [G89 3] Cancer associated pain       ED Disposition     ED Disposition Condition Date/Time Comment    Discharge Stable Fri Dec 20, 2019  7:24 PM Della Hillman discharge to home/self care  Follow-up Information    None         Discharge Medication List as of 12/20/2019  7:26 PM      START taking these medications    Details   !! morphine (MSIR) 15 mg tablet Take 1 tablet (15 mg total) by mouth every 4 (four) hours as needed for severe pain for up to 30 dosesMax Daily Amount: 90 mg, Starting Fri 12/20/2019, Normal       !! - Potential duplicate medications found  Please discuss with provider  CONTINUE these medications which have NOT CHANGED    Details   dexamethasone (DECADRON) 4 mg tablet Take 1 tablet (4 mg total) by mouth 2 (two) times a day with meals, Starting Mon 11/25/2019, Normal      estrogen, conjugated,-medroxyprogesterone (PREMPRO) 0 625-2 5 MG per tablet Take 1 tablet by mouth daily, Starting Tue 8/20/2019, Normal      lidocaine (LMX) 4 % cream Apply topically as needed for mild pain, Starting Tue 7/23/2019, Normal      meloxicam (MOBIC) 7 5 mg tablet Take 1 tablet (7 5 mg total) by mouth daily, Starting Fri 11/29/2019, Normal      !! morphine (MSIR) 15 mg tablet Take 1 tablet (15 mg total) by mouth every 4 (four) hours as needed for severe pain for up to 7 dosesMax Daily Amount: 90 mg, Starting Fri 11/29/2019, Normal      oxyCODONE (ROXICODONE) 10 MG TABS Take 1 tablet (10 mg total) by mouth every 4 (four) hours as needed for moderate painMax Daily Amount: 60 mg, Starting Fri 11/22/2019, Normal       !! - Potential duplicate medications found  Please discuss with provider  No discharge procedures on file      ED Provider  Electronically Signed by           DEVEN Noriega  12/20/19 1992

## 2020-01-29 ENCOUNTER — HOSPITAL ENCOUNTER (OUTPATIENT)
Dept: RADIOLOGY | Age: 45
Discharge: HOME | End: 2020-01-29
Payer: COMMERCIAL

## 2020-01-29 DIAGNOSIS — R94.6 ABNORMAL THYROID SCAN: ICD-10-CM

## 2020-01-29 LAB — GLUCOSE SERPL-MCNC: 87 MG/DL (ref 65–140)

## 2020-01-29 PROCEDURE — A9552 F18 FDG: HCPCS

## 2020-01-29 PROCEDURE — 78815 PET IMAGE W/CT SKULL-THIGH: CPT

## 2020-01-29 PROCEDURE — 82948 REAGENT STRIP/BLOOD GLUCOSE: CPT

## 2020-02-05 ENCOUNTER — OFFICE VISIT (OUTPATIENT)
Dept: GYNECOLOGIC ONCOLOGY | Facility: CLINIC | Age: 45
End: 2020-02-05
Payer: COMMERCIAL

## 2020-02-05 VITALS
DIASTOLIC BLOOD PRESSURE: 84 MMHG | HEIGHT: 61 IN | SYSTOLIC BLOOD PRESSURE: 150 MMHG | RESPIRATION RATE: 18 BRPM | WEIGHT: 127 LBS | TEMPERATURE: 98.3 F | HEART RATE: 68 BPM | BODY MASS INDEX: 23.98 KG/M2

## 2020-02-05 DIAGNOSIS — R94.8 ABNORMAL PET SCAN OF MEDIASTINUM: Primary | ICD-10-CM

## 2020-02-05 DIAGNOSIS — C53.8 MALIGNANT NEOPLASM OF OVERLAPPING SITES OF CERVIX (HCC): ICD-10-CM

## 2020-02-05 DIAGNOSIS — M54.50 ACUTE MIDLINE LOW BACK PAIN WITHOUT SCIATICA: ICD-10-CM

## 2020-02-05 PROCEDURE — 99214 OFFICE O/P EST MOD 30 MIN: CPT | Performed by: OBSTETRICS & GYNECOLOGY

## 2020-02-05 NOTE — LETTER
February 5, 2020     [facility], DO  [address]    Patient: Lucie Vega   YOB: 1975   Date of Visit: 2/5/2020       Dear Dr Smith Lindsey: Thank you for referring Lucie Vega to me for evaluation  Below are my notes for this consultation  If you have questions, please do not hesitate to call me  I look forward to following your patient along with you  Sincerely,        Trduy Ellis MD        CC: MD Trudy Calderón MD  2/5/2020  9:53 AM  Sign at close encounter  Assessment/Plan:    Problem List Items Addressed This Visit        Genitourinary    Malignant neoplasm of overlapping sites of cervix [facility])     Patient has completed therapy for cervical cancer however her PET scan is worrisome for recurrent metastatic disease  She has hypermetabolic lymph nodes in the mediastinal and neck region  In these are non palpable on exam   I recommended moving ahead with CT-guided biopsy to help identify that whether this represents recurrent disease or not  The patient has refused pelvic exam today so it is difficult to assess her primary disease  We will see her back in 1 week upon completion of biopsy            Other    Acute midline low back pain without sciatica     Patient has continued low back pain  She has seen palliative Care for this  She has tried multiple medications without success  She has tried physical therapy without success  We have encouraged her to see a chiropractor as was her plan  The patient states that she is having difficulty with finances and is having trouble paying for all her to her doctor's visits  We have referred her to social service for assistance in that  There is possibility that this could represent recurrent disease in the spine  This is not showing currently on PET-CT scan             Other Visit Diagnoses     Abnormal PET scan of mediastinum    -  Primary    Relevant Orders    IR image guided biopsy/aspiration lymph node    APTT    Protime-INR    CBC and differential              CHIEF COMPLAINT:  Surveillance cervical cancer          Problem:  Cancer Staging  Malignant neoplasm of overlapping sites of cervix [facility])  Staging form: Cervix Uteri, AJCC 8th Edition  - Clinical stage from 5/1/2019: FIGO Stage IB2 (cT1b2, cN1, cM0) - Signed by Kelley Maxwell MD on 5/1/2019      Previous therapy:  Oncology History    Returns for first follow up post pelvic/paraaortic and tandem and ring radiation completed on 7/5/19 7/16/19 Dr Freya Lofton  Complains of  diffuse pain, pelvic swelling and discomfort; no exam performed  Plan PET-CT in 3-4 months post treatment    7/23/19 Dr Michelle Maddox certification issued    8/20/19 Dr Freya Lofton  9/17/19 Dr Elham Palmer        Malignant neoplasm of overlapping sites of cervix ([facility] )    4/26/2019 Biopsy     Final Diagnosis   A  Cervix, 12:00, biopsy:  -  Invasive moderately to poorly differentiated non-keratinizing squamous cell carcinoma (see note)  5/1/2019 Initial Diagnosis     Malignant neoplasm of overlapping sites of cervix ([facility] ) 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 with potential metastatic disease to the left iliac region and sacral region  5/1/2019 -  Cancer Staged     Staging form: Cervix Uteri, AJCC 8th Edition  - Clinical stage from 5/1/2019: FIGO Stage IB2 (cT1b2, cN1, cM0) - Signed by Kelley Maxwell MD on 5/1/2019  Histologic grade (G): G3  Histologic grading system: 3 grade system        5/6/2019 -  Chemotherapy     CISplatin (PLATINOL) 62 4 mg in sodium chloride 0 9 % 250 mL infusion, 40 mg/m2 = 62 4 mg, Intravenous, Once, 6 of 6 cycles  Administration: 62 4 mg (5/21/2019), 62 4 mg (5/29/2019), 62 4 mg (6/4/2019), 62 4 mg (6/11/2019), 62 4 mg (6/18/2019), 62 4 mg (6/26/2019)      5/20/2019 - 7/3/2019 Radiation     Whole pelvic radiation therapy  45Gy in 25 daily fractions    With a parametrial boost of an additional 5 4Gy in 3 daily fractions  6/20/2019 - 7/5/2019 Radiation     HDR brachytherapy utilizing tandem and ring  6Gy in 5 fractions on dates: 6/20/19, 6/28/19, 6/25/19, 7/2/19, 7/5/19  Patient ID: Kristi Link is a 38 y o  female  Patient is a pleasant 56-year-old white female with history of 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 status post chemo radiation therapy followed by vaginal brachytherapy implants completed in July of 2019  The patient has been having worsening right lower back pain and was referred to palliative care as her post treatment CT scan revealed no evidence of recurrence of disease  Her exam was unremarkable as well  The patient presents in follow-up  Allie Larios She was recommended a medical marijuana card but has not got that yet due to having a firearm at home and the ordered in says against that  The patient's pain continues to interfere with lifestyle  She is unable to stand completely up in has to always arch her back  She has seen physical therapy which has been unhelpful  She has been recommended to go see a chiropractor  The patient has recently undergone a PET-CT scan which reveals the following:  HEAD/NECK:     There are multiple new hypermetabolic bilateral supraclavicular nodes, extending into the mediastinum, SUV ranging up to 3 2 on the right and 3 1 on the left  These are difficult to measure due to their small size on the noncontrast CT images  Decreased but persistent focal activity just inferior to the right thyroid, SUV 3, prior study 3 9  Given the appearance of the supraclavicular hypermetabolic nodes, and absence of a right thyroid nodule on prior ultrasound, this focus may also   represent a node      CT images:  Otherwise stable      CHEST:     Interval development of multiple hypermetabolic mediastinal paratracheal, subcarinal and bilateral perihilar foci, most concerning for metastases      Example subcarinal lesion measures 2 x 1 cm, SUV 87 8   Hypermetabolic focus between the left mainstem bronchus and esophagus has an SUV of 5 1  Cluster of small subcentimeter hypermetabolic foci in the right upper paratracheal region have an SUV of 5 5      Right perihilar activity has an SUV of 2 9  Left perihilar activity has an SUV of 2 5  CT images: Unremarkable      ABDOMEN:     No FDG avid soft tissue lesions are seen  CT images: Stable  Small fat-containing umbilical hernia  Stable small left hepatic hypodensity  Tiny nonobstructing renal calculi bilaterally      PELVIS:   No FDG avid soft tissue lesions are seen  SUV in the cervical region measures 2 6, prior SUV 2 9  No new hypermetabolic pelvic adenopathy  CT images: Stable      OSSEOUS STRUCTURES:  No FDG avid lesions are seen  CT images: Stable      IMPRESSION:  1  Interval development of multiple hypermetabolic bilateral supraclavicular, mediastinal and bilateral perihilar lesions, most concerning for malignancy/metastases  2   No new hypermetabolic metastases in the abdomen or pelvis  Today, the patient is doing well except her back pain  She is refusing a pelvic exam   She denies significant abdominal pain, pelvic pain, nausea, vomiting, constipation, diarrhea, fevers, chills, or vaginal bleeding  Review of Systems   Constitutional: Negative  HENT: Negative  Eyes: Negative  Respiratory: Negative  Cardiovascular: Negative  Gastrointestinal: Negative  Endocrine: Negative  Genitourinary: Negative  Musculoskeletal: Positive for back pain  Skin: Negative  Neurological: Negative  Hematological: Negative  Psychiatric/Behavioral: Negative          Current Outpatient Medications   Medication Sig Dispense Refill    lidocaine (LMX) 4 % cream Apply topically as needed for mild pain (Patient not taking: Reported on 8/5/2019) 30 g 0    meloxicam (MOBIC) 7 5 mg tablet Take 1 tablet (7 5 mg total) by mouth daily (Patient not taking: Reported on 2/5/2020) 15 tablet 0    morphine (MSIR) 15 mg tablet Take 1 tablet (15 mg total) by mouth every 4 (four) hours as needed for severe pain for up to 7 dosesMax Daily Amount: 90 mg (Patient not taking: Reported on 2020) 7 tablet 0    morphine (MSIR) 15 mg tablet Take 1 tablet (15 mg total) by mouth every 4 (four) hours as needed for severe pain for up to 30 dosesMax Daily Amount: 90 mg (Patient not taking: Reported on 2020) 30 tablet 0    oxyCODONE (ROXICODONE) 10 MG TABS Take 1 tablet (10 mg total) by mouth every 4 (four) hours as needed for moderate painMax Daily Amount: 60 mg (Patient not taking: Reported on 2020) 30 tablet 0     No current facility-administered medications for this visit  Allergies   Allergen Reactions    Latex Hives    Other      Tomatoes   Vomiting and diarhhea       Past Medical History:   Diagnosis Date    Abnormal Pap smear of cervix     [facility] )     cervix       Past Surgical History:   Procedure Laterality Date    [place] DILATION OF VAGINA W ANESTH N/A 2019    Procedure: EXAM UNDER ANESTHESIA (EUA); Surgeon: Edwin Santiago MD;  [facility];  Service: Gynecology Oncology    [place] INSERT VAGINAL RADIATION DEVICE N/A 2019    Procedure: [address];  Surgeon: Edwin Santiago MD;  [facility];  Service: Gynecology Oncology    TONSILLECTOMY      US GUIDANCE  2019       OB History        3    Para   2    Term   2       0    AB   1    Living   2       SAB   1    TAB   0    Ectopic   0    Multiple   0    Live Births   2                 Family History   Problem Relation Age of Onset    Uterine cancer Maternal Grandmother        The following portions of the patient's history were reviewed and updated as appropriate: allergies, current medications, past family history, past social history, past surgical history and problem list       Objective:    Blood pressure 150/84, pulse 68, temperature 98 3 °F (36 8 °C), resp   rate 18, height 5' 1" (1 549 m), weight 57 6 kg (127 lb), last menstrual period 04/15/2019, not currently breastfeeding  Body mass index is 24 kg/m²  Physical Exam   Constitutional: She is oriented to person, place, and time  She appears well-developed and well-nourished  HENT:   Head: Normocephalic and atraumatic  Eyes: Pupils are equal, round, and reactive to light  EOM are normal    Neck: Normal range of motion  Neck supple  Cardiovascular: Normal rate, regular rhythm and normal heart sounds  Pulmonary/Chest: Effort normal and breath sounds normal  No respiratory distress  Abdominal: Soft  Bowel sounds are normal  She exhibits no distension and no ascites  There is no tenderness  There is no rigidity, no rebound and no guarding  Genitourinary:   Genitourinary Comments: Refuse   Musculoskeletal: Normal range of motion  Lymphadenopathy:     She has no cervical adenopathy  She has no axillary adenopathy  Right: No inguinal and no supraclavicular adenopathy present  Left: No inguinal and no supraclavicular adenopathy present  Neurological: She is alert and oriented to person, place, and time  Skin: Skin is warm and dry  Psychiatric: She has a normal mood and affect  Her behavior is normal      No significant lumbosacral tenderness

## 2020-02-05 NOTE — PROGRESS NOTES
Assessment/Plan:    Problem List Items Addressed This Visit        Genitourinary    Malignant neoplasm of overlapping sites of cervix [facility])     Patient has completed therapy for cervical cancer however her PET scan is worrisome for recurrent metastatic disease  She has hypermetabolic lymph nodes in the mediastinal and neck region  In these are non palpable on exam   I recommended moving ahead with CT-guided biopsy to help identify that whether this represents recurrent disease or not  The patient has refused pelvic exam today so it is difficult to assess her primary disease  We will see her back in 1 week upon completion of biopsy            Other    Acute midline low back pain without sciatica     Patient has continued low back pain  She has seen palliative Care for this  She has tried multiple medications without success  She has tried physical therapy without success  We have encouraged her to see a chiropractor as was her plan  The patient states that she is having difficulty with finances and is having trouble paying for all her to her doctor's visits  We have referred her to social service for assistance in that  There is possibility that this could represent recurrent disease in the spine  This is not showing currently on PET-CT scan             Other Visit Diagnoses     Abnormal PET scan of mediastinum    -  Primary    Relevant Orders    IR image guided biopsy/aspiration lymph node    APTT    Protime-INR    CBC and differential              CHIEF COMPLAINT:  Surveillance cervical cancer          Problem:  Cancer Staging  Malignant neoplasm of overlapping sites of cervix [facility])  Staging form: Cervix Uteri, AJCC 8th Edition  - Clinical stage from 5/1/2019: FIGO Stage IB2 (cT1b2, cN1, cM0) - Signed by Antonio Phelps MD on 5/1/2019      Previous therapy:  Oncology History    Returns for first follow up post pelvic/paraaortic and tandem and ring radiation completed on 7/5/19 7/16/19  Kranthi  Complains of  diffuse pain, pelvic swelling and discomfort; no exam performed  Plan PET-CT in 3-4 months post treatment    7/23/19 Dr Garcia Emperor certification issued    8/20/19 Dr Catarino Zhou  9/17/19 Dr Randall Free        Malignant neoplasm of overlapping sites of cervix ([facility] )    4/26/2019 Biopsy     Final Diagnosis   A  Cervix, 12:00, biopsy:  -  Invasive moderately to poorly differentiated non-keratinizing squamous cell carcinoma (see note)  5/1/2019 Initial Diagnosis     Malignant neoplasm of overlapping sites of cervix ([facility] ) 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 with potential metastatic disease to the left iliac region and sacral region  5/1/2019 -  Cancer Staged     Staging form: Cervix Uteri, AJCC 8th Edition  - Clinical stage from 5/1/2019: FIGO Stage IB2 (cT1b2, cN1, cM0) - Signed by Segundo Ramirez MD on 5/1/2019  Histologic grade (G): G3  Histologic grading system: 3 grade system        5/6/2019 -  Chemotherapy     CISplatin (PLATINOL) 62 4 mg in sodium chloride 0 9 % 250 mL infusion, 40 mg/m2 = 62 4 mg, Intravenous, Once, 6 of 6 cycles  Administration: 62 4 mg (5/21/2019), 62 4 mg (5/29/2019), 62 4 mg (6/4/2019), 62 4 mg (6/11/2019), 62 4 mg (6/18/2019), 62 4 mg (6/26/2019)      5/20/2019 - 7/3/2019 Radiation     Whole pelvic radiation therapy  45Gy in 25 daily fractions  With a parametrial boost of an additional 5 4Gy in 3 daily fractions  6/20/2019 - 7/5/2019 Radiation     HDR brachytherapy utilizing tandem and ring  6Gy in 5 fractions on dates: 6/20/19, 6/28/19, 6/25/19, 7/2/19, 7/5/19  Patient ID: Arnoldo Stover is a 38 y o  female  Patient is a pleasant 50-year-old white female with history of 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 status post chemo radiation therapy followed by vaginal brachytherapy implants completed in July of 2019      The patient has been having worsening right lower back pain and was referred to palliative care as her post treatment CT scan revealed no evidence of recurrence of disease  Her exam was unremarkable as well  The patient presents in follow-up  Chrissie Daniel She was recommended a medical marijuana card but has not got that yet due to having a firearm at home and the ordered in says against that  The patient's pain continues to interfere with lifestyle  She is unable to stand completely up in has to always arch her back  She has seen physical therapy which has been unhelpful  She has been recommended to go see a chiropractor  The patient has recently undergone a PET-CT scan which reveals the following:  HEAD/NECK:     There are multiple new hypermetabolic bilateral supraclavicular nodes, extending into the mediastinum, SUV ranging up to 3 2 on the right and 3 1 on the left  These are difficult to measure due to their small size on the noncontrast CT images  Decreased but persistent focal activity just inferior to the right thyroid, SUV 3, prior study 3 9  Given the appearance of the supraclavicular hypermetabolic nodes, and absence of a right thyroid nodule on prior ultrasound, this focus may also   represent a node      CT images: Otherwise stable      CHEST:     Interval development of multiple hypermetabolic mediastinal paratracheal, subcarinal and bilateral perihilar foci, most concerning for metastases      Example subcarinal lesion measures 2 x 1 cm, SUV 10 8  Hypermetabolic focus between the left mainstem bronchus and esophagus has an SUV of 5 1  Cluster of small subcentimeter hypermetabolic foci in the right upper paratracheal region have an SUV of 5 5      Right perihilar activity has an SUV of 2 9  Left perihilar activity has an SUV of 2 5  CT images: Unremarkable      ABDOMEN:     No FDG avid soft tissue lesions are seen  CT images: Stable  Small fat-containing umbilical hernia  Stable small left hepatic hypodensity    Tiny nonobstructing renal calculi bilaterally      PELVIS:   No FDG avid soft tissue lesions are seen  SUV in the cervical region measures 2 6, prior SUV 2 9  No new hypermetabolic pelvic adenopathy  CT images: Stable      OSSEOUS STRUCTURES:  No FDG avid lesions are seen  CT images: Stable      IMPRESSION:  1  Interval development of multiple hypermetabolic bilateral supraclavicular, mediastinal and bilateral perihilar lesions, most concerning for malignancy/metastases  2   No new hypermetabolic metastases in the abdomen or pelvis  Today, the patient is doing well except her back pain  She is refusing a pelvic exam   She denies significant abdominal pain, pelvic pain, nausea, vomiting, constipation, diarrhea, fevers, chills, or vaginal bleeding  Review of Systems   Constitutional: Negative  HENT: Negative  Eyes: Negative  Respiratory: Negative  Cardiovascular: Negative  Gastrointestinal: Negative  Endocrine: Negative  Genitourinary: Negative  Musculoskeletal: Positive for back pain  Skin: Negative  Neurological: Negative  Hematological: Negative  Psychiatric/Behavioral: Negative          Current Outpatient Medications   Medication Sig Dispense Refill    lidocaine (LMX) 4 % cream Apply topically as needed for mild pain (Patient not taking: Reported on 8/5/2019) 30 g 0    meloxicam (MOBIC) 7 5 mg tablet Take 1 tablet (7 5 mg total) by mouth daily (Patient not taking: Reported on 2/5/2020) 15 tablet 0    morphine (MSIR) 15 mg tablet Take 1 tablet (15 mg total) by mouth every 4 (four) hours as needed for severe pain for up to 7 dosesMax Daily Amount: 90 mg (Patient not taking: Reported on 2/5/2020) 7 tablet 0    morphine (MSIR) 15 mg tablet Take 1 tablet (15 mg total) by mouth every 4 (four) hours as needed for severe pain for up to 30 dosesMax Daily Amount: 90 mg (Patient not taking: Reported on 2/5/2020) 30 tablet 0    oxyCODONE (ROXICODONE) 10 MG TABS Take 1 tablet (10 mg total) by mouth every 4 (four) hours as needed for moderate painMax Daily Amount: 60 mg (Patient not taking: Reported on 2020) 30 tablet 0     No current facility-administered medications for this visit  Allergies   Allergen Reactions    Latex Hives    Other      Tomatoes   Vomiting and diarhhea       Past Medical History:   Diagnosis Date    Abnormal Pap smear of cervix     Cancer ([address] )     cervix       Past Surgical History:   Procedure Laterality Date    [place] DILATION OF VAGINA W ANESTH N/A 2019    Procedure: EXAM UNDER ANESTHESIA (EUA); Surgeon: Jannette Ponce MD;  [facility];  Service: Gynecology Oncology    [place] INSERT VAGINAL RADIATION DEVICE N/A 2019    Procedure: [address];  Surgeon: Jannette Ponce MD;  [facility];  Service: Gynecology Oncology    TONSILLECTOMY      US GUIDANCE  2019       OB History        3    Para   2    Term   2       0    AB   1    Living   2       SAB   1    TAB   0    Ectopic   0    Multiple   0    Live Births   2                 Family History   Problem Relation Age of Onset    Uterine cancer Maternal Grandmother        The following portions of the patient's history were reviewed and updated as appropriate: allergies, current medications, past family history, past social history, past surgical history and problem list       Objective:    Blood pressure 150/84, pulse 68, temperature 98 3 °F (36 8 °C), resp  rate 18, height 5' 1" (1 549 m), weight 57 6 kg (127 lb), last menstrual period 04/15/2019, not currently breastfeeding  Body mass index is 24 kg/m²  Physical Exam   Constitutional: She is oriented to person, place, and time  She appears well-developed and well-nourished  HENT:   Head: Normocephalic and atraumatic  Eyes: Pupils are equal, round, and reactive to light  EOM are normal    Neck: Normal range of motion  Neck supple  Cardiovascular: Normal rate, regular rhythm and normal heart sounds     Pulmonary/Chest: Effort normal and breath sounds normal  No respiratory distress  Abdominal: Soft  Bowel sounds are normal  She exhibits no distension and no ascites  There is no tenderness  There is no rigidity, no rebound and no guarding  Genitourinary:   Genitourinary Comments: Refuse   Musculoskeletal: Normal range of motion  Lymphadenopathy:     She has no cervical adenopathy  She has no axillary adenopathy  Right: No inguinal and no supraclavicular adenopathy present  Left: No inguinal and no supraclavicular adenopathy present  Neurological: She is alert and oriented to person, place, and time  Skin: Skin is warm and dry  Psychiatric: She has a normal mood and affect  Her behavior is normal      No significant lumbosacral tenderness

## 2020-02-05 NOTE — ASSESSMENT & PLAN NOTE
Patient has continued low back pain  She has seen palliative Care for this  She has tried multiple medications without success  She has tried physical therapy without success  We have encouraged her to see a chiropractor as was her plan  The patient states that she is having difficulty with finances and is having trouble paying for all her to her doctor's visits  We have referred her to social service for assistance in that  There is possibility that this could represent recurrent disease in the spine  This is not showing currently on PET-CT scan

## 2020-02-05 NOTE — ASSESSMENT & PLAN NOTE
Patient has completed therapy for cervical cancer however her PET scan is worrisome for recurrent metastatic disease  She has hypermetabolic lymph nodes in the mediastinal and neck region  In these are non palpable on exam   I recommended moving ahead with CT-guided biopsy to help identify that whether this represents recurrent disease or not  The patient has refused pelvic exam today so it is difficult to assess her primary disease      We will see her back in 1 week upon completion of biopsy

## 2020-02-12 ENCOUNTER — DOCUMENTATION (OUTPATIENT)
Dept: INFUSION CENTER | Facility: CLINIC | Age: 45
End: 2020-02-12

## 2020-02-12 DIAGNOSIS — J98.59 MEDIASTINAL MASS: Primary | ICD-10-CM

## 2020-02-12 NOTE — SOCIAL WORK
MSW s/w pt by phone today at the suggestion of [facility] staff  Pt tells me that she has been out of work and without pay since November  Her previous boss is trying to pay her $50 a week to catch up to what she is owed  She is past due on her rent and in the process of being evicted  She is working with [employer] and went to court yesterday  Her options at this point are to come up with $350 within 10 days to buy herself more time to catch up on the rent, or vacate the home by 3/6  Pt says that she would ultimately like to move, as this home has several repair needs that the landlord is ignoring as well as mold issues  She is hopeful that she will receive her tax return in early March, which she is estimating will be around $2000  She says that she is starting a new job tomorrow  I suggested to pt that in the interim, she use [employer] again to reduce her expenses on transportation  Pt will have some testing coming up to determine if her cancer has recurred  She agrees and is appreciative of the [employer] option  I also suggested that depending on what her upcoming tests show, she apply for disability through [employer] to have regular, steady monthly income as her work income is inconsistent and never seems to be paid timely  Pt does not think that she will qualify for disability, saying "you have to be terminal to get that"  MSW advised her that based on what her upcoming testing will show, if she is needing to resume treatments she will qualify for disability  I also provided pt contact information for [employer] [facility] to see if they have any rental assistance available at this time  MSW emailed to inquire when pt last used compassion funds to see if she might be eligible for help coming up with that $350  If pt is eligible, I will submit her application  Pt was appreciative of the call and the concern shown on her behalf    We agreed to keep in touch in the next few days/weeks regarding any updates to her situation

## 2020-02-18 ENCOUNTER — TELEPHONE (OUTPATIENT)
Dept: CARDIAC SURGERY | Facility: CLINIC | Age: 45
End: 2020-02-18

## 2020-02-18 ENCOUNTER — OFFICE VISIT (OUTPATIENT)
Dept: CARDIAC SURGERY | Facility: CLINIC | Age: 45
End: 2020-02-18
Payer: COMMERCIAL

## 2020-02-18 VITALS
DIASTOLIC BLOOD PRESSURE: 104 MMHG | HEIGHT: 61 IN | BODY MASS INDEX: 24.24 KG/M2 | HEART RATE: 58 BPM | SYSTOLIC BLOOD PRESSURE: 192 MMHG | OXYGEN SATURATION: 97 % | TEMPERATURE: 97.6 F | WEIGHT: 128.4 LBS

## 2020-02-18 DIAGNOSIS — C53.8 MALIGNANT NEOPLASM OF OVERLAPPING SITES OF CERVIX (HCC): ICD-10-CM

## 2020-02-18 DIAGNOSIS — R59.0 MEDIASTINAL LYMPHADENOPATHY: Primary | ICD-10-CM

## 2020-02-18 DIAGNOSIS — J98.59 MEDIASTINAL MASS: ICD-10-CM

## 2020-02-18 PROCEDURE — 99245 OFF/OP CONSLTJ NEW/EST HI 55: CPT | Performed by: PHYSICIAN ASSISTANT

## 2020-02-18 NOTE — TELEPHONE ENCOUNTER
Pt needs an insurance referral in order to be seen by a specialist  I called to obtain an insurance referral for pt to be seen in our office today  Per Dick Espinoza at [facility], pt is covered for today's visit and referral will be available in [address] [place] [address] within 24 hours       Ref #: [id number]

## 2020-02-18 NOTE — PROGRESS NOTES
Thoracic Consult  Assessment/Plan:    Mediastinal lymphadenopathy  Ms Sruthi Arroyo presents for evaluation of mediastinal lymphadenopathy with a history of cervical cancer, completed treatment in July 2019  Dr Lito Wolfe is concerned that these lymph nodes could represent metastatic disease  Dr Igor Marroquin is recommending flexible bronchoscopy, endobronchial ultrasound with biopsy, possible mediastinoscopy  This was discussed at length  It is an outpatient procedure  Patient does not wish to proceed with mediastinoscopy portion  Dr Igor Marroquin discussed risk involved with false negative from EBUS  The only way to know for sure would be mediastinoscopy  Patient is aware and still does not want an incision  EBUS discussed at length, risks and benefits discussed  Consent obtained  All questions addressed  Diagnoses and all orders for this visit:    Mediastinal lymphadenopathy  -     Basic metabolic panel; Future  -     APTT; Future  -     CBC and Platelet; Future  -     Protime-INR; Future  -     Case request operating room: BRONCHOSCOPY FLEXIBLE, ENDOBRONCHIAL ULTRASOUND (EBUS); Standing  -     Type and screen; Future  -     Case request operating room: BRONCHOSCOPY FLEXIBLE, ENDOBRONCHIAL ULTRASOUND (EBUS)    Mediastinal mass  -     Ambulatory referral to Thoracic Surgery    Malignant neoplasm of overlapping sites of cervix ([facility] [address] )    Other orders  -     Diet NPO; Sips with meds; Standing  -     Void on call to OR; Standing  -     Insert peripheral IV; Standing  -     Place sequential compression device; Standing          Thoracic History   Diagnosis: Mediastinal lymphadenopathy   Procedures/Surgeries:    Pathology:    Adjuvant Therapy:       Subjective:    Patient ID: Romulo Samson is a 38 y o  female  HPI   Ms Sruthi Arroyo is a 38year old female referred to our office by Dr Lito Wolfe for evaluation of mediastinal lymphadenopathy  She has a history of squamous cell cervical cancer diagnosed in May 2019, Stage IB   She completed chemoradiation therapy 7/5/19 and vaginal brachytherapy implants completed July 2019  She had undergone a PET-CT 1/29/20 revealing multiple bilateral hypermetabolic supraclavicular lymph nodes, SUV up to 3 2  There is a 2cm subcarinal node with SUV 91 6, hypermetabolic focus between left mainstem bronchus and esophagus, SUV 5 1  There is right perihilar activity with SUV 2 9, and left perihilar activity with SUV 2 5  On discussion, she reports that she is very active  She denies shortness of breath, chest pain, cough, fevers, chills, weight loss or appetite change  Denies new bone/joint pains, visual changes, headaches nausea or vomiting  PMH: no cardiac history  She does not take blood thinners  Smoking history: former  Quit few days ago  Few cigarettes per day over one year  Intermittent  The following portions of the patient's history were reviewed and updated as appropriate: allergies, current medications, past family history, past medical history, past social history, past surgical history and problem list     Past Medical History:   Diagnosis Date    Abnormal Pap smear of cervix     Acute hip pain     Cancer ([address] )     cervix    Hypokalemia     Low back pain     Lymphadenopathy     Pelvic pain       Past Surgical History:   Procedure Laterality Date    AK DILATION OF VAGINA W ANESTH N/A 6/20/2019    Procedure: EXAM UNDER ANESTHESIA (EUA);   Surgeon: Kyra Shannon MD;  [facility];  Service: Gynecology Oncology    AK INSERT VAGINAL RADIATION DEVICE N/A 6/20/2019    Procedure: PIERRE SLEEVE PLACEMENT;  Surgeon: Kyra Shannon MD;  [facility];  Service: Gynecology Oncology    TONSILLECTOMY      US GUIDANCE  6/20/2019      Family History   Problem Relation Age of Onset    Uterine cancer Maternal Grandmother     Heart disease Mother     Canavan disease Father     Cancer Father       Social History     Socioeconomic History    Marital status: Significant Other     Spouse name: Not on file    Number of children: Not on file    Years of education: Not on file    Highest education level: Not on file   Occupational History    Not on file   Social Needs    Financial resource strain: Not on file    Food insecurity:     Worry: Not on file     Inability: Not on file    Transportation needs:     Medical: Not on file     Non-medical: Not on file   Tobacco Use    Smoking status: Former Smoker     Packs/day: 0 50     Years: 1 00     Pack years: 0 50     Types: Cigarettes     Last attempt to quit: 2020     Years since quittin 0    Smokeless tobacco: Never Used   Substance and Sexual Activity    Alcohol use: Not Currently    Drug use: Never    Sexual activity: Yes   Lifestyle    Physical activity:     Days per week: Not on file     Minutes per session: Not on file    Stress: Not on file   Relationships    Social connections:     Talks on phone: Not on file     Gets together: Not on file     Attends [employer] service: Not on file     Active member of club or organization: Not on file     Attends meetings of clubs or organizations: Not on file     Relationship status: Not on file    Intimate partner violence:     Fear of current or ex partner: Not on file     Emotionally abused: Not on file     Physically abused: Not on file     Forced sexual activity: Not on file   Other Topics Concern    Not on file   Social History Narrative    Not on file        Current Outpatient Medications:     lidocaine (LMX) 4 % cream, Apply topically as needed for mild pain (Patient not taking: Reported on 2019), Disp: 30 g, Rfl: 0    meloxicam (MOBIC) 7 5 mg tablet, Take 1 tablet (7 5 mg total) by mouth daily (Patient not taking: Reported on 2020), Disp: 15 tablet, Rfl: 0    morphine (MSIR) 15 mg tablet, Take 1 tablet (15 mg total) by mouth every 4 (four) hours as needed for severe pain for up to 7 dosesMax Daily Amount: 90 mg (Patient not taking: Reported on 2/5/2020), Disp: 7 tablet, Rfl: 0    morphine (MSIR) 15 mg tablet, Take 1 tablet (15 mg total) by mouth every 4 (four) hours as needed for severe pain for up to 30 dosesMax Daily Amount: 90 mg (Patient not taking: Reported on 2/5/2020), Disp: 30 tablet, Rfl: 0    oxyCODONE (ROXICODONE) 10 MG TABS, Take 1 tablet (10 mg total) by mouth every 4 (four) hours as needed for moderate painMax Daily Amount: 60 mg (Patient not taking: Reported on 2/5/2020), Disp: 30 tablet, Rfl: 0   Allergies   Allergen Reactions    Latex Hives    Other      Tomatoes   Vomiting and diarhhea       Review of Systems   Constitutional: Negative for activity change, appetite change, chills, diaphoresis, fatigue, fever and unexpected weight change  Eyes: Negative for visual disturbance  Respiratory: Negative for cough, chest tightness, shortness of breath and wheezing  Cardiovascular: Negative for chest pain and palpitations  Gastrointestinal: Negative for nausea and vomiting  Musculoskeletal: Negative for joint swelling and myalgias  Skin: Negative for color change and rash  Neurological: Negative for weakness, light-headedness and headaches  Hematological: Negative for adenopathy  Does not bruise/bleed easily  Psychiatric/Behavioral: Negative for confusion  Objective:   Physical Exam   Constitutional: She is oriented to person, place, and time  She appears well-developed and well-nourished  No distress  HENT:   Head: Normocephalic and atraumatic  Eyes: Conjunctivae are normal  No scleral icterus  Neck: Neck supple  No tracheal deviation present  Cardiovascular: Normal rate, regular rhythm and normal heart sounds  Pulmonary/Chest: Effort normal and breath sounds normal  No respiratory distress  Abdominal: Soft  She exhibits no distension  Lymphadenopathy:     She has no cervical adenopathy  Neurological: She is alert and oriented to person, place, and time  Skin: Skin is warm and dry     Psychiatric: She has a normal mood and affect  Her behavior is normal  Judgment and thought content normal    BP (!) 192/104 (BP Location: Left arm, Patient Position: Sitting, Cuff Size: Adult)   Pulse 58   Temp 97 6 °F (36 4 °C)   Ht 5' 1" (1 549 m)   Wt 58 2 kg (128 lb 6 4 oz)   LMP 04/15/2019   SpO2 97%   BMI 24 26 kg/m²       Nm Pet Ct Skull Base To Mid Thigh    Result Date: 1/29/2020  Narrative PET/CT SCAN INDICATION:  R94 6: Abnormal results of thyroid function studies   , cervical cancer, status post chemoradiation 7/2019, restaging for treatment management MODIFIER: PS COMPARISON: PET CT 10/21/2019, thyroid ultrasound 11/6/2019 CELL TYPE:  Squamous cell carcinoma, cervical biopsy 4/26/2019 TECHNIQUE:   7 2 mCi F-18-FDG administered IV  Multiplanar attenuation corrected and non attenuation corrected PET images were acquired 60 minutes post injection  Contiguous, low dose, axial CT sections were obtained from the skull base through the femurs   Intravenous contrast material was not utilized  This examination, like all CT scans performed in the [facility], was performed utilizing techniques to minimize radiation dose exposure, including the use of iterative reconstruction and automated exposure control  Fasting serum glucose: 87 mg/dl FINDINGS: VISUALIZED BRAIN:   No acute abnormalities are seen  HEAD/NECK:   There are multiple new hypermetabolic bilateral supraclavicular nodes, extending into the mediastinum, SUV ranging up to 3 2 on the right and 3 1 on the left  These are difficult to measure due to their small size on the noncontrast CT images  Decreased but persistent focal activity just inferior to the right thyroid, SUV 3, prior study 3 9  Given the appearance of the supraclavicular hypermetabolic nodes, and absence of a right thyroid nodule on prior ultrasound, this focus may also represent a node  CT images: Otherwise stable   CHEST:   Interval development of multiple hypermetabolic mediastinal paratracheal, subcarinal and bilateral perihilar foci, most concerning for metastases  Example subcarinal lesion measures 2 x 1 cm, SUV 10 8  Hypermetabolic focus between the left mainstem bronchus and esophagus has an SUV of 5 1  Cluster of small subcentimeter hypermetabolic foci in the right upper paratracheal region have an SUV of 5 5  Right perihilar activity has an SUV of 2 9  Left perihilar activity has an SUV of 2 5  CT images: Unremarkable  ABDOMEN:   No FDG avid soft tissue lesions are seen  CT images: Stable  Small fat-containing umbilical hernia  Stable small left hepatic hypodensity  Tiny nonobstructing renal calculi bilaterally  PELVIS: No FDG avid soft tissue lesions are seen  SUV in the cervical region measures 2 6, prior SUV 2 9  No new hypermetabolic pelvic adenopathy  CT images: Stable  OSSEOUS STRUCTURES: No FDG avid lesions are seen  CT images: Stable  Impression 1  Interval development of multiple hypermetabolic bilateral supraclavicular, mediastinal and bilateral perihilar lesions, most concerning for malignancy/metastases  2   No new hypermetabolic metastases in the abdomen or pelvis  Workstation performed: [id number]     Nm Pet Ct Skull Base To Mid Thigh    Result Date: 10/21/2019  Narrative PET/CT SCAN INDICATION:  C53 9: Malignant neoplasm of cervix uteri, unspecified   , restaging post chemoradiation for treatment management MODIFIER: PS COMPARISON: PET CT 5/6/2019 CELL TYPE:  Squamous cell carcinoma, cervical biopsy 4/26/2019 TECHNIQUE:   8 3 mCi F-18-FDG administered IV  Multiplanar attenuation corrected and non attenuation corrected PET images are available for interpretation, and contiguous, low dose, axial CT sections were obtained from the skull base through the femurs   Intravenous contrast material was not utilized    This examination, like all CT scans performed in the [facility], was performed utilizing techniques to minimize radiation dose exposure, including the use of iterative reconstruction and automated exposure control  Fasting serum glucose: 84 mg/dl FINDINGS: VISUALIZED BRAIN:   No acute abnormalities are seen  HEAD/NECK:   Decreased but persistent hypermetabolism of right lower posterior exophytic thyroid nodule or possibly parathyroid measuring approximately 5 mm, SUV 3 9, prior SUV 6 7  No FDG avid cervical adenopathy is seen  CT images: Unremarkable  CHEST:   No FDG avid soft tissue lesions are seen  CT images: Unremarkable  ABDOMEN:   No FDG avid soft tissue lesions are seen  CT images: Tiny punctate nonobstructing right renal calculus  Stable small left hepatic hypodensity  Small fat-containing umbilical hernia  PELVIS: Decreased FDG activity in the region of the cervix, SUV 2 9, prior SUV 19 5  This is most suggestive of response to therapy  Interval decreased FDG activity in left pelvic nodes  No new FDG avid soft tissue lesions are seen  CT images: Otherwise stable  OSSEOUS STRUCTURES: No FDG avid lesions are seen  CT images: No significant findings  Impression 1  Significantly decreased FDG activity in the region of the cervix, suggesting response to therapy  Mildly hypermetabolic left pelvic nodes also have decreased  2   No new hypermetabolic metastases demonstrated  3   Persistent hypermetabolic nodule along the right lower posterior thyroid lesion  Ultrasound evaluation may be considered if not ready performed  Workstation performed: [id number]     Nm Pet Ct Skull Base To Mid Thigh    Result Date: 5/6/2019  Narrative PET/CT SCAN INDICATION:  C53 8: Malignant neoplasm of overlapping sites of cervix uteri   , newly diagnosed cervical cancer, initial staging for treatment management MODIFIER: PI COMPARISON: MRI pelvis 4/27/2019, CT 4/26/2019 CELL TYPE:  Squamous cell carcinoma, cervical biopsy 4/26/2019 TECHNIQUE:   8 5 mCi F-18-FDG administered IV   Multiplanar attenuation corrected and non attenuation corrected PET images are available for interpretation, and contiguous, low dose, axial CT sections were obtained from the skull base through the femurs   Intravenous contrast material was not utilized  This examination, like all CT scans performed in the [facility], was performed utilizing techniques to minimize radiation dose exposure, including the use of iterative reconstruction and automated exposure control  Fasting serum glucose: 92 mg/dl FINDINGS: VISUALIZED BRAIN:   No acute abnormalities are seen  HEAD/NECK:   Hypermetabolic right lower posterior exophytic thyroid nodule or possibly parathyroid, measuring approximately 6 mm, SUV 6 7  A metastatic node from known cervical cancer would be unusual and much less likely  Otherwise no FDG avid cervical adenopathy is seen  CT images: Unremarkable  CHEST:   No FDG avid soft tissue lesions are seen  CT images: Unremarkable  ABDOMEN:   No FDG avid soft tissue lesions are seen  CT images: Small fat-containing umbilical hernia  PELVIS: Intensely hypermetabolic mass in the cervical region to the left of midline, SUV 19 5, compatible with the history of squamous cell cancer  This measures approximately 3 7 x 4 4 x 2 3 cm based on the extent of FDG activity  Remainder of the uterine body and fundus demonstrates only mild homogeneous FDG activity, SUV 2 2  There appears to be a 7 mm left external iliac node with mild FDG activity, image 201, SUV 2 6  Subcentimeter left internal iliac pelvic sidewall node image 203 has an SUV of 2 1  Small subcentimeter right external iliac node image 198 with mild FDG activity, SUV 2 3  Early metastases are not excluded  CT images: Otherwise stable  OSSEOUS STRUCTURES: No FDG avid lesions are seen  CT images: No significant findings  Impression 1  Intensely hypermetabolic cervical mass compatible with known cervical cancer  2   Small subcentimeter bilateral pelvic nodes with minimal FDG activity    Early metastases are not excluded  Continued follow-up recommended  3   No hypermetabolic metastases in the chest or upper abdomen  4   Hypermetabolic right lower posterior exophytic thyroid nodule or possibly parathyroid, measuring approximately 6 mm  Further evaluation with ultrasound recommended   Workstation performed: [id number]

## 2020-02-18 NOTE — H&P (VIEW-ONLY)
Thoracic Consult  Assessment/Plan:    Mediastinal lymphadenopathy  Ms Wolfgang Naqvi presents for evaluation of mediastinal lymphadenopathy with a history of cervical cancer, completed treatment in July 2019  Dr Audra Winslow is concerned that these lymph nodes could represent metastatic disease  Dr Felisa Baca is recommending flexible bronchoscopy, endobronchial ultrasound with biopsy, possible mediastinoscopy  This was discussed at length  It is an outpatient procedure  Patient does not wish to proceed with mediastinoscopy portion  Dr Felisa Baca discussed risk involved with false negative from EBUS  The only way to know for sure would be mediastinoscopy  Patient is aware and still does not want an incision  EBUS discussed at length, risks and benefits discussed  Consent obtained  All questions addressed  Diagnoses and all orders for this visit:    Mediastinal lymphadenopathy  -     Basic metabolic panel; Future  -     APTT; Future  -     CBC and Platelet; Future  -     Protime-INR; Future  -     Case request operating room: BRONCHOSCOPY FLEXIBLE, ENDOBRONCHIAL ULTRASOUND (EBUS); Standing  -     Type and screen; Future  -     Case request operating room: BRONCHOSCOPY FLEXIBLE, ENDOBRONCHIAL ULTRASOUND (EBUS)    Mediastinal mass  -     Ambulatory referral to Thoracic Surgery    Malignant neoplasm of overlapping sites of cervix ([facility] [address] )    Other orders  -     Diet NPO; Sips with meds; Standing  -     Void on call to OR; Standing  -     Insert peripheral IV; Standing  -     Place sequential compression device; Standing          Thoracic History   Diagnosis: Mediastinal lymphadenopathy   Procedures/Surgeries:    Pathology:    Adjuvant Therapy:       Subjective:    Patient ID: Gurdeep Recinos is a 38 y o  female  HPI   Ms Wolfgang Naqvi is a 38year old female referred to our office by Dr Audra Winslow for evaluation of mediastinal lymphadenopathy  She has a history of squamous cell cervical cancer diagnosed in May 2019, Stage IB   She completed chemoradiation therapy 7/5/19 and vaginal brachytherapy implants completed July 2019  She had undergone a PET-CT 1/29/20 revealing multiple bilateral hypermetabolic supraclavicular lymph nodes, SUV up to 3 2  There is a 2cm subcarinal node with SUV 79 0, hypermetabolic focus between left mainstem bronchus and esophagus, SUV 5 1  There is right perihilar activity with SUV 2 9, and left perihilar activity with SUV 2 5  On discussion, she reports that she is very active  She denies shortness of breath, chest pain, cough, fevers, chills, weight loss or appetite change  Denies new bone/joint pains, visual changes, headaches nausea or vomiting  PMH: no cardiac history  She does not take blood thinners  Smoking history: former  Quit few days ago  Few cigarettes per day over one year  Intermittent  The following portions of the patient's history were reviewed and updated as appropriate: allergies, current medications, past family history, past medical history, past social history, past surgical history and problem list     Past Medical History:   Diagnosis Date    Abnormal Pap smear of cervix     Acute hip pain     Cancer ([facility] [address] )     cervix    Hypokalemia     Low back pain     Lymphadenopathy     Pelvic pain       Past Surgical History:   Procedure Laterality Date    PA DILATION OF VAGINA W ANESTH N/A 6/20/2019    Procedure: EXAM UNDER ANESTHESIA (EUA);   Surgeon: Abdias Zacarias MD;  [facility];  Service: Gynecology Oncology    PA INSERT VAGINAL RADIATION DEVICE N/A 6/20/2019    Procedure: PIERRE SLEEVE PLACEMENT;  Surgeon: Abdias Zacarias MD;  [facility];  Service: Gynecology Oncology    TONSILLECTOMY      US GUIDANCE  6/20/2019      Family History   Problem Relation Age of Onset    Uterine cancer Maternal Grandmother     Heart disease Mother     Canavan disease Father     Cancer Father       Social History     Socioeconomic History    Marital status: Significant Other     Spouse name: Not on file    Number of children: Not on file    Years of education: Not on file    Highest education level: Not on file   Occupational History    Not on file   Social Needs    Financial resource strain: Not on file    Food insecurity:     Worry: Not on file     Inability: Not on file    Transportation needs:     Medical: Not on file     Non-medical: Not on file   Tobacco Use    Smoking status: Former Smoker     Packs/day: 0 50     Years: 1 00     Pack years: 0 50     Types: Cigarettes     Last attempt to quit: 2020     Years since quittin 0    Smokeless tobacco: Never Used   Substance and Sexual Activity    Alcohol use: Not Currently    Drug use: Never    Sexual activity: Yes   Lifestyle    Physical activity:     Days per week: Not on file     Minutes per session: Not on file    Stress: Not on file   Relationships    Social connections:     Talks on phone: Not on file     Gets together: Not on file     Attends Sabianist service: Not on file     Active member of club or organization: Not on file     Attends meetings of clubs or organizations: Not on file     Relationship status: Not on file    Intimate partner violence:     Fear of current or ex partner: Not on file     Emotionally abused: Not on file     Physically abused: Not on file     Forced sexual activity: Not on file   Other Topics Concern    Not on file   Social History Narrative    Not on file        Current Outpatient Medications:     lidocaine (LMX) 4 % cream, Apply topically as needed for mild pain (Patient not taking: Reported on 2019), Disp: 30 g, Rfl: 0    meloxicam (MOBIC) 7 5 mg tablet, Take 1 tablet (7 5 mg total) by mouth daily (Patient not taking: Reported on 2020), Disp: 15 tablet, Rfl: 0    morphine (MSIR) 15 mg tablet, Take 1 tablet (15 mg total) by mouth every 4 (four) hours as needed for severe pain for up to 7 dosesMax Daily Amount: 90 mg (Patient not taking: Reported on 2/5/2020), Disp: 7 tablet, Rfl: 0    morphine (MSIR) 15 mg tablet, Take 1 tablet (15 mg total) by mouth every 4 (four) hours as needed for severe pain for up to 30 dosesMax Daily Amount: 90 mg (Patient not taking: Reported on 2/5/2020), Disp: 30 tablet, Rfl: 0    oxyCODONE (ROXICODONE) 10 MG TABS, Take 1 tablet (10 mg total) by mouth every 4 (four) hours as needed for moderate painMax Daily Amount: 60 mg (Patient not taking: Reported on 2/5/2020), Disp: 30 tablet, Rfl: 0   Allergies   Allergen Reactions    Latex Hives    Other      Tomatoes   Vomiting and diarhhea       Review of Systems   Constitutional: Negative for activity change, appetite change, chills, diaphoresis, fatigue, fever and unexpected weight change  Eyes: Negative for visual disturbance  Respiratory: Negative for cough, chest tightness, shortness of breath and wheezing  Cardiovascular: Negative for chest pain and palpitations  Gastrointestinal: Negative for nausea and vomiting  Musculoskeletal: Negative for joint swelling and myalgias  Skin: Negative for color change and rash  Neurological: Negative for weakness, light-headedness and headaches  Hematological: Negative for adenopathy  Does not bruise/bleed easily  Psychiatric/Behavioral: Negative for confusion  Objective:   Physical Exam   Constitutional: She is oriented to person, place, and time  She appears well-developed and well-nourished  No distress  HENT:   Head: Normocephalic and atraumatic  Eyes: Conjunctivae are normal  No scleral icterus  Neck: Neck supple  No tracheal deviation present  Cardiovascular: Normal rate, regular rhythm and normal heart sounds  Pulmonary/Chest: Effort normal and breath sounds normal  No respiratory distress  Abdominal: Soft  She exhibits no distension  Lymphadenopathy:     She has no cervical adenopathy  Neurological: She is alert and oriented to person, place, and time  Skin: Skin is warm and dry     Psychiatric: She has a normal mood and affect  Her behavior is normal  Judgment and thought content normal    BP (!) 192/104 (BP Location: Left arm, Patient Position: Sitting, Cuff Size: Adult)   Pulse 58   Temp 97 6 °F (36 4 °C)   Ht 5' 1" (1 549 m)   Wt 58 2 kg (128 lb 6 4 oz)   LMP 04/15/2019   SpO2 97%   BMI 24 26 kg/m²       Nm Pet Ct Skull Base To Mid Thigh    Result Date: 1/29/2020  Narrative PET/CT SCAN INDICATION:  R94 6: Abnormal results of thyroid function studies   , cervical cancer, status post chemoradiation 7/2019, restaging for treatment management MODIFIER: PS COMPARISON: PET CT 10/21/2019, thyroid ultrasound 11/6/2019 CELL TYPE:  Squamous cell carcinoma, cervical biopsy 4/26/2019 TECHNIQUE:   7 2 mCi F-18-FDG administered IV  Multiplanar attenuation corrected and non attenuation corrected PET images were acquired 60 minutes post injection  Contiguous, low dose, axial CT sections were obtained from the skull base through the femurs   Intravenous contrast material was not utilized  This examination, like all CT scans performed in the [facility], was performed utilizing techniques to minimize radiation dose exposure, including the use of iterative reconstruction and automated exposure control  Fasting serum glucose: 87 mg/dl FINDINGS: VISUALIZED BRAIN:   No acute abnormalities are seen  HEAD/NECK:   There are multiple new hypermetabolic bilateral supraclavicular nodes, extending into the mediastinum, SUV ranging up to 3 2 on the right and 3 1 on the left  These are difficult to measure due to their small size on the noncontrast CT images  Decreased but persistent focal activity just inferior to the right thyroid, SUV 3, prior study 3 9  Given the appearance of the supraclavicular hypermetabolic nodes, and absence of a right thyroid nodule on prior ultrasound, this focus may also represent a node  CT images: Otherwise stable   CHEST:   Interval development of multiple hypermetabolic mediastinal paratracheal, subcarinal and bilateral perihilar foci, most concerning for metastases  Example subcarinal lesion measures 2 x 1 cm, SUV 10 8  Hypermetabolic focus between the left mainstem bronchus and esophagus has an SUV of 5 1  Cluster of small subcentimeter hypermetabolic foci in the right upper paratracheal region have an SUV of 5 5  Right perihilar activity has an SUV of 2 9  Left perihilar activity has an SUV of 2 5  CT images: Unremarkable  ABDOMEN:   No FDG avid soft tissue lesions are seen  CT images: Stable  Small fat-containing umbilical hernia  Stable small left hepatic hypodensity  Tiny nonobstructing renal calculi bilaterally  PELVIS: No FDG avid soft tissue lesions are seen  SUV in the cervical region measures 2 6, prior SUV 2 9  No new hypermetabolic pelvic adenopathy  CT images: Stable  OSSEOUS STRUCTURES: No FDG avid lesions are seen  CT images: Stable  Impression 1  Interval development of multiple hypermetabolic bilateral supraclavicular, mediastinal and bilateral perihilar lesions, most concerning for malignancy/metastases  2   No new hypermetabolic metastases in the abdomen or pelvis  Workstation performed: [id number]     Nm Pet Ct Skull Base To Mid Thigh    Result Date: 10/21/2019  Narrative PET/CT SCAN INDICATION:  C53 9: Malignant neoplasm of cervix uteri, unspecified   , restaging post chemoradiation for treatment management MODIFIER: PS COMPARISON: PET CT 5/6/2019 CELL TYPE:  Squamous cell carcinoma, cervical biopsy 4/26/2019 TECHNIQUE:   8 3 mCi F-18-FDG administered IV  Multiplanar attenuation corrected and non attenuation corrected PET images are available for interpretation, and contiguous, low dose, axial CT sections were obtained from the skull base through the femurs   Intravenous contrast material was not utilized    This examination, like all CT scans performed in the [facility], was performed utilizing techniques to minimize radiation dose exposure, including the use of iterative reconstruction and automated exposure control  Fasting serum glucose: 84 mg/dl FINDINGS: VISUALIZED BRAIN:   No acute abnormalities are seen  HEAD/NECK:   Decreased but persistent hypermetabolism of right lower posterior exophytic thyroid nodule or possibly parathyroid measuring approximately 5 mm, SUV 3 9, prior SUV 6 7  No FDG avid cervical adenopathy is seen  CT images: Unremarkable  CHEST:   No FDG avid soft tissue lesions are seen  CT images: Unremarkable  ABDOMEN:   No FDG avid soft tissue lesions are seen  CT images: Tiny punctate nonobstructing right renal calculus  Stable small left hepatic hypodensity  Small fat-containing umbilical hernia  PELVIS: Decreased FDG activity in the region of the cervix, SUV 2 9, prior SUV 19 5  This is most suggestive of response to therapy  Interval decreased FDG activity in left pelvic nodes  No new FDG avid soft tissue lesions are seen  CT images: Otherwise stable  OSSEOUS STRUCTURES: No FDG avid lesions are seen  CT images: No significant findings  Impression 1  Significantly decreased FDG activity in the region of the cervix, suggesting response to therapy  Mildly hypermetabolic left pelvic nodes also have decreased  2   No new hypermetabolic metastases demonstrated  3   Persistent hypermetabolic nodule along the right lower posterior thyroid lesion  Ultrasound evaluation may be considered if not ready performed  Workstation performed: [id number]     Nm Pet Ct Skull Base To Mid Thigh    Result Date: 5/6/2019  Narrative PET/CT SCAN INDICATION:  C53 8: Malignant neoplasm of overlapping sites of cervix uteri   , newly diagnosed cervical cancer, initial staging for treatment management MODIFIER: PI COMPARISON: MRI pelvis 4/27/2019, CT 4/26/2019 CELL TYPE:  Squamous cell carcinoma, cervical biopsy 4/26/2019 TECHNIQUE:   8 5 mCi F-18-FDG administered IV   Multiplanar attenuation corrected and non attenuation corrected PET images are available for interpretation, and contiguous, low dose, axial CT sections were obtained from the skull base through the femurs   Intravenous contrast material was not utilized  This examination, like all CT scans performed in the [facility], was performed utilizing techniques to minimize radiation dose exposure, including the use of iterative reconstruction and automated exposure control  Fasting serum glucose: 92 mg/dl FINDINGS: VISUALIZED BRAIN:   No acute abnormalities are seen  HEAD/NECK:   Hypermetabolic right lower posterior exophytic thyroid nodule or possibly parathyroid, measuring approximately 6 mm, SUV 6 7  A metastatic node from known cervical cancer would be unusual and much less likely  Otherwise no FDG avid cervical adenopathy is seen  CT images: Unremarkable  CHEST:   No FDG avid soft tissue lesions are seen  CT images: Unremarkable  ABDOMEN:   No FDG avid soft tissue lesions are seen  CT images: Small fat-containing umbilical hernia  PELVIS: Intensely hypermetabolic mass in the cervical region to the left of midline, SUV 19 5, compatible with the history of squamous cell cancer  This measures approximately 3 7 x 4 4 x 2 3 cm based on the extent of FDG activity  Remainder of the uterine body and fundus demonstrates only mild homogeneous FDG activity, SUV 2 2  There appears to be a 7 mm left external iliac node with mild FDG activity, image 201, SUV 2 6  Subcentimeter left internal iliac pelvic sidewall node image 203 has an SUV of 2 1  Small subcentimeter right external iliac node image 198 with mild FDG activity, SUV 2 3  Early metastases are not excluded  CT images: Otherwise stable  OSSEOUS STRUCTURES: No FDG avid lesions are seen  CT images: No significant findings  Impression 1  Intensely hypermetabolic cervical mass compatible with known cervical cancer  2   Small subcentimeter bilateral pelvic nodes with minimal FDG activity    Early metastases are not excluded  Continued follow-up recommended  3   No hypermetabolic metastases in the chest or upper abdomen  4   Hypermetabolic right lower posterior exophytic thyroid nodule or possibly parathyroid, measuring approximately 6 mm  Further evaluation with ultrasound recommended   Workstation performed: [id number]

## 2020-02-19 ENCOUNTER — DOCUMENTATION (OUTPATIENT)
Dept: INFUSION CENTER | Facility: CLINIC | Age: 45
End: 2020-02-19

## 2020-02-19 NOTE — SOCIAL WORK
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 and has $280 to go toward paying for the extension on her rental lease  Her new job has not officially started yet, although she is doing work for them to get them started  She has not been paid for this work yet but says that he will pay her once they have revenue coming in  Her previous boss still owes her money and she is hoping that he will give her the $70 she needs to make the payment on the house by Friday  Pt is upset with her thoracic appointment yesterday, saying that she is unable to use [facility] to get to her biopsy appt because she will have to have anesthesia  She has significant financial issues that make it a challenge for her to get there on her own  She is also upset about the process of taking the biopsy, sharing with me that if the biopsy of the lung is negative, they want to make an incision below her breast bone and take a lymph node out "just to be sure"  Pt stated "I'm not walking around with a scar for the rest of my life if the original biopsy is negative  That's ridiculous  No one ever told me that would happen "  She says that her biopsy is scheduled for 2/26, which is the same day she is supposed to f/u with Dr Mcdonald Labor  She says that she tried to reschedule the appointment but was not successful  MSW asked Dr Robin Gaitan MA to please contact pt regarding the above appointment  I have also reached out to [facility] to determine if they are able to transport pt after having anesthesia  Pt agreed to call me and update me regarding her housing situation when she has any news  No other concerns at this time

## 2020-02-21 ENCOUNTER — APPOINTMENT (OUTPATIENT)
Dept: LAB | Facility: HOSPITAL | Age: 45
End: 2020-02-21
Payer: COMMERCIAL

## 2020-02-21 ENCOUNTER — TELEPHONE (OUTPATIENT)
Dept: CARDIAC SURGERY | Facility: CLINIC | Age: 45
End: 2020-02-21

## 2020-02-21 DIAGNOSIS — R59.0 MEDIASTINAL LYMPHADENOPATHY: ICD-10-CM

## 2020-02-21 LAB
ALBUMIN SERPL BCP-MCNC: 3.8 G/DL (ref 3.5–5)
ANION GAP SERPL CALCULATED.3IONS-SCNC: 8 MMOL/L (ref 4–13)
APTT PPP: 29 SECONDS (ref 23–37)
BUN SERPL-MCNC: 11 MG/DL (ref 5–25)
CALCIUM ALBUM COR SERPL-MCNC: 10.4 MG/DL (ref 8.3–10.1)
CALCIUM SERPL-MCNC: 10.2 MG/DL (ref 8.3–10.1)
CALCIUM SERPL-MCNC: 10.2 MG/DL (ref 8.3–10.1)
CHLORIDE SERPL-SCNC: 103 MMOL/L (ref 100–108)
CO2 SERPL-SCNC: 36 MMOL/L (ref 21–32)
CREAT SERPL-MCNC: 0.6 MG/DL (ref 0.6–1.3)
ERYTHROCYTE [DISTWIDTH] IN BLOOD BY AUTOMATED COUNT: 13.3 % (ref 11.6–15.1)
GFR SERPL CREATININE-BSD FRML MDRD: 112 ML/MIN/1.73SQ M
GLUCOSE P FAST SERPL-MCNC: 93 MG/DL (ref 65–99)
HCT VFR BLD AUTO: 41.3 % (ref 34.8–46.1)
HGB BLD-MCNC: 14 G/DL (ref 11.5–15.4)
INR PPP: 0.96 (ref 0.84–1.19)
MCH RBC QN AUTO: 28.2 PG (ref 26.8–34.3)
MCHC RBC AUTO-ENTMCNC: 33.9 G/DL (ref 31.4–37.4)
MCV RBC AUTO: 83 FL (ref 82–98)
PLATELET # BLD AUTO: 187 THOUSANDS/UL (ref 149–390)
PMV BLD AUTO: 10.4 FL (ref 8.9–12.7)
POTASSIUM SERPL-SCNC: 2.6 MMOL/L (ref 3.5–5.3)
PROTHROMBIN TIME: 12.8 SECONDS (ref 11.6–14.5)
RBC # BLD AUTO: 4.96 MILLION/UL (ref 3.81–5.12)
SODIUM SERPL-SCNC: 147 MMOL/L (ref 136–145)
WBC # BLD AUTO: 5.32 THOUSAND/UL (ref 4.31–10.16)

## 2020-02-21 PROCEDURE — 85730 THROMBOPLASTIN TIME PARTIAL: CPT

## 2020-02-21 PROCEDURE — 86901 BLOOD TYPING SEROLOGIC RH(D): CPT | Performed by: THORACIC SURGERY (CARDIOTHORACIC VASCULAR SURGERY)

## 2020-02-21 PROCEDURE — 85027 COMPLETE CBC AUTOMATED: CPT

## 2020-02-21 PROCEDURE — 85610 PROTHROMBIN TIME: CPT

## 2020-02-21 PROCEDURE — 82040 ASSAY OF SERUM ALBUMIN: CPT

## 2020-02-21 PROCEDURE — 86900 BLOOD TYPING SEROLOGIC ABO: CPT | Performed by: THORACIC SURGERY (CARDIOTHORACIC VASCULAR SURGERY)

## 2020-02-21 PROCEDURE — 86850 RBC ANTIBODY SCREEN: CPT | Performed by: THORACIC SURGERY (CARDIOTHORACIC VASCULAR SURGERY)

## 2020-02-21 PROCEDURE — 36415 COLL VENOUS BLD VENIPUNCTURE: CPT

## 2020-02-21 PROCEDURE — 80048 BASIC METABOLIC PNL TOTAL CA: CPT

## 2020-02-22 ENCOUNTER — LAB REQUISITION (OUTPATIENT)
Dept: LAB | Facility: HOSPITAL | Age: 45
End: 2020-02-22
Payer: COMMERCIAL

## 2020-02-22 DIAGNOSIS — R59.0 LOCALIZED ENLARGED LYMPH NODES: ICD-10-CM

## 2020-02-22 LAB
ABO GROUP BLD: NORMAL
BLD GP AB SCN SERPL QL: NEGATIVE
RH BLD: POSITIVE
SPECIMEN EXPIRATION DATE: NORMAL

## 2020-02-25 ENCOUNTER — ANESTHESIA EVENT (OUTPATIENT)
Dept: PERIOP | Facility: HOSPITAL | Age: 45
End: 2020-02-25
Payer: COMMERCIAL

## 2020-02-25 NOTE — PRE-PROCEDURE INSTRUCTIONS
No outpatient medications have been marked as taking for the 2/26/20 encounter [facility] Encounter)  Pre op instructions reviewed with pt on 2/25  PT IS NOT TAKING ANY MEDS AT THIS TIME     Hospital and bathing instructions reviewed with understanding at this time

## 2020-02-26 ENCOUNTER — ANESTHESIA (OUTPATIENT)
Dept: PERIOP | Facility: HOSPITAL | Age: 45
End: 2020-02-26
Payer: COMMERCIAL

## 2020-02-26 ENCOUNTER — HOSPITAL ENCOUNTER (OUTPATIENT)
Facility: HOSPITAL | Age: 45
Setting detail: OUTPATIENT SURGERY
Discharge: HOME | End: 2020-02-26
Attending: THORACIC SURGERY (CARDIOTHORACIC VASCULAR SURGERY) | Admitting: THORACIC SURGERY (CARDIOTHORACIC VASCULAR SURGERY)
Payer: COMMERCIAL

## 2020-02-26 VITALS
HEIGHT: 61 IN | TEMPERATURE: 97.5 F | SYSTOLIC BLOOD PRESSURE: 170 MMHG | OXYGEN SATURATION: 98 % | BODY MASS INDEX: 24.17 KG/M2 | HEART RATE: 55 BPM | DIASTOLIC BLOOD PRESSURE: 99 MMHG | RESPIRATION RATE: 16 BRPM | WEIGHT: 128 LBS

## 2020-02-26 DIAGNOSIS — R59.0 MEDIASTINAL LYMPHADENOPATHY: ICD-10-CM

## 2020-02-26 LAB
EXT PREGNANCY TEST URINE: NEGATIVE
EXT. CONTROL: NORMAL

## 2020-02-26 PROCEDURE — 88305 TISSUE EXAM BY PATHOLOGIST: CPT | Performed by: PATHOLOGY

## 2020-02-26 PROCEDURE — 88173 CYTOPATH EVAL FNA REPORT: CPT | Performed by: PATHOLOGY

## 2020-02-26 PROCEDURE — 31652 BRONCH EBUS SAMPLNG 1/2 NODE: CPT | Performed by: PHYSICIAN ASSISTANT

## 2020-02-26 PROCEDURE — 88172 CYTP DX EVAL FNA 1ST EA SITE: CPT | Performed by: PATHOLOGY

## 2020-02-26 PROCEDURE — 31652 BRONCH EBUS SAMPLNG 1/2 NODE: CPT | Performed by: THORACIC SURGERY (CARDIOTHORACIC VASCULAR SURGERY)

## 2020-02-26 PROCEDURE — 81025 URINE PREGNANCY TEST: CPT | Performed by: THORACIC SURGERY (CARDIOTHORACIC VASCULAR SURGERY)

## 2020-02-26 RX ORDER — FENTANYL CITRATE 50 UG/ML
INJECTION, SOLUTION INTRAMUSCULAR; INTRAVENOUS AS NEEDED
Status: DISCONTINUED | OUTPATIENT
Start: 2020-02-26 | End: 2020-02-26 | Stop reason: SURG

## 2020-02-26 RX ORDER — KETAMINE HYDROCHLORIDE 50 MG/ML
INJECTION, SOLUTION, CONCENTRATE INTRAMUSCULAR; INTRAVENOUS AS NEEDED
Status: DISCONTINUED | OUTPATIENT
Start: 2020-02-26 | End: 2020-02-26 | Stop reason: SURG

## 2020-02-26 RX ORDER — DEXAMETHASONE SODIUM PHOSPHATE 10 MG/ML
INJECTION, SOLUTION INTRAMUSCULAR; INTRAVENOUS AS NEEDED
Status: DISCONTINUED | OUTPATIENT
Start: 2020-02-26 | End: 2020-02-26 | Stop reason: SURG

## 2020-02-26 RX ORDER — LIDOCAINE HYDROCHLORIDE 20 MG/ML
INJECTION, SOLUTION EPIDURAL; INFILTRATION; INTRACAUDAL; PERINEURAL AS NEEDED
Status: DISCONTINUED | OUTPATIENT
Start: 2020-02-26 | End: 2020-02-26 | Stop reason: HOSPADM

## 2020-02-26 RX ORDER — MIDAZOLAM HYDROCHLORIDE 2 MG/2ML
INJECTION, SOLUTION INTRAMUSCULAR; INTRAVENOUS AS NEEDED
Status: DISCONTINUED | OUTPATIENT
Start: 2020-02-26 | End: 2020-02-26 | Stop reason: SURG

## 2020-02-26 RX ORDER — SODIUM CHLORIDE, SODIUM LACTATE, POTASSIUM CHLORIDE, CALCIUM CHLORIDE 600; 310; 30; 20 MG/100ML; MG/100ML; MG/100ML; MG/100ML
100 INJECTION, SOLUTION INTRAVENOUS CONTINUOUS
Status: DISCONTINUED | OUTPATIENT
Start: 2020-02-26 | End: 2020-02-26 | Stop reason: HOSPADM

## 2020-02-26 RX ORDER — ONDANSETRON 2 MG/ML
INJECTION INTRAMUSCULAR; INTRAVENOUS AS NEEDED
Status: DISCONTINUED | OUTPATIENT
Start: 2020-02-26 | End: 2020-02-26 | Stop reason: SURG

## 2020-02-26 RX ORDER — LIDOCAINE HYDROCHLORIDE 10 MG/ML
INJECTION, SOLUTION EPIDURAL; INFILTRATION; INTRACAUDAL; PERINEURAL AS NEEDED
Status: DISCONTINUED | OUTPATIENT
Start: 2020-02-26 | End: 2020-02-26 | Stop reason: SURG

## 2020-02-26 RX ORDER — ACETAMINOPHEN 325 MG/1
650 TABLET ORAL EVERY 6 HOURS PRN
Qty: 30 TABLET | Refills: 0 | Status: SHIPPED | OUTPATIENT
Start: 2020-02-26

## 2020-02-26 RX ORDER — LIDOCAINE HYDROCHLORIDE 10 MG/ML
INJECTION, SOLUTION EPIDURAL; INFILTRATION; INTRACAUDAL; PERINEURAL
Status: DISCONTINUED
Start: 2020-02-26 | End: 2020-02-26 | Stop reason: HOSPADM

## 2020-02-26 RX ORDER — FENTANYL CITRATE/PF 50 MCG/ML
50 SYRINGE (ML) INJECTION
Status: DISCONTINUED | OUTPATIENT
Start: 2020-02-26 | End: 2020-02-26 | Stop reason: HOSPADM

## 2020-02-26 RX ORDER — PROPOFOL 10 MG/ML
INJECTION, EMULSION INTRAVENOUS AS NEEDED
Status: DISCONTINUED | OUTPATIENT
Start: 2020-02-26 | End: 2020-02-26 | Stop reason: SURG

## 2020-02-26 RX ORDER — GLYCOPYRROLATE 0.2 MG/ML
INJECTION INTRAMUSCULAR; INTRAVENOUS AS NEEDED
Status: DISCONTINUED | OUTPATIENT
Start: 2020-02-26 | End: 2020-02-26 | Stop reason: SURG

## 2020-02-26 RX ORDER — SODIUM CHLORIDE, SODIUM LACTATE, POTASSIUM CHLORIDE, CALCIUM CHLORIDE 600; 310; 30; 20 MG/100ML; MG/100ML; MG/100ML; MG/100ML
INJECTION, SOLUTION INTRAVENOUS CONTINUOUS PRN
Status: DISCONTINUED | OUTPATIENT
Start: 2020-02-26 | End: 2020-02-26 | Stop reason: SURG

## 2020-02-26 RX ORDER — ONDANSETRON 2 MG/ML
4 INJECTION INTRAMUSCULAR; INTRAVENOUS ONCE AS NEEDED
Status: DISCONTINUED | OUTPATIENT
Start: 2020-02-26 | End: 2020-02-26 | Stop reason: HOSPADM

## 2020-02-26 RX ORDER — PROPOFOL 10 MG/ML
INJECTION, EMULSION INTRAVENOUS CONTINUOUS PRN
Status: DISCONTINUED | OUTPATIENT
Start: 2020-02-26 | End: 2020-02-26 | Stop reason: SURG

## 2020-02-26 RX ADMIN — GLYCOPYRROLATE 0.2 MG: 0.2 INJECTION, SOLUTION INTRAMUSCULAR; INTRAVENOUS at 08:45

## 2020-02-26 RX ADMIN — LIDOCAINE HYDROCHLORIDE 50 MG: 10 INJECTION, SOLUTION EPIDURAL; INFILTRATION; INTRACAUDAL; PERINEURAL at 08:29

## 2020-02-26 RX ADMIN — MIDAZOLAM 2 MG: 1 INJECTION INTRAMUSCULAR; INTRAVENOUS at 08:25

## 2020-02-26 RX ADMIN — REMIFENTANIL HYDROCHLORIDE 0.3 MCG/KG/MIN: 1 INJECTION, POWDER, LYOPHILIZED, FOR SOLUTION INTRAVENOUS at 08:39

## 2020-02-26 RX ADMIN — PHENYLEPHRINE HYDROCHLORIDE 100 MCG: 10 INJECTION INTRAVENOUS at 08:43

## 2020-02-26 RX ADMIN — SODIUM CHLORIDE, SODIUM LACTATE, POTASSIUM CHLORIDE, AND CALCIUM CHLORIDE: .6; .31; .03; .02 INJECTION, SOLUTION INTRAVENOUS at 08:26

## 2020-02-26 RX ADMIN — GLYCOPYRROLATE 0.2 MG: 0.2 INJECTION, SOLUTION INTRAMUSCULAR; INTRAVENOUS at 08:42

## 2020-02-26 RX ADMIN — FENTANYL CITRATE 50 MCG: 50 INJECTION, SOLUTION INTRAMUSCULAR; INTRAVENOUS at 08:29

## 2020-02-26 RX ADMIN — PROPOFOL 50 MG: 10 INJECTION, EMULSION INTRAVENOUS at 08:39

## 2020-02-26 RX ADMIN — PROPOFOL 100 MCG/KG/MIN: 10 INJECTION, EMULSION INTRAVENOUS at 08:33

## 2020-02-26 RX ADMIN — ONDANSETRON 4 MG: 2 INJECTION INTRAMUSCULAR; INTRAVENOUS at 09:07

## 2020-02-26 RX ADMIN — PROPOFOL 70 MG: 10 INJECTION, EMULSION INTRAVENOUS at 08:41

## 2020-02-26 RX ADMIN — PHENYLEPHRINE HYDROCHLORIDE 100 MCG: 10 INJECTION INTRAVENOUS at 09:02

## 2020-02-26 RX ADMIN — DEXAMETHASONE SODIUM PHOSPHATE 10 MG: 10 INJECTION, SOLUTION INTRAMUSCULAR; INTRAVENOUS at 08:38

## 2020-02-26 RX ADMIN — KETAMINE HYDROCHLORIDE 20 MG: 50 INJECTION, SOLUTION INTRAMUSCULAR; INTRAVENOUS at 08:37

## 2020-02-26 RX ADMIN — PHENYLEPHRINE HYDROCHLORIDE 100 MCG: 10 INJECTION INTRAVENOUS at 08:50

## 2020-02-26 RX ADMIN — PHENYLEPHRINE HYDROCHLORIDE 100 MCG: 10 INJECTION INTRAVENOUS at 08:55

## 2020-02-26 RX ADMIN — PHENYLEPHRINE HYDROCHLORIDE 200 MCG: 10 INJECTION INTRAVENOUS at 09:12

## 2020-02-26 RX ADMIN — REMIFENTANIL HYDROCHLORIDE 0.2 MCG/KG/MIN: 1 INJECTION, POWDER, LYOPHILIZED, FOR SOLUTION INTRAVENOUS at 08:33

## 2020-02-26 RX ADMIN — FENTANYL CITRATE 50 MCG: 50 INJECTION, SOLUTION INTRAMUSCULAR; INTRAVENOUS at 08:37

## 2020-02-26 NOTE — DISCHARGE INSTRUCTIONS
Please call thoracic surgery office with any questions or concerns  [phone]    Follow up with Dr Telma Crooks as scheduled to discuss results

## 2020-02-26 NOTE — ANESTHESIA PREPROCEDURE EVALUATION
Review of Systems/Medical History  Patient summary reviewed  Chart reviewed  No history of anesthetic complications     Cardiovascular  Negative cardio ROS Exercise tolerance (METS): >4,  No PND,    Pulmonary  Smoker ex-smoker  ,        GI/Hepatic  Negative GI/hepatic ROS          Negative  ROS No chronic kidney disease ,        Endo/Other  Negative endo/other ROS      GYN      Comment: Cervical cancer s/p chemo/radiation/brachytherapy now with mediastinal lymphadenopathy     Hematology  Negative hematology ROS      Musculoskeletal  Back pain , lumbar pain,        Neurology  Negative neurology ROS      Psychology     Chronic opioid dependence            Physical Exam    Airway    Mallampati score: II  TM Distance: >3 FB  Neck ROM: full     Dental       Cardiovascular  Comment: Negative ROS,     Pulmonary      Other Findings        Anesthesia Plan  ASA Score- 2     Anesthesia Type- general with ASA Monitors  Additional Monitors:   Airway Plan: LMA      Comment: Recent labs personally reviewed:  Lab Results       Component                Value               Date                       WBC                      5 32                02/21/2020                 HGB                      14 0                02/21/2020                 PLT                      187                 02/21/2020            Lab Results       Component                Value               Date                       K                        2 6 (LL)            02/21/2020                 BUN                      11                  02/21/2020                 CREATININE               0 60                02/21/2020            Lab Results       Component                Value               Date                       PTT                      29                  02/21/2020             Lab Results       Component                Value               Date                       INR                      0 96                02/21/2020              Blood type Delano Solomon MD, have personally seen and evaluated the patient prior to anesthetic care  I have reviewed the pre-anesthetic record, medical history, allergies, medications and any other medical records if appropriate to the anesthetic care  If a CRNA is involved in the case, I have reviewed the CRNA assessment, if present, and agree  I discussed the anesthetic plan and risks/benefits/alternatives with the patient including possible PONV, sore throat, and possibility of rare anesthetic and surgical emergencies        Plan Factors-  Patient did not smoke on day of surgery  Induction- intravenous  Postoperative Plan- Plan for postoperative opioid use  Planned trial extubation    Informed Consent- Anesthetic plan and risks discussed with patient  I personally reviewed this patient with the CRNA  Discussed and agreed on the Anesthesia Plan with the CRNA  Chaitanya Yousif

## 2020-02-26 NOTE — ANESTHESIA POSTPROCEDURE EVALUATION
Post-Op Assessment Note    CV Status:  Stable    Pain management: adequate     Mental Status:  Alert and awake   Hydration Status:  Euvolemic   PONV Controlled:  Controlled   Airway Patency:  Patent   Post Op Vitals Reviewed: Yes      Staff: AnesthesiologistALONSO           /65 (02/26/20 0927)    Temp (!) 96 9 °F (36 1 °C) (02/26/20 0927)    Pulse 73 (02/26/20 0927)   Resp 15 (02/26/20 0927)    SpO2 100 % (02/26/20 0927)

## 2020-02-26 NOTE — OP NOTE
OPERATIVE REPORT  PATIENT NAME: Gurdeep Recinos    :  1975  MRN: [id number]  Pt Location: [facility] OR ROOM 08    SURGERY DATE: 2020    Surgeon(s) and Role:     * Obi Barrientos MD - Primary     * Bassem Boateng PA-C - Assisting    Preop Diagnosis:  Mediastinal lymphadenopathy [R59 0]  History of cervical cancer    Post-Op Diagnosis Codes:     * Mediastinal lymphadenopathy [R59 0]  History of cervical cancer    Procedure(s) (LRB):  BRONCHOSCOPY FLEXIBLE (N/A)  ENDOBRONCHIAL ULTRASOUND (EBUS)  TRANS BRONCHIAL NEEDLE ASPIRATION    Specimen(s):  ID Type Source Tests Collected by Time Destination   1 : Level 7 FNA Lymph Node FINE NEEDLE ASPIRATION Obi Barrientos MD 2020 0845        Estimated Blood Loss:   Minimal    Drains:  Urethral Catheter Non-latex 16 Fr  (Active)   Number of days: 251       Anesthesia Type:   General    Operative Indications:  Mediastinal lymphadenopathy [R59 0]  History of cervical cancer  Ms Wolfgang Naqvi has a history of cervical cancer and recently underwent a PET-CT scan for surveillance  She was noted to have a hypermetabolic subcarinal lymph node  She is brought to the operating room for biopsy to exclude metastasis  Operative Findings:  Small, amorphous subcarinal lymph node by endobronchial ultrasound  Rapid on-site evaluation is negative for malignancy  Further specimen is sent for cell block analysis  Complications:   None    Procedure and Technique:    The patient was brought to the operating room and placed in the supine position  After institution of general anesthesia utilizing an LMA, the fiberoptic bronchoscope was inserted  The vocal cords appear to be functioning normally and were topically anesthetized with a small amount of 4% lidocaine  The vocal cords were traversed and the trachea was topically lysed with 4% lidocaine  The trachea appears normal  The viktoria is sharp  The airways are normal to the subsegmental level bilaterally   There are no endobronchial lesions and secretions are scant  The standard bronchoscope is removed and an endobronchial ultrasound scope was inserted  An endobronchial ultrasound exam is performed  There are scant lymph nodes around the tracheobronchial tree  The subcarinal lymph node is somewhat amorphous and small despite its PET activity on January 29, 2020  Using real-time ultrasound guidance and a 21 gauge needle multiple punctures of this lymph node or performed  Using a jabbing technique with and without suction, specimen is obtained for both rapid on-site evaluation as well as later cell block analysis  Rapid on-site evaluation sees mostly broncho epithelial cells, some lymphocytes, and no evidence of epithelial malignancy  There were no further significant lymph nodes visualized on the endobronchial exam and so the procedures terminated  The endobronchial ultrasound scope was removed in the standard bronchoscope was reinserted  The airways were suctioned clear and there is no evidence of ongoing bleeding  The patient is then extubated and brought to the recovery unit in stable condition having tolerated the procedure well  Sponge and instrument counts were correct         I was present for the entire procedure, A qualified resident physician was not available and A physician assistant was required during the procedure for retraction tissue handling,dissection and suturing    Patient Disposition:  PACU     SIGNATURE: Mello Evangelista MD  DATE: February 26, 2020  TIME: 9:09 AM

## 2020-03-03 ENCOUNTER — OFFICE VISIT (OUTPATIENT)
Dept: GYNECOLOGIC ONCOLOGY | Facility: CLINIC | Age: 45
End: 2020-03-03
Payer: COMMERCIAL

## 2020-03-03 VITALS
WEIGHT: 129.5 LBS | TEMPERATURE: 98.3 F | BODY MASS INDEX: 24.45 KG/M2 | SYSTOLIC BLOOD PRESSURE: 148 MMHG | HEART RATE: 77 BPM | DIASTOLIC BLOOD PRESSURE: 80 MMHG | HEIGHT: 61 IN | RESPIRATION RATE: 18 BRPM

## 2020-03-03 DIAGNOSIS — C53.8 MALIGNANT NEOPLASM OF OVERLAPPING SITES OF CERVIX (HCC): ICD-10-CM

## 2020-03-03 DIAGNOSIS — C53.1 MALIGNANT NEOPLASM OF EXOCERVIX (HCC): Primary | ICD-10-CM

## 2020-03-03 PROCEDURE — 99214 OFFICE O/P EST MOD 30 MIN: CPT | Performed by: OBSTETRICS & GYNECOLOGY

## 2020-03-03 RX ORDER — CHLORAL HYDRATE 500 MG
1000 CAPSULE ORAL DAILY
COMMUNITY

## 2020-03-03 NOTE — ASSESSMENT & PLAN NOTE
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 status post completion of therapy with chemo radiation therapy completed approximately 6 months ago  Her 1st post treatment PET-CT scan revealed suspicious lymphadenopathy in the mediastinum  She underwent trans bronchial biopsy which was read as no evidence of malignancy at this time  I have discussed with the patient the possibility of repeat excisional biopsy of the supraclavicular lymph node versus continued observation versus initiation of treatment in the likely possibility that this represents recurrence of disease  The patient is feeling well at this time and is not interested in proceeding with biopsy or initiation of treatment  I agree  We have therefore made plans to follow-up with a PET-CT scan in 3 months and follow-up visit at that time  If she develops any symptoms of pain cough or any other abnormality she will call us prior to that  The patient still has a number of psychosocial needs related to housing crises and a number of other financial and socialogical crises that her ongoing in her life  She will meet with Lynne Lopez the clinical  immediately following this visit

## 2020-03-03 NOTE — PROGRESS NOTES
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 status post completion of therapy with chemo radiation therapy completed approximately 6 months ago  Her 1st post treatment PET-CT scan revealed suspicious lymphadenopathy in the mediastinum  She underwent trans bronchial biopsy which was read as no evidence of malignancy at this time  I have discussed with the patient the possibility of repeat excisional biopsy of the supraclavicular lymph node versus continued observation versus initiation of treatment in the likely possibility that this represents recurrence of disease  The patient is feeling well at this time and is not interested in proceeding with biopsy or initiation of treatment  I agree  We have therefore made plans to follow-up with a PET-CT scan in 3 months and follow-up visit at that time  If she develops any symptoms of pain cough or any other abnormality she will call us prior to that  Other Visit Diagnoses     Malignant neoplasm of exocervix ([address] )    -  Primary    Relevant Orders    NM PET CT skull base to mid thigh        Overall consultation took 25 minutes with greater than 50% being dedicated toward discussion time    CHIEF COMPLAINT:  Follow-up cervical cancer status post trans biopsy      Problem:  Cancer Staging  Malignant neoplasm of overlapping sites of cervix [facility])  Staging form: Cervix Uteri, AJCC 8th Edition  - Clinical stage from 5/1/2019: FIGO Stage IB2 (cT1b2, cN1, cM0) - Signed by Brayan Estrada MD on 5/1/2019        Previous therapy:  Oncology History    Returns for first follow up post pelvic/paraaortic and tandem and ring radiation completed on 7/5/19 7/16/19 Dr Sherry Perry of  diffuse pain, pelvic swelling and discomfort; no exam performed   Plan PET-CT in 3-4 months post treatment    7/23/19 Dr Magalys Giordano certification issued    8/20/19 Dr Michelle Ferguson  9/17/19 Dr Bev Sevlila        Malignant ne[facility] overlapping sites [facility] )    4/26/2019 Biopsy     Final Diagnosis   A  Cervix, 12:00, biopsy:  -  Invasive moderately to poorly differentiated non-keratinizing squamous cell carcinoma (see note)  5/1/2019 Initial Diagnosis     Malignant neoplasm of overlapping sites of cervix ([facility] ) 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 with potential metastatic disease to the left iliac region and sacral region  5/1/2019 -  Cancer Staged     Staging form: Cervix Uteri, AJCC 8th Edition  - Clinical stage from 5/1/2019: FIGO Stage IB2 (cT1b2, cN1, cM0) - Signed by Trudy Ellis MD on 5/1/2019  Histologic grade (G): G3  Histologic grading system: 3 grade system        5/6/2019 -  Chemotherapy     CISplatin (PLATINOL) 62 4 mg in sodium chloride 0 9 % 250 mL infusion, 40 mg/m2 = 62 4 mg, Intravenous, Once, 6 of 6 cycles  Administration: 62 4 mg (5/21/2019), 62 4 mg (5/29/2019), 62 4 mg (6/4/2019), 62 4 mg (6/11/2019), 62 4 mg (6/18/2019), 62 4 mg (6/26/2019)      5/20/2019 - 7/3/2019 Radiation     Whole pelvic radiation therapy  45Gy in 25 daily fractions  With a parametrial boost of an additional 5 4Gy in 3 daily fractions  6/20/2019 - 7/5/2019 Radiation     HDR brachytherapy utilizing tandem and ring  6Gy in 5 fractions on dates: 6/20/19, 6/28/19, 6/25/19, 7/2/19, 7/5/19  Patient ID: Lucie Vega is a 38 y o  female  Patient is a very pleasant 71-year-old female with a history of stage 1 B2 squamous cell carcinoma of the cervix status post chemo radiation therapy in 2019  She had achieved a clinical remission  Most recently she went underwent a PET-CT scan which revealed multiple lymph nodes positive  These were unable to be biopsied by CT    She underwent a transbronchial biopsy which has just come back as negative  The patient is without complaint  She is feeling well  Today, the patient is doing well  She denies significant abdominal pain, pelvic pain, nausea, vomiting, constipation, diarrhea, fevers, chills, or vaginal bleeding  The following portions of the patient's history were reviewed and updated as appropriate: allergies, current medications, past medical history, past social history, past surgical history and problem list     Review of Systems   Constitutional: Negative  HENT: Negative  Eyes: Negative  Respiratory: Negative  Cardiovascular: Negative  Gastrointestinal: Negative  Endocrine: Negative  Genitourinary: Negative  Musculoskeletal: Negative  Skin: Negative  Neurological: Negative  Hematological: Negative  Psychiatric/Behavioral: Negative  Current Outpatient Medications   Medication Sig Dispense Refill    acetaminophen (TYLENOL) 325 mg tablet Take 2 tablets (650 mg total) by mouth every 6 (six) hours as needed for mild pain 30 tablet 0    Multiple Vitamins-Minerals (MULTIVITAMIN ADULT PO) multivitamin      Omega-3 Fatty Acids (FISH OIL) 1,000 mg Fish Oil       No current facility-administered medications for this visit  Objective:    Blood pressure 148/80, pulse 77, temperature 98 3 °F (36 8 °C), resp  rate 18, height 5' 1" (1 549 m), weight 58 7 kg (129 lb 8 oz), last menstrual period 04/15/2019, not currently breastfeeding  Body mass index is 24 47 kg/m²  Body surface area is 1 57 meters squared  Physical Exam   Constitutional: She is oriented to person, place, and time  She appears well-developed and well-nourished  HENT:   Head: Normocephalic and atraumatic  Eyes: Pupils are equal, round, and reactive to light  EOM are normal    Neck: Normal range of motion  Neck supple  Cardiovascular: Normal rate, regular rhythm and normal heart sounds  Pulmonary/Chest: Effort normal and breath sounds normal  No respiratory distress  Abdominal: Soft  Bowel sounds are normal  She exhibits no distension and no ascites  There is no tenderness  There is no rigidity, no rebound and no guarding  Genitourinary:   Genitourinary Comments: Deferred   Musculoskeletal: Normal range of motion  Lymphadenopathy:     She has no cervical adenopathy  She has no axillary adenopathy  Right: No inguinal and no supraclavicular adenopathy present  Left: No inguinal and no supraclavicular adenopathy present  Neurological: She is alert and oriented to person, place, and time  Skin: Skin is warm and dry  Psychiatric: She has a normal mood and affect   Her behavior is normal        No results found for:   Lab Results   Component Value Date    K 2 6 (LL) 02/21/2020     02/21/2020    CO2 36 (H) 02/21/2020    BUN 11 02/21/2020    CREATININE 0 60 02/21/2020    GLUF 93 02/21/2020    CALCIUM 10 2 (H) 02/21/2020    CORRECTEDCA 10 4 (H) 02/21/2020    AST 20 11/29/2019    ALT 48 11/29/2019    ALKPHOS 61 11/29/2019    EGFR 112 02/21/2020     Lab Results   Component Value Date    WBC 5 32 02/21/2020    HGB 14 0 02/21/2020    HCT 41 3 02/21/2020    MCV 83 02/21/2020     02/21/2020     Lab Results   Component Value Date    NEUTROABS 7 61 11/29/2019

## 2020-03-03 NOTE — LETTER
March 3, 2020     [facility], DO  [address]  [facility]    Patient: Luz Marina Sánchez   YOB: 1975   Date of Visit: 3/3/2020       Dear Dr Brown Client: Thank you for referring Luz Marina Sánchez to me for evaluation  Below are my notes for this consultation  If you have questions, please do not hesitate to call me  I look forward to following your patient along with you  Sincerely,        Sherin Mejia MD        CC: Luella Mcmurray, MD Theola Alpers, MD Eppie Form, MD  3/3/2020  9:55 AM  Sign at close encounter  Assessment/Plan:    Problem List Items Addressed This Visit        Genitourinary    Malignant neoplasm of overlapping sites of cervix [facility])     Patient is a pleasant 56-year-old white female with a history of 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 status post completion of therapy with chemo radiation therapy completed approximately 6 months ago  Her 1st post treatment PET-CT scan revealed suspicious lymphadenopathy in the mediastinum  She underwent trans bronchial biopsy which was read as no evidence of malignancy at this time  I have discussed with the patient the possibility of repeat excisional biopsy of the supraclavicular lymph node versus continued observation versus initiation of treatment in the likely possibility that this represents recurrence of disease  The patient is feeling well at this time and is not interested in proceeding with biopsy or initiation of treatment  I agree  We have therefore made plans to follow-up with a PET-CT scan in 3 months and follow-up visit at that time  If she develops any symptoms of pain cough or any other abnormality she will call us prior to that             Other Visit Diagnoses     Malignant neoplasm of exocervix ([address] )    -  Primary    Relevant Orders    NM PET CT skull base to mid thigh        Overall consultation took 25 minutes with greater than 50% being dedicated toward discussion time    CHIEF COMPLAINT:  Follow-up cervical cancer status post trans biopsy      Problem:  Cancer Staging  Malignant neoplasm of overlapping sites of cervix [facility])  Staging form: Cervix Uteri, AJCC 8th Edition  - Clinical stage from 5/1/2019: FIGO Stage IB2 (cT1b2, cN1, cM0) - Signed by Dinah Thomas MD on 5/1/2019        Previous therapy:  Oncology History    Returns for first follow up post pelvic/paraaortic and tandem and ring radiation completed on 7/5/19 7/16/19 Dr Valerie May  Complains of  diffuse pain, pelvic swelling and discomfort; no exam performed  Plan PET-CT in 3-4 months post treatment    7/23/19 Dr Elo Elizalde certification issued    8/20/19 Dr Valerie May  9/17/19 Dr Danny Robles        Malignant neoplasm of overlapping sites of cervix ([facility] )    4/26/2019 Biopsy     Final Diagnosis   A  Cervix, 12:00, biopsy:  -  Invasive moderately to poorly differentiated non-keratinizing squamous cell carcinoma (see note)  5/1/2019 Initial Diagnosis     [facility] 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 with potential metastatic disease to the left iliac region and sacral region  5/1/2019 -  Cancer Staged     Staging form: Cervix Uteri, AJCC 8th Edition  - Clinical stage from 5/1/2019: FIGO Stage IB2 (cT1b2, cN1, cM0) - Signed by Dinah Thomas MD on 5/1/2019  Histologic grade (G): G3  Histologic grading system: 3 grade system        5/6/2019 -  Chemotherapy     CISplatin (PLATINOL) 62 4 mg in sodium chloride 0 9 % 250 mL infusion, 40 mg/m2 = 62 4 mg, Intravenous, Once, 6 of 6 cycles  Administration: 62 4 mg (5/21/2019), 62 4 mg (5/29/2019), 62 4 mg (6/4/2019), 62 4 mg (6/11/2019), 62 4 mg (6/18/2019), 62 4 mg (6/26/2019)      5/20/2019 - 7/3/2019 Radiation     Whole pelvic radiation therapy  45Gy in 25 daily fractions  With a parametrial boost of an additional 5 4Gy in 3 daily fractions        6/20/2019 - 7/5/2019 Radiation     HDR brachytherapy utilizing tandem and ring  6Gy in 5 fractions on dates: 6/20/19, 6/28/19, 6/25/19, 7/2/19, 7/5/19  Patient ID: Alberto Card is a 38 y o  female  Patient is a very pleasant 71-year-old female with a history of stage 1 B2 squamous cell carcinoma of the cervix status post chemo radiation therapy in 2019  She had achieved a clinical remission  Most recently she went underwent a PET-CT scan which revealed multiple lymph nodes positive  These were unable to be biopsied by CT  She underwent a transbronchial biopsy which has just come back as negative  The patient is without complaint  She is feeling well  Today, the patient is doing well  She denies significant abdominal pain, pelvic pain, nausea, vomiting, constipation, diarrhea, fevers, chills, or vaginal bleeding  The following portions of the patient's history were reviewed and updated as appropriate: allergies, current medications, past medical history, past social history, past surgical history and problem list     Review of Systems   Constitutional: Negative  HENT: Negative  Eyes: Negative  Respiratory: Negative  Cardiovascular: Negative  Gastrointestinal: Negative  Endocrine: Negative  Genitourinary: Negative  Musculoskeletal: Negative  Skin: Negative  Neurological: Negative  Hematological: Negative  Psychiatric/Behavioral: Negative  Current Outpatient Medications   Medication Sig Dispense Refill    acetaminophen (TYLENOL) 325 mg tablet Take 2 tablets (650 mg total) by mouth every 6 (six) hours as needed for mild pain 30 tablet 0    Multiple Vitamins-Minerals (MULTIVITAMIN ADULT PO) multivitamin      Omega-3 Fatty Acids (FISH OIL) 1,000 mg Fish Oil       No current facility-administered medications for this visit  Objective:    Blood pressure 148/80, pulse 77, temperature 98 3 °F (36 8 °C), resp   rate 18, height 5' 1" (1 549 m), weight 58 7 kg (129 lb 8 oz), last menstrual period 04/15/2019, not currently breastfeeding  Body mass index is 24 47 kg/m²  Body surface area is 1 57 meters squared  Physical Exam   Constitutional: She is oriented to person, place, and time  She appears well-developed and well-nourished  HENT:   Head: Normocephalic and atraumatic  Eyes: Pupils are equal, round, and reactive to light  EOM are normal    Neck: Normal range of motion  Neck supple  Cardiovascular: Normal rate, regular rhythm and normal heart sounds  Pulmonary/Chest: Effort normal and breath sounds normal  No respiratory distress  Abdominal: Soft  Bowel sounds are normal  She exhibits no distension and no ascites  There is no tenderness  There is no rigidity, no rebound and no guarding  Genitourinary:   Genitourinary Comments: Deferred   Musculoskeletal: Normal range of motion  Lymphadenopathy:     She has no cervical adenopathy  She has no axillary adenopathy  Right: No inguinal and no supraclavicular adenopathy present  Left: No inguinal and no supraclavicular adenopathy present  Neurological: She is alert and oriented to person, place, and time  Skin: Skin is warm and dry  Psychiatric: She has a normal mood and affect   Her behavior is normal        No results found for:   Lab Results   Component Value Date    K 2 6 (LL) 02/21/2020     02/21/2020    CO2 36 (H) 02/21/2020    BUN 11 02/21/2020    CREATININE 0 60 02/21/2020    GLUF 93 02/21/2020    CALCIUM 10 2 (H) 02/21/2020    CORRECTEDCA 10 4 (H) 02/21/2020    AST 20 11/29/2019    ALT 48 11/29/2019    ALKPHOS 61 11/29/2019    EGFR 112 02/21/2020     Lab Results   Component Value Date    WBC 5 32 02/21/2020    HGB 14 0 02/21/2020    HCT 41 3 02/21/2020    MCV 83 02/21/2020     02/21/2020     Lab Results   Component Value Date    NEUTROABS 7 61 11/29/2019

## 2020-03-06 ENCOUNTER — TELEPHONE (OUTPATIENT)
Dept: GYNECOLOGIC ONCOLOGY | Facility: CLINIC | Age: 45
End: 2020-03-06

## 2020-03-06 NOTE — TELEPHONE ENCOUNTER
Patient would like PET scheduled for 3 months from last pet date (late April/early May vs  June)  Will r/s

## 2020-05-04 ENCOUNTER — HOSPITAL ENCOUNTER (OUTPATIENT)
Dept: RADIOLOGY | Age: 45
Discharge: HOME | End: 2020-05-04
Payer: COMMERCIAL

## 2020-05-04 DIAGNOSIS — C53.1 MALIGNANT NEOPLASM OF EXOCERVIX (HCC): ICD-10-CM

## 2020-05-04 LAB — GLUCOSE SERPL-MCNC: 86 MG/DL (ref 65–140)

## 2020-05-04 PROCEDURE — 82948 REAGENT STRIP/BLOOD GLUCOSE: CPT

## 2020-05-04 PROCEDURE — A9552 F18 FDG: HCPCS

## 2020-05-04 PROCEDURE — 78815 PET IMAGE W/CT SKULL-THIGH: CPT

## 2020-05-12 ENCOUNTER — TELEMEDICINE (OUTPATIENT)
Dept: GYNECOLOGIC ONCOLOGY | Facility: CLINIC | Age: 45
End: 2020-05-12
Payer: COMMERCIAL

## 2020-05-12 DIAGNOSIS — C53.8 MALIGNANT NEOPLASM OF OVERLAPPING SITES OF CERVIX (HCC): Primary | ICD-10-CM

## 2020-05-12 PROCEDURE — 99213 OFFICE O/P EST LOW 20 MIN: CPT | Performed by: OBSTETRICS & GYNECOLOGY

## 2020-08-12 ENCOUNTER — OFFICE VISIT (OUTPATIENT)
Dept: GYNECOLOGIC ONCOLOGY | Facility: CLINIC | Age: 45
End: 2020-08-12
Payer: COMMERCIAL

## 2020-08-12 VITALS
BODY MASS INDEX: 26.06 KG/M2 | RESPIRATION RATE: 18 BRPM | WEIGHT: 138 LBS | DIASTOLIC BLOOD PRESSURE: 104 MMHG | TEMPERATURE: 98.7 F | HEIGHT: 61 IN | HEART RATE: 70 BPM | SYSTOLIC BLOOD PRESSURE: 168 MMHG

## 2020-08-12 DIAGNOSIS — C53.8 MALIGNANT NEOPLASM OF OVERLAPPING SITES OF CERVIX (HCC): Primary | ICD-10-CM

## 2020-08-12 PROCEDURE — 88141 CYTOPATH C/V INTERPRET: CPT | Performed by: PATHOLOGY

## 2020-08-12 PROCEDURE — 88175 CYTOPATH C/V AUTO FLUID REDO: CPT | Performed by: PATHOLOGY

## 2020-08-12 PROCEDURE — 99213 OFFICE O/P EST LOW 20 MIN: CPT | Performed by: OBSTETRICS & GYNECOLOGY

## 2020-08-12 NOTE — PROGRESS NOTES
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 status post chemo radiation  She has had an excellent response  There is no visible or palpable evidence of recurrence of disease  Her PET scan has normalized essentially  Routine Pap smears performed  The patient will follow-up in approximately 3 months for repeat evaluation and Pap smear  CHIEF COMPLAINT:  Surveillance stage 1 B2 squamous cell carcinoma of cervix      Problem:  Cancer Staging  Malignant neoplasm of overlapping sites of cervix [facility])  Staging form: Cervix Uteri, AJCC 8th Edition  - Clinical stage from 5/1/2019: FIGO Stage IB2 (cT1b2, cN1, cM0) - Signed by Maldonado Moran MD on 5/1/2019        Previous therapy:  Oncology History   Returns for first follow up post pelvic/paraaortic and tandem and ring radiation completed on 7/5/19 7/16/19 Dr Yaritza Lewis  Complains of  diffuse pain, pelvic swelling and discomfort; no exam performed  Plan PET-CT in 3-4 months post treatment    7/23/19 Dr Little Gauthier certification issued    8/20/19 Dr Yaritza Lewis  9/17/19 Dr John Roberto     Malignant neoplasm of overlapping sites of cervix ([facility] [address] )   4/26/2019 Biopsy    Final Diagnosis   A  Cervix, 12:00, biopsy:  -  Invasive moderately to poorly differentiated non-keratinizing squamous cell carcinoma (see note)  5/1/2019 Initial Diagnosis    Malignant neoplasm of overlapping sites of cervix ([facility] [address] ) 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 with potential metastatic disease to the left iliac region and sacral region       5/1/2019 -  Cancer Staged    Staging form: Cervix Uteri, AJCC 8th Edition  - Clinical stage from 5/1/2019: FIGO Stage IB2 (cT1b2, cN1, cM0) - Signed by Maldonado Moran MD on 5/1/2019  Histologic grade (G): G3  Histologic grading system: 3 grade system       5/6/2019 -  Chemotherapy    CISplatin (PLATINOL) 62 4 mg in sodium chloride 0 9 % 250 mL infusion, 40 mg/m2 = 62 4 mg, Intravenous, Once, 6 of 6 cycles  Administration: 62 4 mg (5/21/2019), 62 4 mg (5/29/2019), 62 4 mg (6/4/2019), 62 4 mg (6/11/2019), 62 4 mg (6/18/2019), 62 4 mg (6/26/2019)     5/20/2019 - 7/3/2019 Radiation    Whole pelvic radiation therapy  45Gy in 25 daily fractions  With a parametrial boost of an additional 5 4Gy in 3 daily fractions  6/20/2019 - 7/5/2019 Radiation    HDR brachytherapy utilizing tandem and ring  6Gy in 5 fractions on dates: 6/20/19, 6/28/19, 6/25/19, 7/2/19, 7/5/19  Patient ID: Dilshad Magaña is a 41 y o  female  Patient is very pleasant 78-year-old female with a history of 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 treated with chemo radiation therapy completed approximately 1 year ago  Since that time she has been without evidence of recurrence of disease  Her most recent screen with PET scan several months ago revealed no acutely active disease  It showed continued resolution  She has not had a Pap smear  Patient remains with some significant degree of psychosocial difficulties  She has been working with [employer] from [employer] to help with housing and so forth  Today, the patient is doing well  She denies significant abdominal pain, pelvic pain, nausea, vomiting, constipation, diarrhea, fevers, chills, or vaginal bleeding  The following portions of the patient's history were reviewed and updated as appropriate: allergies, current medications, past family history, past medical history, past surgical history and problem list     Review of Systems   Constitutional: Negative  HENT: Negative  Eyes: Negative  Respiratory: Negative  Cardiovascular: Negative  Gastrointestinal: Negative  Endocrine: Negative  Genitourinary: Negative  Musculoskeletal: Negative  Skin: Negative  Neurological: Negative  Hematological: Negative  Psychiatric/Behavioral: Negative  Current Outpatient Medications   Medication Sig Dispense Refill    acetaminophen (TYLENOL) 325 mg tablet Take 2 tablets (650 mg total) by mouth every 6 (six) hours as needed for mild pain 30 tablet 0    Multiple Vitamins-Minerals (MULTIVITAMIN ADULT PO) multivitamin      Omega-3 Fatty Acids (FISH OIL) 1,000 mg Fish Oil       No current facility-administered medications for this visit  Objective:    Blood pressure (!) 168/104, pulse 70, temperature 98 7 °F (37 1 °C), resp  rate 18, height 5' 1" (1 549 m), weight 62 6 kg (138 lb), last menstrual period 04/15/2019, not currently breastfeeding  Body mass index is 26 07 kg/m²  Body surface area is 1 61 meters squared  Physical Exam  Constitutional:       Appearance: She is well-developed  HENT:      Head: Normocephalic and atraumatic  Eyes:      Pupils: Pupils are equal, round, and reactive to light  Neck:      Musculoskeletal: Normal range of motion and neck supple  Cardiovascular:      Rate and Rhythm: Normal rate and regular rhythm  Heart sounds: Normal heart sounds  Pulmonary:      Effort: Pulmonary effort is normal  No respiratory distress  Breath sounds: Normal breath sounds  Abdominal:      General: Bowel sounds are normal  There is no distension  Palpations: Abdomen is soft  Abdomen is not rigid  Tenderness: There is no abdominal tenderness  There is no guarding or rebound  Genitourinary:     Comments: -Normal external female genitalia, normal Bartholin's and [place]'s glands                  -Normal midline urethral meatus  No lesions notes                  -Bladder without fullness mass or tenderness                  -Vagina without lesion or discharge No significant cystocele or rectocele noted                  -Cervix normal appearing without visible lesions stable status post radiation    There is some degree of foreshortening of the left parametria  There are no palpable lesions with the in the parametria  There is normal mobility                  -Uterus with normal contour, mobility  No tenderness,                  -Adnexae without  mass or tenderness                  - Anus without fissure of lesion    Musculoskeletal: Normal range of motion  Lymphadenopathy:      Cervical: No cervical adenopathy  Upper Body:      Right upper body: No supraclavicular adenopathy  Left upper body: No supraclavicular adenopathy  Skin:     General: Skin is warm and dry  Neurological:      Mental Status: She is alert and oriented to person, place, and time     Psychiatric:         Behavior: Behavior normal

## 2020-08-12 NOTE — LETTER
August 12, 2020     [facility]  DO [place]aph[place]  [address]    Patient: Arielle Knowles   YOB: 1975   Date of Visit: 8/12/2020       Dear Dr Cortney Gayle: Thank you for referring Arielle Knowles to me for evaluation  Below are my notes for this consultation  If you have questions, please do not hesitate to call me  I look forward to following your patient along with you  Sincerely,        Erinn Burgess MD        CC: No Recipients  Erinn Burgess MD  8/12/2020 11:08 AM  Incomplete  Assessment/Plan:    Problem List Items Addressed This Visit        Genitourinary    Malignant neoplasm of overlapping sites of cervix [facility]) - Primary     Patient is very pleasant 80-year-old female with a history of 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 status post chemo radiation  She has had an excellent response  There is no visible or palpable evidence of recurrence of disease  Her PET scan has normalized essentially  Routine Pap smears performed  The patient will follow-up in approximately 3 months for repeat evaluation and Pap smear  CHIEF COMPLAINT:  Surveillance stage 1 B2 squamous cell carcinoma of cervix      Problem:  Cancer Staging  Malignant neoplasm of overlapping sites of cervix [facility])  Staging form: Cervix Uteri, AJCC 8th Edition  - Clinical stage from 5/1/2019: FIGO Stage IB2 (cT1b2, cN1, cM0) - Signed by Erinn Burgess MD on 5/1/2019        Previous therapy:  Oncology History   Returns for first follow up post pelvic/paraaortic and tandem and ring radiation completed on 7/5/19 7/16/19 Dr Joe Godoy  Complains of  diffuse pain, pelvic swelling and discomfort; no exam performed  Plan PET-CT in 3-4 months post treatment    7/23/19 Dr [facility] certification issued    8/20/19 Dr Joe Godoy  9/17/19 Dr Velasquez Metcalf     Malignant neoplasm of overlapping sites of cervix ([facility] [place] [address] )   4/26/2019 Biopsy    Final Diagnosis   A    Cervix, 12:00, biopsy:  -  Invasive moderately to poorly differentiated non-keratinizing squamous cell carcinoma (see note)  5/1/2019 Initial Diagnosis    Malignant neoplasm of overlapping sites of cervix ([facility] [address] ) 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 with potential metastatic disease to the left iliac region and sacral region  5/1/2019 -  Cancer Staged    Staging form: Cervix Uteri, AJCC 8th Edition  - Clinical stage from 5/1/2019: FIGO Stage IB2 (cT1b2, cN1, cM0) - Signed by Josiah Virgen MD on 5/1/2019  Histologic grade (G): G3  Histologic grading system: 3 grade system       5/6/2019 -  Chemotherapy    CISplatin (PLATINOL) 62 4 mg in sodium chloride 0 9 % 250 mL infusion, 40 mg/m2 = 62 4 mg, Intravenous, Once, 6 of 6 cycles  Administration: 62 4 mg (5/21/2019), 62 4 mg (5/29/2019), 62 4 mg (6/4/2019), 62 4 mg (6/11/2019), 62 4 mg (6/18/2019), 62 4 mg (6/26/2019)     5/20/2019 - 7/3/2019 Radiation    Whole pelvic radiation therapy  45Gy in 25 daily fractions  With a parametrial boost of an additional 5 4Gy in 3 daily fractions  6/20/2019 - 7/5/2019 Radiation    HDR brachytherapy utilizing tandem and ring  6Gy in 5 fractions on dates: 6/20/19, 6/28/19, 6/25/19, 7/2/19, 7/5/19  Patient ID: Rhiannon Amaral is a 41 y o  female  Patient is very pleasant 80-year-old female with a history of 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 treated with chemo radiation therapy completed approximately 1 year ago  Since that time she has been without evidence of recurrence of disease  Her most recent screen with PET scan several months ago revealed no acutely active disease  It showed continued resolution  She has not had a Pap smear  Patient remains with some significant degree of psychosocial difficulties  She has been working with [employer] from [employer] to help with housing and so forth  Today, the patient is doing well    She denies significant abdominal pain, pelvic pain, nausea, vomiting, constipation, diarrhea, fevers, chills, or vaginal bleeding  The following portions of the patient's history were reviewed and updated as appropriate: allergies, current medications, past family history, past medical history, past surgical history and problem list     Review of Systems   Constitutional: Negative  HENT: Negative  Eyes: Negative  Respiratory: Negative  Cardiovascular: Negative  Gastrointestinal: Negative  Endocrine: Negative  Genitourinary: Negative  Musculoskeletal: Negative  Skin: Negative  Neurological: Negative  Hematological: Negative  Psychiatric/Behavioral: Negative  Current Outpatient Medications   Medication Sig Dispense Refill    acetaminophen (TYLENOL) 325 mg tablet Take 2 tablets (650 mg total) by mouth every 6 (six) hours as needed for mild pain 30 tablet 0    Multiple Vitamins-Minerals (MULTIVITAMIN ADULT PO) multivitamin      Omega-3 Fatty Acids (FISH OIL) 1,000 mg Fish Oil       No current facility-administered medications for this visit  Objective:    Blood pressure (!) 168/104, pulse 70, temperature 98 7 °F (37 1 °C), resp  rate 18, height 5' 1" (1 549 m), weight 62 6 kg (138 lb), last menstrual period 04/15/2019, not currently breastfeeding  Body mass index is 26 07 kg/m²  Body surface area is 1 61 meters squared  Physical Exam  Constitutional:       Appearance: She is well-developed  HENT:      Head: Normocephalic and atraumatic  Eyes:      Pupils: Pupils are equal, round, and reactive to light  Neck:      Musculoskeletal: Normal range of motion and neck supple  Cardiovascular:      Rate and Rhythm: Normal rate and regular rhythm  Heart sounds: Normal heart sounds  Pulmonary:      Effort: Pulmonary effort is normal  No respiratory distress  Breath sounds: Normal breath sounds  Abdominal:      General: Bowel sounds are normal  There is no distension  Palpations: Abdomen is soft   Abdomen is not rigid  Tenderness: There is no abdominal tenderness  There is no guarding or rebound  Genitourinary:     Comments: -Normal external female genitalia, normal Bartholin's and [place]'s glands                  -Normal midline urethral meatus  No lesions notes                  -Bladder without fullness mass or tenderness                  -Vagina without lesion or discharge No significant cystocele or rectocele noted                  -Cervix normal appearing without visible lesions stable status post radiation  There is some degree of foreshortening of the left parametria  There are no palpable lesions with the in the parametria  There is normal mobility                  -Uterus with normal contour, mobility  No tenderness,                  -Adnexae without  mass or tenderness                  - Anus without fissure of lesion    Musculoskeletal: Normal range of motion  Lymphadenopathy:      Cervical: No cervical adenopathy  Upper Body:      Right upper body: No supraclavicular adenopathy  Left upper body: No supraclavicular adenopathy  Skin:     General: Skin is warm and dry  Neurological:      Mental Status: She is alert and oriented to person, place, and time     Psychiatric:         Behavior: Behavior normal

## 2020-08-12 NOTE — ASSESSMENT & PLAN NOTE
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

## 2020-08-19 LAB
LAB AP GYN PRIMARY INTERPRETATION: ABNORMAL
Lab: ABNORMAL
PATH INTERP SPEC-IMP: ABNORMAL

## 2020-11-11 ENCOUNTER — OFFICE VISIT (OUTPATIENT)
Dept: GYNECOLOGIC ONCOLOGY | Facility: CLINIC | Age: 45
End: 2020-11-11
Payer: COMMERCIAL

## 2020-11-11 VITALS
HEIGHT: 61 IN | TEMPERATURE: 98.4 F | WEIGHT: 139 LBS | SYSTOLIC BLOOD PRESSURE: 150 MMHG | RESPIRATION RATE: 16 BRPM | HEART RATE: 62 BPM | DIASTOLIC BLOOD PRESSURE: 98 MMHG | BODY MASS INDEX: 26.24 KG/M2

## 2020-11-11 DIAGNOSIS — C53.9 MALIGNANT NEOPLASM OF CERVIX, UNSPECIFIED SITE (HCC): ICD-10-CM

## 2020-11-11 DIAGNOSIS — C53.8 MALIGNANT NEOPLASM OF OVERLAPPING SITES OF CERVIX (HCC): ICD-10-CM

## 2020-11-11 DIAGNOSIS — D06.9 SEVERE DYSPLASIA OF CERVIX: ICD-10-CM

## 2020-11-11 DIAGNOSIS — Z78.9 NEED FOR FOLLOW-UP BY SOCIAL WORKER: Primary | ICD-10-CM

## 2020-11-11 PROCEDURE — 88141 CYTOPATH C/V INTERPRET: CPT | Performed by: PATHOLOGY

## 2020-11-11 PROCEDURE — 99213 OFFICE O/P EST LOW 20 MIN: CPT | Performed by: OBSTETRICS & GYNECOLOGY

## 2020-11-11 PROCEDURE — 88175 CYTOPATH C/V AUTO FLUID REDO: CPT | Performed by: PATHOLOGY

## 2020-11-11 PROCEDURE — 57452 EXAM OF CERVIX W/SCOPE: CPT | Performed by: OBSTETRICS & GYNECOLOGY

## 2020-11-17 LAB
LAB AP GYN PRIMARY INTERPRETATION: ABNORMAL
Lab: ABNORMAL
PATH INTERP SPEC-IMP: ABNORMAL

## 2020-11-24 ENCOUNTER — PATIENT OUTREACH (OUTPATIENT)
Dept: CASE MANAGEMENT | Facility: HOSPITAL | Age: 45
End: 2020-11-24

## 2020-11-30 ENCOUNTER — PATIENT OUTREACH (OUTPATIENT)
Dept: CASE MANAGEMENT | Facility: HOSPITAL | Age: 45
End: 2020-11-30

## 2021-01-01 NOTE — ADDENDUM NOTE
Addendum  created 07/05/19 1056 by Marcin Valdovinos CRNA    Intraprocedure Meds edited EOAE (evoked otoacoustic emission)

## 2021-03-03 ENCOUNTER — OFFICE VISIT (OUTPATIENT)
Dept: GYNECOLOGIC ONCOLOGY | Facility: CLINIC | Age: 46
End: 2021-03-03
Payer: COMMERCIAL

## 2021-03-03 VITALS
TEMPERATURE: 98.6 F | HEART RATE: 99 BPM | DIASTOLIC BLOOD PRESSURE: 100 MMHG | HEIGHT: 61 IN | BODY MASS INDEX: 28.04 KG/M2 | WEIGHT: 148.5 LBS | RESPIRATION RATE: 18 BRPM | SYSTOLIC BLOOD PRESSURE: 130 MMHG

## 2021-03-03 DIAGNOSIS — L91.8 SKIN TAG: ICD-10-CM

## 2021-03-03 DIAGNOSIS — R87.613 HSIL ON PAP SMEAR OF CERVIX: ICD-10-CM

## 2021-03-03 DIAGNOSIS — C53.8 MALIGNANT NEOPLASM OF OVERLAPPING SITES OF CERVIX (HCC): ICD-10-CM

## 2021-03-03 DIAGNOSIS — Z12.31 ENCOUNTER FOR SCREENING MAMMOGRAM FOR MALIGNANT NEOPLASM OF BREAST: Primary | ICD-10-CM

## 2021-03-03 PROCEDURE — 57500 BIOPSY OF CERVIX: CPT | Performed by: OBSTETRICS & GYNECOLOGY

## 2021-03-03 PROCEDURE — 88305 TISSUE EXAM BY PATHOLOGIST: CPT | Performed by: PATHOLOGY

## 2021-03-03 PROCEDURE — 88175 CYTOPATH C/V AUTO FLUID REDO: CPT | Performed by: PATHOLOGY

## 2021-03-03 PROCEDURE — 88141 CYTOPATH C/V INTERPRET: CPT | Performed by: PATHOLOGY

## 2021-03-03 PROCEDURE — 11200 RMVL SKIN TAGS UP TO&INC 15: CPT | Performed by: OBSTETRICS & GYNECOLOGY

## 2021-03-03 PROCEDURE — 99214 OFFICE O/P EST MOD 30 MIN: CPT | Performed by: OBSTETRICS & GYNECOLOGY

## 2021-03-03 NOTE — PROGRESS NOTES
Assessment/Plan:    Problem List Items Addressed This Visit        Genitourinary    Malignant neoplasm of overlapping sites of cervix [facility])       Patient has a history of cervical cancer stage I B2  She is status post treatment with chemo radiation and over a year out  She has no evidence of recurrence of disease  Her most recent Pap smear revealed high-grade LUIS  Colposcopy today revealed an essential normal colposcopy with the exception of some radiation change  No biopsies were performed however Pap smear was reperformed  Additionally a patient on complained of a right labial skin tag  This was removed and sent to pathology without difficulty  Finally with regard to cancer survivor ship the patient has significant stressors seem to be improved  She is due for mammogram and this was ordered  Patient will follow-up in 3 months for repeat evaluation  Other Visit Diagnoses     Encounter for screening mammogram for malignant neoplasm of breast    -  Primary    Relevant Orders    Mammo screening bilateral w 3d & cad    Skin tag        Relevant Orders    Skin tag removal    HSIL on Pap smear of cervix        Relevant Orders    Colposcopy            CHIEF COMPLAINT:  Surveillance endocervical cancer/ New skin tag in vulva    Problem:  Cancer Staging  Malignant neoplasm of overlapping sites of cervix [facility])  Staging form: Cervix Uteri, AJCC 8th Edition  - Clinical stage from 5/1/2019: FIGO Stage IB2 (cT1b2, cN1, cM0) - Signed by Sharyn Madison MD on 5/1/2019        Previous therapy:  Oncology History Overview Note   Returns for first follow up post pelvic/paraaortic and tandem and ring radiation completed on 7/5/19 7/16/19 Dr Lito Wolfe  Complains of  diffuse pain, pelvic swelling and discomfort; no exam performed   Plan PET-CT in 3-4 months post treatment    7/23/19 Dr Julee Silva certification issued    8/20/19 Dr Lito Wolfe  9/17/19 Dr Grande Few     Malignant neoplasm of overlapping sites of cervix ([facility] )   4/26/2019 Biopsy    Final Diagnosis   A  Cervix, 12:00, biopsy:  -  Invasive moderately to poorly differentiated non-keratinizing squamous cell carcinoma (see note)  5/1/2019 Initial Diagnosis    [facility] 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 with potential metastatic disease to the left iliac region and sacral region  5/1/2019 -  Cancer Staged    Staging form: Cervix Uteri, AJCC 8th Edition  - Clinical stage from 5/1/2019: FIGO Stage IB2 (cT1b2, cN1, cM0) - Signed by Avinash Harmon MD on 5/1/2019  Histologic grade (G): G3  Histologic grading system: 3 grade system       5/6/2019 -  Chemotherapy    CISplatin (PLATINOL) 62 4 mg in sodium chloride 0 9 % 250 mL infusion, 40 mg/m2 = 62 4 mg, Intravenous, Once, 6 of 6 cycles  Administration: 62 4 mg (5/21/2019), 62 4 mg (5/29/2019), 62 4 mg (6/4/2019), 62 4 mg (6/11/2019), 62 4 mg (6/18/2019), 62 4 mg (6/26/2019)     5/20/2019 - 7/3/2019 Radiation    Whole pelvic radiation therapy  45Gy in 25 daily fractions  With a parametrial boost of an additional 5 4Gy in 3 daily fractions  6/20/2019 - 7/5/2019 Radiation    HDR brachytherapy utilizing tandem and ring  6Gy in 5 fractions on dates: 6/20/19, 6/28/19, 6/25/19, 7/2/19, 7/5/19  Patient ID: Sherrie Winston is a 41 y o  female   Patient is very pleasant 31-year-old female with history of stage I B2 cervical carcinoma diagnosed in 2019  She was treated with chemo radiation therapy and has been disease free since that time  She was last seen 3 months ago  Exam was unremarkable however Pap smear revealed high-grade LUIS  Patient presents in follow-up  She is without complaint  Today, the patient is doing well  She denies significant abdominal pain, pelvic pain, nausea, vomiting, constipation, diarrhea, fevers, chills, or vaginal bleeding    The patient continues to have difficult psychosocial situations with work and financial issues these are improving     These have been managed by social Service interaction nearly every time the patient has been here  The patient has noted a new skin tag over the past several months  It has grown since last visit  She would like this removed  The following portions of the patient's history were reviewed and updated as appropriate: allergies, current medications, past family history, past social history, past surgical history and problem list     Review of Systems   Constitutional: Negative  HENT: Negative  Eyes: Negative  Respiratory: Negative  Cardiovascular: Negative  Gastrointestinal: Negative  Endocrine: Negative  Genitourinary: Negative  Musculoskeletal: Negative  Skin: Negative  Neurological: Negative  Hematological: Negative  Psychiatric/Behavioral: Negative  Current Outpatient Medications   Medication Sig Dispense Refill    acetaminophen (TYLENOL) 325 mg tablet Take 2 tablets (650 mg total) by mouth every 6 (six) hours as needed for mild pain 30 tablet 0    Multiple Vitamins-Minerals (MULTIVITAMIN ADULT PO) multivitamin      Omega-3 Fatty Acids (FISH OIL) 1,000 mg Fish Oil       No current facility-administered medications for this visit  Objective:    Blood pressure 130/100, pulse 99, temperature 98 6 °F (37 °C), resp  rate 18, height 5' 1" (1 549 m), weight 67 4 kg (148 lb 8 oz), last menstrual period 04/15/2019, not currently breastfeeding  Body mass index is 28 06 kg/m²  Body surface area is 1 67 meters squared  Physical Exam  Constitutional:       Appearance: She is well-developed  HENT:      Head: Normocephalic and atraumatic  Eyes:      Pupils: Pupils are equal, round, and reactive to light  Neck:      Musculoskeletal: Normal range of motion and neck supple  Cardiovascular:      Rate and Rhythm: Normal rate and regular rhythm  Heart sounds: Normal heart sounds     Pulmonary: Effort: Pulmonary effort is normal  No respiratory distress  Breath sounds: Normal breath sounds  Abdominal:      General: Bowel sounds are normal  There is no distension  Palpations: Abdomen is soft  Abdomen is not rigid  Tenderness: There is no abdominal tenderness  There is no guarding or rebound  Genitourinary:     Comments: -Normal external female genitaliaWith 4 mm skin tag in right labia majora, normal Bartholin's and [place]'s glands                  -Normal midline urethral meatus  No lesions notes                  -Bladder without fullness mass or tenderness                  -Vagina without lesion or discharge No significant cystocele or rectocele noted                  -Cervix normal appearing without visible lesions                  -Uterus with normal contour, mobility  No tenderness,                  -Adnexae without  mass or tenderness                  - Anus without fissure of lesion    Musculoskeletal: Normal range of motion  Lymphadenopathy:      Cervical: No cervical adenopathy  Upper Body:      Right upper body: No supraclavicular adenopathy  Left upper body: No supraclavicular adenopathy  Skin:     General: Skin is warm and dry  Neurological:      Mental Status: She is alert and oriented to person, place, and time     Psychiatric:         Behavior: Behavior normal          Colposcopy    Date/Time: 3/3/2021 9:24 AM  Performed by: Dinah Thomas MD  Authorized by: Dinah Thomas MD     Consent:     Consent obtained:  Verbal    Consent given by:  Patient    Procedural risks discussed:  Bleeding    Patient questions answered: yes      Patient agrees, verbalizes understanding, and wants to proceed: yes    Indication:     Indication:  HSIL  Procedure:     Procedure: Biopsy of cervix only      Under satisfactory analgesia the patient was prepped and draped in the dorsal lithotomy position: yes      [place] speculum was placed in the vagina: yes      Under colposcopic examination the transition zone was seen in entirety: yes      Cervical biopsy performed with a cervical biopsy punch: no      Biopsy(s): no    Post-procedure:     Impression comment:  Post radiation changes  Comments: The patient's cervix has a surprisingly visible and normal-appearing squamocolumnar junction  There is mild neovascularity consistent with prior radiation therapy  There are no significant aceto-white changes  There are no punctation sore mosaicism  Overall this appears to be normal post radiation cervix  Routine Pap smear was performed  Skin tag removal    Date/Time: 3/3/2021 9:25 AM  Performed by: Brayan Estrada MD  Authorized by: Brayan Estrada MD   Universal Protocol:  Consent: Verbal consent obtained  Consent given by: patient  Patient understanding: patient states understanding of the procedure being performed  Patient identity confirmed: verbally with patient        Procedure Details - Skin Tag Destruction:     Up to 15      Body area: Anogenital    Anogenital location:  Vulva    Initial size (mm):  4    Final defect size (mm):  0    Malignancy: benign lesion      Destruction method: scissors used for extraction    Lesion 6:        Only 1 lesion noted  Area cleansed with Betadine prior to  Removal   Skin infiltrated with 1% lidocaine prior to removal    Specimen sent to pathology for further identification

## 2021-03-03 NOTE — LETTER
March 3, 2021     [facility], DO  [address]  [facility]    Patient: Nighat Knox   YOB: 1975   Date of Visit: 3/3/2021       Dear Dr Carlee Sparks: Thank you for referring Nighat Knox to me for evaluation  Below are my notes for this consultation  If you have questions, please do not hesitate to call me  I look forward to following your patient along with you  Sincerely,        Kareen Preston MD        CC: No Recipients  Kareen Preston MD  3/3/2021  9:31 AM  Sign when Signing Visit  Assessment/Plan:    Problem List Items Addressed This Visit        Genitourinary    Malignant neoplasm of overlapping sites of cervix [facility])       Patient has a history of cervical cancer stage I B2  She is status post treatment with chemo radiation and over a year out  She has no evidence of recurrence of disease  Her most recent Pap smear revealed high-grade LUIS  Colposcopy today revealed an essential normal colposcopy with the exception of some radiation change  No biopsies were performed however Pap smear was reperformed  Additionally a patient on complained of a right labial skin tag  This was removed and sent to pathology without difficulty  Finally with regard to cancer survivor ship the patient has significant stressors seem to be improved  She is due for mammogram and this was ordered  Patient will follow-up in 3 months for repeat evaluation             Other Visit Diagnoses     Encounter for screening mammogram for malignant neoplasm of breast    -  Primary    Relevant Orders    Mammo screening bilateral w 3d & cad    Skin tag        Relevant Orders    Skin tag removal    HSIL on Pap smear of cervix        Relevant Orders    Colposcopy            CHIEF COMPLAINT:  Surveillance endocervical cancer/ New skin tag in vulva    Problem:  Cancer Staging  Malignant neoplasm of overlapping sites of cervix [facility])  Staging form: Cervix Uteri, AJCC 8th Edition  - Clinical stage from 5/1/2019: FIGO Stage IB2 (cT1b2, cN1, cM0) - Signed by Henrik Harris MD on 5/1/2019        Previous therapy:  Oncology History Overview Note   Returns for first follow up post pelvic/paraaortic and tandem and ring radiation completed on 7/5/19 7/16/19 Dr Fantasma Kim of  diffuse pain, pelvic swelling and discomfort; no exam performed  Plan PET-CT in 3-4 months post treatment    7/23/19 Dr Socorro Skinner certification issued    8/20/19 Dr Reza Brown  9/17/19 Dr Maia Ma     Malignant neoplasm of overlapping sites of cervix ([facility] [address] )   4/26/2019 Biopsy    Final Diagnosis   A  Cervix, 12:00, biopsy:  -  Invasive moderately to poorly differentiated non-keratinizing squamous cell carcinoma (see note)  5/1/2019 Initial Diagnosis    Malignant neoplasm of overlapping sites of cervix ([facility] [address] ) 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 with potential metastatic disease to the left iliac region and sacral region  5/1/2019 -  Cancer Staged    Staging form: Cervix Uteri, AJCC 8th Edition  - Clinical stage from 5/1/2019: FIGO Stage IB2 (cT1b2, cN1, cM0) - Signed by Henrik Harris MD on 5/1/2019  Histologic grade (G): G3  Histologic grading system: 3 grade system       5/6/2019 -  Chemotherapy    CISplatin (PLATINOL) 62 4 mg in sodium chloride 0 9 % 250 mL infusion, 40 mg/m2 = 62 4 mg, Intravenous, Once, 6 of 6 cycles  Administration: 62 4 mg (5/21/2019), 62 4 mg (5/29/2019), 62 4 mg (6/4/2019), 62 4 mg (6/11/2019), 62 4 mg (6/18/2019), 62 4 mg (6/26/2019)     5/20/2019 - 7/3/2019 Radiation    Whole pelvic radiation therapy  45Gy in 25 daily fractions  With a parametrial boost of an additional 5 4Gy in 3 daily fractions  6/20/2019 - 7/5/2019 Radiation    HDR brachytherapy utilizing tandem and ring  6Gy in 5 fractions on dates: 6/20/19, 6/28/19, 6/25/19, 7/2/19, 7/5/19               Patient ID: Ronelle Graven is a 41 y o  female   Patient is very pleasant 71-year-old female with history of stage I B2 cervical carcinoma diagnosed in 2019  She was treated with chemo radiation therapy and has been disease free since that time  She was last seen 3 months ago  Exam was unremarkable however Pap smear revealed high-grade LUIS  Patient presents in follow-up  She is without complaint  Today, the patient is doing well  She denies significant abdominal pain, pelvic pain, nausea, vomiting, constipation, diarrhea, fevers, chills, or vaginal bleeding  The patient continues to have difficult psychosocial situations with work and financial issues these are improving     These have been managed by social Service interaction nearly every time the patient has been here  The patient has noted a new skin tag over the past several months  It has grown since last visit  She would like this removed  The following portions of the patient's history were reviewed and updated as appropriate: allergies, current medications, past family history, past social history, past surgical history and problem list     Review of Systems   Constitutional: Negative  HENT: Negative  Eyes: Negative  Respiratory: Negative  Cardiovascular: Negative  Gastrointestinal: Negative  Endocrine: Negative  Genitourinary: Negative  Musculoskeletal: Negative  Skin: Negative  Neurological: Negative  Hematological: Negative  Psychiatric/Behavioral: Negative  Current Outpatient Medications   Medication Sig Dispense Refill    acetaminophen (TYLENOL) 325 mg tablet Take 2 tablets (650 mg total) by mouth every 6 (six) hours as needed for mild pain 30 tablet 0    Multiple Vitamins-Minerals (MULTIVITAMIN ADULT PO) multivitamin      Omega-3 Fatty Acids (FISH OIL) 1,000 mg Fish Oil       No current facility-administered medications for this visit  Objective:    Blood pressure 130/100, pulse 99, temperature 98 6 °F (37 °C), resp   rate 18, height 5' 1" (1 549 m), weight 67 4 kg (148 lb 8 oz), last menstrual period 04/15/2019, not currently breastfeeding  Body mass index is 28 06 kg/m²  Body surface area is 1 67 meters squared  Physical Exam  Constitutional:       Appearance: She is well-developed  HENT:      Head: Normocephalic and atraumatic  Eyes:      Pupils: Pupils are equal, round, and reactive to light  Neck:      Musculoskeletal: Normal range of motion and neck supple  Cardiovascular:      Rate and Rhythm: Normal rate and regular rhythm  Heart sounds: Normal heart sounds  Pulmonary:      Effort: Pulmonary effort is normal  No respiratory distress  Breath sounds: Normal breath sounds  Abdominal:      General: Bowel sounds are normal  There is no distension  Palpations: Abdomen is soft  Abdomen is not rigid  Tenderness: There is no abdominal tenderness  There is no guarding or rebound  Genitourinary:     Comments: -Normal external female genitaliaWith 4 mm skin tag in right labia majora, normal Bartholin's and [place]'s glands                  -Normal midline urethral meatus  No lesions notes                  -Bladder without fullness mass or tenderness                  -Vagina without lesion or discharge No significant cystocele or rectocele noted                  -Cervix normal appearing without visible lesions                  -Uterus with normal contour, mobility  No tenderness,                  -Adnexae without  mass or tenderness                  - Anus without fissure of lesion    Musculoskeletal: Normal range of motion  Lymphadenopathy:      Cervical: No cervical adenopathy  Upper Body:      Right upper body: No supraclavicular adenopathy  Left upper body: No supraclavicular adenopathy  Skin:     General: Skin is warm and dry  Neurological:      Mental Status: She is alert and oriented to person, place, and time     Psychiatric:         Behavior: Behavior normal          Colposcopy    Date/Time: 3/3/2021 9:24 AM  Performed by: Seda Sanches MD  Authorized by: Dinah Thomas MD     Consent:     Consent obtained:  Verbal    Consent given by:  Patient    Procedural risks discussed:  Bleeding    Patient questions answered: yes      Patient agrees, verbalizes understanding, and wants to proceed: yes    Indication:     Indication:  HSIL  Procedure:     Procedure: Biopsy of cervix only      Under satisfactory analgesia the patient was prepped and draped in the dorsal lithotomy position: yes      Cohoes speculum was placed in the vagina: yes      Under colposcopic examination the transition zone was seen in entirety: yes      Cervical biopsy performed with a cervical biopsy punch: no      Biopsy(s): no    Post-procedure:     Impression comment:  Post radiation changes  Comments: The patient's cervix has a surprisingly visible and normal-appearing squamocolumnar junction  There is mild neovascularity consistent with prior radiation therapy  There are no significant aceto-white changes  There are no punctation sore mosaicism  Overall this appears to be normal post radiation cervix  Routine Pap smear was performed  Skin tag removal    Date/Time: 3/3/2021 9:25 AM  Performed by: Dinah Thomas MD  Authorized by: Dinah Thomas MD   Universal Protocol:  Consent: Verbal consent obtained  Consent given by: patient  Patient understanding: patient states understanding of the procedure being performed  Patient identity confirmed: verbally with patient        Procedure Details - Skin Tag Destruction:     Up to 15      Body area: Anogenital    Anogenital location:  Vulva    Initial size (mm):  4    Final defect size (mm):  0    Malignancy: benign lesion      Destruction method: scissors used for extraction    Lesion 6:        Only 1 lesion noted  Area cleansed with Betadine prior to  Removal   Skin infiltrated with 1% lidocaine prior to removal    Specimen sent to pathology for further identification

## 2021-03-03 NOTE — ASSESSMENT & PLAN NOTE
Patient has a history of cervical cancer stage I B2  She is status post treatment with chemo radiation and over a year out  She has no evidence of recurrence of disease  Her most recent Pap smear revealed high-grade LUIS  Colposcopy today revealed an essential normal colposcopy with the exception of some radiation change  No biopsies were performed however Pap smear was reperformed  Additionally a patient on complained of a right labial skin tag  This was removed and sent to pathology without difficulty  Finally with regard to cancer survivor ship the patient has significant stressors seem to be improved  She is due for mammogram and this was ordered  Patient will follow-up in 3 months for repeat evaluation

## 2021-03-10 LAB
LAB AP GYN PRIMARY INTERPRETATION: ABNORMAL
Lab: ABNORMAL
PATH INTERP SPEC-IMP: ABNORMAL

## 2022-01-04 ENCOUNTER — HOSPITAL ENCOUNTER (EMERGENCY)
Facility: HOSPITAL | Age: 47
Discharge: HOME | End: 2022-01-04
Attending: EMERGENCY MEDICINE
Payer: COMMERCIAL

## 2022-01-04 VITALS
DIASTOLIC BLOOD PRESSURE: 79 MMHG | BODY MASS INDEX: 28.06 KG/M2 | OXYGEN SATURATION: 100 % | SYSTOLIC BLOOD PRESSURE: 188 MMHG | TEMPERATURE: 97.6 F | RESPIRATION RATE: 19 BRPM | HEART RATE: 60 BPM | HEIGHT: 61 IN

## 2022-01-04 DIAGNOSIS — U07.1 COVID-19: Primary | ICD-10-CM

## 2022-01-04 DIAGNOSIS — K62.5 RECTAL BLEEDING: ICD-10-CM

## 2022-01-04 LAB
ATRIAL RATE: 54 BPM
BACTERIA UR QL AUTO: ABNORMAL /HPF
BASOPHILS # BLD AUTO: 0.02 THOUSANDS/ΜL (ref 0–0.1)
BASOPHILS NFR BLD AUTO: 0 % (ref 0–1)
BILIRUB UR QL STRIP: NEGATIVE
CLARITY UR: CLEAR
COLOR UR: YELLOW
EOSINOPHIL # BLD AUTO: 0.02 THOUSAND/ΜL (ref 0–0.61)
EOSINOPHIL NFR BLD AUTO: 0 % (ref 0–6)
ERYTHROCYTE [DISTWIDTH] IN BLOOD BY AUTOMATED COUNT: 13.8 % (ref 11.6–15.1)
FLUAV RNA RESP QL NAA+PROBE: NEGATIVE
FLUBV RNA RESP QL NAA+PROBE: NEGATIVE
GLUCOSE UR STRIP-MCNC: NEGATIVE MG/DL
HCT VFR BLD AUTO: 49.7 % (ref 34.8–46.1)
HGB BLD-MCNC: 16.4 G/DL (ref 11.5–15.4)
HGB UR QL STRIP.AUTO: ABNORMAL
IMM GRANULOCYTES # BLD AUTO: 0.01 THOUSAND/UL (ref 0–0.2)
IMM GRANULOCYTES NFR BLD AUTO: 0 % (ref 0–2)
KETONES UR STRIP-MCNC: NEGATIVE MG/DL
LEUKOCYTE ESTERASE UR QL STRIP: NEGATIVE
LYMPHOCYTES # BLD AUTO: 0.9 THOUSANDS/ΜL (ref 0.6–4.47)
LYMPHOCYTES NFR BLD AUTO: 20 % (ref 14–44)
MCH RBC QN AUTO: 27.6 PG (ref 26.8–34.3)
MCHC RBC AUTO-ENTMCNC: 33 G/DL (ref 31.4–37.4)
MCV RBC AUTO: 84 FL (ref 82–98)
MONOCYTES # BLD AUTO: 0.43 THOUSAND/ΜL (ref 0.17–1.22)
MONOCYTES NFR BLD AUTO: 10 % (ref 4–12)
NEUTROPHILS # BLD AUTO: 3.16 THOUSANDS/ΜL (ref 1.85–7.62)
NEUTS SEG NFR BLD AUTO: 70 % (ref 43–75)
NITRITE UR QL STRIP: NEGATIVE
NON-SQ EPI CELLS URNS QL MICRO: ABNORMAL /HPF
NRBC BLD AUTO-RTO: 0 /100 WBCS
P AXIS: 71 DEGREES
PH UR STRIP.AUTO: 6.5 [PH]
PLATELET # BLD AUTO: 117 THOUSANDS/UL (ref 149–390)
PMV BLD AUTO: 11.1 FL (ref 8.9–12.7)
PR INTERVAL: 144 MS
PROT UR STRIP-MCNC: ABNORMAL MG/DL
QRS AXIS: 91 DEGREES
QRSD INTERVAL: 96 MS
QT INTERVAL: 432 MS
QTC INTERVAL: 409 MS
RBC # BLD AUTO: 5.94 MILLION/UL (ref 3.81–5.12)
RBC #/AREA URNS AUTO: ABNORMAL /HPF
RSV RNA RESP QL NAA+PROBE: NEGATIVE
SARS-COV-2 RNA RESP QL NAA+PROBE: POSITIVE
SP GR UR STRIP.AUTO: 1.02 (ref 1–1.03)
T WAVE AXIS: 73 DEGREES
UROBILINOGEN UR QL STRIP.AUTO: 0.2 E.U./DL
VENTRICULAR RATE: 54 BPM
WBC # BLD AUTO: 4.54 THOUSAND/UL (ref 4.31–10.16)
WBC #/AREA URNS AUTO: ABNORMAL /HPF

## 2022-01-04 PROCEDURE — 93005 ELECTROCARDIOGRAM TRACING: CPT

## 2022-01-04 PROCEDURE — 99285 EMERGENCY DEPT VISIT HI MDM: CPT

## 2022-01-04 PROCEDURE — 93010 ELECTROCARDIOGRAM REPORT: CPT | Performed by: INTERNAL MEDICINE

## 2022-01-04 PROCEDURE — 0241U HB NFCT DS VIR RESP RNA 4 TRGT: CPT | Performed by: EMERGENCY MEDICINE

## 2022-01-04 PROCEDURE — 36415 COLL VENOUS BLD VENIPUNCTURE: CPT

## 2022-01-04 PROCEDURE — 81001 URINALYSIS AUTO W/SCOPE: CPT | Performed by: EMERGENCY MEDICINE

## 2022-01-04 PROCEDURE — 85025 COMPLETE CBC W/AUTO DIFF WBC: CPT | Performed by: EMERGENCY MEDICINE

## 2022-01-04 PROCEDURE — 99285 EMERGENCY DEPT VISIT HI MDM: CPT | Performed by: EMERGENCY MEDICINE

## 2022-01-04 NOTE — ED PROVIDER NOTES
History  Chief Complaint   Patient presents with    Rectal Bleeding     Patient has been having diarrhea and blood in the stool x2 days  Patient is negative for vomiting  Pt having lower middle abdominal pain     HPI patient is a 43-year-old female, reports for the last 2 days she has had fairly liquid diarrhea  She reported seeing some blood in her stool  She reports some bright red blood in around her stool  Patient reports with the last bowel movement she had there was no further bleeding  She denies any rectal pain  She denies any abdominal pain  Patient reports that she has felt some body aches but otherwise feels well  Patient works as a  in [place]  Patient does not know of any COVID exposure  Patient denies any previous abdominal pathology  Past medical history of cervical cancer, low back pain lymphadenopathy pelvic pain  Family history noncontributory  Social history, smoker, employed      Prior to Admission Medications   Prescriptions Last Dose Informant Patient Reported? Taking? Multiple Vitamins-Minerals (MULTIVITAMIN ADULT PO)  Self Yes No   Sig: multivitamin   Omega-3 Fatty Acids (FISH OIL) 1,000 mg  Self Yes No   Sig: Fish Oil   acetaminophen (TYLENOL) 325 mg tablet  Self No No   Sig: Take 2 tablets (650 mg total) by mouth every 6 (six) hours as needed for mild pain      Facility-Administered Medications: None       Past Medical History:   Diagnosis Date    Abnormal Pap smear of cervix     Acute hip pain     Cancer (HCC)     cervix    Hypokalemia     Low back pain     Lymphadenopathy     Pelvic pain        Past Surgical History:   Procedure Laterality Date    ID BRONCHOSCOPY NEEDLE BX TRACHEA MAIN STEM&/BRON N/A 2/26/2020    Procedure: ENDOBRONCHIAL ULTRASOUND (EBUS);   Surgeon: Twin Nazario MD;  [facility];  Service: Thoracic    ID [address] N/A 2/26/2020    Procedure: Joel Bonilla;  Surgeon: Twin Nazario MD;  [facility];  Service: Thoracic    [place] DILATION OF VAGINA W ANESTH N/A 2019    Procedure: EXAM UNDER ANESTHESIA (EUA); Surgeon: Ese Casiano MD;  [facility];  Service: Gynecology Oncology    [place] INSERT VAGINAL RADIATION DEVICE N/A 2019    Procedure: PIERRE SLEEVE PLACEMENT;  Surgeon: Ese Casiano MD;  [facility];  Service: Gynecology Oncology    TONSILLECTOMY      US GUIDANCE  2019       Family History   Problem Relation Age of Onset    Uterine cancer Maternal Grandmother     Heart disease Mother     Canavan disease Father     Cancer Father      I have reviewed and agree with the history as documented  E-Cigarette/Vaping    E-Cigarette Use Never User      E-Cigarette/Vaping Substances    Nicotine No     THC No     CBD No     Flavoring No     Other No     Unknown No      Social History     Tobacco Use    Smoking status: Former Smoker     Packs/day: 0 50     Years: 1 00     Pack years: 0 50     Types: Cigarettes     Quit date: 2020     Years since quittin 8    Smokeless tobacco: Never Used   Vaping Use    Vaping Use: Never used   Substance Use Topics    Alcohol use: Not Currently    Drug use: Never       Review of Systems   Constitutional: Negative for diaphoresis, fatigue and fever  HENT: Negative for congestion, ear pain, nosebleeds and sore throat  Eyes: Negative for photophobia, pain, discharge and visual disturbance  Respiratory: Negative for cough, choking, chest tightness, shortness of breath and wheezing  Cardiovascular: Negative for chest pain and palpitations  Gastrointestinal: Positive for blood in stool and diarrhea  Negative for abdominal distention, abdominal pain, nausea, rectal pain and vomiting  Genitourinary: Negative for dysuria, flank pain and frequency  Musculoskeletal: Negative for back pain, gait problem and joint swelling  Skin: Negative for color change and rash     Neurological: Negative for dizziness, syncope and headaches  Psychiatric/Behavioral: Negative for behavioral problems and confusion  The patient is not nervous/anxious  All other systems reviewed and are negative  Physical Exam  Physical Exam  Vitals and nursing note reviewed  Constitutional:       Appearance: She is well-developed  HENT:      Head: Normocephalic  Right Ear: External ear normal       Left Ear: External ear normal       Nose: Nose normal    Eyes:      General: Lids are normal       Pupils: Pupils are equal, round, and reactive to light  Cardiovascular:      Rate and Rhythm: Normal rate and regular rhythm  Pulses: Normal pulses  Heart sounds: Normal heart sounds  Pulmonary:      Effort: Pulmonary effort is normal  No respiratory distress  Breath sounds: Normal breath sounds  Abdominal:      General: Abdomen is flat  Bowel sounds are normal  There is no distension  Palpations: Abdomen is soft  Tenderness: There is no abdominal tenderness  Comments: Abdomen completely soft nontender no rebound no guarding   Musculoskeletal:         General: No deformity  Normal range of motion  Cervical back: Normal range of motion and neck supple  Skin:     General: Skin is warm and dry  Neurological:      Mental Status: She is alert and oriented to person, place, and time       Pulse oximetry 100% on room air adequate oxygenation, there is no hypoxia    Vital Signs  ED Triage Vitals   Temperature Pulse Respirations Blood Pressure SpO2   01/04/22 1347 01/04/22 1346 01/04/22 1346 01/04/22 1347 01/04/22 1346   97 6 °F (36 4 °C) 64 18 (!) 200/101 100 %      Temp Source Heart Rate Source Patient Position - Orthostatic VS BP Location FiO2 (%)   01/04/22 1346 01/04/22 1346 01/04/22 1346 01/04/22 1346 --   Temporal Monitor Sitting Left arm       Pain Score       --                  Vitals:    01/04/22 1346 01/04/22 1347 01/04/22 1548   BP:  (!) 200/101 (!) 188/79   Pulse: 64  60   Patient Position - Orthostatic VS: Sitting  Sitting         Visual Acuity      ED Medications  Medications - No data to display    Diagnostic Studies  Results Reviewed     Procedure Component Value Units Date/Time    COVID/FLU/RSV [[id number]]  (Abnormal) Collected: 01/04/22 1407    Lab Status: Final result Specimen: Nares from Nose Updated: 01/04/22 1501     SARS-CoV-2 Positive     INFLUENZA A PCR Negative     INFLUENZA B PCR Negative     RSV PCR Negative    Narrative:      FOR PEDIATRIC PATIENTS - copy/paste COVID Guidelines URL to browser: [email or web address]  [employer]    SARS-CoV-2 assay is a Nucleic Acid Amplification assay intended for the  qualitative detection of nucleic acid from SARS-CoV-2 in nasopharyngeal  swabs  Results are for the presumptive identification of SARS-CoV-2 RNA  Positive results are indicative of infection with SARS-CoV-2, the virus  causing COVID-19, but do not rule out bacterial infection or co-infection  with other viruses  Laboratories within the [place] and its  territories are required to report all positive results to the appropriate  public health authorities  Negative results do not preclude SARS-CoV-2  infection and should not be used as the sole basis for treatment or other  patient management decisions  Negative results must be combined with  clinical observations, patient history, and epidemiological information  This test has not been FDA cleared or approved  This test has been authorized by FDA under an Emergency Use Authorization  (EUA)  This test is only authorized for the duration of time the  declaration that circumstances exist justifying the authorization of the  emergency use of an in vitro diagnostic tests for detection of SARS-CoV-2  virus and/or diagnosis of COVID-19 infection under section 564(b)(1) of  the Act, 21 U  S C  205UDY-1(T)(9), unless the authorization is terminated  or revoked sooner   The test has been validated but independent review by FDA  and CLIA is pending  Test performed using "[employer] GeneXpert: This RT-PCR assay targets N2,  a region unique to SARS-CoV-2  A conserved region in the E-gene was chosen  for pan-Sarbecovirus detection which includes SARS-CoV-2      Urine Microscopic [[id number]]  (Abnormal) Collected: 01/04/22 1415    Lab Status: Final result Specimen: Urine, Other Updated: 01/04/22 1446     RBC, UA 10-20 /hpf      WBC, UA 1-2 /hpf      Epithelial Cells Occasional /hpf      Bacteria, UA Occasional /hpf     CBC and differential [[id number]]  (Abnormal) Collected: 01/04/22 1353    Lab Status: Final result Specimen: Blood from Arm, Left Updated: 01/04/22 1420     WBC 4 54 Thousand/uL      RBC 5 94 Million/uL      Hemoglobin 16 4 g/dL      Hematocrit 49 7 %      MCV 84 fL      MCH 27 6 pg      MCHC 33 0 g/dL      RDW 13 8 %      MPV 11 1 fL      Platelets 558 Thousands/uL      nRBC 0 /100 WBCs      Neutrophils Relative 70 %      Immat GRANS % 0 %      Lymphocytes Relative 20 %      Monocytes Relative 10 %      Eosinophils Relative 0 %      Basophils Relative 0 %      Neutrophils Absolute 3 16 Thousands/µL      Immature Grans Absolute 0 01 Thousand/uL      Lymphocytes Absolute 0 90 Thousands/µL      Monocytes Absolute 0 43 Thousand/µL      Eosinophils Absolute 0 02 Thousand/µL      Basophils Absolute 0 02 Thousands/µL     UA (URINE) with reflex to Scope [[id number]]  (Abnormal) Collected: 01/04/22 1415    Lab Status: Final result Specimen: Urine, Other Updated: 01/04/22 1420     Color, UA Yellow     Clarity, UA Clear     Specific [place], UA 1 020     pH, UA 6 5     Leukocytes, UA Negative     Nitrite, UA Negative     Protein, UA Trace mg/dl      Glucose, UA Negative mg/dl      Ketones, UA Negative mg/dl      Urobilinogen, UA 0 2 E U /dl      Bilirubin, UA Negative     Blood, UA Small    Comprehensive metabolic panel [[id number]] Updated: 01/04/22 1415    Lab Status: No result Specimen: Blood from Arm, Left Lipase [[id number]] Updated: 01/04/22 1415    Lab Status: No result Specimen: Blood from Arm, Left                  No orders to display              Procedures  ECG 12 Lead Documentation Only    Date/Time: 1/4/2022 4:14 PM  Performed by: Mayito Elliott MD  Authorized by: Mayito Elliott MD     Indications / Diagnosis:  Possible electrolyte disturbance  ECG reviewed by me, the ED Provider: no    Patient location:  ED  Previous ECG:     Previous ECG:  Unavailable  Interpretation:     Interpretation: non-specific    Rate:     ECG rate:  Fifty-four    ECG rate assessment: normal    Rhythm:     Rhythm: sinus bradycardia    Comments:      Sinus bradycardia nonspecific ST-T wave changes, incomplete right bundle no acute ST elevations  ED Course        Patient was seen during the outbreak of the corona virus epidemic  Resources are limited due to the severity of patient illnesses associated with virus  Testing is also limited at this time  Discussed with patient at the time of this evaluation  Due to the fact that limited resources are available -treatment options are limited  Laboratory testing performed in triage, reviewed with patient, at this point I believe she has COVID and not sure any other testing is necessary hemoglobin was normal   EKG was performed by the triage personnel       diagnostic testing COVID testing was positive  Urinalysis showed some blood consistent with patient's history of blood in her stool  White count was normal at 4 5 no sign of inflammation hemoglobin was normal at 16 no sign of anemia, actually elevated believe secondary to patient's smoking  MDM medical decision making 50-year-old female presents emergency department with some rectal bleeding and diarrhea  Patient is COVID positive  Discussed with patient, we have seen gastrointestinal symptoms from [facility]  At this point she has no further bleeding    Hemoglobin is 16 there is no indication for hospitalization or further diagnostic testing here  Discussed patient may require endoscopy or colonoscopy if her bleeding persist I gave her GI follow-up  We discussed indications to return  patient was given a work note  Disposition  Final diagnoses:   COVID-19   Rectal bleeding     Time reflects when diagnosis was documented in both MDM as applicable and the Disposition within this note     Time User Action Codes Description Comment    1/4/2022  3:59 PM Anuja Ribera Add [U07 1] COVID-19     1/4/2022  3:59 PM Anuja Ribera Add [K62 5] Rectal bleeding       ED Disposition     ED Disposition Condition Date/Time Comment    Discharge Stable Tue Jan 4, 2022  3:59 PM Della Hillman discharge to home/self care  Follow-up Information     Follow up With Specialties Details Why [address] Internal Medicine   [address] [facility]  [address] [facility] MD [facility]   [address]  [phone]            Discharge Medication List as of 1/4/2022  4:01 PM      CONTINUE these medications which have NOT CHANGED    Details   acetaminophen (TYLENOL) 325 mg tablet Take 2 tablets (650 mg total) by mouth every 6 (six) hours as needed for mild pain, Starting Wed 2/26/2020, Normal      Multiple Vitamins-Minerals (MULTIVITAMIN ADULT PO) multivitamin, Historical Med      Omega-3 Fatty Acids (FISH OIL) 1,000 mg Fish Oil, Historical Med             No discharge procedures on file      PDMP Review     None          ED Provider  Electronically Signed by           Fly Valdez MD  01/04/22 [phone]

## 2022-01-04 NOTE — DISCHARGE INSTRUCTIONS
[place] diet  Gatorade  Continue Imodium  Follow-up with gastroenterology if you have recurrent bleeding  Return increasing pain, fever or worsening symptoms or any problems

## 2022-01-04 NOTE — Clinical Note
Genaro Victor was seen and treated in our emergency department on 1/4/2022  Diagnosis:     Della    She may return on this date: 01/10/2022         If you have any questions or concerns, please don't hesitate to call        Janie Smart MD    ______________________________           _______________          _______________  Hospital Representative                              Date                                Time

## 2022-03-14 ENCOUNTER — HOSPITAL ENCOUNTER (EMERGENCY)
Facility: HOSPITAL | Age: 47
Discharge: HOME | End: 2022-03-14
Attending: EMERGENCY MEDICINE
Payer: COMMERCIAL

## 2022-03-14 ENCOUNTER — APPOINTMENT (EMERGENCY)
Dept: RADIOLOGY | Facility: HOSPITAL | Age: 47
End: 2022-03-14
Payer: COMMERCIAL

## 2022-03-14 VITALS
TEMPERATURE: 98.2 F | DIASTOLIC BLOOD PRESSURE: 78 MMHG | HEART RATE: 50 BPM | SYSTOLIC BLOOD PRESSURE: 167 MMHG | OXYGEN SATURATION: 98 % | RESPIRATION RATE: 20 BRPM

## 2022-03-14 DIAGNOSIS — I10 HYPERTENSION: Primary | ICD-10-CM

## 2022-03-14 LAB
2HR DELTA HS TROPONIN: 2 NG/L
ALBUMIN SERPL BCP-MCNC: 3.9 G/DL (ref 3.5–5)
ALP SERPL-CCNC: 67 U/L (ref 46–116)
ALT SERPL W P-5'-P-CCNC: 23 U/L (ref 12–78)
ANION GAP SERPL CALCULATED.3IONS-SCNC: 4 MMOL/L (ref 4–13)
AST SERPL W P-5'-P-CCNC: 52 U/L (ref 5–45)
BASOPHILS # BLD AUTO: 0.05 THOUSANDS/ΜL (ref 0–0.1)
BASOPHILS NFR BLD AUTO: 1 % (ref 0–1)
BILIRUB DIRECT SERPL-MCNC: 0.02 MG/DL (ref 0–0.2)
BILIRUB SERPL-MCNC: 0.64 MG/DL (ref 0.2–1)
BUN SERPL-MCNC: 16 MG/DL (ref 5–25)
CALCIUM SERPL-MCNC: 10.2 MG/DL (ref 8.3–10.1)
CARDIAC TROPONIN I PNL SERPL HS: 27 NG/L
CARDIAC TROPONIN I PNL SERPL HS: 29 NG/L
CHLORIDE SERPL-SCNC: 101 MMOL/L (ref 100–108)
CO2 SERPL-SCNC: 34 MMOL/L (ref 21–32)
CREAT SERPL-MCNC: 0.56 MG/DL (ref 0.6–1.3)
EOSINOPHIL # BLD AUTO: 0.26 THOUSAND/ΜL (ref 0–0.61)
EOSINOPHIL NFR BLD AUTO: 4 % (ref 0–6)
ERYTHROCYTE [DISTWIDTH] IN BLOOD BY AUTOMATED COUNT: 13.8 % (ref 11.6–15.1)
GFR SERPL CREATININE-BSD FRML MDRD: 112 ML/MIN/1.73SQ M
GLUCOSE SERPL-MCNC: 86 MG/DL (ref 65–140)
HCT VFR BLD AUTO: 38.2 % (ref 34.8–46.1)
HGB BLD-MCNC: 13.1 G/DL (ref 11.5–15.4)
IMM GRANULOCYTES # BLD AUTO: 0.02 THOUSAND/UL (ref 0–0.2)
IMM GRANULOCYTES NFR BLD AUTO: 0 % (ref 0–2)
LYMPHOCYTES # BLD AUTO: 1.48 THOUSANDS/ΜL (ref 0.6–4.47)
LYMPHOCYTES NFR BLD AUTO: 21 % (ref 14–44)
MCH RBC QN AUTO: 27.7 PG (ref 26.8–34.3)
MCHC RBC AUTO-ENTMCNC: 34.3 G/DL (ref 31.4–37.4)
MCV RBC AUTO: 81 FL (ref 82–98)
MONOCYTES # BLD AUTO: 0.53 THOUSAND/ΜL (ref 0.17–1.22)
MONOCYTES NFR BLD AUTO: 7 % (ref 4–12)
NEUTROPHILS # BLD AUTO: 4.87 THOUSANDS/ΜL (ref 1.85–7.62)
NEUTS SEG NFR BLD AUTO: 67 % (ref 43–75)
NRBC BLD AUTO-RTO: 0 /100 WBCS
PLATELET # BLD AUTO: 223 THOUSANDS/UL (ref 149–390)
PMV BLD AUTO: 9.9 FL (ref 8.9–12.7)
POTASSIUM SERPL-SCNC: 4 MMOL/L (ref 3.5–5.3)
PROT SERPL-MCNC: 7.6 G/DL (ref 6.4–8.2)
RBC # BLD AUTO: 4.73 MILLION/UL (ref 3.81–5.12)
SODIUM SERPL-SCNC: 139 MMOL/L (ref 136–145)
WBC # BLD AUTO: 7.21 THOUSAND/UL (ref 4.31–10.16)

## 2022-03-14 PROCEDURE — 93005 ELECTROCARDIOGRAM TRACING: CPT

## 2022-03-14 PROCEDURE — 80048 BASIC METABOLIC PNL TOTAL CA: CPT | Performed by: EMERGENCY MEDICINE

## 2022-03-14 PROCEDURE — 36415 COLL VENOUS BLD VENIPUNCTURE: CPT | Performed by: EMERGENCY MEDICINE

## 2022-03-14 PROCEDURE — 71045 X-RAY EXAM CHEST 1 VIEW: CPT

## 2022-03-14 PROCEDURE — 96374 THER/PROPH/DIAG INJ IV PUSH: CPT

## 2022-03-14 PROCEDURE — 99284 EMERGENCY DEPT VISIT MOD MDM: CPT | Performed by: EMERGENCY MEDICINE

## 2022-03-14 PROCEDURE — 85025 COMPLETE CBC W/AUTO DIFF WBC: CPT | Performed by: EMERGENCY MEDICINE

## 2022-03-14 PROCEDURE — 80076 HEPATIC FUNCTION PANEL: CPT | Performed by: EMERGENCY MEDICINE

## 2022-03-14 PROCEDURE — 84484 ASSAY OF TROPONIN QUANT: CPT | Performed by: EMERGENCY MEDICINE

## 2022-03-14 PROCEDURE — 99284 EMERGENCY DEPT VISIT MOD MDM: CPT

## 2022-03-14 RX ORDER — AMLODIPINE BESYLATE 5 MG/1
5 TABLET ORAL DAILY
Qty: 30 TABLET | Refills: 0 | Status: SHIPPED | OUTPATIENT
Start: 2022-03-14 | End: 2022-04-19 | Stop reason: SINTOL

## 2022-03-14 RX ORDER — LABETALOL 20 MG/4 ML (5 MG/ML) INTRAVENOUS SYRINGE
10 ONCE
Status: COMPLETED | OUTPATIENT
Start: 2022-03-14 | End: 2022-03-14

## 2022-03-14 RX ADMIN — LABETALOL HYDROCHLORIDE 10 MG: 5 INJECTION, SOLUTION INTRAVENOUS at 20:57

## 2022-03-14 NOTE — ED PROVIDER NOTES
History  Chief Complaint   Patient presents with    Dizziness     Elevated blood pressure and dizziness since she had COVID 2 months 202/104 at urgent care       History provided by:  Patient   used: No    Hypertension  Severity:  Moderate  Onset quality:  Gradual  Timing:  Intermittent  Progression:  Worsening  Chronicity:  New  Notable PTA blood pressures:  202/104  Relieved by:  Nothing  Worsened by:  Nothing  Ineffective treatments:  None tried  Associated symptoms: no abdominal pain, no chest pain, no ear pain, no fever, no hematuria, no palpitations, no shortness of breath and not vomiting        Prior to Admission Medications   Prescriptions Last Dose Informant Patient Reported? Taking? Multiple Vitamins-Minerals (MULTIVITAMIN ADULT PO)  Self Yes No   Sig: multivitamin   Omega-3 Fatty Acids (FISH OIL) 1,000 mg  Self Yes No   Sig: Fish Oil   acetaminophen (TYLENOL) 325 mg tablet  Self No No   Sig: Take 2 tablets (650 mg total) by mouth every 6 (six) hours as needed for mild pain      Facility-Administered Medications: None       Past Medical History:   Diagnosis Date    Abnormal Pap smear of cervix     Acute hip pain     Cancer (HCC)     cervix    Hypokalemia     Low back pain     Lymphadenopathy     Pelvic pain        Past Surgical History:   Procedure Laterality Date    OH BRONCHOSCOPY NEEDLE BX TRACHEA MAIN STEM&/BRON N/A 2/26/2020    Procedure: ENDOBRONCHIAL ULTRASOUND (EBUS); Surgeon: Frankey Goldberg, MD;  [facility];  Service: Thoracic    OH [address] N/A 2/26/2020    Procedure: Lionel Prado;  Surgeon: Frankey Goldberg, MD;  [facility];  Service: Thoracic    OH DILATION OF VAGINA W ANESTH N/A 6/20/2019    Procedure: EXAM UNDER ANESTHESIA (EUA);   Surgeon: Luan Enriquez MD;  [facility];  Service: Gynecology Oncology    OH INSERT VAGINAL RADIATION DEVICE N/A 6/20/2019    Procedure: Alicia Goodman;  Surgeon: Shaina Valdivia MD;  [facility];  Service: Gynecology Oncology    TONSILLECTOMY      US GUIDANCE  2019       Family History   Problem Relation Age of Onset    Uterine cancer Maternal Grandmother     Heart disease Mother     Canavan disease Father     Cancer Father      I have reviewed and agree with the history as documented  E-Cigarette/Vaping    E-Cigarette Use Never User      E-Cigarette/Vaping Substances    Nicotine No     THC No     CBD No     Flavoring No     Other No     Unknown No      Social History     Tobacco Use    Smoking status: Former Smoker     Packs/day: 0 50     Years: 1 00     Pack years: 0 50     Types: Cigarettes     Quit date: 2020     Years since quittin 1    Smokeless tobacco: Never Used   Vaping Use    Vaping Use: Never used   Substance Use Topics    Alcohol use: Not Currently    Drug use: Never       Review of Systems   Constitutional: Negative for chills and fever  HENT: Negative for ear pain and sore throat  Eyes: Negative for pain and visual disturbance  Respiratory: Negative for cough and shortness of breath  Cardiovascular: Negative for chest pain and palpitations  Gastrointestinal: Negative for abdominal pain and vomiting  Genitourinary: Negative for dysuria and hematuria  Musculoskeletal: Negative for arthralgias and back pain  Skin: Negative for color change and rash  Neurological: Negative for seizures and syncope  All other systems reviewed and are negative  Physical Exam  Physical Exam  Vitals and nursing note reviewed  Constitutional:       General: She is not in acute distress  Appearance: She is well-developed  HENT:      Head: Normocephalic and atraumatic  Eyes:      Conjunctiva/sclera: Conjunctivae normal    Cardiovascular:      Rate and Rhythm: Normal rate and regular rhythm  Heart sounds: No murmur heard        Pulmonary:      Effort: Pulmonary effort is normal  No respiratory distress  Breath sounds: Normal breath sounds  Abdominal:      Palpations: Abdomen is soft  Tenderness: There is no abdominal tenderness  Musculoskeletal:      Cervical back: Neck supple  Skin:     General: Skin is warm and dry  Capillary Refill: Capillary refill takes less than 2 seconds  Neurological:      General: No focal deficit present  Mental Status: She is alert and oriented to person, place, and time           Vital Signs  ED Triage Vitals   Temperature Pulse Respirations Blood Pressure SpO2   03/14/22 1902 03/14/22 1902 03/14/22 1902 03/14/22 1902 03/14/22 1902   98 2 °F (36 8 °C) 63 19 (!) 255/124 100 %      Temp Source Heart Rate Source Patient Position - Orthostatic VS BP Location FiO2 (%)   03/14/22 1902 03/14/22 1902 03/14/22 1902 03/14/22 1902 --   Oral Monitor Sitting Left arm       Pain Score       03/14/22 1913       2           Vitals:    03/14/22 2100 03/14/22 2130 03/14/22 2200 03/14/22 2230   BP: (!) 219/105 (!) 182/84 (!) 177/84 167/78   Pulse: (!) 49 (!) 51 (!) 52 (!) 50   Patient Position - Orthostatic VS:   Lying          Visual Acuity      ED Medications  Medications   Labetalol HCl (NORMODYNE) injection 10 mg (10 mg Intravenous Given 3/14/22 2057)       Diagnostic Studies  Results Reviewed     Procedure Component Value Units Date/Time    HS Troponin I 2hr [[id number]]  (Normal) Collected: 03/14/22 2201    Lab Status: Final result Specimen: Blood from Arm, Left Updated: 03/14/22 2232     hs TnI 2hr 29 ng/L      Delta 2hr hsTnI 2 ng/L     HS Troponin 0hr (reflex protocol) [[id number]]  (Normal) Collected: 03/14/22 1959    Lab Status: Final result Specimen: Blood from Arm, Left Updated: 03/14/22 2027     hs TnI 0hr 27 ng/L     Hepatic function panel [[id number]]  (Abnormal) Collected: 03/14/22 1959    Lab Status: Final result Specimen: Blood from Arm, Left Updated: 03/14/22 2026     Total Bilirubin 0 64 mg/dL      Bilirubin, Direct 0 02 mg/dL      Alkaline Phosphatase 67 U/L      AST 52 U/L      ALT 23 U/L      Total Protein 7 6 g/dL      Albumin 3 9 g/dL     Basic metabolic panel [[id number]]  (Abnormal) Collected: 03/14/22 1959    Lab Status: Final result Specimen: Blood from Arm, Left Updated: 03/14/22 2026     Sodium 139 mmol/L      Potassium 4 0 mmol/L      Chloride 101 mmol/L      CO2 34 mmol/L      ANION GAP 4 mmol/L      BUN 16 mg/dL      Creatinine 0 56 mg/dL      Glucose 86 mg/dL      Calcium 10 2 mg/dL      eGFR 112 ml/min/1 73sq m     Narrative:      [facility] guidelines for Chronic Kidney Disease (CKD):     Stage 1 with normal or high GFR (GFR > 90 mL/min/1 73 square meters)    Stage 2 Mild CKD (GFR = 60-89 mL/min/1 73 square meters)    Stage 3A Moderate CKD (GFR = 45-59 mL/min/1 73 square meters)    Stage 3B Moderate CKD (GFR = 30-44 mL/min/1 73 square meters)    Stage 4 Severe CKD (GFR = 15-29 mL/min/1 73 square meters)    Stage 5 End Stage CKD (GFR <15 mL/min/1 73 square meters)  Note: GFR calculation is accurate only with a steady state creatinine    CBC and differential [[id number]]  (Abnormal) Collected: 03/14/22 1959    Lab Status: Final result Specimen: Blood from Arm, Left Updated: 03/14/22 2004     WBC 7 21 Thousand/uL      RBC 4 73 Million/uL      Hemoglobin 13 1 g/dL      Hematocrit 38 2 %      MCV 81 fL      MCH 27 7 pg      MCHC 34 3 g/dL      RDW 13 8 %      MPV 9 9 fL      Platelets 771 Thousands/uL      nRBC 0 /100 WBCs      Neutrophils Relative 67 %      Immat GRANS % 0 %      Lymphocytes Relative 21 %      Monocytes Relative 7 %      Eosinophils Relative 4 %      Basophils Relative 1 %      Neutrophils Absolute 4 87 Thousands/µL      Immature Grans Absolute 0 02 Thousand/uL      Lymphocytes Absolute 1 48 Thousands/µL      Monocytes Absolute 0 53 Thousand/µL      Eosinophils Absolute 0 26 Thousand/µL      Basophils Absolute 0 05 Thousands/µL                  XR chest 1 view portable   Final Result by [facility] Darryn Rain MD (03/15 0113)      No active pulmonary disease  Workstation performed: [id number]                    Procedures  Procedures         ED Course                               SBIRT 22yo+      Most Recent Value   SBIRT (22 yo +)    In order to provide better care to our patients, we are screening all of our patients for alcohol and drug use  Would it be okay to ask you these screening questions? Yes Filed at: 03/14/2022 1913   Initial Alcohol Screen: US AUDIT-C     1  How often do you have a drink containing alcohol? 0 Filed at: 03/14/2022 1913   2  How many drinks containing alcohol do you have on a typical day you are drinking? 0 Filed at: 03/14/2022 1913   3b  FEMALE Any Age, or MALE 65+: How often do you have 4 or more drinks on one occassion? 0 Filed at: 03/14/2022 1913   Audit-C Score 0 Filed at: 03/14/2022 1913   GRISELDA: How many times in the past year have you    Used an illegal drug or used a prescription medication for non-medical reasons? Never Filed at: 03/14/2022 1913                    MDM  Number of Diagnoses or Management Options  Hypertension: new and requires workup  Diagnosis management comments: 41 yo female sent to ED from urgent care for elevated BP  Patient was at urgent care for a wrist injury and denies any acute symptoms  She notes that she has known that her bp was "high" for "a while now" but hasn't thought much of it  She had COVID in January and since has had a few episodes of dizziness/lightheadedness that seemed worse than usual but hasn't had any such incidents lately  Contrary to triage note, pt does not relate that COVID and htn are temporally related  MDM: asymptomatic htn sent to ED after incidentally found during urgent care visit for wrist pain  Renal function and cardiac enzymes unremarkable  Discussed w patient that her bp today was quite high and that her EKG does show signs of LVH   We discussed that continually uncontrolled htn will put her at increased risk for multiple serious medical problems including heart attack and stroke  Will plan to start norvasc, discussed that fu is imperative, discussed return precautions  Amount and/or Complexity of Data Reviewed  Clinical lab tests: ordered and reviewed  Tests in the radiology section of CPT®: ordered and reviewed  Review and summarize past medical records: yes  Discuss the patient with other providers: yes  Independent visualization of images, tracings, or specimens: yes        Disposition  Final diagnoses:   Hypertension     Time reflects when diagnosis was documented in both MDM as applicable and the Disposition within this note     Time User Action Codes Description Comment    3/14/2022 10:44 PM Esteban Leiva [facility] Hypertension       ED Disposition     ED Disposition Condition Date/Time Comment    Discharge Stable Mon Mar 14, 2022 10:44 PM Della Hillman discharge to home/self care              Follow-up Information     Follow up With Specialties Details Why Contact Info Additional [id number] [facility] Internal Medicine   [address] [place] [address] Cardiology [facility] Cardiology   [facility] [address]  [place] [id number]  [address] Cardiology [facility], [facility] [address], [place], [id number] [phone]          Discharge Medication List as of 3/14/2022 10:46 PM      START taking these medications    Details   amLODIPine (NORVASC) 5 mg tablet Take 1 tablet (5 mg total) by mouth daily, Starting Mon 3/14/2022, Normal         CONTINUE these medications which have NOT CHANGED    Details   acetaminophen (TYLENOL) 325 mg tablet Take 2 tablets (650 mg total) by mouth every 6 (six) hours as needed for mild pain, Starting Wed 2/26/2020, Normal      Multiple Vitamins-Minerals (MULTIVITAMIN ADULT PO) multivitamin, Historical Med      Omega-3 Fatty Acids (FISH OIL) 1,000 mg Fish Oil, Historical Med                 PDMP Review     None          ED Provider  Electronically Signed by           Melissa Alvarenga MD  03/30/22 4570

## 2022-03-14 NOTE — Clinical Note
Nery Calloway was seen and treated in our emergency department on 3/14/2022  Diagnosis:     Della  may return to work on return date  She may return on this date: 03/15/2022         If you have any questions or concerns, please don't hesitate to call        Niru Raymond MD    ______________________________           _______________          _______________  Hospital Representative                              Date                                Time

## 2022-03-15 LAB
ATRIAL RATE: 59 BPM
P AXIS: 72 DEGREES
PR INTERVAL: 140 MS
QRS AXIS: 103 DEGREES
QRSD INTERVAL: 100 MS
QT INTERVAL: 448 MS
QTC INTERVAL: 443 MS
T WAVE AXIS: -40 DEGREES
VENTRICULAR RATE: 59 BPM

## 2022-03-15 PROCEDURE — 93010 ELECTROCARDIOGRAM REPORT: CPT | Performed by: INTERNAL MEDICINE

## 2022-04-01 ENCOUNTER — CONSULT (OUTPATIENT)
Dept: CARDIOLOGY CLINIC | Facility: CLINIC | Age: 47
End: 2022-04-01
Payer: COMMERCIAL

## 2022-04-01 VITALS
OXYGEN SATURATION: 98 % | BODY MASS INDEX: 28.13 KG/M2 | DIASTOLIC BLOOD PRESSURE: 92 MMHG | RESPIRATION RATE: 16 BRPM | SYSTOLIC BLOOD PRESSURE: 154 MMHG | WEIGHT: 149 LBS | HEART RATE: 68 BPM | HEIGHT: 61 IN

## 2022-04-01 DIAGNOSIS — I10 HYPERTENSION: ICD-10-CM

## 2022-04-01 PROCEDURE — 99204 OFFICE O/P NEW MOD 45 MIN: CPT | Performed by: INTERNAL MEDICINE

## 2022-04-01 RX ORDER — HYDROCHLOROTHIAZIDE 12.5 MG/1
12.5 TABLET ORAL DAILY
Qty: 30 TABLET | Refills: 3 | Status: SHIPPED | OUTPATIENT
Start: 2022-04-01 | End: 2022-04-19 | Stop reason: SINTOL

## 2022-04-01 NOTE — PATIENT INSTRUCTIONS
Recommend low salt DASH diet  Strongly encourage compliance with your medications  Please reach out to us if you have any questions   Recommend you present to the emergency room or call our office if you have chest pain, chest pressure, shortness of breath, any new, persistent or progressive symptoms

## 2022-04-01 NOTE — PROGRESS NOTES
OP Consultation - Cardiology    Della Hillman 40 y o  female MRN: [id number]    Physician Requesting Consult: Ivy Wright MD    Reason for Consult / Chief Complaint: Hypertension    HPI:     52-year-old woman with no significant cardiac history who presents following an ER visit  Patient was seen in the emergency room after being referred by an urgent care for high blood pressure  Patient had a mechanical fall  As a result, she hurt her right hand  She was seen at urgent care  X-ray of her hand were unremarkable  However she has a lot of pain in her right hand especially with movement  Blood pressure was noted to be in the 250s  In the emergency room, she was started on amlodipine  Currently, she is doing well  Denies all complaints  Denies chest pain, chest pressure, shortness of breath, dizziness, lightheadedness, presyncope or syncope  Denies orthopnea, PND or lower limb edema  Social History:  Tobacco:  She vapes, used to smoke but quit  Alcohol:  Socially  She is employed as a   Is on her feet  No exertional complaints      FAMILY HISTORY:  Family History   Problem Relation Age of Onset    Uterine cancer Maternal Grandmother     Heart disease Mother     Canavan disease Father     Cancer Father         MEDS & ALLERGIES:  Allergies   Allergen Reactions    Latex Hives    Other      Tomatoes   Vomiting and diarhhea         Current Outpatient Medications:     amLODIPine (NORVASC) 5 mg tablet, Take 1 tablet (5 mg total) by mouth daily, Disp: 30 tablet, Rfl: 0    Multiple Vitamins-Minerals (MULTIVITAMIN ADULT PO), multivitamin, Disp: , Rfl:     Omega-3 Fatty Acids (FISH OIL) 1,000 mg, Fish Oil, Disp: , Rfl:     acetaminophen (TYLENOL) 325 mg tablet, Take 2 tablets (650 mg total) by mouth every 6 (six) hours as needed for mild pain (Patient not taking: Reported on 4/1/2022 ), Disp: 30 tablet, Rfl: 0    hydrochlorothiazide (HYDRODIURIL) 12 5 mg tablet, Take 1 tablet (12 5 mg total) by mouth daily, Disp: 30 tablet, Rfl: 3    Past Medical History:   Diagnosis Date    Abnormal Pap smear of cervix     Acute hip pain     Cancer (HCC)     cervix    Hypokalemia     Low back pain     Lymphadenopathy     Pelvic pain          Review of Systems:  Review of Systems   Constitutional: Negative for activity change, diaphoresis, fatigue and fever  Respiratory: Negative for chest tightness, shortness of breath and wheezing  Cardiovascular: Negative for chest pain, palpitations and leg swelling  Gastrointestinal: Negative for nausea  Neurological: Negative for dizziness, syncope and light-headedness  Psychiatric/Behavioral: Negative for behavioral problems  All other systems reviewed and are negative  PHYSICAL EXAM:  Vitals:   Vitals:    04/01/22 1435   BP: 154/92   BP Location: Left arm   Patient Position: Sitting   Cuff Size: Standard   Pulse: 68   Resp: 16   SpO2: 98%   Weight: 67 6 kg (149 lb)   Height: 5' 1" (1 549 m)        Physical Exam:  Physical Exam  Vitals and nursing note reviewed  Constitutional:       General: She is not in acute distress  Appearance: Normal appearance  She is not ill-appearing or diaphoretic  HENT:      Head: Normocephalic and atraumatic  Eyes:      Extraocular Movements: Extraocular movements intact  Cardiovascular:      Rate and Rhythm: Normal rate and regular rhythm  Heart sounds: No murmur heard  No friction rub  No gallop  Pulmonary:      Effort: Pulmonary effort is normal  No respiratory distress  Breath sounds: Normal breath sounds  Abdominal:      General: There is no distension  Palpations: Abdomen is soft  Musculoskeletal:      Cervical back: Normal range of motion and neck supple  Skin:     General: Skin is warm and dry  Capillary Refill: Capillary refill takes less than 2 seconds  Coloration: Skin is not pale  Neurological:      General: No focal deficit present        Mental Status: She is alert and oriented to person, place, and time  Psychiatric:         Mood and Affect: Mood normal          LABORATORY RESULTS:    Lab Results   Component Value Date    WBC 7 21 03/14/2022    HGB 13 1 03/14/2022    HCT 38 2 03/14/2022    MCV 81 (L) 03/14/2022     03/14/2022     Lab Results   Component Value Date    CALCIUM 10 2 (H) 03/14/2022    K 4 0 03/14/2022    CO2 34 (H) 03/14/2022     03/14/2022    BUN 16 03/14/2022    CREATININE 0 56 (L) 03/14/2022     No results found for: HGBA1C    Lipid Profile:     Imaging: I have personally reviewed pertinent reports  No results found  EKG reviewed personally:  Normal sinus rhythm, left ventricular hypertrophy    Assessment and Plan:    Della was seen today for new patient visit  Diagnoses and all orders for this visit:    Hypertension  -     Ambulatory Referral to Cardiology  -     hydrochlorothiazide (HYDRODIURIL) 12 5 mg tablet; Take 1 tablet (12 5 mg total) by mouth daily  -     Echo complete w/ contrast if indicated; Future         Patient presents for evaluation of hypertension  Blood pressure remains elevated  Will add hydrochlorothiazide  Will check echocardiogram to rule out structural heart disease  Suspect, blood pressure is partially elevated due to her recent injury  Consider exercise stress test once blood pressure is better controlled and she she has recovered from her hand injury  As such, has no significant exertional symptoms right now  Recommend patient presents to the emergency room or call our office if he has any new/persistent or progressive symptoms  Previous studies were reviewed  Safety measures were reviewed  Questions were entertained and answered  Patient was advised to report any problems requiring medical attention  Follow-up with PCP and appropriate specialist and lab work as discussed  Return for follow up visit as scheduled or earlier, if needed    Thank you for allowing me to participate in the care and evaluation of your patient  Should you have any questions, please feel free to contact me        Return to clinic in 1 month or PRN    Kristel Montenegro MD  4/1/2022,3:11 PM

## 2022-04-04 ENCOUNTER — HOSPITAL ENCOUNTER (EMERGENCY)
Facility: HOSPITAL | Age: 47
Discharge: HOME | End: 2022-04-04
Attending: EMERGENCY MEDICINE | Admitting: EMERGENCY MEDICINE
Payer: COMMERCIAL

## 2022-04-04 VITALS
OXYGEN SATURATION: 97 % | DIASTOLIC BLOOD PRESSURE: 58 MMHG | SYSTOLIC BLOOD PRESSURE: 123 MMHG | RESPIRATION RATE: 20 BRPM | TEMPERATURE: 97.5 F | HEART RATE: 77 BPM

## 2022-04-04 DIAGNOSIS — T50.905A ADVERSE EFFECT OF DRUG, INITIAL ENCOUNTER: Primary | ICD-10-CM

## 2022-04-04 PROCEDURE — 99284 EMERGENCY DEPT VISIT MOD MDM: CPT

## 2022-04-04 PROCEDURE — 96361 HYDRATE IV INFUSION ADD-ON: CPT

## 2022-04-04 PROCEDURE — 99284 EMERGENCY DEPT VISIT MOD MDM: CPT | Performed by: EMERGENCY MEDICINE

## 2022-04-04 PROCEDURE — 96374 THER/PROPH/DIAG INJ IV PUSH: CPT

## 2022-04-04 PROCEDURE — 96375 TX/PRO/DX INJ NEW DRUG ADDON: CPT

## 2022-04-04 PROCEDURE — 93005 ELECTROCARDIOGRAM TRACING: CPT

## 2022-04-04 RX ORDER — PREDNISONE 20 MG/1
60 TABLET ORAL DAILY
Qty: 15 TABLET | Refills: 0 | Status: SHIPPED | OUTPATIENT
Start: 2022-04-04 | End: 2022-04-09

## 2022-04-04 RX ORDER — DIPHENHYDRAMINE HYDROCHLORIDE 50 MG/ML
50 INJECTION INTRAMUSCULAR; INTRAVENOUS ONCE
Status: COMPLETED | OUTPATIENT
Start: 2022-04-04 | End: 2022-04-04

## 2022-04-04 RX ORDER — METHYLPREDNISOLONE SODIUM SUCCINATE 125 MG/2ML
80 INJECTION, POWDER, LYOPHILIZED, FOR SOLUTION INTRAMUSCULAR; INTRAVENOUS ONCE
Status: COMPLETED | OUTPATIENT
Start: 2022-04-04 | End: 2022-04-04

## 2022-04-04 RX ADMIN — DIPHENHYDRAMINE HYDROCHLORIDE 50 MG: 50 INJECTION, SOLUTION INTRAMUSCULAR; INTRAVENOUS at 13:39

## 2022-04-04 RX ADMIN — FAMOTIDINE 20 MG: 10 INJECTION, SOLUTION INTRAVENOUS at 13:39

## 2022-04-04 RX ADMIN — METHYLPREDNISOLONE SODIUM SUCCINATE 80 MG: 125 INJECTION, POWDER, FOR SOLUTION INTRAMUSCULAR; INTRAVENOUS at 13:39

## 2022-04-04 RX ADMIN — SODIUM CHLORIDE 1000 ML: 0.9 INJECTION, SOLUTION INTRAVENOUS at 13:39

## 2022-04-04 NOTE — ED PROVIDER NOTES
History  Chief Complaint   Patient presents with    Oral Swelling     Pt reports taking hydrochlorothiazide today, reports oral swelling and jaw tightness, difficulty breathing  Pursed lip breathing    Dizziness     Started approximately 25 minutes ago     40year old female patient presents emergency department with a possible reaction to new medication  Patient started on hydrochlorothiazide, states that she started taking yesterday and felt unwell with that and then tried today again and had a reaction that she describes as dizziness and throat swelling  The patient stated initially she could not open her jaw however she is able open her jaw and I am able to visualize the posterior pharynx without difficulty  There is no soft tissue edema or swelling  There is no other acute abnormalities  Patient be treated for presumptive allergic reaction to her Hydrea chlorothiazide, instructed return to cardiology to discuss another diuretic and there was no recurrence of her symptoms will be discharged      History provided by:  Patient   used: No    Dizziness  Quality:  Unable to specify  Severity:  Mild  Timing:  Constant  Chronicity:  New  Relieved by:  Nothing  Worsened by:  Nothing  Ineffective treatments:  None tried  Associated symptoms: no diarrhea, no hearing loss, no palpitations and no vision changes        Prior to Admission Medications   Prescriptions Last Dose Informant Patient Reported? Taking?    Multiple Vitamins-Minerals (MULTIVITAMIN ADULT PO)  Self Yes No   Sig: multivitamin   Omega-3 Fatty Acids (FISH OIL) 1,000 mg  Self Yes No   Sig: Fish Oil   acetaminophen (TYLENOL) 325 mg tablet  Self No No   Sig: Take 2 tablets (650 mg total) by mouth every 6 (six) hours as needed for mild pain   Patient not taking: Reported on 4/1/2022    amLODIPine (NORVASC) 5 mg tablet   No No   Sig: Take 1 tablet (5 mg total) by mouth daily   hydrochlorothiazide (HYDRODIURIL) 12 5 mg tablet   No No Sig: Take 1 tablet (12 5 mg total) by mouth daily      Facility-Administered Medications: None       Past Medical History:   Diagnosis Date    Abnormal Pap smear of cervix     Acute hip pain     Cancer (HCC)     cervix    Hypokalemia     Low back pain     Lymphadenopathy     Pelvic pain        Past Surgical History:   Procedure Laterality Date    [place] BRONCHOSCOPY NEEDLE BX TRACHEA MAIN STEM&/BRON N/A 2020    Procedure: ENDOBRONCHIAL ULTRASOUND (EBUS); Surgeon: Ania Partida MD;  [facility];  Service: Thoracic    IL [address] N/A 2020    Procedure: Orest Patch;  Surgeon: Ania Partida MD;  [facility];  Service: Thoracic    IL DILATION OF VAGINA W ANESTH N/A 2019    Procedure: EXAM UNDER ANESTHESIA (EUA); Surgeon: Khadar Arriaga MD;  [facility];  Service: Gynecology Oncology    [place] INSERT VAGINAL RADIATION DEVICE N/A 2019    Procedure: PIERRE SLEEVE PLACEMENT;  Surgeon: Khadar Arriaga MD;  [facility];  Service: Gynecology Oncology    TONSILLECTOMY      US GUIDANCE  2019       Family History   Problem Relation Age of Onset    Uterine cancer Maternal Grandmother     Heart disease Mother     Canavan disease Father     Cancer Father      I have reviewed and agree with the history as documented  E-Cigarette/Vaping    E-Cigarette Use Never User      E-Cigarette/Vaping Substances    Nicotine No     THC No     CBD No     Flavoring No     Other No     Unknown No      Social History     Tobacco Use    Smoking status: Former Smoker     Packs/day: 0 50     Years: 1 00     Pack years: 0 50     Types: Cigarettes     Quit date: 2020     Years since quittin 1    Smokeless tobacco: Never Used   Vaping Use    Vaping Use: Never used   Substance Use Topics    Alcohol use: Not Currently    Drug use: Never       Review of Systems   HENT: Negative for hearing loss      Cardiovascular: Negative for palpitations  Gastrointestinal: Negative for diarrhea  Neurological: Positive for dizziness  All other systems reviewed and are negative  Physical Exam  Physical Exam  Vitals and nursing note reviewed  Constitutional:       Appearance: She is well-developed  HENT:      Head: Normocephalic and atraumatic  Right Ear: External ear normal       Left Ear: External ear normal    Eyes:      Conjunctiva/sclera: Conjunctivae normal    Neck:      Thyroid: No thyromegaly  Vascular: No JVD  Trachea: No tracheal deviation  Cardiovascular:      Rate and Rhythm: Normal rate  Pulmonary:      Effort: Pulmonary effort is normal       Breath sounds: Normal breath sounds  No stridor  Abdominal:      General: There is no distension  Palpations: Abdomen is soft  There is no mass  Tenderness: There is no abdominal tenderness  There is no guarding  Hernia: No hernia is present  Musculoskeletal:         General: No tenderness or deformity  Normal range of motion  Lymphadenopathy:      Cervical: No cervical adenopathy  Skin:     General: Skin is warm  Coloration: Skin is not pale  Findings: No erythema or rash  Neurological:      Mental Status: She is alert and oriented to person, place, and time     Psychiatric:         Behavior: Behavior normal          Vital Signs  ED Triage Vitals [04/04/22 1314]   Temperature Pulse Respirations Blood Pressure SpO2   97 5 °F (36 4 °C) 77 20 123/58 97 %      Temp Source Heart Rate Source Patient Position - Orthostatic VS BP Location FiO2 (%)   Tympanic Monitor Sitting Left arm --      Pain Score       --           Vitals:    04/04/22 1314   BP: 123/58   Pulse: 77   Patient Position - Orthostatic VS: Sitting         Visual Acuity      ED Medications  Medications   diphenhydrAMINE (BENADRYL) injection 50 mg (50 mg Intravenous Given 4/4/22 1339)   methylPREDNISolone sodium succinate (Solu-MEDROL) injection 80 mg (80 mg Intravenous Given 4/4/22 1339)   famotidine (PEPCID) injection 20 mg (20 mg Intravenous Given 4/4/22 1339)   sodium chloride 0 9 % bolus 1,000 mL (0 mL Intravenous Stopped 4/4/22 1540)       Diagnostic Studies  Results Reviewed     None                 No orders to display              Procedures  Procedures         ED Course                               SBIRT 22yo+      Most Recent Value   SBIRT (24 yo +)    In order to provide better care to our patients, we are screening all of our patients for alcohol and drug use  Would it be okay to ask you these screening questions? No Filed at: 04/04/2022 1326                    [facility]  Number of Diagnoses or Management Options  Adverse effect of drug, initial encounter: new and requires workup     Amount and/or Complexity of Data Reviewed  Clinical lab tests: ordered and reviewed  Tests in the radiology section of CPT®: ordered and reviewed  Decide to obtain previous medical records or to obtain history from someone other than the patient: yes  Review and summarize past medical records: yes    Patient Progress  Patient progress: stable      Disposition  Final diagnoses: Adverse effect of drug, initial encounter     Time reflects when diagnosis was documented in both MDM as applicable and the Disposition within this note     Time User Action Codes Description Comment    4/4/2022  2:35 PM Farhan Bussing Add [P68 699Q] Adverse reaction to antithrombotic medication, initial encounter     4/4/2022  2:35 PM Farhan Bussing Remove [A94 [id number]] Adverse reaction to antithrombotic medication, initial encounter     4/4/2022  2:36 PM Farhan Bussing Add [T50 905A] Adverse effect of drug, initial encounter       ED Disposition     ED Disposition Condition Date/Time Comment    Discharge Stable Mon Apr 4, 2022  2:33 PM Della Hillman discharge to home/self care              Follow-up Information     Follow up With Specialties Details Why [address] Internal Medicine   [address] [facility]            Discharge Medication List as of 4/4/2022  2:36 PM      START taking these medications    Details   predniSONE 20 mg tablet Take 3 tablets (60 mg total) by mouth daily for 5 days, Starting Mon 4/4/2022, Until Sat 4/9/2022, Normal         CONTINUE these medications which have NOT CHANGED    Details   acetaminophen (TYLENOL) 325 mg tablet Take 2 tablets (650 mg total) by mouth every 6 (six) hours as needed for mild pain, Starting Wed 2/26/2020, Normal      amLODIPine (NORVASC) 5 mg tablet Take 1 tablet (5 mg total) by mouth daily, Starting Mon 3/14/2022, Normal      hydrochlorothiazide (HYDRODIURIL) 12 5 mg tablet Take 1 tablet (12 5 mg total) by mouth daily, Starting Fri 4/1/2022, Normal      Multiple Vitamins-Minerals (MULTIVITAMIN ADULT PO) multivitamin, Historical Med      Omega-3 Fatty Acids (FISH OIL) 1,000 mg Fish Oil, Historical Med             No discharge procedures on file      PDMP Review     None          ED Provider  Electronically Signed by           Tiffani Ochoa DO  04/05/22 0442

## 2022-04-07 LAB
ATRIAL RATE: 62 BPM
P AXIS: 78 DEGREES
PR INTERVAL: 146 MS
QRS AXIS: 90 DEGREES
QRSD INTERVAL: 98 MS
QT INTERVAL: 424 MS
QTC INTERVAL: 430 MS
T WAVE AXIS: -69 DEGREES
VENTRICULAR RATE: 62 BPM

## 2022-04-07 PROCEDURE — 93010 ELECTROCARDIOGRAM REPORT: CPT | Performed by: INTERNAL MEDICINE

## 2022-04-13 ENCOUNTER — TELEPHONE (OUTPATIENT)
Dept: CARDIOLOGY CLINIC | Facility: CLINIC | Age: 47
End: 2022-04-13

## 2022-04-13 NOTE — TELEPHONE ENCOUNTER
Patient called & would like a call back with her echo results..       Please call pt at [phone]

## 2022-04-13 NOTE — TELEPHONE ENCOUNTER
Pt had echo at [facility]. Her results are posted in care everywhere from todays date 4/13/22. Pt would like Dr Erickson to read and give her the results. Please advise.

## 2022-04-14 ENCOUNTER — HOSPITAL ENCOUNTER (EMERGENCY)
Facility: HOSPITAL | Age: 47
Discharge: HOME | End: 2022-04-14
Attending: EMERGENCY MEDICINE
Payer: COMMERCIAL

## 2022-04-14 ENCOUNTER — TELEPHONE (OUTPATIENT)
Dept: CARDIOLOGY CLINIC | Facility: CLINIC | Age: 47
End: 2022-04-14

## 2022-04-14 ENCOUNTER — APPOINTMENT (EMERGENCY)
Dept: RADIOLOGY | Facility: HOSPITAL | Age: 47
End: 2022-04-14
Payer: COMMERCIAL

## 2022-04-14 VITALS
HEART RATE: 60 BPM | HEIGHT: 61 IN | TEMPERATURE: 97.7 F | WEIGHT: 149 LBS | OXYGEN SATURATION: 99 % | RESPIRATION RATE: 20 BRPM | SYSTOLIC BLOOD PRESSURE: 188 MMHG | DIASTOLIC BLOOD PRESSURE: 76 MMHG | BODY MASS INDEX: 28.13 KG/M2

## 2022-04-14 DIAGNOSIS — R20.2 PARESTHESIA OF BOTH FEET: ICD-10-CM

## 2022-04-14 DIAGNOSIS — I42.2 ASYMMETRIC SEPTAL HYPERTROPHY (HCC): ICD-10-CM

## 2022-04-14 DIAGNOSIS — R00.2 PALPITATIONS: Primary | ICD-10-CM

## 2022-04-14 LAB
2HR DELTA HS TROPONIN: 1 NG/L
ALBUMIN SERPL BCP-MCNC: 3.7 G/DL (ref 3.5–5)
ALP SERPL-CCNC: 78 U/L (ref 46–116)
ALT SERPL W P-5'-P-CCNC: 13 U/L (ref 12–78)
ANION GAP SERPL CALCULATED.3IONS-SCNC: 10 MMOL/L (ref 4–13)
AST SERPL W P-5'-P-CCNC: 10 U/L (ref 5–45)
BASOPHILS # BLD AUTO: 0.04 THOUSANDS/ΜL (ref 0–0.1)
BASOPHILS NFR BLD AUTO: 0 % (ref 0–1)
BILIRUB SERPL-MCNC: 0.51 MG/DL (ref 0.2–1)
BUN SERPL-MCNC: 26 MG/DL (ref 5–25)
CALCIUM SERPL-MCNC: 10.5 MG/DL (ref 8.3–10.1)
CARDIAC TROPONIN I PNL SERPL HS: 22 NG/L
CARDIAC TROPONIN I PNL SERPL HS: 23 NG/L
CHLORIDE SERPL-SCNC: 103 MMOL/L (ref 100–108)
CO2 SERPL-SCNC: 26 MMOL/L (ref 21–32)
CREAT SERPL-MCNC: 0.74 MG/DL (ref 0.6–1.3)
D DIMER PPP FEU-MCNC: 0.4 UG/ML FEU
EOSINOPHIL # BLD AUTO: 0.37 THOUSAND/ΜL (ref 0–0.61)
EOSINOPHIL NFR BLD AUTO: 3 % (ref 0–6)
ERYTHROCYTE [DISTWIDTH] IN BLOOD BY AUTOMATED COUNT: 13.4 % (ref 11.6–15.1)
GFR SERPL CREATININE-BSD FRML MDRD: 98 ML/MIN/1.73SQ M
GLUCOSE SERPL-MCNC: 107 MG/DL (ref 65–140)
HCT VFR BLD AUTO: 42.2 % (ref 34.8–46.1)
HGB BLD-MCNC: 14.1 G/DL (ref 11.5–15.4)
IMM GRANULOCYTES # BLD AUTO: 0.06 THOUSAND/UL (ref 0–0.2)
IMM GRANULOCYTES NFR BLD AUTO: 1 % (ref 0–2)
LYMPHOCYTES # BLD AUTO: 1.71 THOUSANDS/ΜL (ref 0.6–4.47)
LYMPHOCYTES NFR BLD AUTO: 16 % (ref 14–44)
MAGNESIUM SERPL-MCNC: 2.3 MG/DL (ref 1.6–2.6)
MCH RBC QN AUTO: 28 PG (ref 26.8–34.3)
MCHC RBC AUTO-ENTMCNC: 33.4 G/DL (ref 31.4–37.4)
MCV RBC AUTO: 84 FL (ref 82–98)
MONOCYTES # BLD AUTO: 0.75 THOUSAND/ΜL (ref 0.17–1.22)
MONOCYTES NFR BLD AUTO: 7 % (ref 4–12)
NEUTROPHILS # BLD AUTO: 7.87 THOUSANDS/ΜL (ref 1.85–7.62)
NEUTS SEG NFR BLD AUTO: 73 % (ref 43–75)
NRBC BLD AUTO-RTO: 0 /100 WBCS
PLATELET # BLD AUTO: 272 THOUSANDS/UL (ref 149–390)
PMV BLD AUTO: 9.4 FL (ref 8.9–12.7)
POTASSIUM SERPL-SCNC: 3.4 MMOL/L (ref 3.5–5.3)
PROT SERPL-MCNC: 7.3 G/DL (ref 6.4–8.2)
RBC # BLD AUTO: 5.04 MILLION/UL (ref 3.81–5.12)
SODIUM SERPL-SCNC: 139 MMOL/L (ref 136–145)
TSH SERPL DL<=0.05 MIU/L-ACNC: 2.1 UIU/ML (ref 0.45–4.5)
WBC # BLD AUTO: 10.8 THOUSAND/UL (ref 4.31–10.16)

## 2022-04-14 PROCEDURE — 36415 COLL VENOUS BLD VENIPUNCTURE: CPT

## 2022-04-14 PROCEDURE — 80053 COMPREHEN METABOLIC PANEL: CPT | Performed by: EMERGENCY MEDICINE

## 2022-04-14 PROCEDURE — 99285 EMERGENCY DEPT VISIT HI MDM: CPT

## 2022-04-14 PROCEDURE — 84484 ASSAY OF TROPONIN QUANT: CPT | Performed by: EMERGENCY MEDICINE

## 2022-04-14 PROCEDURE — 84443 ASSAY THYROID STIM HORMONE: CPT | Performed by: EMERGENCY MEDICINE

## 2022-04-14 PROCEDURE — 93005 ELECTROCARDIOGRAM TRACING: CPT

## 2022-04-14 PROCEDURE — 71045 X-RAY EXAM CHEST 1 VIEW: CPT

## 2022-04-14 PROCEDURE — 85025 COMPLETE CBC W/AUTO DIFF WBC: CPT | Performed by: EMERGENCY MEDICINE

## 2022-04-14 PROCEDURE — 99285 EMERGENCY DEPT VISIT HI MDM: CPT | Performed by: EMERGENCY MEDICINE

## 2022-04-14 PROCEDURE — 85379 FIBRIN DEGRADATION QUANT: CPT | Performed by: EMERGENCY MEDICINE

## 2022-04-14 PROCEDURE — 96360 HYDRATION IV INFUSION INIT: CPT

## 2022-04-14 PROCEDURE — 83735 ASSAY OF MAGNESIUM: CPT | Performed by: EMERGENCY MEDICINE

## 2022-04-14 RX ORDER — POTASSIUM CHLORIDE 20 MEQ/1
40 TABLET, EXTENDED RELEASE ORAL ONCE
Status: COMPLETED | OUTPATIENT
Start: 2022-04-14 | End: 2022-04-14

## 2022-04-14 RX ORDER — HYDROXYZINE HYDROCHLORIDE 25 MG/1
25 TABLET, FILM COATED ORAL ONCE
Status: COMPLETED | OUTPATIENT
Start: 2022-04-14 | End: 2022-04-14

## 2022-04-14 RX ADMIN — HYDROXYZINE HYDROCHLORIDE 25 MG: 25 TABLET ORAL at 18:45

## 2022-04-14 RX ADMIN — POTASSIUM CHLORIDE 40 MEQ: 1500 TABLET, EXTENDED RELEASE ORAL at 18:46

## 2022-04-14 RX ADMIN — SODIUM CHLORIDE 1000 ML: 0.9 INJECTION, SOLUTION INTRAVENOUS at 18:46

## 2022-04-14 RX ADMIN — METOPROLOL TARTRATE 25 MG: 25 TABLET, FILM COATED ORAL at 20:17

## 2022-04-14 NOTE — TELEPHONE ENCOUNTER
Patient has finished her Amlodipine and would like to know if she should continue taking it  She would also like to speak to dr Gisselle Jesus because patient states that since taking this medication she feels her heart beating hard        [phone]

## 2022-04-14 NOTE — TELEPHONE ENCOUNTER
As per pts hospital discharge notice pt is to continue amlodipine 5mg daily  Please advise pt stated she is having side effects from amlodipine

## 2022-04-14 NOTE — ED PROVIDER NOTES
History  Chief Complaint   Patient presents with    Palpitations     pt reports being seen multiple times in the past two weeks, received echo yesterday, seen cardiologist  Pt reports, "I can feel my heart beating, it's like hard, you shouldn't feel that " Reports vaping and trying to cut back  Reports tingling in both feet    Tingling     also reports dizziness     Patient is a 52-year-old female with past medical history of cervical cancer status post chemo and radiation 2 years ago in remission, hypertension, presents to the emergency department for palpitations, bilateral feet numbness and tingling sensation  Patient states for the past 2-3 weeks she has had intermittent palpitations in which she feels her heart thumping in her chest very strongly  She states it does not feel normal to be so aware of her heartbeat but denies that her heart is racing or beating irregular  The palpitations come and go  She also reports bilateral feet tingling sensation which also comes and goes but today it is been fairly constant  She occasionally gets a sharp pain under her left breast but it usually lasts only seconds and this goes away  Denies any chest pain currently  Over the past couple of weeks she has also had intermittent diaphoresis and denies that this happens with exertion and states it just comes on randomly even when watching TV  She also reports intermittent lightheadedness  She denies any fevers or shaking chills, headaches, vertigo/dizziness, change in vision, cough or URI symptoms, hemoptysis, dyspnea, abdominal pain, nausea, vomiting, diarrhea, constipation, blood per rectum or melena, dysuria, change in urinary frequency, hematuria, flank pain, skin rash color change, leg swelling or pain, upper extremity paresthesia, extremity weakness or other focal neurologic deficits  Patient just saw her cardiologist, Dr Slime Ferguson, yesterday, who performed an echocardiogram however results are still pending  She states she was also recently started on amlodipine and she is unsure if that is contributing to her symptoms  She does admit to increased stress and anxiety at home but no formal diagnosis of anxiety  History provided by:  Patient and medical records   used: No    Palpitations  Associated symptoms: diaphoresis and numbness    Associated symptoms: no back pain, no chest pain, no cough, no dizziness, no nausea, no shortness of breath, no vomiting and no weakness        Prior to Admission Medications   Prescriptions Last Dose Informant Patient Reported? Taking? Multiple Vitamins-Minerals (MULTIVITAMIN ADULT PO)  Self Yes No   Sig: multivitamin   Omega-3 Fatty Acids (FISH OIL) 1,000 mg  Self Yes No   Sig: Fish Oil   acetaminophen (TYLENOL) 325 mg tablet  Self No No   Sig: Take 2 tablets (650 mg total) by mouth every 6 (six) hours as needed for mild pain   Patient not taking: Reported on 4/1/2022    amLODIPine (NORVASC) 5 mg tablet   No No   Sig: Take 1 tablet (5 mg total) by mouth daily   hydrochlorothiazide (HYDRODIURIL) 12 5 mg tablet   No No   Sig: Take 1 tablet (12 5 mg total) by mouth daily      Facility-Administered Medications: None       Past Medical History:   Diagnosis Date    Abnormal Pap smear of cervix     Acute hip pain     Cancer (HCC)     cervix    Hypokalemia     Low back pain     Lymphadenopathy     Pelvic pain        Past Surgical History:   Procedure Laterality Date    [place] BRONCHOSCOPY NEEDLE BX TRACHEA MAIN STEM&/BRON N/A 2/26/2020    Procedure: ENDOBRONCHIAL ULTRASOUND (EBUS); Surgeon: Katey Schilling MD;  [facility];  Service: Thoracic    [place] [address] N/A 2/26/2020    Procedure: Jignesh Lama;  Surgeon: Katey Schilling MD;  [facility];  Service: Thoracic    [place] DILATION OF VAGINA W ANESTH N/A 6/20/2019    Procedure: EXAM UNDER ANESTHESIA (EUA);   Surgeon: Alexandra Mcguire MD;  [facility];  Service: Gynecology Oncology    [place] INSERT VAGINAL RADIATION DEVICE N/A 2019    Procedure: PIERRE SLEEVE PLACEMENT;  Surgeon: Raza Busby MD;  [facility];  Service: Gynecology Oncology    TONSILLECTOMY      US GUIDANCE  2019       Family History   Problem Relation Age of Onset    Uterine cancer Maternal Grandmother     Heart disease Mother     Canavan disease Father     Cancer Father      I have reviewed and agree with the history as documented  E-Cigarette/Vaping    E-Cigarette Use Never User      E-Cigarette/Vaping Substances    Nicotine No     THC No     CBD No     Flavoring No     Other No     Unknown No      Social History     Tobacco Use    Smoking status: Former Smoker     Packs/day: 0 50     Years: 1 00     Pack years: 0 50     Types: Cigarettes     Quit date: 2020     Years since quittin 1    Smokeless tobacco: Never Used   Vaping Use    Vaping Use: Never used   Substance Use Topics    Alcohol use: Not Currently    Drug use: Never       Review of Systems   Constitutional: Positive for diaphoresis  Negative for chills and fever  HENT: Negative for congestion, ear pain, rhinorrhea and sore throat  Respiratory: Negative for cough, chest tightness, shortness of breath and wheezing  Cardiovascular: Positive for palpitations  Negative for chest pain  Gastrointestinal: Negative for abdominal pain, constipation, diarrhea, nausea and vomiting  Genitourinary: Negative for dysuria, flank pain, frequency and hematuria  Musculoskeletal: Negative for back pain, neck pain and neck stiffness  Skin: Negative for color change, pallor, rash and wound  Allergic/Immunologic: Negative for immunocompromised state  Neurological: Positive for light-headedness and numbness  Negative for dizziness, seizures, syncope, facial asymmetry, speech difficulty, weakness and headaches  Hematological: Negative for adenopathy  Does not bruise/bleed easily  Psychiatric/Behavioral: Negative for confusion and decreased concentration  The patient is nervous/anxious  All other systems reviewed and are negative  Physical Exam  Physical Exam  Vitals and nursing note reviewed  Constitutional:       General: She is not in acute distress  Appearance: Normal appearance  She is well-developed  She is not ill-appearing, toxic-appearing or diaphoretic  HENT:      Head: Normocephalic and atraumatic  Right Ear: External ear normal       Left Ear: External ear normal       Mouth/Throat:      Comments: Orpharyngeal exam deferred at this time due to risk of exposure to COVID-19 during current pandemic  Patient has no oropharyngeal complaints  Eyes:      Extraocular Movements: Extraocular movements intact  Conjunctiva/sclera: Conjunctivae normal    Neck:      Vascular: No JVD  Cardiovascular:      Rate and Rhythm: Normal rate and regular rhythm  Pulses: Normal pulses  Heart sounds: Normal heart sounds  No murmur heard  No friction rub  No gallop  Pulmonary:      Effort: Pulmonary effort is normal  No respiratory distress  Breath sounds: Normal breath sounds  No wheezing, rhonchi or rales  Abdominal:      General: There is no distension  Palpations: Abdomen is soft  Tenderness: There is no abdominal tenderness  There is no guarding or rebound  Musculoskeletal:         General: No swelling or tenderness  Normal range of motion  Cervical back: Normal range of motion and neck supple  No rigidity  Right lower leg: No edema  Left lower leg: No edema  Skin:     General: Skin is warm and dry  Coloration: Skin is not pale  Findings: No erythema or rash  Neurological:      General: No focal deficit present  Mental Status: She is alert and oriented to person, place, and time  Sensory: No sensory deficit  Motor: No weakness     Psychiatric:         Mood and Affect: Mood normal  Behavior: Behavior normal          Vital Signs  ED Triage Vitals   Temperature Pulse Respirations Blood Pressure SpO2   04/14/22 1706 04/14/22 1706 04/14/22 1706 04/14/22 1706 04/14/22 1706   97 7 °F (36 5 °C) 80 16 140/93 95 %      Temp Source Heart Rate Source Patient Position - Orthostatic VS BP Location FiO2 (%)   04/14/22 1706 04/14/22 1706 04/14/22 1706 04/14/22 1706 --   Temporal Monitor Sitting Left arm       Pain Score       04/14/22 2017       No Pain           Vitals:    04/14/22 1855 04/14/22 1856 04/14/22 1859 04/14/22 2017   BP: (!) 175/94 (!) 180/99 (!) 177/113 (!) 188/76   Pulse: 58 60 72 60   Patient Position - Orthostatic VS: Lying - Orthostatic VS Sitting - Orthostatic VS Standing - Orthostatic VS Sitting     Vitals:    04/14/22 1855 04/14/22 1856 04/14/22 1859 04/14/22 2017   BP: (!) 175/94 (!) 180/99 (!) 177/113 (!) 188/76   BP Location: Right arm Right arm Right arm Right arm   Pulse: 58 60 72 60   Resp: 20 18 20 20   Temp:       TempSrc:       SpO2:    99%   Weight:       Height:           Visual Acuity      ED Medications  Medications   sodium chloride 0 9 % bolus 1,000 mL (0 mL Intravenous Stopped 4/14/22 2008)   hydrOXYzine HCL (ATARAX) tablet 25 mg (25 mg Oral Given 4/14/22 1845)   potassium chloride (K-DUR,KLOR-CON) CR tablet 40 mEq (40 mEq Oral Given 4/14/22 1846)   metoprolol tartrate (LOPRESSOR) tablet 25 mg (25 mg Oral Given 4/14/22 2017)       Diagnostic Studies  Results Reviewed     Procedure Component Value Units Date/Time    HS Troponin I 2hr [[id number]]  (Normal) Collected: 04/14/22 1929    Lab Status: Final result Specimen: Blood from Arm, Left Updated: 04/14/22 2006     hs TnI 2hr 23 ng/L      Delta 2hr hsTnI 1 ng/L     Magnesium [[id number]]  (Normal) Collected: 04/14/22 1711    Lab Status: Final result Specimen: Blood from Arm, Right Updated: 04/14/22 1903     Magnesium 2 3 mg/dL     TSH, 3rd generation with Free T4 reflex [[id number]]  (Normal) Collected: 04/14/22 1711    Lab Status: Final result Specimen: Blood from Arm, Right Updated: 04/14/22 1903     TSH 3RD GENERATON 2 096 uIU/mL     Narrative:      Patients undergoing fluorescein dye angiography may retain small amounts of fluorescein in the body for 48-72 hours post procedure  Samples containing fluorescein can produce falsely depressed TSH values  If the patient had this procedure,a specimen should be resubmitted post fluorescein clearance        D-Dimer [[id number]]  (Normal) Collected: 04/14/22 1711    Lab Status: Final result Specimen: Blood from Arm, Right Updated: 04/14/22 1845     D-Dimer, Quant 0 40 ug/ml FEU     HS Troponin 0hr (reflex protocol) [[id number]]  (Normal) Collected: 04/14/22 1711    Lab Status: Final result Specimen: Blood from Arm, Right Updated: 04/14/22 1739     hs TnI 0hr 22 ng/L     Comprehensive metabolic panel [[id number]]  (Abnormal) Collected: 04/14/22 1711    Lab Status: Final result Specimen: Blood from Arm, Right Updated: 04/14/22 1730     Sodium 139 mmol/L      Potassium 3 4 mmol/L      Chloride 103 mmol/L      CO2 26 mmol/L      ANION GAP 10 mmol/L      BUN 26 mg/dL      Creatinine 0 74 mg/dL      Glucose 107 mg/dL      Calcium 10 5 mg/dL      AST 10 U/L      ALT 13 U/L      Alkaline Phosphatase 78 U/L      Total Protein 7 3 g/dL      Albumin 3 7 g/dL      Total Bilirubin 0 51 mg/dL      eGFR 98 ml/min/1 73sq m     Narrative:      [facility] guidelines for Chronic Kidney Disease (CKD):     Stage 1 with normal or high GFR (GFR > 90 mL/min/1 73 square meters)    Stage 2 Mild CKD (GFR = 60-89 mL/min/1 73 square meters)    Stage 3A Moderate CKD (GFR = 45-59 mL/min/1 73 square meters)    Stage 3B Moderate CKD (GFR = 30-44 mL/min/1 73 square meters)    Stage 4 Severe CKD (GFR = 15-29 mL/min/1 73 square meters)    Stage 5 End Stage CKD (GFR <15 mL/min/1 73 square meters)  Note: GFR calculation is accurate only with a steady state creatinine    CBC and differential [[id number]] (Abnormal) Collected: 04/14/22 1711    Lab Status: Final result Specimen: Blood from Arm, Right Updated: 04/14/22 1715     WBC 10 80 Thousand/uL      RBC 5 04 Million/uL      Hemoglobin 14 1 g/dL      Hematocrit 42 2 %      MCV 84 fL      MCH 28 0 pg      MCHC 33 4 g/dL      RDW 13 4 %      MPV 9 4 fL      Platelets 433 Thousands/uL      nRBC 0 /100 WBCs      Neutrophils Relative 73 %      Immat GRANS % 1 %      Lymphocytes Relative 16 %      Monocytes Relative 7 %      Eosinophils Relative 3 %      Basophils Relative 0 %      Neutrophils Absolute 7 87 Thousands/µL      Immature Grans Absolute 0 06 Thousand/uL      Lymphocytes Absolute 1 71 Thousands/µL      Monocytes Absolute 0 75 Thousand/µL      Eosinophils Absolute 0 37 Thousand/µL      Basophils Absolute 0 04 Thousands/µL                  XR chest 1 view portable   ED Interpretation by Blair Benoit DO (04/14 0724)   No acute abnormality in the chest                  Procedures  ECG 12 Lead Documentation Only    Date/Time: 4/14/2022 6:32 PM  Performed by: Blair Benoit DO  Authorized by: lBair Benoit DO     ECG reviewed by me, the ED Provider: yes    Patient location:  ED  Previous ECG:     Previous ECG:  Compared to current    Comparison ECG info:  4-4-22; T-wave inversion in the anterior and lateral leads is no longer present  Nonspecific T-wave abnormality improved in inferior leads    Rate:     ECG rate:  78    ECG rate assessment: normal    Rhythm:     Rhythm: sinus rhythm    QRS:     QRS axis:  Right    QRS intervals:  Normal  Conduction:     Conduction: abnormal      Abnormal conduction: incomplete RBBB    ST segments:     ST segments:  Non-specific  T waves:     T waves: normal    Other findings:     Other findings: LINO and LVH with strain      ECG 12 Lead Documentation Only    Date/Time: 4/14/2022 8:07 PM  Performed by: Blair Benoit DO  Authorized by: Blair Benoit DO     ECG reviewed by me, the ED Provider: yes    Patient location:  ED  Previous ECG:     Previous ECG:  Compared to current    Similarity:  No change  Rate:     ECG rate:  53    ECG rate assessment: bradycardic    Rhythm:     Rhythm: sinus bradycardia    Ectopy:     Ectopy: none    QRS:     QRS axis:  Normal    QRS intervals:  Normal  Conduction:     Conduction: abnormal      Abnormal conduction: incomplete RBBB    ST segments:     ST segments:  Non-specific  T waves:     T waves: non-specific    Other findings:     Other findings: LINO and LVH with strain               ED Course  ED Course as of 04/14/22 2026   Thu Apr 14, 2022   1823 Potassium(!): 3 4  Will replace with oral potassium  1823 hs TnI 0hr: 22  Down from 27 one month ago  Will keep for 2 hr repeat  1850 D-Dimer, Quant: 0 40   2006 Spoke with cardiologist on-call, Dr Derrick Escalante, and updated him of patient's symptoms as well as her recent echo findings and he recommended she stop taking the amlodipine and to start her on metoprolol tartrate 25 mg b i d    2020 Updated patient about conversation with Cardiology as well as repeat troponin being normal   I advised her to follow-up with her cardiologist tomorrow  Discussed ED return parameters  MDM  Number of Diagnoses or Management Options  Diagnosis management comments: 78-year-old female with history of cervical cancer status post treatment 2 years ago in remission, hypertension, presents to the ED for 2 weeks of intermittent palpitations in which she feels her heart beating very strongly and something out of her chest,"as well as bilateral feet paresthesia and intermittent diaphoresis and lightheadedness  Patient denies any chest pain at this time  EKG shows improvement in T-waves and stable repolarization abnormality  Cardiac workup initiated in triage  Initial troponin 22 so will keep for repeat troponin at 2 hours  Overall low suspicion for ACS    Other considerations especially given cancer history includes acute PE, anxiety, electrolyte or metabolic abnormality  Will give IV fluids, check orthostatic vital signs  According to [facility] records, patient had echocardiogram done yesterday on 4/13/2022 showing:  Left Ventricle: There is severe septal asymmetric hypertrophy  Septum   1 8 cm  Posterior wall 1 4 cm  Systolic function is normal with an   ejection fraction of 60-65%  Aortic Valve: There is mild regurgitation  Mildly elevated flow   velocities  Will refer back to patient's cardiologist to review echocardiogram findings  Amount and/or Complexity of Data Reviewed  Clinical lab tests: ordered and reviewed  Tests in the radiology section of CPT®: ordered and reviewed  Tests in the medicine section of CPT®: ordered and reviewed  Decide to obtain previous medical records or to obtain history from someone other than the patient: yes  Review and summarize past medical records: yes  Independent visualization of images, tracings, or specimens: yes        Disposition  Final diagnoses:   Palpitations   Paresthesia of both feet   Asymmetric septal hypertrophy ([address] )     Time reflects when diagnosis was documented in both MDM as applicable and the Disposition within this note     Time User Action Codes Description Comment    4/14/2022  8:22 PM [place] Add [R00 2] Palpitations     4/14/2022  8:23 PM [place] Add [R20 2] Paresthesia of both feet     4/14/2022  8:26 PM [place] Add [I42 2] Asymmetric septal hypertrophy [facility])       ED Disposition     ED Disposition Condition Date/Time Comment    Discharge Stable u Apr 14, 2022  8:23 PM Della Hillman discharge to home/self care              Follow-up Information     Follow up With Specialties Details Why Contact Info Additional [id number] [facility] Schedule an appointment as soon as possible for a visit   [address] [place] [address]  [address] Osvaldo Kerrza [address]       [place] Gracia Caban MD Cardiology Schedule an appointment as soon as possible for a visit   [address] [place] [id number] [phone] [address] Emergency Department Emergency Medicine Go to  If symptoms worsen [address] [facility]  [id number] [place] [address] Emergency Department, [address] [place], [place], [facility]          Patient's Medications   Discharge Prescriptions    METOPROLOL TARTRATE (LOPRESSOR) 25 MG TABLET    Take 1 tablet (25 mg total) by mouth every 12 (twelve) hours       Start Date: 4/14/2022 End Date: --       Order Dose: 25 mg       Quantity: 60 tablet    Refills: 0       No discharge procedures on file      PDMP Review     None          ED Provider  Electronically Signed by           Jose E Viveros DO  04/14/22 2027

## 2022-04-14 NOTE — TELEPHONE ENCOUNTER
Tried calling  Went to voice mail  Left a message  Please schedule patient to follow up with me in 1 - 2 weeks   Okay to [employer]

## 2022-04-15 LAB
ATRIAL RATE: 53 BPM
ATRIAL RATE: 78 BPM
P AXIS: 68 DEGREES
P AXIS: 74 DEGREES
PR INTERVAL: 134 MS
PR INTERVAL: 152 MS
QRS AXIS: 77 DEGREES
QRS AXIS: 97 DEGREES
QRSD INTERVAL: 90 MS
QRSD INTERVAL: 98 MS
QT INTERVAL: 396 MS
QT INTERVAL: 464 MS
QTC INTERVAL: 435 MS
QTC INTERVAL: 451 MS
T WAVE AXIS: 37 DEGREES
T WAVE AXIS: 97 DEGREES
VENTRICULAR RATE: 53 BPM
VENTRICULAR RATE: 78 BPM

## 2022-04-15 PROCEDURE — 93010 ELECTROCARDIOGRAM REPORT: CPT | Performed by: INTERNAL MEDICINE

## 2022-04-15 NOTE — DISCHARGE INSTRUCTIONS
Heart Palpitations   WHAT YOU NEED TO KNOW:   Heart palpitations are feelings that your heart races, jumps, throbs, or flutters  You may feel extra beats, no beats for a short time, or skipped beats  You may have these feelings in your chest, throat, or neck  They may happen when you are sitting, standing, or lying  Heart palpitations may be frightening, but are usually not caused by a serious problem  DISCHARGE INSTRUCTIONS:   Call 911 or have someone else call for any of the following:   · You have any of the following signs of a heart attack:      ? Squeezing, pressure, or pain in your chest    ? You may  also have any of the following:     § Discomfort or pain in your back, neck, jaw, stomach, or arm    § Shortness of breath    § Nausea or vomiting    § Lightheadedness or a sudden cold sweat    · You have any of the following signs of a stroke:      ? Numbness or drooping on one side of your face     ? Weakness in an arm or leg    ? Confusion or difficulty speaking    ? Dizziness, a severe headache, or vision loss    · You faint or lose consciousness  Seek care immediately if:   · Your palpitations happen more often or last longer than usual      · You have palpitations and shortness of breath, nausea, sweating, or dizziness  Contact your healthcare provider if:   · You have questions or concerns about your condition or care  Follow up with your healthcare provider as directed: You may need to follow up with a cardiologist  M[place] may need tests to check for heart problems that cause palpitations  Write down your questions so you remember to ask them during your visits  Keep a record:  Write down when your palpitations start and stop, what you were doing when they started, and your symptoms  Keep track of what you ate or drank within a few hours of your palpitations  Include anything that seemed to help your symptoms, such as lying down or holding your breath   This record will help you and your healthcare provider learn what triggers your palpitations  Bring this record with you to your follow up visits  Help prevent heart palpitations:   · Manage stress and anxiety  Find ways to relax such as listening to music, meditating, or doing yoga  Exercise can also help decrease stress and anxiety  Talk to someone you trust about your stress or anxiety  You can also talk to a therapist      · Get plenty of sleep every night  Ask your healthcare provider how much sleep you need each night  · Do not drink caffeine or alcohol  Caffeine and alcohol can make your palpitations worse  Caffeine is found in soda, coffee, tea, chocolate, and drinks that increase your energy  · Do not smoke  Nicotine and other chemicals in cigarettes and cigars may damage your heart and blood vessels  Ask your healthcare provider for information if you currently smoke and need help to quit  E-cigarettes or smokeless tobacco still contain nicotine  Talk to your healthcare provider before you use these products  · Do not use illegal drugs  Talk to your healthcare provider if you use illegal drugs and want help to quit  © Copyright [employer] 2022 Information is for End User's use only and may not be sold, redistributed or otherwise used for commercial purposes  All illustrations and images included in CareNotes® are the copyrighted property of [employer] , Inc  or [facility]  The above information is an  only  It is not intended as medical advice for individual conditions or treatments  Talk to your doctor, nurse or pharmacist before following any medical regimen to see if it is safe and effective for you  Paresthesia   WHAT YOU NEED TO KNOW:   Paresthesia is numbness, tingling, or burning  It can happen in any part of your body, but usually occurs in your legs, feet, arms, or hands    DISCHARGE INSTRUCTIONS:   Return to the emergency department if:   · You have severe pain along with numbness and tingling  · Your legs suddenly become cold  You have trouble moving your legs, and they ache  · You have increased weakness in a part of your body  · You have uncontrolled movements  Contact your healthcare provider or neurologist if:   · Your symptoms do not improve  · You have symptoms in more than one part of your body  · You have questions or concerns about your condition or care  Manage paresthesia:   · Protect the area from injury  You may injure or burn yourself if you lose feeling in the area  Be careful when you touch anything that could be hot  Wear sturdy shoes to protect your feet  Ask about other ways to protect yourself  · Go to physical or occupational therapy if directed  Your provider may recommend therapy if you have a condition such as carpal tunnel syndrome  A physical therapist can teach you exercises to help strengthen the area or increase your ability to move it  An occupational therapist can help you find new ways to do your daily activities  · Manage health conditions that can cause paresthesia  Work with your diabetes specialist if you have uncontrolled diabetes  A dietitian or your healthcare provider can help you create a meal plan if you have low vitamin B levels  Your provider can help you manage your health if you have multiple sclerosis or you had a stroke  It is important to manage health conditions to stop paresthesia or prevent it from getting worse  Follow up with your healthcare provider or neurologist as directed: Your healthcare provider may refer you to a specialist  Write down your questions so you remember to ask them during your visits  © [employer] 2022 Information is for End User's use only and may not be sold, redistributed or otherwise used for commercial purposes   All illustrations and images included in CareNotes® are the copyrighted property of [employer]  or Chelsea Proctor  The above information is an  only  It is not intended as medical advice for individual conditions or treatments  Talk to your doctor, nurse or pharmacist before following any medical regimen to see if it is safe and effective for you

## 2022-04-19 ENCOUNTER — OFFICE VISIT (OUTPATIENT)
Dept: CARDIOLOGY CLINIC | Facility: CLINIC | Age: 47
End: 2022-04-19
Payer: COMMERCIAL

## 2022-04-19 VITALS
BODY MASS INDEX: 28.89 KG/M2 | DIASTOLIC BLOOD PRESSURE: 82 MMHG | SYSTOLIC BLOOD PRESSURE: 132 MMHG | WEIGHT: 153 LBS | HEIGHT: 61 IN | HEART RATE: 68 BPM

## 2022-04-19 DIAGNOSIS — I42.2 ASYMMETRIC SEPTAL HYPERTROPHY (HCC): Primary | ICD-10-CM

## 2022-04-19 DIAGNOSIS — I42.2 ASYMMETRIC SEPTAL HYPERTROPHY (HCC): ICD-10-CM

## 2022-04-19 DIAGNOSIS — I10 PRIMARY HYPERTENSION: Primary | ICD-10-CM

## 2022-04-19 PROCEDURE — 99214 OFFICE O/P EST MOD 30 MIN: CPT

## 2022-04-19 RX ORDER — IBUPROFEN 600 MG/1
TABLET ORAL
COMMUNITY
Start: 2022-04-18 | End: 2022-06-29

## 2022-04-19 NOTE — PROGRESS NOTES
[address] [place] [address]  Cardiology Office Note    Della Hillman 40 y o  female MRN: [id number]    04/19/22          Assessment/Plan:  1  Hypertension  · Initially elevated however decreased to 132/62 on recheck  · Continue metoprolol tartrate 25 mg b i d  2  Asymmetric Septal Hypertrophy  · TTE shows severe septal asymmetric hypertrophy  Septum 1 8 cm, posterior wall 1 4 cm  · No evidence of left ventricular hypertrophy  · No family history of HCM  3  Nicotine use- Patient endorses vaping  · Cessation advised    Follow up: 6 months or sooner as needed    Recommend aggressive risk factor modification and therapeutic lifestyle changes  Low-salt, low-calorie, low-fat, low-cholesterol diet with regular exercise and to optimize weight  Discussed concepts of atherosclerosis, including signs and symptoms of cardiac disease  Previous studies were reviewed  Safety measures were reviewed  All questions and concerns addressed  Patient was advised to report any problems requiring medical attention  1  Primary hypertension     2  Asymmetric septal hypertrophy (HCC)         HPI: Juan Izquierdo is a 40y o  year old female with PMH of hypertension who presents for follow-up  Patient is known to Dr Slime Ferguson  Patient was seen in the emergency room in March after she experienced a mechanical fall  She hurt her right hand but the x-ray was unremarkable  Her blood pressures were noted to be elevated in the 993R systolically during her hospitalization  She was started on amlodipine at that time  Patient was seen in the office on 4/1/22 and since her BP was still elevated, she was started on HCTZ 12 5 mg daily  On April 14th patient presented to the emergency department with palpitations  She states that she could feel her heart beat  She was discharged on metoprolol tartrate 25 mg b i d        Patient states that she is currently just taking the metoprolol tartrate 25 mg b i d   Patient notes that she experienced increased palpitations and sweating on amlodipine  She also experienced face tightening on hydrochlorothiazide in which she went to the emergency room  She states that the side effects have resolved since she stopped taking these medications  Patient's blood pressure was elevated at today's visit, however blood pressure was recheck to later in the visit and was found to be 132/82     Patient states that she has significant stress regarding her wrist injury, work, and neighbors in addition to other stressors  Patient states that she is attempting to lose weight  She has been having trouble going to the gym due to frequent doctor's appointments  Patient notes that she currently vapes however she has been decreasing or use and plans to stop when she starts going to the gym more often  Patient works as a  and denies any chest pain or exertional symptoms while working  Patient notes that she does not currently take her blood pressure at home, but she is willing to do so  Patient states that she does not wish to increase any medication at this time or any new medications  Patient completed her echo and results were reviewed  All questions and concerns addressed  The patient denies chest pain, dyspnea on exertion or rest, lower extremity edema, palpitations, lightheadedness, dizziness, presyncope/syncope and was instructed to call  the office or seek medical attention if any such symptoms develop  All medications reviewed  Family History:   Mom- WPW  MGF- CVA  Father- HTN, HLD     Social history:   Patient endorses vaping  She notes rare alcohol intake  EKG- 04/14/2022 normal sinus rhythm, biatrial enlargement, rightward axis, RSR or QR pattern in V1 suggest right ventricular conduction delay, left ventricular hypertrophy with repolarization abnormality      Echo 4/13/22  Left ventricle:  Left ventricle is normal size  There is severe septal asymmetric hypertrophy  Septum 1 8 cm  Posterior wall 1 4 cm  Systolic function is normal with an ejection fraction of 60-65%  Wall motion is within normal limits  There is grade 1 DD  Aortic valve: There is mild regurgitation  Mildly elevated flow velocities  Patient Active Problem List   Diagnosis    Malignant neoplasm of overlapping sites of cervix ([address] )    Cancer associated pain    Insomnia due to medical condition    Loss of appetite    Menopausal symptoms    Acute midline low back pain without sciatica    Mediastinal lymphadenopathy       Allergies   Allergen Reactions    Hydrochlorothiazide Arthralgia    Latex Hives    Other      Tomatoes   Vomiting and diarhhea         Current Outpatient Medications:     ibuprofen (MOTRIN) 600 mg tablet, , Disp: , Rfl:     metoprolol tartrate (LOPRESSOR) 25 mg tablet, Take 1 tablet (25 mg total) by mouth every 12 (twelve) hours, Disp: 60 tablet, Rfl: 0    Multiple Vitamins-Minerals (MULTIVITAMIN ADULT PO), multivitamin, Disp: , Rfl:     Omega-3 Fatty Acids (FISH OIL) 1,000 mg, Fish Oil, Disp: , Rfl:     acetaminophen (TYLENOL) 325 mg tablet, Take 2 tablets (650 mg total) by mouth every 6 (six) hours as needed for mild pain (Patient not taking: Reported on 4/1/2022 ), Disp: 30 tablet, Rfl: 0    Past Medical History:   Diagnosis Date    Abnormal Pap smear of cervix     Acute hip pain     Cancer (HCC)     cervix    Hypokalemia     Low back pain     Lymphadenopathy     Pelvic pain        Family History   Problem Relation Age of Onset    Uterine cancer Maternal Grandmother     Heart disease Mother     Canavan disease Father     Cancer Father        Past Surgical History:   Procedure Laterality Date    CO BRONCHOSCOPY NEEDLE BX TRACHEA MAIN STEM&/BRON N/A 2/26/2020    Procedure: ENDOBRONCHIAL ULTRASOUND (EBUS);   Surgeon: Aubrey Tello MD;  [facility];  Service: Thoracic    CO BRONCHOSCOPY,DIAGNOSTIC N/A 2020    Procedure: Elizabeth Gone;  Surgeon: Malina Hitchcock MD;  [facility];  Service: Thoracic    SC DILATION OF VAGINA W ANESTH N/A 2019    Procedure: EXAM UNDER ANESTHESIA (EUA); Surgeon: Bernadette Harry MD;  [facility];  Service: Gynecology Oncology    SC INSERT VAGINAL RADIATION DEVICE N/A 2019    Procedure: PIERRE SLEEVE PLACEMENT;  Surgeon: Bernadette Harry MD;  [facility];  Service: Gynecology Oncology    TONSILLECTOMY      US GUIDANCE  2019       Social History     Socioeconomic History    Marital status: Significant Other     Spouse name: Not on file    Number of children: Not on file    Years of education: Not on file    Highest education level: Not on file   Occupational History    Not on file   Tobacco Use    Smoking status: Former Smoker     Packs/day: 0 50     Years: 1 00     Pack years: 0 50     Types: Cigarettes     Quit date: 2020     Years since quittin 1    Smokeless tobacco: Never Used   Vaping Use    Vaping Use: Never used   Substance and Sexual Activity    Alcohol use: Not Currently    Drug use: Never    Sexual activity: Yes   Other Topics Concern    Not on file   Social History Narrative    Not on file     Social Determinants of Health     Financial Resource Strain: Not on file   Food Insecurity: Not on file   Transportation Needs: Not on file   Physical Activity: Not on file   Stress: Not on file   Social Connections: Not on file   Intimate Partner Violence: Not on file   Housing Stability: Not on file       Review of symptoms:   Review of Systems   Constitutional: Negative for chills, diaphoresis and fever  Respiratory: Negative for cough, chest tightness and shortness of breath  Cardiovascular: Negative for chest pain, palpitations and leg swelling  Gastrointestinal: Negative for abdominal distention, blood in stool, nausea and vomiting     Genitourinary: Negative for difficulty urinating  Musculoskeletal: Negative for arthralgias and back pain  Neurological: Negative for dizziness, syncope, light-headedness and headaches  Psychiatric/Behavioral: Negative for agitation and confusion  The patient is nervous/anxious  Vitals: /82   Pulse 68   Ht 5' 1" (1 549 m)   Wt 69 4 kg (153 lb)   LMP 04/15/2019   BMI 28 91 kg/m²         Physical Exam:     Physical Exam  Vitals and nursing note reviewed  Constitutional:       General: She is not in acute distress  Appearance: She is well-developed  HENT:      Head: Normocephalic and atraumatic  Eyes:      Conjunctiva/sclera: Conjunctivae normal    Cardiovascular:      Rate and Rhythm: Normal rate and regular rhythm  Heart sounds: Normal heart sounds  No murmur heard  Pulmonary:      Effort: Pulmonary effort is normal  No respiratory distress  Breath sounds: Normal breath sounds  Abdominal:      Palpations: Abdomen is soft  Tenderness: There is no abdominal tenderness  Musculoskeletal:      Cervical back: Neck supple  Right lower leg: No edema  Left lower leg: No edema  Skin:     General: Skin is warm and dry  Neurological:      Mental Status: She is alert and oriented to person, place, and time  Psychiatric:         Mood and Affect: Mood normal          Behavior: Behavior normal             Thank you for allowing me to participate in the care and evaluation of your patient  Should you have any questions, please feel free to contact me

## 2022-04-19 NOTE — PROGRESS NOTES
Spoke with patient further regarding echo results  Severe septal asymmetric hypertrophy warrants further evaluation with Cardiac MRI, patient agreeable to plan  Order placed in chart

## 2022-05-17 ENCOUNTER — HOSPITAL ENCOUNTER (OUTPATIENT)
Dept: MRI IMAGING | Facility: HOSPITAL | Age: 47
Discharge: HOME | End: 2022-05-17
Payer: COMMERCIAL

## 2022-05-17 DIAGNOSIS — I42.2 ASYMMETRIC SEPTAL HYPERTROPHY (HCC): ICD-10-CM

## 2022-05-17 PROCEDURE — G1004 CDSM NDSC: HCPCS

## 2022-05-17 PROCEDURE — A9585 GADOBUTROL INJECTION: HCPCS

## 2022-05-17 PROCEDURE — 75561 CARDIAC MRI FOR MORPH W/DYE: CPT

## 2022-05-17 RX ADMIN — GADOBUTROL 12 ML: 604.72 INJECTION INTRAVENOUS at 17:27

## 2022-05-18 DIAGNOSIS — R00.2 PALPITATIONS: ICD-10-CM

## 2022-05-18 NOTE — TELEPHONE ENCOUNTER
Name of Caller:Della  Call back Number:[phone]    Medication(s):Metoprolol    Are we prescribing provider?:    30 or 90 day supply:90    Pharmacy name/number:[id number] [address] or Next appt?:On recall list for an appt in October of this year

## 2022-05-23 NOTE — TELEPHONE ENCOUNTER
Patient contacting office [phone] to advise [facility] has not received the request to refill her metoprolol  Patient mentioned she has been out of the medication since 2 days

## 2022-06-10 ENCOUNTER — TELEPHONE (OUTPATIENT)
Dept: CARDIOLOGY CLINIC | Facility: CLINIC | Age: 47
End: 2022-06-10

## 2022-06-10 NOTE — TELEPHONE ENCOUNTER
----- Message from DEVEN Sierra sent at 6/10/2022  3:37 PM EDT -----  Regarding: Schedule  Please schedule patient with Dr. Curiel in 2 weeks to further discuss MRI results. Thank you!

## 2022-06-22 ENCOUNTER — TELEPHONE (OUTPATIENT)
Dept: OTHER | Facility: OTHER | Age: 47
End: 2022-06-22

## 2022-06-24 ENCOUNTER — APPOINTMENT (EMERGENCY)
Dept: CT IMAGING | Facility: HOSPITAL | Age: 47
End: 2022-06-24
Payer: COMMERCIAL

## 2022-06-24 ENCOUNTER — HOSPITAL ENCOUNTER (EMERGENCY)
Facility: HOSPITAL | Age: 47
End: 2022-06-25
Attending: EMERGENCY MEDICINE | Admitting: EMERGENCY MEDICINE
Payer: COMMERCIAL

## 2022-06-24 DIAGNOSIS — N20.1 URETEROLITHIASIS: ICD-10-CM

## 2022-06-24 DIAGNOSIS — N39.0 URINARY TRACT INFECTION WITH HEMATURIA, SITE UNSPECIFIED: ICD-10-CM

## 2022-06-24 DIAGNOSIS — R31.9 URINARY TRACT INFECTION WITH HEMATURIA, SITE UNSPECIFIED: ICD-10-CM

## 2022-06-24 DIAGNOSIS — E87.6 HYPOKALEMIA: ICD-10-CM

## 2022-06-24 DIAGNOSIS — N13.2 URETERAL STONE WITH HYDRONEPHROSIS: Primary | ICD-10-CM

## 2022-06-24 DIAGNOSIS — A41.9 SEPSIS, DUE TO UNSPECIFIED ORGANISM, UNSPECIFIED WHETHER ACUTE ORGAN DYSFUNCTION PRESENT (HCC): ICD-10-CM

## 2022-06-24 LAB
ALBUMIN SERPL BCP-MCNC: 2.4 G/DL (ref 3.5–5)
ALP SERPL-CCNC: 72 U/L (ref 46–116)
ALT SERPL W P-5'-P-CCNC: 24 U/L (ref 12–78)
ANION GAP SERPL CALCULATED.3IONS-SCNC: 11 MMOL/L (ref 4–13)
APTT PPP: 32 SECONDS (ref 23–37)
AST SERPL W P-5'-P-CCNC: 28 U/L (ref 5–45)
BACTERIA UR QL AUTO: ABNORMAL /HPF
BASOPHILS # BLD AUTO: 0.01 THOUSANDS/ΜL (ref 0–0.1)
BASOPHILS NFR BLD AUTO: 0 % (ref 0–1)
BILIRUB SERPL-MCNC: 0.78 MG/DL (ref 0.2–1)
BILIRUB UR QL STRIP: NEGATIVE
BUN SERPL-MCNC: 29 MG/DL (ref 5–25)
CALCIUM ALBUM COR SERPL-MCNC: 11.3 MG/DL (ref 8.3–10.1)
CALCIUM SERPL-MCNC: 10 MG/DL (ref 8.3–10.1)
CHLORIDE SERPL-SCNC: 97 MMOL/L (ref 100–108)
CLARITY UR: CLEAR
CO2 SERPL-SCNC: 27 MMOL/L (ref 21–32)
COLOR UR: YELLOW
CREAT SERPL-MCNC: 1.28 MG/DL (ref 0.6–1.3)
EOSINOPHIL # BLD AUTO: 0.01 THOUSAND/ΜL (ref 0–0.61)
EOSINOPHIL NFR BLD AUTO: 0 % (ref 0–6)
ERYTHROCYTE [DISTWIDTH] IN BLOOD BY AUTOMATED COUNT: 13.2 % (ref 11.6–15.1)
FLUAV RNA RESP QL NAA+PROBE: NEGATIVE
FLUBV RNA RESP QL NAA+PROBE: NEGATIVE
GFR SERPL CREATININE-BSD FRML MDRD: 50 ML/MIN/1.73SQ M
GLUCOSE SERPL-MCNC: 135 MG/DL (ref 65–140)
GLUCOSE UR STRIP-MCNC: NEGATIVE MG/DL
HCG SERPL QL: NEGATIVE
HCT VFR BLD AUTO: 35.5 % (ref 34.8–46.1)
HGB BLD-MCNC: 12.1 G/DL (ref 11.5–15.4)
HGB UR QL STRIP.AUTO: ABNORMAL
IMM GRANULOCYTES # BLD AUTO: 0.03 THOUSAND/UL (ref 0–0.2)
IMM GRANULOCYTES NFR BLD AUTO: 1 % (ref 0–2)
INR PPP: 1.09 (ref 0.84–1.19)
KETONES UR STRIP-MCNC: NEGATIVE MG/DL
LACTATE SERPL-SCNC: 0.4 MMOL/L (ref 0.5–2)
LACTATE SERPL-SCNC: 2.2 MMOL/L (ref 0.5–2)
LEUKOCYTE ESTERASE UR QL STRIP: ABNORMAL
LIPASE SERPL-CCNC: 22 U/L (ref 73–393)
LYMPHOCYTES # BLD AUTO: 0.42 THOUSANDS/ΜL (ref 0.6–4.47)
LYMPHOCYTES NFR BLD AUTO: 7 % (ref 14–44)
MAGNESIUM SERPL-MCNC: 1.9 MG/DL (ref 1.6–2.6)
MCH RBC QN AUTO: 27.2 PG (ref 26.8–34.3)
MCHC RBC AUTO-ENTMCNC: 34.1 G/DL (ref 31.4–37.4)
MCV RBC AUTO: 80 FL (ref 82–98)
MONOCYTES # BLD AUTO: 0.55 THOUSAND/ΜL (ref 0.17–1.22)
MONOCYTES NFR BLD AUTO: 9 % (ref 4–12)
NEUTROPHILS # BLD AUTO: 5.23 THOUSANDS/ΜL (ref 1.85–7.62)
NEUTS SEG NFR BLD AUTO: 83 % (ref 43–75)
NITRITE UR QL STRIP: NEGATIVE
NON-SQ EPI CELLS URNS QL MICRO: ABNORMAL /HPF
NRBC BLD AUTO-RTO: 0 /100 WBCS
PH UR STRIP.AUTO: 6 [PH]
PLATELET # BLD AUTO: 130 THOUSANDS/UL (ref 149–390)
PMV BLD AUTO: 11.2 FL (ref 8.9–12.7)
POTASSIUM SERPL-SCNC: 2.7 MMOL/L (ref 3.5–5.3)
PROCALCITONIN SERPL-MCNC: 2.74 NG/ML
PROT SERPL-MCNC: 6.8 G/DL (ref 6.4–8.2)
PROT UR STRIP-MCNC: ABNORMAL MG/DL
PROTHROMBIN TIME: 13.7 SECONDS (ref 11.6–14.5)
RBC # BLD AUTO: 4.45 MILLION/UL (ref 3.81–5.12)
RBC #/AREA URNS AUTO: ABNORMAL /HPF
RSV RNA RESP QL NAA+PROBE: NEGATIVE
SARS-COV-2 RNA RESP QL NAA+PROBE: NEGATIVE
SODIUM SERPL-SCNC: 135 MMOL/L (ref 136–145)
SP GR UR STRIP.AUTO: 1.02 (ref 1–1.03)
UROBILINOGEN UR QL STRIP.AUTO: 0.2 E.U./DL
WBC # BLD AUTO: 6.25 THOUSAND/UL (ref 4.31–10.16)
WBC #/AREA URNS AUTO: ABNORMAL /HPF

## 2022-06-24 PROCEDURE — 96367 TX/PROPH/DG ADDL SEQ IV INF: CPT

## 2022-06-24 PROCEDURE — 87186 SC STD MICRODIL/AGAR DIL: CPT | Performed by: EMERGENCY MEDICINE

## 2022-06-24 PROCEDURE — 85730 THROMBOPLASTIN TIME PARTIAL: CPT | Performed by: EMERGENCY MEDICINE

## 2022-06-24 PROCEDURE — 96375 TX/PRO/DX INJ NEW DRUG ADDON: CPT

## 2022-06-24 PROCEDURE — 96361 HYDRATE IV INFUSION ADD-ON: CPT

## 2022-06-24 PROCEDURE — 83605 ASSAY OF LACTIC ACID: CPT | Performed by: EMERGENCY MEDICINE

## 2022-06-24 PROCEDURE — 99285 EMERGENCY DEPT VISIT HI MDM: CPT | Performed by: EMERGENCY MEDICINE

## 2022-06-24 PROCEDURE — 96365 THER/PROPH/DIAG IV INF INIT: CPT

## 2022-06-24 PROCEDURE — 84145 PROCALCITONIN (PCT): CPT | Performed by: EMERGENCY MEDICINE

## 2022-06-24 PROCEDURE — 84703 CHORIONIC GONADOTROPIN ASSAY: CPT | Performed by: EMERGENCY MEDICINE

## 2022-06-24 PROCEDURE — 36415 COLL VENOUS BLD VENIPUNCTURE: CPT | Performed by: EMERGENCY MEDICINE

## 2022-06-24 PROCEDURE — 80053 COMPREHEN METABOLIC PANEL: CPT | Performed by: EMERGENCY MEDICINE

## 2022-06-24 PROCEDURE — 87077 CULTURE AEROBIC IDENTIFY: CPT | Performed by: EMERGENCY MEDICINE

## 2022-06-24 PROCEDURE — 87154 CUL TYP ID BLD PTHGN 6+ TRGT: CPT | Performed by: EMERGENCY MEDICINE

## 2022-06-24 PROCEDURE — 0241U HB NFCT DS VIR RESP RNA 4 TRGT: CPT | Performed by: EMERGENCY MEDICINE

## 2022-06-24 PROCEDURE — 74176 CT ABD & PELVIS W/O CONTRAST: CPT

## 2022-06-24 PROCEDURE — 87086 URINE CULTURE/COLONY COUNT: CPT | Performed by: EMERGENCY MEDICINE

## 2022-06-24 PROCEDURE — 93005 ELECTROCARDIOGRAM TRACING: CPT

## 2022-06-24 PROCEDURE — 83690 ASSAY OF LIPASE: CPT | Performed by: EMERGENCY MEDICINE

## 2022-06-24 PROCEDURE — 83735 ASSAY OF MAGNESIUM: CPT | Performed by: EMERGENCY MEDICINE

## 2022-06-24 PROCEDURE — 99285 EMERGENCY DEPT VISIT HI MDM: CPT

## 2022-06-24 PROCEDURE — 96366 THER/PROPH/DIAG IV INF ADDON: CPT

## 2022-06-24 PROCEDURE — 81001 URINALYSIS AUTO W/SCOPE: CPT | Performed by: EMERGENCY MEDICINE

## 2022-06-24 PROCEDURE — 85610 PROTHROMBIN TIME: CPT | Performed by: EMERGENCY MEDICINE

## 2022-06-24 PROCEDURE — 87040 BLOOD CULTURE FOR BACTERIA: CPT | Performed by: EMERGENCY MEDICINE

## 2022-06-24 PROCEDURE — 85025 COMPLETE CBC W/AUTO DIFF WBC: CPT | Performed by: EMERGENCY MEDICINE

## 2022-06-24 RX ORDER — LIDOCAINE HYDROCHLORIDE 20 MG/ML
15 SOLUTION OROPHARYNGEAL ONCE
Status: COMPLETED | OUTPATIENT
Start: 2022-06-24 | End: 2022-06-24

## 2022-06-24 RX ORDER — MORPHINE SULFATE 4 MG/ML
4 INJECTION, SOLUTION INTRAMUSCULAR; INTRAVENOUS ONCE
Status: COMPLETED | OUTPATIENT
Start: 2022-06-24 | End: 2022-06-24

## 2022-06-24 RX ORDER — MAGNESIUM HYDROXIDE/ALUMINUM HYDROXICE/SIMETHICONE 120; 1200; 1200 MG/30ML; MG/30ML; MG/30ML
30 SUSPENSION ORAL ONCE
Status: COMPLETED | OUTPATIENT
Start: 2022-06-24 | End: 2022-06-24

## 2022-06-24 RX ORDER — SODIUM CHLORIDE, SODIUM LACTATE, POTASSIUM CHLORIDE, CALCIUM CHLORIDE 600; 310; 30; 20 MG/100ML; MG/100ML; MG/100ML; MG/100ML
100 INJECTION, SOLUTION INTRAVENOUS CONTINUOUS
Status: DISCONTINUED | OUTPATIENT
Start: 2022-06-25 | End: 2022-06-25 | Stop reason: HOSPADM

## 2022-06-24 RX ORDER — POTASSIUM CHLORIDE 14.9 MG/ML
20 INJECTION INTRAVENOUS ONCE
Status: COMPLETED | OUTPATIENT
Start: 2022-06-24 | End: 2022-06-24

## 2022-06-24 RX ORDER — ONDANSETRON 2 MG/ML
4 INJECTION INTRAMUSCULAR; INTRAVENOUS ONCE
Status: COMPLETED | OUTPATIENT
Start: 2022-06-24 | End: 2022-06-24

## 2022-06-24 RX ORDER — ACETAMINOPHEN 325 MG/1
650 TABLET ORAL ONCE
Status: COMPLETED | OUTPATIENT
Start: 2022-06-24 | End: 2022-06-24

## 2022-06-24 RX ADMIN — SODIUM CHLORIDE 1000 ML: 0.9 INJECTION, SOLUTION INTRAVENOUS at 19:50

## 2022-06-24 RX ADMIN — ACETAMINOPHEN 650 MG: 325 TABLET, FILM COATED ORAL at 19:33

## 2022-06-24 RX ADMIN — LIDOCAINE HYDROCHLORIDE 15 ML: 20 SOLUTION ORAL; TOPICAL at 19:33

## 2022-06-24 RX ADMIN — MORPHINE SULFATE 4 MG: 4 INJECTION INTRAVENOUS at 22:37

## 2022-06-24 RX ADMIN — ALUMINUM HYDROXIDE, MAGNESIUM HYDROXIDE, AND SIMETHICONE 30 ML: 200; 200; 20 SUSPENSION ORAL at 19:33

## 2022-06-24 RX ADMIN — POTASSIUM CHLORIDE 20 MEQ: 14.9 INJECTION, SOLUTION INTRAVENOUS at 21:01

## 2022-06-24 RX ADMIN — ONDANSETRON 4 MG: 2 INJECTION INTRAMUSCULAR; INTRAVENOUS at 20:00

## 2022-06-24 RX ADMIN — CEFTRIAXONE SODIUM 2000 MG: 10 INJECTION, POWDER, FOR SOLUTION INTRAVENOUS at 20:40

## 2022-06-24 NOTE — ED PROVIDER NOTES
Pt Name: Sherrie Winston  MRN: [id number]  [place] 1975  Age/Sex: 40 y o  female  Date of evaluation: 6/24/2022  PCP: David Maciel    CHIEF COMPLAINT    Chief Complaint   Patient presents with    Back Pain     Pt c/o lower back pain and head pain that started Monday after getting sick from eating chinese food         HPI and MDM    40 y o  female presenting with lower back pain, headache, decreased appetite and nausea  History of cervical cancer in remission  Started on Monday  She states she ate [place] food and then a couple hours later started having diarrhea and nausea/vomiting  Diarrhea subsided  Still has nausea, no vomiting recently  Also is having headaches now, muscle aches, lower back pain  No difficulty urinating or moving her bowels  No numbness or tingling, no focal weakness  Today started having a fever  No urinary symptoms  No cough or runny nose  She does mention some reflux, states every time she burps she has some acid going in her mouth     No known sick contacts  Patient denies abdominal pain  Patient febrile in the emergency department, otherwise vitals are reassuring  Does not appear toxic  No meningismus, doubt meningitis  No CVA tenderness bilaterally  Does have right lower quadrant tenderness to palpation  Symptoms could be viral, UTIs considered, appendicitis is on the differential   Will obtain blood work, urinalysis, CT abdomen/pelvis  ED Course as of 06/25/22 0110   Fri Jun 24, 2022 2030 UA is concerning for urinary tract infection, will give patient dose of IV Rocephin  2333 Patient septic with right-sided ureterolithiasis and UTI  Vitals improving with IV fluids and antipyretic  Patient given IV antibiotics  Discussed with Urology, patient needs to be transferred to [facility], will go to the OR in the morning  NPO after midnight  [facility] attending, Dr Florencia Waite, accepting patient           Medications   lactated ringers infusion (100 mL/hr Intravenous New Bag 6/25/22 0000)   potassium chloride 20 mEq IVPB (premix) (has no administration in time range)   sodium chloride 0 9 % bolus 1,000 mL (0 mL Intravenous Stopped 6/24/22 2237)   ondansetron (ZOFRAN) injection 4 mg (4 mg Intravenous Given 6/24/22 2000)   aluminum-magnesium hydroxide-simethicone (MYLANTA) oral suspension 30 mL (30 mL Oral Given 6/24/22 1933)   Lidocaine Viscous HCl (XYLOCAINE) 2 % mucosal solution 15 mL (15 mL Swish & Swallow Given 6/24/22 1933)   acetaminophen (TYLENOL) tablet 650 mg (650 mg Oral Given 6/24/22 1933)   cefTRIAXone (ROCEPHIN) 2,000 mg in dextrose 5 % 50 mL IVPB (0 mg Intravenous Stopped 6/24/22 2110)   potassium chloride 20 mEq IVPB (premix) (0 mEq Intravenous Stopped 6/24/22 2301)   morphine injection 4 mg (4 mg Intravenous Given 6/24/22 2237)         Past Medical and Surgical History    Past Medical History:   Diagnosis Date    Abnormal Pap smear of cervix     Acute hip pain     Cancer (HCC)     cervix    Hypokalemia     Low back pain     Lymphadenopathy     Pelvic pain        Past Surgical History:   Procedure Laterality Date    HI BRONCHOSCOPY NEEDLE BX TRACHEA MAIN STEM&/BRON N/A 2/26/2020    Procedure: ENDOBRONCHIAL ULTRASOUND (EBUS); Surgeon: Vira Arnett MD;  [facility];  Service: Thoracic    HI [address] N/A 2/26/2020    Procedure: Kristina Ayers;  Surgeon: Vira Arnett MD;  [facility];  Service: Thoracic    HI DILATION OF VAGINA W ANESTH N/A 6/20/2019    Procedure: EXAM UNDER ANESTHESIA (EUA);   Surgeon: Ankur Jules MD;  [facility];  Service: Gynecology Oncology    HI INSERT VAGINAL RADIATION DEVICE N/A 6/20/2019    Procedure: Wade Dejesus;  Surgeon: Ankur Julse MD;  [facility];  Service: Gynecology Oncology    TONSILLECTOMY      US GUIDANCE  6/20/2019       Family History   Problem Relation Age of Onset    Uterine cancer Maternal Grandmother     Heart disease Mother     Canavan disease Father     Cancer Father        Social History     Tobacco Use    Smoking status: Former Smoker     Packs/day: 0 50     Years: 1 00     Pack years: 0 50     Types: Cigarettes     Quit date: 2020     Years since quittin 3    Smokeless tobacco: Never Used   Vaping Use    Vaping Use: Never used   Substance Use Topics    Alcohol use: Not Currently    Drug use: Never           Allergies    Allergies   Allergen Reactions    Hydrochlorothiazide Arthralgia    Latex Hives    Other      Tomatoes   Vomiting and diarhhea       Home Medications    Prior to Admission medications    Medication Sig Start Date End Date Taking? Authorizing Provider   acetaminophen (TYLENOL) 325 mg tablet Take 2 tablets (650 mg total) by mouth every 6 (six) hours as needed for mild pain  Patient not taking: Reported on 2022   Robbie Mcdowell PA-C   ibuprofen (MOTRIN) 600 mg tablet  22   Historical Provider, MD   metoprolol tartrate (LOPRESSOR) 25 mg tablet Take 1 tablet (25 mg total) by mouth every 12 (twelve) hours 22   Blessing Lance MD   Multiple Vitamins-Minerals (MULTIVITAMIN ADULT PO) multivitamin    Historical Provider, MD   Omega-3 Fatty Acids (FISH OIL) 1,000 mg Fish Oil    Historical Provider, MD           Review of Systems    Review of Systems   Constitutional: Positive for appetite change, fatigue and fever  HENT: Negative for rhinorrhea and sore throat  Eyes: Negative for pain and visual disturbance  Respiratory: Negative for cough and shortness of breath  Cardiovascular: Negative for chest pain and leg swelling  Gastrointestinal: Positive for diarrhea, nausea and vomiting  Negative for abdominal pain  Genitourinary: Negative for dysuria and hematuria  Musculoskeletal: Positive for back pain and myalgias  Skin: Negative for rash and wound  Neurological: Positive for headaches  Negative for syncope             Physical Exam      ED Triage Vitals   Temperature Pulse Respirations Blood Pressure SpO2   06/24/22 1811 06/24/22 1811 06/24/22 1812 06/24/22 1811 06/24/22 1811   (!) 102 9 °F (39 4 °C) 91 18 161/79 95 %      Temp Source Heart Rate Source Patient Position - Orthostatic VS BP Location FiO2 (%)   06/24/22 1811 06/24/22 1811 06/24/22 1811 06/24/22 1811 --   Tympanic Monitor Sitting Left arm       Pain Score       06/24/22 1933       Med Not Given for Pain - for MAR use only               Physical Exam  Constitutional:       General: She is not in acute distress  Appearance: She is not toxic-appearing  HENT:      Head: Normocephalic and atraumatic  Nose: Nose normal       Mouth/Throat:      Mouth: Mucous membranes are moist    Eyes:      Extraocular Movements: Extraocular movements intact  Pupils: Pupils are equal, round, and reactive to light  Cardiovascular:      Rate and Rhythm: Normal rate and regular rhythm  Pulmonary:      Effort: No respiratory distress  Breath sounds: Normal breath sounds  No wheezing  Abdominal:      General: There is no distension  Palpations: Abdomen is soft  Comments: Right lower quadrant and McBurney's point tenderness to palpation, no CVA tenderness bilaterally   Musculoskeletal:         General: No swelling or deformity  Normal range of motion  Cervical back: Normal range of motion and neck supple  Skin:     General: Skin is warm  Findings: No erythema  Neurological:      Mental Status: She is alert and oriented to person, place, and time  Mental status is at baseline  Diagnostic Results      Labs:    Results Reviewed     Procedure Component Value Units Date/Time    Lactic acid, plasma [[id number]]  (Abnormal) Collected: 06/24/22 2332    Lab Status: Final result Specimen: Blood from Arm, Left Updated: 06/25/22 0003     LACTIC ACID 0 4 mmol/L     Narrative:      Result may be elevated if tourniquet was used during collection      Lactic acid 2 Hours [[id number]]  (Abnormal) Collected: 06/24/22 2228    Lab Status: Final result Specimen: Blood from Arm, Left Updated: 06/24/22 2305     LACTIC ACID 0 4 mmol/L     Narrative:      Result may be elevated if tourniquet was used during collection  COVID/FLU/RSV - 2 hour TAT [[id number]]  (Normal) Collected: 06/24/22 1942    Lab Status: Final result Specimen: Nares from Nose Updated: 06/24/22 2254     SARS-CoV-2 Negative     INFLUENZA A PCR Negative     INFLUENZA B PCR Negative     RSV PCR Negative    Narrative:      FOR PEDIATRIC PATIENTS - copy/paste COVID Guidelines URL to browser: [email or web address]  [email or web address]    SARS-CoV-2 assay is a Nucleic Acid Amplification assay intended for the  qualitative detection of nucleic acid from SARS-CoV-2 in nasopharyngeal  swabs  Results are for the presumptive identification of SARS-CoV-2 RNA  Positive results are indicative of infection with SARS-CoV-2, the virus  causing COVID-19, but do not rule out bacterial infection or co-infection  with other viruses  Laboratories within the [place] and its  territories are required to report all positive results to the appropriate  public health authorities  Negative results do not preclude SARS-CoV-2  infection and should not be used as the sole basis for treatment or other  patient management decisions  Negative results must be combined with  clinical observations, patient history, and epidemiological information  This test has not been FDA cleared or approved  This test has been authorized by FDA under an Emergency Use Authorization  (EUA)  This test is only authorized for the duration of time the  declaration that circumstances exist justifying the authorization of the  emergency use of an in vitro diagnostic tests for detection of SARS-CoV-2  virus and/or diagnosis of COVID-19 infection under section 564(b)(1) of  the Act, 21 U  S C  912UXE-4(N)(0), unless the authorization is terminated  or revoked sooner  The test has been validated but independent review by FDA  and CLIA is pending  Test performed using [employer] GeneXpert: This RT-PCR assay targets N2,  a region unique to SARS-CoV-2  A conserved region in the E-gene was chosen  for pan-Sarbecovirus detection which includes SARS-CoV-2      Lipase [[id number]]  (Abnormal) Collected: 06/24/22 1949    Lab Status: Final result Specimen: Blood from Arm, Left Updated: 06/24/22 2208     Lipase 22 u/L     Procalcitonin [[id number]]  (Abnormal) Collected: 06/24/22 1949    Lab Status: Final result Specimen: Blood from Arm, Left Updated: 06/24/22 2058     Procalcitonin 2 74 ng/ml     Comprehensive metabolic panel [[id number]]  (Abnormal) Collected: 06/24/22 1949    Lab Status: Final result Specimen: Blood from Arm, Left Updated: 06/24/22 2049     Sodium 135 mmol/L      Potassium 2 7 mmol/L      Chloride 97 mmol/L      CO2 27 mmol/L      ANION GAP 11 mmol/L      BUN 29 mg/dL      Creatinine 1 28 mg/dL      Glucose 135 mg/dL      Calcium 10 0 mg/dL      Corrected Calcium 11 3 mg/dL      AST 28 U/L      ALT 24 U/L      Alkaline Phosphatase 72 U/L      Total Protein 6 8 g/dL      Albumin 2 4 g/dL      Total Bilirubin 0 78 mg/dL      eGFR 50 ml/min/1 73sq m     Narrative:      [place] guidelines for Chronic Kidney Disease (CKD):     Stage 1 with normal or high GFR (GFR > 90 mL/min/1 73 square meters)    Stage 2 Mild CKD (GFR = 60-89 mL/min/1 73 square meters)    Stage 3A Moderate CKD (GFR = 45-59 mL/min/1 73 square meters)    Stage 3B Moderate CKD (GFR = 30-44 mL/min/1 73 square meters)    Stage 4 Severe CKD (GFR = 15-29 mL/min/1 73 square meters)    Stage 5 End Stage CKD (GFR <15 mL/min/1 73 square meters)  Note: GFR calculation is accurate only with a steady state creatinine    Magnesium [[id number]]  (Normal) Collected: 06/24/22 1949    Lab Status: Final result Specimen: Blood from Arm, Left Updated: 06/24/22 2046 Magnesium 1 9 mg/dL     hCG, qualitative pregnancy [[id number]]  (Normal) Collected: 06/24/22 1949    Lab Status: Final result Specimen: Blood from Arm, Left Updated: 06/24/22 2046     Preg, Serum Negative    Protime-INR [[id number]]  (Normal) Collected: 06/24/22 1949    Lab Status: Final result Specimen: Blood from Arm, Left Updated: 06/24/22 2040     Protime 13 7 seconds      INR 1 09    APTT [[id number]]  (Normal) Collected: 06/24/22 1949    Lab Status: Final result Specimen: Blood from Arm, Left Updated: 06/24/22 2040     PTT 32 seconds     Lactic acid [[id number]]  (Abnormal) Collected: 06/24/22 1949    Lab Status: Final result Specimen: Blood from Arm, Left Updated: 06/24/22 2034     LACTIC ACID 2 2 mmol/L     Narrative:      Result may be elevated if tourniquet was used during collection  CBC and differential [[id number]]  (Abnormal) Collected: 06/24/22 1949    Lab Status: Final result Specimen: Blood from Arm, Left Updated: 06/24/22 2029     WBC 6 25 Thousand/uL      RBC 4 45 Million/uL      Hemoglobin 12 1 g/dL      Hematocrit 35 5 %      MCV 80 fL      MCH 27 2 pg      MCHC 34 1 g/dL      RDW 13 2 %      MPV 11 2 fL      Platelets 662 Thousands/uL      nRBC 0 /100 WBCs      Neutrophils Relative 83 %      Immat GRANS % 1 %      Lymphocytes Relative 7 %      Monocytes Relative 9 %      Eosinophils Relative 0 %      Basophils Relative 0 %      Neutrophils Absolute 5 23 Thousands/µL      Immature Grans Absolute 0 03 Thousand/uL      Lymphocytes Absolute 0 42 Thousands/µL      Monocytes Absolute 0 55 Thousand/µL      Eosinophils Absolute 0 01 Thousand/µL      Basophils Absolute 0 01 Thousands/µL     Blood culture #2 [[id number]] Collected: 06/24/22 2016    Lab Status:  In process Specimen: Blood from Arm, Left Updated: 06/24/22 2018    Urine Microscopic [[id number]]  (Abnormal) Collected: 06/24/22 1942    Lab Status: Final result Specimen: Urine, Clean Catch Updated: 06/24/22 2008     RBC, UA 10-20 /hpf      WBC, UA 30-50 /hpf      Epithelial Cells Occasional /hpf      Bacteria, UA Moderate /hpf     Urine culture [[id number]] Collected: 06/24/22 1942    Lab Status: In process Specimen: Urine, Clean Catch Updated: 06/24/22 2008    Blood culture #1 [[id number]] Collected: 06/24/22 1950    Lab Status: In process Specimen: Blood from Arm, Left Updated: 06/24/22 1957    UA w Reflex to Microscopic w Reflex to Culture [[id number]]  (Abnormal) Collected: 06/24/22 1942    Lab Status: Final result Specimen: Urine, Clean Catch Updated: 06/24/22 1952     Color, UA Yellow     Clarity, UA Clear     Specific [place], UA 1 020     pH, UA 6 0     Leukocytes, UA Moderate     Nitrite, UA Negative     Protein,  (2+) mg/dl      Glucose, UA Negative mg/dl      Ketones, UA Negative mg/dl      Urobilinogen, UA 0 2 E U /dl      Bilirubin, UA Negative     Occult Blood, UA Large          All labs reviewed and utilized in the medical decision making process    Radiology:    CT abdomen pelvis wo contrast   Final Result      Mild right hydroureteronephrosis secondary to 3 mm distal ureteric calculus  Asymmetric right perinephric and peripelvic fat stranding may be reactive  Correlate clinically for infection  Tiny nonobstructing right renal calculus  Colonic diverticulosis without evidence of diverticulitis        Limited study without IV or oral contrast       Workstation performed: [id number]             All radiology studies independently viewed by me and interpreted by the radiologist     Procedure    Procedures        FINAL IMPRESSION    Final diagnoses:   Ureterolithiasis   Sepsis, due to unspecified organism, unspecified whether acute organ dysfunction present [facility])   Urinary tract infection with hematuria, site unspecified   Hypokalemia         DISPOSITION    Time reflects when diagnosis was documented in both MDM as applicable and the Disposition within this note     Time User Action Codes Description Comment    6/24/2022 11:06 PM Edwin iGlliam Add [N13 2] Ureteral stone with hydronephrosis     6/24/2022 11:48 PM Starleen Prom Add [N20 1] Ureterolithiasis     6/24/2022 11:48 PM Starleen Prom Add [A41 9] Sepsis, due to unspecified organism, unspecified whether acute organ dysfunction present ([facility] [address] )     6/24/2022 11:48 PM Starleen Prom Add [N39 0,  R31 9] Urinary tract infection with hematuria, site unspecified     6/25/2022  1:10 AM Starleen Prom Add [E87 6] Hypokalemia       ED Disposition     ED Disposition   Transfer to Another Facility-In Network    Condition   --    Date/Time   Fri Jun 24, 2022 11:44 PM    Comment   Pelon White should be transferred out to [facility]             MD Documentation    Governor Fermín Most Recent Value   Patient Condition The patient has been stabilized such that within reasonable medical probability, no material deterioration of the patient condition or the condition of the unborn child(ericka) is likely to result from the transfer   Reason for Transfer Level of Care needed not available at this facility   Benefits of Transfer Specialized equipment and/or services available at the receiving facility (Include comment)________________________   Risks of Transfer Potential for delay in receiving treatment, Potential deterioration of medical condition, Loss of IV, Increased discomfort during transfer, Possible worsening of condition or death during transfer   Accepting Physician Dr Swan Quale Name, Dalila [facility]   Provider Certification General risk, such as traffic hazards, adverse weather conditions, rough terrain or turbulence, possible failure of equipment (including vehicle or aircraft), or consequences of actions of persons outside the control of the transport personnel, Unanticipated needs of medical equipment and personnel during transport, Risk of worsening condition, The possibility of a transport vehicle being unavailable      RN Documentation    Flowsheet Row Most Recent Value   Accepting Facility Name, [facility]  [facility]      Follow-up Information    None           PATIENT REFERRED TO:    No follow-up provider specified  DISCHARGE MEDICATIONS:    Patient's Medications   Discharge Prescriptions    No medications on file       No discharge procedures on file  Viet Sim DO    Notation for the chart: This patient was evaluated during a time of global shortage of iodinated contrast media  Based on guidance from the [employer] of Radiology, best practices, and local institutional approaches an alternative path for evaluating and managing the patient may have been employed in order to provide optimal care during this shortage  This note was partially completed using voice recognition technology, and was scanned for gross errors; however some errors may still exist  Please contact the author with any questions or requests for clarification        Viet Sim DO  06/25/22 0111

## 2022-06-25 ENCOUNTER — PREP FOR PROCEDURE (OUTPATIENT)
Dept: UROLOGY | Facility: CLINIC | Age: 47
End: 2022-06-25

## 2022-06-25 ENCOUNTER — HOSPITAL ENCOUNTER (INPATIENT)
Facility: HOSPITAL | Age: 47
LOS: 4 days | Discharge: HOME | DRG: 720 | End: 2022-06-29
Attending: INTERNAL MEDICINE | Admitting: HOSPITALIST
Payer: COMMERCIAL

## 2022-06-25 ENCOUNTER — APPOINTMENT (INPATIENT)
Dept: RADIOLOGY | Facility: HOSPITAL | Age: 47
DRG: 720 | End: 2022-06-25
Payer: COMMERCIAL

## 2022-06-25 ENCOUNTER — ANESTHESIA (INPATIENT)
Dept: PERIOP | Facility: HOSPITAL | Age: 47
DRG: 720 | End: 2022-06-25
Payer: COMMERCIAL

## 2022-06-25 ENCOUNTER — TELEPHONE (OUTPATIENT)
Dept: UROLOGY | Facility: CLINIC | Age: 47
End: 2022-06-25

## 2022-06-25 ENCOUNTER — ANESTHESIA EVENT (INPATIENT)
Dept: PERIOP | Facility: HOSPITAL | Age: 47
DRG: 720 | End: 2022-06-25
Payer: COMMERCIAL

## 2022-06-25 VITALS
DIASTOLIC BLOOD PRESSURE: 68 MMHG | WEIGHT: 153 LBS | OXYGEN SATURATION: 99 % | HEART RATE: 64 BPM | SYSTOLIC BLOOD PRESSURE: 116 MMHG | RESPIRATION RATE: 18 BRPM | TEMPERATURE: 98.9 F | BODY MASS INDEX: 28.91 KG/M2

## 2022-06-25 DIAGNOSIS — N12 PYELONEPHRITIS: ICD-10-CM

## 2022-06-25 DIAGNOSIS — R79.89 ELEVATED TSH: ICD-10-CM

## 2022-06-25 DIAGNOSIS — N20.1 RIGHT URETERAL STONE: Primary | ICD-10-CM

## 2022-06-25 DIAGNOSIS — N13.2 URETERAL STONE WITH HYDRONEPHROSIS: ICD-10-CM

## 2022-06-25 DIAGNOSIS — N13.1 HYDRONEPHROSIS DUE TO OBSTRUCTION OF URETER: Primary | ICD-10-CM

## 2022-06-25 DIAGNOSIS — Z46.6 ENCOUNTER FOR ADJUSTMENT OF URETERAL STENT: ICD-10-CM

## 2022-06-25 DIAGNOSIS — R78.81 GRAM-NEGATIVE BACTEREMIA: ICD-10-CM

## 2022-06-25 DIAGNOSIS — I10 HTN (HYPERTENSION): ICD-10-CM

## 2022-06-25 DIAGNOSIS — R00.2 PALPITATIONS: ICD-10-CM

## 2022-06-25 DIAGNOSIS — K21.9 GERD (GASTROESOPHAGEAL REFLUX DISEASE): ICD-10-CM

## 2022-06-25 DIAGNOSIS — R00.1 BRADYCARDIA: ICD-10-CM

## 2022-06-25 PROBLEM — A41.9 SEPSIS (HCC): Status: ACTIVE | Noted: 2022-06-25

## 2022-06-25 LAB
ALBUMIN SERPL BCP-MCNC: 2 G/DL (ref 3.5–5)
ALP SERPL-CCNC: 62 U/L (ref 46–116)
ALT SERPL W P-5'-P-CCNC: 21 U/L (ref 12–78)
ANION GAP SERPL CALCULATED.3IONS-SCNC: 4 MMOL/L (ref 4–13)
AST SERPL W P-5'-P-CCNC: 11 U/L (ref 5–45)
ATRIAL RATE: 68 BPM
BASOPHILS # BLD AUTO: 0.02 THOUSANDS/ΜL (ref 0–0.1)
BASOPHILS NFR BLD AUTO: 0 % (ref 0–1)
BILIRUB SERPL-MCNC: 0.66 MG/DL (ref 0.2–1)
BUN SERPL-MCNC: 18 MG/DL (ref 5–25)
CALCIUM ALBUM COR SERPL-MCNC: 11.4 MG/DL (ref 8.3–10.1)
CALCIUM SERPL-MCNC: 9.8 MG/DL (ref 8.3–10.1)
CHLORIDE SERPL-SCNC: 110 MMOL/L (ref 100–108)
CO2 SERPL-SCNC: 28 MMOL/L (ref 21–32)
CREAT SERPL-MCNC: 1.09 MG/DL (ref 0.6–1.3)
EOSINOPHIL # BLD AUTO: 0.03 THOUSAND/ΜL (ref 0–0.61)
EOSINOPHIL NFR BLD AUTO: 1 % (ref 0–6)
ERYTHROCYTE [DISTWIDTH] IN BLOOD BY AUTOMATED COUNT: 13.9 % (ref 11.6–15.1)
GFR SERPL CREATININE-BSD FRML MDRD: 61 ML/MIN/1.73SQ M
GLUCOSE SERPL-MCNC: 106 MG/DL (ref 65–140)
HCT VFR BLD AUTO: 29.4 % (ref 34.8–46.1)
HGB BLD-MCNC: 10 G/DL (ref 11.5–15.4)
IMM GRANULOCYTES # BLD AUTO: 0.06 THOUSAND/UL (ref 0–0.2)
IMM GRANULOCYTES NFR BLD AUTO: 1 % (ref 0–2)
LACTATE SERPL-SCNC: 0.4 MMOL/L (ref 0.5–2)
LYMPHOCYTES # BLD AUTO: 0.43 THOUSANDS/ΜL (ref 0.6–4.47)
LYMPHOCYTES NFR BLD AUTO: 8 % (ref 14–44)
MCH RBC QN AUTO: 27.9 PG (ref 26.8–34.3)
MCHC RBC AUTO-ENTMCNC: 34 G/DL (ref 31.4–37.4)
MCV RBC AUTO: 82 FL (ref 82–98)
MONOCYTES # BLD AUTO: 0.5 THOUSAND/ΜL (ref 0.17–1.22)
MONOCYTES NFR BLD AUTO: 9 % (ref 4–12)
NEUTROPHILS # BLD AUTO: 4.43 THOUSANDS/ΜL (ref 1.85–7.62)
NEUTS SEG NFR BLD AUTO: 81 % (ref 43–75)
NRBC BLD AUTO-RTO: 0 /100 WBCS
P AXIS: 54 DEGREES
PLATELET # BLD AUTO: 107 THOUSANDS/UL (ref 149–390)
PMV BLD AUTO: 11.1 FL (ref 8.9–12.7)
POTASSIUM SERPL-SCNC: 2.5 MMOL/L (ref 3.5–5.3)
POTASSIUM SERPL-SCNC: 3.4 MMOL/L (ref 3.5–5.3)
PR INTERVAL: 140 MS
PROT SERPL-MCNC: 5.9 G/DL (ref 6.4–8.2)
QRS AXIS: 46 DEGREES
QRSD INTERVAL: 106 MS
QT INTERVAL: 390 MS
QTC INTERVAL: 414 MS
RBC # BLD AUTO: 3.58 MILLION/UL (ref 3.81–5.12)
SODIUM SERPL-SCNC: 142 MMOL/L (ref 136–145)
T WAVE AXIS: 37 DEGREES
VENTRICULAR RATE: 68 BPM
WBC # BLD AUTO: 5.47 THOUSAND/UL (ref 4.31–10.16)

## 2022-06-25 PROCEDURE — 85025 COMPLETE CBC W/AUTO DIFF WBC: CPT | Performed by: HOSPITALIST

## 2022-06-25 PROCEDURE — 74420 UROGRAPHY RTRGR +-KUB: CPT

## 2022-06-25 PROCEDURE — 93010 ELECTROCARDIOGRAM REPORT: CPT | Performed by: INTERNAL MEDICINE

## 2022-06-25 PROCEDURE — 96376 TX/PRO/DX INJ SAME DRUG ADON: CPT

## 2022-06-25 PROCEDURE — 84132 ASSAY OF SERUM POTASSIUM: CPT | Performed by: NURSE PRACTITIONER

## 2022-06-25 PROCEDURE — 87040 BLOOD CULTURE FOR BACTERIA: CPT | Performed by: NURSE PRACTITIONER

## 2022-06-25 PROCEDURE — C2617 STENT, NON-COR, TEM W/O DEL: HCPCS | Performed by: UROLOGY

## 2022-06-25 PROCEDURE — 99254 IP/OBS CNSLTJ NEW/EST MOD 60: CPT | Performed by: UROLOGY

## 2022-06-25 PROCEDURE — 96366 THER/PROPH/DIAG IV INF ADDON: CPT

## 2022-06-25 PROCEDURE — 80053 COMPREHEN METABOLIC PANEL: CPT | Performed by: NURSE PRACTITIONER

## 2022-06-25 PROCEDURE — BT1DYZZ FLUOROSCOPY OF RIGHT KIDNEY, URETER AND BLADDER USING OTHER CONTRAST: ICD-10-PCS | Performed by: UROLOGY

## 2022-06-25 PROCEDURE — C1769 GUIDE WIRE: HCPCS | Performed by: UROLOGY

## 2022-06-25 PROCEDURE — 99223 1ST HOSP IP/OBS HIGH 75: CPT | Performed by: HOSPITALIST

## 2022-06-25 PROCEDURE — 0T768DZ DILATION OF RIGHT URETER WITH INTRALUMINAL DEVICE, VIA NATURAL OR ARTIFICIAL OPENING ENDOSCOPIC: ICD-10-PCS | Performed by: UROLOGY

## 2022-06-25 PROCEDURE — 52332 CYSTOSCOPY AND TREATMENT: CPT | Performed by: UROLOGY

## 2022-06-25 PROCEDURE — 96361 HYDRATE IV INFUSION ADD-ON: CPT

## 2022-06-25 DEVICE — INLAY OPTIMA URETERAL STENT W/O GUIDEWIRE
Type: IMPLANTABLE DEVICE | Site: URETER | Status: FUNCTIONAL
Brand: BARD® INLAY OPTIMA® URETERAL STENT

## 2022-06-25 RX ORDER — MORPHINE SULFATE 4 MG/ML
4 INJECTION, SOLUTION INTRAMUSCULAR; INTRAVENOUS ONCE
Status: COMPLETED | OUTPATIENT
Start: 2022-06-25 | End: 2022-06-25

## 2022-06-25 RX ORDER — MIDAZOLAM HYDROCHLORIDE 2 MG/2ML
INJECTION, SOLUTION INTRAMUSCULAR; INTRAVENOUS AS NEEDED
Status: DISCONTINUED | OUTPATIENT
Start: 2022-06-25 | End: 2022-06-25

## 2022-06-25 RX ORDER — POTASSIUM CHLORIDE 14.9 MG/ML
20 INJECTION INTRAVENOUS ONCE
Status: COMPLETED | OUTPATIENT
Start: 2022-06-25 | End: 2022-06-25

## 2022-06-25 RX ORDER — ACETAMINOPHEN 325 MG/1
650 TABLET ORAL EVERY 6 HOURS PRN
Status: DISCONTINUED | OUTPATIENT
Start: 2022-06-25 | End: 2022-06-29 | Stop reason: HOSPADM

## 2022-06-25 RX ORDER — OXYBUTYNIN CHLORIDE 10 MG/1
10 TABLET, EXTENDED RELEASE ORAL
Qty: 30 TABLET | Refills: 0 | Status: SHIPPED | OUTPATIENT
Start: 2022-06-25 | End: 2022-07-25

## 2022-06-25 RX ORDER — MAGNESIUM HYDROXIDE 1200 MG/15ML
LIQUID ORAL AS NEEDED
Status: DISCONTINUED | OUTPATIENT
Start: 2022-06-25 | End: 2022-06-25 | Stop reason: HOSPADM

## 2022-06-25 RX ORDER — TAMSULOSIN HYDROCHLORIDE 0.4 MG/1
0.4 CAPSULE ORAL
Status: DISCONTINUED | OUTPATIENT
Start: 2022-06-25 | End: 2022-06-29 | Stop reason: HOSPADM

## 2022-06-25 RX ORDER — ACETAMINOPHEN 325 MG/1
975 TABLET ORAL ONCE
Status: CANCELLED | OUTPATIENT
Start: 2022-06-25 | End: 2022-06-25

## 2022-06-25 RX ORDER — PROPOFOL 10 MG/ML
INJECTION, EMULSION INTRAVENOUS CONTINUOUS PRN
Status: DISCONTINUED | OUTPATIENT
Start: 2022-06-25 | End: 2022-06-25

## 2022-06-25 RX ORDER — CHLORAL HYDRATE 500 MG
1000 CAPSULE ORAL DAILY
Status: DISCONTINUED | OUTPATIENT
Start: 2022-06-26 | End: 2022-06-29 | Stop reason: HOSPADM

## 2022-06-25 RX ORDER — POTASSIUM CHLORIDE 20 MEQ/1
40 TABLET, EXTENDED RELEASE ORAL ONCE
Status: COMPLETED | OUTPATIENT
Start: 2022-06-25 | End: 2022-06-25

## 2022-06-25 RX ORDER — GABAPENTIN 100 MG/1
300 CAPSULE ORAL ONCE
Status: CANCELLED | OUTPATIENT
Start: 2022-06-25 | End: 2022-06-25

## 2022-06-25 RX ORDER — SODIUM CHLORIDE, SODIUM LACTATE, POTASSIUM CHLORIDE, CALCIUM CHLORIDE 600; 310; 30; 20 MG/100ML; MG/100ML; MG/100ML; MG/100ML
20 INJECTION, SOLUTION INTRAVENOUS CONTINUOUS
Status: DISCONTINUED | OUTPATIENT
Start: 2022-06-25 | End: 2022-06-25

## 2022-06-25 RX ORDER — DIPHENHYDRAMINE HYDROCHLORIDE 50 MG/ML
INJECTION INTRAMUSCULAR; INTRAVENOUS AS NEEDED
Status: DISCONTINUED | OUTPATIENT
Start: 2022-06-25 | End: 2022-06-25

## 2022-06-25 RX ORDER — FENTANYL CITRATE 50 UG/ML
INJECTION, SOLUTION INTRAMUSCULAR; INTRAVENOUS AS NEEDED
Status: DISCONTINUED | OUTPATIENT
Start: 2022-06-25 | End: 2022-06-25

## 2022-06-25 RX ORDER — POTASSIUM CHLORIDE 20 MEQ/1
40 TABLET, EXTENDED RELEASE ORAL ONCE
Status: DISCONTINUED | OUTPATIENT
Start: 2022-06-25 | End: 2022-06-25

## 2022-06-25 RX ORDER — HYDROMORPHONE HCL IN WATER/PF 6 MG/30 ML
0.2 PATIENT CONTROLLED ANALGESIA SYRINGE INTRAVENOUS
Status: DISCONTINUED | OUTPATIENT
Start: 2022-06-25 | End: 2022-06-25 | Stop reason: HOSPADM

## 2022-06-25 RX ORDER — TAMSULOSIN HYDROCHLORIDE 0.4 MG/1
0.4 CAPSULE ORAL
Qty: 30 CAPSULE | Refills: 0 | Status: SHIPPED | OUTPATIENT
Start: 2022-06-25 | End: 2022-07-25

## 2022-06-25 RX ORDER — ONDANSETRON 2 MG/ML
INJECTION INTRAMUSCULAR; INTRAVENOUS AS NEEDED
Status: DISCONTINUED | OUTPATIENT
Start: 2022-06-25 | End: 2022-06-25

## 2022-06-25 RX ORDER — SODIUM CHLORIDE 9 MG/ML
100 INJECTION, SOLUTION INTRAVENOUS CONTINUOUS
Status: DISCONTINUED | OUTPATIENT
Start: 2022-06-25 | End: 2022-06-25

## 2022-06-25 RX ORDER — OXYCODONE HYDROCHLORIDE 5 MG/1
5 TABLET ORAL EVERY 4 HOURS PRN
Qty: 8 TABLET | Refills: 0 | Status: SHIPPED | OUTPATIENT
Start: 2022-06-25 | End: 2022-06-30

## 2022-06-25 RX ORDER — ENOXAPARIN SODIUM 100 MG/ML
40 INJECTION SUBCUTANEOUS DAILY
Status: DISCONTINUED | OUTPATIENT
Start: 2022-06-26 | End: 2022-06-29 | Stop reason: HOSPADM

## 2022-06-25 RX ORDER — LIDOCAINE HYDROCHLORIDE 10 MG/ML
INJECTION, SOLUTION EPIDURAL; INFILTRATION; INTRACAUDAL; PERINEURAL AS NEEDED
Status: DISCONTINUED | OUTPATIENT
Start: 2022-06-25 | End: 2022-06-25

## 2022-06-25 RX ORDER — DEXAMETHASONE SODIUM PHOSPHATE 10 MG/ML
INJECTION, SOLUTION INTRAMUSCULAR; INTRAVENOUS AS NEEDED
Status: DISCONTINUED | OUTPATIENT
Start: 2022-06-25 | End: 2022-06-25

## 2022-06-25 RX ORDER — OXYCODONE HYDROCHLORIDE 5 MG/1
5 TABLET ORAL EVERY 4 HOURS PRN
Status: DISCONTINUED | OUTPATIENT
Start: 2022-06-25 | End: 2022-06-29 | Stop reason: HOSPADM

## 2022-06-25 RX ORDER — FENTANYL CITRATE/PF 50 MCG/ML
25 SYRINGE (ML) INJECTION
Status: DISCONTINUED | OUTPATIENT
Start: 2022-06-25 | End: 2022-06-25 | Stop reason: HOSPADM

## 2022-06-25 RX ORDER — CEFAZOLIN SODIUM 1 G/3ML
INJECTION, POWDER, FOR SOLUTION INTRAMUSCULAR; INTRAVENOUS AS NEEDED
Status: DISCONTINUED | OUTPATIENT
Start: 2022-06-25 | End: 2022-06-25

## 2022-06-25 RX ORDER — OXYCODONE HYDROCHLORIDE 5 MG/1
2.5 TABLET ORAL EVERY 4 HOURS PRN
Status: DISCONTINUED | OUTPATIENT
Start: 2022-06-25 | End: 2022-06-29 | Stop reason: HOSPADM

## 2022-06-25 RX ORDER — ONDANSETRON 2 MG/ML
4 INJECTION INTRAMUSCULAR; INTRAVENOUS ONCE AS NEEDED
Status: DISCONTINUED | OUTPATIENT
Start: 2022-06-25 | End: 2022-06-25 | Stop reason: HOSPADM

## 2022-06-25 RX ORDER — PROPOFOL 10 MG/ML
INJECTION, EMULSION INTRAVENOUS AS NEEDED
Status: DISCONTINUED | OUTPATIENT
Start: 2022-06-25 | End: 2022-06-25

## 2022-06-25 RX ORDER — HYDROMORPHONE HCL/PF 1 MG/ML
SYRINGE (ML) INJECTION AS NEEDED
Status: DISCONTINUED | OUTPATIENT
Start: 2022-06-25 | End: 2022-06-25

## 2022-06-25 RX ORDER — CEFAZOLIN SODIUM 1 G/50ML
1000 SOLUTION INTRAVENOUS ONCE
Status: DISCONTINUED | OUTPATIENT
Start: 2022-06-25 | End: 2022-06-25 | Stop reason: HOSPADM

## 2022-06-25 RX ORDER — ACETAMINOPHEN 325 MG/1
650 TABLET ORAL EVERY 6 HOURS PRN
Status: DISCONTINUED | OUTPATIENT
Start: 2022-06-25 | End: 2022-06-25

## 2022-06-25 RX ORDER — SENNOSIDES 8.6 MG
8.6 TABLET ORAL
Qty: 5 TABLET | Refills: 0 | Status: SHIPPED | OUTPATIENT
Start: 2022-06-25 | End: 2022-07-20

## 2022-06-25 RX ORDER — MAGNESIUM SULFATE HEPTAHYDRATE 40 MG/ML
2 INJECTION, SOLUTION INTRAVENOUS ONCE
Status: COMPLETED | OUTPATIENT
Start: 2022-06-25 | End: 2022-06-25

## 2022-06-25 RX ADMIN — HYDROMORPHONE HYDROCHLORIDE 0.5 MG: 1 INJECTION, SOLUTION INTRAMUSCULAR; INTRAVENOUS; SUBCUTANEOUS at 08:59

## 2022-06-25 RX ADMIN — TAMSULOSIN HYDROCHLORIDE 0.4 MG: 0.4 CAPSULE ORAL at 15:31

## 2022-06-25 RX ADMIN — DEXTROSE MONOHYDRATE 1000 MG: 5 INJECTION, SOLUTION INTRAVENOUS at 20:11

## 2022-06-25 RX ADMIN — OXYCODONE HYDROCHLORIDE 5 MG: 5 TABLET ORAL at 20:08

## 2022-06-25 RX ADMIN — DEXAMETHASONE SODIUM PHOSPHATE 10 MG: 10 INJECTION, SOLUTION INTRAMUSCULAR; INTRAVENOUS at 09:30

## 2022-06-25 RX ADMIN — MIDAZOLAM 2 MG: 1 INJECTION INTRAMUSCULAR; INTRAVENOUS at 09:26

## 2022-06-25 RX ADMIN — PROPOFOL 150 MG: 10 INJECTION, EMULSION INTRAVENOUS at 09:30

## 2022-06-25 RX ADMIN — POTASSIUM CHLORIDE 20 MEQ: 14.9 INJECTION, SOLUTION INTRAVENOUS at 11:33

## 2022-06-25 RX ADMIN — POTASSIUM CHLORIDE 20 MEQ: 14.9 INJECTION, SOLUTION INTRAVENOUS at 01:36

## 2022-06-25 RX ADMIN — POTASSIUM CHLORIDE 40 MEQ: 1500 TABLET, EXTENDED RELEASE ORAL at 11:32

## 2022-06-25 RX ADMIN — OXYCODONE HYDROCHLORIDE 5 MG: 5 TABLET ORAL at 12:32

## 2022-06-25 RX ADMIN — POTASSIUM CHLORIDE 40 MEQ: 1500 TABLET, EXTENDED RELEASE ORAL at 20:08

## 2022-06-25 RX ADMIN — FENTANYL CITRATE 50 MCG: 50 INJECTION INTRAMUSCULAR; INTRAVENOUS at 09:35

## 2022-06-25 RX ADMIN — SODIUM CHLORIDE, SODIUM LACTATE, POTASSIUM CHLORIDE, AND CALCIUM CHLORIDE 20 ML/HR: .6; .31; .03; .02 INJECTION, SOLUTION INTRAVENOUS at 11:33

## 2022-06-25 RX ADMIN — OXYCODONE HYDROCHLORIDE 5 MG: 5 TABLET ORAL at 16:18

## 2022-06-25 RX ADMIN — ONDANSETRON 4 MG: 2 INJECTION INTRAMUSCULAR; INTRAVENOUS at 09:30

## 2022-06-25 RX ADMIN — PROPOFOL 100 MCG/KG/MIN: 10 INJECTION, EMULSION INTRAVENOUS at 09:30

## 2022-06-25 RX ADMIN — LIDOCAINE HYDROCHLORIDE 50 MG: 10 INJECTION, SOLUTION EPIDURAL; INFILTRATION; INTRACAUDAL; PERINEURAL at 09:30

## 2022-06-25 RX ADMIN — CEFAZOLIN SODIUM 1000 MG: 1 INJECTION, POWDER, FOR SOLUTION INTRAMUSCULAR; INTRAVENOUS at 09:34

## 2022-06-25 RX ADMIN — DIPHENHYDRAMINE HYDROCHLORIDE 25 MG: 50 INJECTION, SOLUTION INTRAMUSCULAR; INTRAVENOUS at 09:30

## 2022-06-25 RX ADMIN — SODIUM CHLORIDE, SODIUM LACTATE, POTASSIUM CHLORIDE, AND CALCIUM CHLORIDE 100 ML/HR: .6; .31; .03; .02 INJECTION, SOLUTION INTRAVENOUS at 00:00

## 2022-06-25 RX ADMIN — MORPHINE SULFATE 4 MG: 4 INJECTION INTRAVENOUS at 02:40

## 2022-06-25 RX ADMIN — MAGNESIUM SULFATE HEPTAHYDRATE 2 G: 40 INJECTION, SOLUTION INTRAVENOUS at 11:33

## 2022-06-25 RX ADMIN — SODIUM CHLORIDE: 0.9 INJECTION, SOLUTION INTRAVENOUS at 09:24

## 2022-06-25 RX ADMIN — METOPROLOL TARTRATE 25 MG: 25 TABLET, FILM COATED ORAL at 20:09

## 2022-06-25 NOTE — TELEPHONE ENCOUNTER
Status post placement of a 6 Hebrew by 24 centimeter right ureteral stent, please update the stent database    Please look for a date for surgery in 4-5 weeks with any available urologist for right ureteroscopy with laser lithotripsy and all indicated procedures

## 2022-06-25 NOTE — DISCHARGE INSTRUCTIONS
Della,    You had cystoscopy with ureteral stent placement, this is a flexible plastic tube to help drain the kidney  We will return to the operating room in some weeks to remove your kidney stone  It is very important that you return for that operation as if your stent is left in place for too long you may have trouble with calcification and blockage of the stent  After surgery like this it is common to have urgency and frequency of urination along with blood in the urine  We have given you medications to make your recovery more tolerable  If you have questions or concerns as you recover, please let us know  Thank you for allowing me to take care of you,  Dr Liana Barry metoprolol dose to 12 5 mg twice daily  Start taking amlodipine 5 mg daily for blood pressure control  Take oxybutynin for stent related spasms/discomfort  Take flomax to relax the ureter  Take pyridium for pain with urination  Take oxycodone for general pain control  This medication can make you constipated so you may take over the counter laxatives/stool softeners as needed  Take Protonix for reflux/heartburn  Please follow up with your primary care provider and urology on discharge    Recommend repeat thyroid lab tests in about 6 weeks, this can be arranged with your PCP

## 2022-06-25 NOTE — ED CARE HANDOFF
Emergency Department Sign Out Note        Sign out and transfer of care from [place]  See Separate Emergency Department note  The patient, Angel Samuel, was evaluated by the previous provider for septic kidney stone  Workup Completed:  Septic kidney stone  Septic work up preformed  Patient on ABX and pain control  ED Course / Workup Pending (followup):  Pending transfer to [facility] for urology intervention on septic stone  Procedures  MDM  Number of Diagnoses or Management Options  Hypokalemia  Sepsis, due to unspecified organism, unspecified whether acute organ dysfunction present ([facility] )  Ureterolithiasis  Urinary tract infection with hematuria, site unspecified  Diagnosis management comments: Transfer to [facility] urology intervention on septic stone  Disposition  Final diagnoses:   Ureterolithiasis   Sepsis, due to unspecified organism, unspecified whether acute organ dysfunction present [facility])   Urinary tract infection with hematuria, site unspecified   Hypokalemia     Time reflects when diagnosis was documented in both MDM as applicable and the Disposition within this note     Time User Action Codes Description Comment    6/24/2022 11:06 PM Maday Pedraza Add [N13 2] Ureteral stone with hydronephrosis     6/24/2022 11:48 PM Santiago Brooks Add [N20 1] Ureterolithiasis     6/24/2022 11:48 PM Santiago Brooks Add [A41 9] Sepsis, due to unspecified organism, unspecified whether acute organ dysfunction present ([facility] )     6/24/2022 11:48 PM Santiago Brooks Add [N39 0,  R31 9] Urinary tract infection with hematuria, site unspecified     6/25/2022  1:10 AM Santiago Brooks Add [E87 6] Hypokalemia       ED Disposition     ED Disposition   Transfer to Another Facility-In Network    Condition   --    Date/Time   Fri Jun 24, 2022 11:44 PM    Comment   Agnel Samuel should be transferred out to [facility]             MD Documentation    Flowsheet Row Most Recent Value   Patient Condition The patient has been stabilized such that within reasonable medical probability, no material deterioration of the patient condition or the condition of the unborn child(ericka) is likely to result from the transfer   Reason for Transfer Level of Care needed not available at this facility   Benefits of Transfer Specialized equipment and/or services available at the receiving facility (Include comment)________________________   Risks of Transfer Potential for delay in receiving treatment, Potential deterioration of medical condition, Loss of IV, Increased discomfort during transfer, Possible worsening of condition or death during transfer   Accepting Physician Dr Dee Dee Avalos Name, Lida Khan   Provider Certification General risk, such as traffic hazards, adverse weather conditions, rough terrain or turbulence, possible failure of equipment (including vehicle or aircraft), or consequences of actions of persons outside the control of the transport personnel, Unanticipated needs of medical equipment and personnel during transport, Risk of worsening condition, The possibility of a transport vehicle being unavailable      RN Documentation    [address] Name, [address]  [facility]      Follow-up Information    None       Patient's Medications   Discharge Prescriptions    No medications on file     No discharge procedures on file         ED Provider  Electronically Signed by     Mary Tucker DO  06/25/22 9487

## 2022-06-25 NOTE — ASSESSMENT & PLAN NOTE
Pt presented from [facility] per request of Urology for right ureteral stone , right hydronephrosis , fever, sepsis dt urinary source and complicated UTI  · Pt was taken right to the OR   · Seen by Medicine post op   · Post op day # 0 for cystoscopy retrograde pyelogram with insertion of right ureteral stent   · Turbid urine noted continued on ceftriaxone day # 1  · Plan for outpt fu in a number of weeks for subsequent ureteroscopic intervention and admission to hospital   · Monitor renal function   · Pain control - oxy prn and tylenol   · IVF hydration   · Pending urine culture  And blood culture all in process   · Procalcitonin elevated chk in am

## 2022-06-25 NOTE — ANESTHESIA PREPROCEDURE EVALUATION
Procedure:  CYSTOSCOPY RETROGRADE PYELOGRAM WITH INSERTION STENT URETERAL (Right Bladder)    Relevant Problems   GYN   (+) Malignant neoplasm of overlapping sites of cervix (HCC)      MUSCULOSKELETAL   (+) Acute midline low back pain without sciatica      Other   (+) Mediastinal lymphadenopathy       CAD/PCI/MI/CHF -- denies  COPD/ASTHMA/LENNY -- denies  PROBS WITH PRIOR ANESTHESIA -- denies  NPO STATUS CONFIRMED    Lab Results   Component Value Date    SODIUM 135 (L) 06/24/2022    K 2 7 (LL) 06/24/2022    BUN 29 (H) 06/24/2022    CREATININE 1 28 06/24/2022    EGFR 50 06/24/2022     No results found for: HGBA1C    Lab Results   Component Value Date    HGB 12 1 06/24/2022    HGB 14 1 04/14/2022    HGB 13 1 03/14/2022     (L) 06/24/2022     04/14/2022     03/14/2022     Lab Results   Component Value Date    WBC 6 25 06/24/2022       Lab Results   Component Value Date    CREATININE 1 28 06/24/2022    CREATININE 0 74 04/14/2022    CREATININE 0 56 (L) 03/14/2022       Lab Results   Component Value Date    INR 1 09 06/24/2022    INR 0 96 02/21/2020    INR 1 11 04/26/2019     Lab Results   Component Value Date    PTT 32 06/24/2022    PTT 29 02/21/2020    PTT 30 04/26/2019       No results found for: LACTATE      Type and Screen  O                      Physical Exam    Airway    Mallampati score: II  TM Distance: >3 FB       Dental   lower dentures,     Cardiovascular      Pulmonary      Other Findings  Partial lower; mulitple caps      Anesthesia Plan  ASA Score- 2     Anesthesia Type- general with ASA Monitors  Additional Monitors:   Airway Plan:     Comment:  MAYRA Sloan , have personally seen and evaluated the patient prior to anesthetic care  I have reviewed the pre-anesthetic record, and other medical records if appropriate to the anesthetic care  If a CRNA is involved in the case, I have reviewed the CRNA assessment, if present, and agree        Risks/benefits and alternatives discussed with patient including possible PONV, sore throat, and possibility of rare anesthetic and surgical emergencies          Plan Factors-    Chart reviewed  Existing labs reviewed  Patient summary reviewed  Patient instructed to abstain from smoking on day of procedure  There is medical exclusion for perioperative obstructive sleep apnea risk education  Induction- intravenous  Postoperative Plan- Plan for postoperative opioid use  Planned trial extubation    Informed Consent- Anesthetic plan and risks discussed with patient  I personally reviewed this patient with the CRNA  Discussed and agreed on the Anesthesia Plan with the CRNA  Sammy Luciano

## 2022-06-25 NOTE — ASSESSMENT & PLAN NOTE
In setting of right ureteral stone, fever with complicated UTI  · Blood cultures obtained at [facility], positive x2 Gram-negative rods - monitor for final result in susceptibility  · Repeat blood cultures obtained [facility]  · Continue ceftriaxone  · Monitor vitals - T-max 102 2°  · Hydration with IV fluids  · Trend leukocytosis  · Lactic stable

## 2022-06-25 NOTE — H&P
[address] [facility]  H&P- [address] 1975, 40 y o  female MRN: [id number]  Unit/Bed#: [facility] 919-01 Encounter: [id number]  Primary Care Provider: Dieter Basilio   Date and time admitted to hospital: 6/25/2022  7:41 AM    * Pyelonephritis  Assessment & Plan  Pt presented from [facility] per request of Urology for right ureteral stone , right hydronephrosis , fever, sepsis dt urinary source and complicated UTI  · Pt was taken right to the OR   · Seen by Medicine post op   · Post op day # 0 for cystoscopy retrograde pyelogram with insertion of right ureteral stent   · Turbid urine noted continued on ceftriaxone day # 1  · Plan for outpt fu in a number of weeks for subsequent ureteroscopic intervention and admission to hospital   · Monitor renal function   · Pain control - oxy prn and tylenol   · IVF hydration   · Pending urine culture  And blood culture all in process   · Procalcitonin elevated chk in am     Gram-negative bacteremia  Assessment & Plan  In setting of right ureteral stone, fever with complicated UTI  · Blood cultures obtained at [facility], positive x2 Gram-negative rods - monitor for final result in susceptibility  · Repeat blood cultures obtained at [facility]  · Continue ceftriaxone  · Monitor vitals - T-max 102 2°  · Hydration with IV fluids  · Trend leukocytosis  · Lactic stable    Sepsis ([address] )  Assessment & Plan  Secondary to ureteral stone and right hydronephrosis, fever of 102 2, tachypnea,  elevated lactic   · Blood cultures obtained at [place]  Gram-negative rods  · - repeat obtained  · Monitor both bc and urine culture in process   · Monitor for leukocytosis   · ivf hydration   · Monitor vital signs   · On iv ceftriaxone     Malignant neoplasm of overlapping sites of cervix [facility])  Assessment & Plan  Pt with hx of malignant neoplasm of overlapping sites of cervix   · Stage 1 B2 cervical cancer   · Sp tx with chemo radiation and now over 2 yrs out   · Pap smear reveals high grade LUIS   · On 3/3/21 pt had colposcopy which was essentially normal with exception of radiation change   · No biopsys were performed at that time  Per Dr Carlos Never  · She also had right labial skin tag removed during this procedure   · Will need ongoing surveillance with gyn onc     Hypokalemia  Assessment & Plan  Hypokalemia in setting of no po intake dt nausea for last several days  · Was repleted overnight but now lower   · Added 20 meq iv potassium 40 meq po kdur and will repeat labs this evening at 1800 for repletion again at that time   · Repeat labs in am  · Add 1 x magnesium 2g iv repletion     VTE Pharmacologic Prophylaxis: VTE Score: 5 High Risk (Score >/= 5) - Pharmacological DVT Prophylaxis Ordered: enoxaparin (Lovenox)  Sequential Compression Devices Ordered  Code Status: Level 1 - Full Code   Discussion with family: patient   Anticipated Length of Stay: Patient will be admitted on an inpatient basis with an anticipated length of stay of greater than 2 midnights secondary to need for iv abx pending cultures   Total Time for Visit, including Counseling / Coordination of Care: 70 minutes Greater than 50% of this total time spent on direct patient counseling and coordination of care  Chief Complaint: severe flank pain with no appetite for last 4  Days     History of Present Illness:  Eve Singleton is a 40 y o  female with a PMH of  who presents with right-sided flank pain and no appetite  Patient has significant history for prior cervical cancer status post chemo radiation treatment, asymmetric septal hypertrophy, vaping, and hypertension  Patient presents with ongoing abdominal pain, fevers complicated UTI and right ureteral stone noted on imaging  Patient reports this has been going on for days and she had noted nausea and malaise with the above-mentioned symptoms  Patient has been treated with antibiotics outpatient and pain medication   She was transferred from [facility] for urological procedure  Patient is now seen status post cystoscopy retrograde pyelogram with insertion of right ureteral stent  Patient was noted to be febrile upon admission also noting significant hypokalemia in the setting of poor oral intake, dehydration and complicated UTI, in setting of stone  Patient was taken immediately to the OR - now seeing patient postoperatively      Review of Systems:  Review of Systems   Constitutional: Positive for activity change, appetite change, chills, fatigue and fever  Respiratory: Negative for cough, shortness of breath, wheezing and stridor  Cardiovascular: Negative for chest pain and palpitations  Gastrointestinal: Positive for abdominal pain, diarrhea (Did have diarrhea a few days ago) and nausea (Nausea up until procedure/ poor oral intake)  Negative for vomiting  Genitourinary: Positive for decreased urine volume and dysuria  Musculoskeletal: Positive for back pain  Neurological: Positive for weakness  Negative for dizziness and headaches  Past Medical and Surgical History:   Past Medical History:   Diagnosis Date    Abnormal Pap smear of cervix     Acute hip pain     Cancer (HCC)     cervix    Hypokalemia     Low back pain     Lymphadenopathy     Pelvic pain        Past Surgical History:   Procedure Laterality Date    NH BRONCHOSCOPY NEEDLE BX TRACHEA MAIN STEM&/BRON N/A 2/26/2020    Procedure: ENDOBRONCHIAL ULTRASOUND (EBUS); Surgeon: Kaylee Berkowitz MD;  [facility];  Service: Thoracic    NH [address] N/A 2/26/2020    Procedure: Lin Lazaro;  Surgeon: Kaylee Berkowitz MD;  [facility];  Service: Thoracic    NH DILATION OF VAGINA W ANESTH N/A 6/20/2019    Procedure: EXAM UNDER ANESTHESIA (EUA);   Surgeon: Hollis Shen MD;  [facility];  Service: Gynecology Oncology    NH INSERT VAGINAL RADIATION DEVICE N/A 6/20/2019    Procedure: Alicia Goodman;  Surgeon: Akua Lama MD;  [facility];  Service: Gynecology Oncology    TONSILLECTOMY      US GUIDANCE  6/20/2019       Meds/Allergies:  Prior to Admission medications    Medication Sig Start Date End Date Taking? Authorizing Provider   metoprolol tartrate (LOPRESSOR) 25 mg tablet Take 1 tablet (25 mg total) by mouth every 12 (twelve) hours 5/23/22  Yes Judith Suarez MD   Multiple Vitamins-Minerals (MULTIVITAMIN ADULT PO) multivitamin   Yes Historical Provider, MD   Omega-3 Fatty Acids (FISH OIL) 1,000 mg Fish Oil   Yes Historical Provider, MD   oxybutynin (DITROPAN-XL) 10 MG 24 hr tablet Take 1 tablet (10 mg total) by mouth daily at bedtime 6/25/22 7/25/22 Yes Christopher Bowles MD   oxyCODONE (Roxicodone) 5 immediate release tablet Take 1 tablet (5 mg total) by mouth every 4 (four) hours as needed for moderate pain for up to 5 days Max Daily Amount: 30 mg 6/25/22 6/30/22 Yes Christopher Bowles MD   senna (SENOKOT) 8 6 mg Take 1 tablet (8 6 mg total) by mouth daily at bedtime for 5 days 6/25/22 6/30/22 Yes Christopher Bowles MD   tamsulosin [facility]) 0 4 mg Take 1 capsule (0 4 mg total) by mouth daily with dinner 6/25/22 7/25/22 Yes Christopher Bowles MD   acetaminophen (TYLENOL) 325 mg tablet Take 2 tablets (650 mg total) by mouth every 6 (six) hours as needed for mild pain  Patient not taking: No sig reported 2/26/20   Martín Pena PA-C   ibuprofen (MOTRIN) 600 mg tablet  4/18/22   Historical Provider, MD     I have reviewed home medications with patient personally  Allergies:    Allergies   Allergen Reactions    Hydrochlorothiazide Arthralgia    Latex Hives    Other      Tomatoes   Vomiting and diarhhea       Social History:  Marital Status: Significant Other   Occupation:    Patient Pre-hospital Living Situation: Home  Patient Pre-hospital Level of Mobility: walks  Patient Pre-hospital Diet Restrictions:  None   Substance Use History:   Social History     Substance and Sexual Activity Alcohol Use Not Currently     Social History     Tobacco Use   Smoking Status Former Smoker    Packs/day: 0 50    Years: 1 00    Pack years: 0 50    Types: Cigarettes    Quit date: 2020    Years since quittin 3   Smokeless Tobacco Never Used     Social History     Substance and Sexual Activity   Drug Use Never       Family History:  Family History   Problem Relation Age of Onset    Uterine cancer Maternal Grandmother     Heart disease Mother     Canavan disease Father     Cancer Father        Physical Exam:     Vitals:   Blood Pressure: 122/74 (22 1548)  Pulse: 57 (22 1548)  Temperature: 97 6 °F (36 4 °C) (22 1548)  Temp Source: Temporal (22 1015)  Respirations: 18 (22 1548)  SpO2: 98 % (22 1015)    Physical Exam  Constitutional:       General: She is not in acute distress  Appearance: She is ill-appearing  She is not toxic-appearing or diaphoretic  HENT:      Head: Normocephalic and atraumatic  Nose: No congestion  Eyes:      Conjunctiva/sclera: Conjunctivae normal    Cardiovascular:      Rate and Rhythm: Normal rate  Heart sounds: Murmur heard  No friction rub  No gallop  Pulmonary:      Effort: No respiratory distress  Breath sounds: No stridor  No wheezing, rhonchi or rales  Comments: Diminished poor appetite  Chest:      Chest wall: No tenderness  Abdominal:      General: There is no distension  Palpations: There is no mass  Tenderness: There is no abdominal tenderness  There is no guarding or rebound  Hernia: No hernia is present  Musculoskeletal:         General: No swelling, tenderness, deformity or signs of injury  Right lower leg: No edema  Left lower leg: No edema  Skin:     Coloration: Skin is pale  Skin is not jaundiced  Findings: No bruising, erythema, lesion or rash  Neurological:      Mental Status: She is alert and oriented to person, place, and time     Psychiatric: Behavior: Behavior normal           Additional Data:     Lab Results:  Results from last 7 days   Lab Units 06/25/22  1012   WBC Thousand/uL 5 47   HEMOGLOBIN g/dL 10 0*   HEMATOCRIT % 29 4*   PLATELETS Thousands/uL 107*   NEUTROS PCT % 81*   LYMPHS PCT % 8*   MONOS PCT % 9   EOS PCT % 1     Results from last 7 days   Lab Units 06/25/22  1834 06/25/22  1025   SODIUM mmol/L  --  142   POTASSIUM mmol/L 3 4* 2 5*   CHLORIDE mmol/L  --  110*   CO2 mmol/L  --  28   BUN mg/dL  --  18   CREATININE mg/dL  --  1 09   ANION GAP mmol/L  --  4   CALCIUM mg/dL  --  9 8   ALBUMIN g/dL  --  2 0*   TOTAL BILIRUBIN mg/dL  --  0 66   ALK PHOS U/L  --  62   ALT U/L  --  21   AST U/L  --  11   GLUCOSE RANDOM mg/dL  --  106     Results from last 7 days   Lab Units 06/24/22  1949   INR  1 09             Results from last 7 days   Lab Units 06/24/22  2332 06/24/22  2228 06/24/22  1949   LACTIC ACID mmol/L 0 4* 0 4* 2 2*   PROCALCITONIN ng/ml  --   --  2 74*       Imaging: Reviewed radiology reports from this admission including: abdominal/pelvic CT  FL retrograde pyelogram    (Results Pending)       EKG and Other Studies Reviewed on Admission:   · EKG: No EKG obtained  Will place on telemetry due to hyperkalemia    ** Please Note: This note has been constructed using a voice recognition system   **

## 2022-06-25 NOTE — CONSULTS
UROLOGY CONSULTATION NOTE     Patient Identifiers: Nathalie Villa (MRN [id number])  Service Requesting Consultation:  [facility] Internal Medicine  Service Providing Consultation:  Urology, John Weinstein MD    Date of Service: 6/25/2022  Consults    Reason for Consultation:  Complicated UTI, right-sided flank pain, right ureteral stone    History of Present Illness:     Nathalie Villa is a 40 y o  old with right flank pain, abdominal pain, fevers, complicated UTI, and a right ureteral stone  This has been present for days and the patient has noted nausea and malaise as associated symptoms  Nothing and pain medication are identified as aggravating and alleviating factors, respectively  Previous therapies include a medication and antibiosis along with fluid resuscitation and previous work-up includes imaging and laboratory workup  The patient has not previously seen a urologist for this problem  The patient otherwise denies a history of urologic malignancy or malady  The patient denies a family history of urologic malignancy or malady  Works as a   The following portions of the patient's history were reviewed and updated as appropriate: allergies, current medications, past family history, past medical history, past social history, past surgical history and problem list     Past Medical, Past Surgical History:     Past Medical History:   Diagnosis Date    Abnormal Pap smear of cervix     Acute hip pain     Cancer ([facility] [address] )     cervix    Hypokalemia     Low back pain     Lymphadenopathy     Pelvic pain    :    Past Surgical History:   Procedure Laterality Date    HI BRONCHOSCOPY NEEDLE BX TRACHEA MAIN STEM&/BRON N/A 2/26/2020    Procedure: ENDOBRONCHIAL ULTRASOUND (EBUS);   Surgeon: Alexus Torres MD;  [facility];  Service: Thoracic    HI [address] N/A 2/26/2020    Procedure: Marlee Sleek;  Surgeon: Alexus Torres MD;  [facility];  Service: Thoracic    AL DILATION OF VAGINA W ANESTH N/A 2019    Procedure: EXAM UNDER ANESTHESIA (EUA); Surgeon: Shaina Valdivia MD;  [facility];  Service: Gynecology Oncology    AL INSERT VAGINAL RADIATION DEVICE N/A 2019    Procedure: Jeo Rob;  Surgeon: Shaina Valdivia MD;  [facility];  Service: Gynecology Oncology    TONSILLECTOMY      US GUIDANCE  2019   :    Medications, Allergies:     Current Facility-Administered Medications   Medication Dose Route Frequency    cefTRIAXone (ROCEPHIN) 1,000 mg in dextrose 5 % 50 mL IVPB  1,000 mg Intravenous Q24H    sodium chloride 0 9 % infusion  100 mL/hr Intravenous Continuous    tamsulosin (FLOMAX) capsule 0 4 mg  0 4 mg Oral Daily With Dinner       Allergies: Allergies   Allergen Reactions    Hydrochlorothiazide Arthralgia    Latex Hives    Other      Tomatoes   Vomiting and diarhhea   :    Social and Family History:   Social History:   Social History     Tobacco Use    Smoking status: Former Smoker     Packs/day: 0 50     Years: 1 00     Pack years: 0 50     Types: Cigarettes     Quit date: 2020     Years since quittin 3    Smokeless tobacco: Never Used   Vaping Use    Vaping Use: Never used   Substance Use Topics    Alcohol use: Not Currently    Drug use: Never     Social History     Tobacco Use   Smoking Status Former Smoker    Packs/day: 0 50    Years: 1 00    Pack years: 0 50    Types: Cigarettes    Quit date: 2020    Years since quittin 3   Smokeless Tobacco Never Used       Family History:  Family History   Problem Relation Age of Onset    Uterine cancer Maternal Grandmother     Heart disease Mother     Canavan disease Father     Cancer Father    :     Review of Systems:     General: Fever, chills, or night sweats: positive  Cardiac: Negative for chest pain  Pulmonary: Negative for shortness of breath  Gastrointestinal: Abdominal pain positive    Nausea, vomiting, or diarrhea positive,  Genitourinary: See HPI above  Patient does not have hematuria  All other systems were queried and were negative except as listed above in HPI and here in the ROS  Physical Exam:   /84   Pulse 89   Temp (!) 102 2 °F (39 °C)   Resp 20   LMP 04/15/2019   SpO2 90% Temp (24hrs), Av 1 °F (38 4 °C), Min:98 9 °F (37 2 °C), Max:102 9 °F (39 4 °C)  current; Temperature: (!) 102 2 °F (39 °C)  No intake/output data recorded  General: Patient is awake and alert, appears ill    Cardiac: Peripheral edema:  Negative, peripheral pulses are present     Pulmonary: Non-labored breathing, speaking in full sentences, no wheezing, no coughing    Abdomen: Soft, non-tender, non-distended  Genitourinary:  Positive CVA tenderness, negative suprapubic tenderness  Neurological: Cranial nerves II-XII are intact, no sensory deficits, no neurological deficits    Musculoskeletal: Extremities are warm, ROM is not assessed    Psychiatric: The patient's train of thought is linear, mood and affect are normal    Lymphatic: There is not adenopathy in the abdominal region  Skin:  Pale    WAGGONER:  None    Labs:     Lab Results   Component Value Date    HGB 12 1 2022    HCT 35 5 2022    WBC 6 25 2022     (L) 2022   ]    Lab Results   Component Value Date    K 2 7 (LL) 2022    CL 97 (L) 2022    CO2 27 2022    BUN 29 (H) 2022    CREATININE 1 28 2022    CALCIUM 10 0 2022   ]    Imaging:   I personally reviewed the images and report of the following studies, and reviewed them with the patient:    Imaging reviewed, right-sided hydronephrosis present along with perinephric stranding      ASSESSMENT:     40 y o  old female with  a complicated UTI, fever, right-sided flank pain, right perinephric stranding  I spoke with her about the decision for surgery for right renal unit decompression by way of cystoscopy with ureteral stent placement  She does understand the risks of surgery which include infection, bleeding, pain, damage to surrounding structures, need for additional procedures, risk of failure of the procedure, risk of anesthesia, risk of positioning complications including neurapraxia, chronic pain, and paralysis as well as risk of rhabdomyolysis and compartment syndrome  Risk of deep venous thrombosis and venous thromboembolism as well as pneumonia and other potential unpredictable complications were also discussed with the patient  Marked on her right arm, wishes to proceed  Gerhardt Sep PLAN:     Continue care per primary team     Proceeding to the operating room for cystoscopy with ureteral stent placement and all indicated procedures  Portions of the above record have been created with voice recognition software  Occasional wrong word or "sound alike" substitution may have occurred due to the inherent limitations of voice recognition software  Read the chart carefully and recognize, using context, where substitution may have occurred  Thank you for allowing me to participate in this patients care  Please do not hesitate to call with any additional questions    Damien Kitchen MD

## 2022-06-25 NOTE — ANESTHESIA POSTPROCEDURE EVALUATION
Post-Op Assessment Note    CV Status:  Stable  Pain Score: 0    Pain management: adequate     Mental Status:  Arousable   Hydration Status:  Stable   PONV Controlled:  Controlled   Airway Patency:  Patent      Post Op Vitals Reviewed: Yes      Staff: CRNA         No complications documented      BP   117/56   Temp  98 4   Pulse  81   Resp   18   SpO2   98

## 2022-06-25 NOTE — EMTALA/ACUTE CARE TRANSFER
[address] [facility] [address]  [address]  [place] [address]  Dept: [phone]      EMTALA TRANSFER CONSENT    NAME Yolande Carolina                                         1975                              MRN [id number]    I have been informed of my rights regarding examination, treatment, and transfer   by Dr Ryan Frost DO    Benefits: Specialized equipment and/or services available at the receiving facility (Include comment)________________________    Risks: Potential for delay in receiving treatment, Potential deterioration of medical condition, Loss of IV, Increased discomfort during transfer, Possible worsening of condition or death during transfer      Consent for Transfer:  I acknowledge that my medical condition has been evaluated and explained to me by the emergency department physician or other qualified medical person and/or my attending physician, who has recommended that I be transferred to the service of  Accepting Physician: Dr Feliz Romo at [address] [facility] Name, [address] : [place]  The above potential benefits of such transfer, the potential risks associated with such transfer, and the probable risks of not being transferred have been explained to me, and I fully understand them  The doctor has explained that, in my case, the benefits of transfer outweigh the risks  I agree to be transferred  I authorize the performance of emergency medical procedures and treatments upon me in both transit and upon arrival at the receiving facility  Additionally, I authorize the release of any and all medical records to the receiving facility and request they be transported with me, if possible  I understand that the safest mode of transportation during a medical emergency is an ambulance and that the Hospital advocates the use of this mode of transport   Risks of traveling to the receiving facility by car, including absence of medical control, life sustaining equipment, such as oxygen, and medical personnel has been explained to me and I fully understand them  (CHUCK CORRECT BOX BELOW)  [  ]  I consent to the stated transfer and to be transported by ambulance/helicopter  [  ]  I consent to the stated transfer, but refuse transportation by ambulance and accept full responsibility for my transportation by car  I understand the risks of non-ambulance transfers and I exonerate the Hospital and its staff from any deterioration in my condition that results from this refusal     X___________________________________________    DATE  22  TIME________  Signature of patient or legally responsible individual signing on patient behalf           RELATIONSHIP TO PATIENT_________________________          Provider Certification    NAME Aletha Quintero                                         1975                              MRN [id number]    A medical screening exam was performed on the above named patient  Based on the examination:    Condition Necessitating Transfer The primary encounter diagnosis was Ureteral stone with hydronephrosis  Diagnoses of Ureterolithiasis, Sepsis, due to unspecified organism, unspecified whether acute organ dysfunction present [facility]), and Urinary tract infection with hematuria, site unspecified were also pertinent to this visit      Patient Condition: The patient has been stabilized such that within reasonable medical probability, no material deterioration of the patient condition or the condition of the unborn child(ericka) is likely to result from the transfer    Reason for Transfer: Level of Care needed not available at this facility    Transfer Requirements: Facility [facility]   · Space available and qualified personnel available for treatment as acknowledged by    · Agreed to accept transfer and to provide appropriate medical treatment as acknowledged by       Dr Shey Clark  · Appropriate medical records of the examination and treatment of the patient are provided at the time of transfer   [facility], [address] _______  · Transfer will be performed by qualified personnel from    and appropriate transfer equipment as required, including the use of necessary and appropriate life support measures  Provider Certification: I have examined the patient and explained the following risks and benefits of being transferred/refusing transfer to the patient/family:  General risk, such as traffic hazards, adverse weather conditions, rough terrain or turbulence, possible failure of equipment (including vehicle or aircraft), or consequences of actions of persons outside the control of the transport personnel, Unanticipated needs of medical equipment and personnel during transport, Risk of worsening condition, The possibility of a transport vehicle being unavailable      Based on these reasonable risks and benefits to the patient and/or the unborn child(ericka), and based upon the information available at the time of the patients examination, I certify that the medical benefits reasonably to be expected from the provision of appropriate medical treatments at another medical facility outweigh the increasing risks, if any, to the individuals medical condition, and in the case of labor to the unborn child, from effecting the transfer      X____________________________________________ DATE 06/24/22        TIME_______      ORIGINAL - SEND TO MEDICAL RECORDS   COPY - SEND WITH PATIENT DURING TRANSFER

## 2022-06-25 NOTE — ASSESSMENT & PLAN NOTE
Pt with hx of malignant neoplasm of overlapping sites of cervix   · Stage 1 B2 cervical cancer   · Sp tx with chemo radiation and now over 2 yrs out   · Pap smear reveals high grade LUIS   · On 3/3/21 pt had colposcopy which was essentially normal with exception of radiation change   · No biopsys were performed at that time  Per Dr Kris Bowles  · She also had right labial skin tag removed during this procedure   · Will need ongoing surveillance with gyn onc

## 2022-06-25 NOTE — ASSESSMENT & PLAN NOTE
Secondary to ureteral stone and right hydronephrosis, fever of 102 2, tachypnea,  elevated lactic   · Blood cultures obtained at [facility]  Gram-negative rods  · - repeat obtained  · Monitor both bc and urine culture in process   · Monitor for leukocytosis   · ivf hydration   · Monitor vital signs   · On iv ceftriaxone

## 2022-06-25 NOTE — ASSESSMENT & PLAN NOTE
Hypokalemia in setting of no po intake dt nausea for last several days  · Was repleted overnight but now lower   · Added 20 meq iv potassium 40 meq po kdur and will repeat labs this evening at 1800 for repletion again at that time   · Repeat labs in am  · Add 1 x magnesium 2g iv repletion

## 2022-06-25 NOTE — ED NOTES
Trans to [facility]  Dr Camille Mays accepting  ALS SLETS 0700  [facility] Diana, MARLENI  06/25/22 7682

## 2022-06-25 NOTE — OP NOTE
OPERATIVE REPORT  PATIENT NAME: Florencia Diane    :  1975  MRN: [id number]  Pt Location: BE CYSTO ROOM 01    SURGERY DATE: 2022    Surgeon(s) and Role:     * Rachel Chester MD - Primary    Preop Diagnosis:  Ureteral stone with hydronephrosis [N13 2]    Post-Op Diagnosis Codes:     * Ureteral stone with hydronephrosis [N13 2]    Procedure(s) (LRB):  CYSTOSCOPY RETROGRADE PYELOGRAM WITH INSERTION STENT URETERAL (Right)    Specimen(s):  * No specimens in log *    Estimated Blood Loss:   Minimal    Drains:  Urethral Catheter Non-latex 16 Fr  (Active)   Number of days: 1101       Anesthesia Type:   General    Operative Indications:  Ureteral stone with hydronephrosis [I10 1]  Complicated UTI  Fever  Sepsis due to urinary source    Operative Findings:  Successful placement of a 6 [place] Island[place] by 24 centimeter right ureteral stent, turbid urine is seen to drain, good curl within the kidney and bladder  The patient will need a planned, subsequent ureteroscopic intervention and admission hospital, this will be in a number of weeks    Complications:   None    Procedure and Technique:    PROCEDURES PERFORMED:  1) Cystoscopy  2) right retrograde pyelography with fluoroscopic interpretation  3) right ureteral stent placement (6 [place] by 24 centimeter)    SURGEON:  Rachel Chester MD    ASSISTANTS:  None    NOTE:  There were no qualified teaching residents to assist with this case    ANESTHESIA: General     COMPLICATIONS:   None    ANTIBIOTICS:  Ancef, Rocephin    INTRAOPERATIVE THROMBOEMBOLISM PROPHYLAXIS:  Pneumatic compression stockings     RADIOLOGIC FINDINGS:    1  Retrograde pyelogram was performed on the right side using a 5 Fr open ended catheter  5 milliliters contrast used    2   The following findings were noted:  Mild hydronephrosis        INDICATIONS FOR PROCEDURE:  Florencia Diane is an 40 y o  old female with a right ureteral stone, right hydronephrosis, fever, sepsis due to urinary source, and complicated UTI  After discussing the options, the patient elected to undergo ureteral stent placement  We discussed the procedure in detail, the alternatives, and the risks, and they signed informed consent to proceed  PROCEDURE IN DETAIL:   The patient was identified and brought to the OR  Antibiotic prophylaxis and DVT prophylaxis were administered  They were placed in the comfortable dorsal lithotomy position with care to pad all pressure points  They were prepped and draped in the usual sterile fashion using hibiclens  A surgical time out was performed with all in the room in agreement with the correct patient, procedure, indications, and laterality  A 21-Croatian rigid cystoscope was used to enter the bladder  The bladder was inspected in its entirety and there were no lesions noted  The ureteral orifices were identified in their orthotopic positions  The right ureteral orifice was identified and a 5 Fr open ended catheter was placed into the ureteral orifice  The stone was not visible on  fluoroscopic guidance  A retrograde pyelogram was performed with injection of contrast which demonstrated mild hydroureteronephrosis  A wire was up to the kidney under fluoroscopic guidance  A 6 Croatian by 24 centimeter right JJ stent was then passed up the wire  under fluoroscopic guidance into the right  kidney with a good curl noted in the kidney and in the bladder  The bladder was drained  The patient was placed back in the supine position, awakened from general anesthesia and brought to the recovery room in stable condition  SPECIMENS:   * No orders in the log *     IMPLANTS:   Implant Name Type Inv   Item Serial No   Lot No  LRB No  Used Action   STENT URETERAL 6FR 24CML Gwendlyn Maxcy  STENT URETERAL 6FR 24CML INLAY OPTIMA  [address] [id number] Right 1 Implanted        COMPLICATIONS:  None    DISPOSITION:  PACU to floor    PLAN:  The patient is admitted to the [facility] Internal Medicine Service for management of sepsis due to urinary source and complicated UTI  Her kidney has been decompressed  I have sent medications for discharge with the exception of antibiotics which can be sent by the primary team for a duration of 7-10 days  We will arrange for ureteroscopic stone intervention as a planned, subsequent admission to hospital in a number of weeks         I was present for the entire procedure and A qualified resident physician was not available    Patient Disposition:  PACU       SIGNATURE: Stacy Walsh MD  DATE: June 25, 2022  TIME: 9:46 AM

## 2022-06-26 PROBLEM — I51.7 ATRIAL SEPTAL HYPERTROPHY: Status: ACTIVE | Noted: 2022-06-26

## 2022-06-26 PROBLEM — E83.52 HYPERCALCEMIA: Status: ACTIVE | Noted: 2022-06-26

## 2022-06-26 LAB
ANION GAP SERPL CALCULATED.3IONS-SCNC: 4 MMOL/L (ref 4–13)
BASOPHILS # BLD MANUAL: 0 THOUSAND/UL (ref 0–0.1)
BASOPHILS NFR MAR MANUAL: 0 % (ref 0–1)
BUN SERPL-MCNC: 26 MG/DL (ref 5–25)
CALCIUM SERPL-MCNC: 11.5 MG/DL (ref 8.3–10.1)
CHLORIDE SERPL-SCNC: 110 MMOL/L (ref 100–108)
CO2 SERPL-SCNC: 29 MMOL/L (ref 21–32)
CREAT SERPL-MCNC: 1.18 MG/DL (ref 0.6–1.3)
EOSINOPHIL # BLD MANUAL: 0 THOUSAND/UL (ref 0–0.4)
EOSINOPHIL NFR BLD MANUAL: 0 % (ref 0–6)
ERYTHROCYTE [DISTWIDTH] IN BLOOD BY AUTOMATED COUNT: 14.6 % (ref 11.6–15.1)
GFR SERPL CREATININE-BSD FRML MDRD: 55 ML/MIN/1.73SQ M
GLUCOSE SERPL-MCNC: 170 MG/DL (ref 65–140)
HCT VFR BLD AUTO: 36.9 % (ref 34.8–46.1)
HGB BLD-MCNC: 12 G/DL (ref 11.5–15.4)
LYMPHOCYTES # BLD AUTO: 0.56 THOUSAND/UL (ref 0.6–4.47)
LYMPHOCYTES # BLD AUTO: 5 % (ref 14–44)
MAGNESIUM SERPL-MCNC: 2.3 MG/DL (ref 1.6–2.6)
MCH RBC QN AUTO: 27 PG (ref 26.8–34.3)
MCHC RBC AUTO-ENTMCNC: 32.5 G/DL (ref 31.4–37.4)
MCV RBC AUTO: 83 FL (ref 82–98)
MONOCYTES # BLD AUTO: 0.11 THOUSAND/UL (ref 0–1.22)
MONOCYTES NFR BLD: 1 % (ref 4–12)
NEUTROPHILS # BLD MANUAL: 10.38 THOUSAND/UL (ref 1.85–7.62)
NEUTS BAND NFR BLD MANUAL: 1 % (ref 0–8)
NEUTS SEG NFR BLD AUTO: 92 % (ref 43–75)
PLATELET # BLD AUTO: 183 THOUSANDS/UL (ref 149–390)
PLATELET BLD QL SMEAR: ADEQUATE
PMV BLD AUTO: 11.6 FL (ref 8.9–12.7)
POTASSIUM SERPL-SCNC: 3.9 MMOL/L (ref 3.5–5.3)
PROCALCITONIN SERPL-MCNC: 1.29 NG/ML
RBC # BLD AUTO: 4.44 MILLION/UL (ref 3.81–5.12)
RBC MORPH BLD: NORMAL
SODIUM SERPL-SCNC: 143 MMOL/L (ref 136–145)
VARIANT LYMPHS # BLD AUTO: 1 %
WBC # BLD AUTO: 11.16 THOUSAND/UL (ref 4.31–10.16)

## 2022-06-26 PROCEDURE — 83735 ASSAY OF MAGNESIUM: CPT | Performed by: HOSPITALIST

## 2022-06-26 PROCEDURE — 85007 BL SMEAR W/DIFF WBC COUNT: CPT | Performed by: HOSPITALIST

## 2022-06-26 PROCEDURE — 85027 COMPLETE CBC AUTOMATED: CPT | Performed by: HOSPITALIST

## 2022-06-26 PROCEDURE — 80048 BASIC METABOLIC PNL TOTAL CA: CPT | Performed by: HOSPITALIST

## 2022-06-26 PROCEDURE — 99232 SBSQ HOSP IP/OBS MODERATE 35: CPT | Performed by: NURSE PRACTITIONER

## 2022-06-26 PROCEDURE — 84145 PROCALCITONIN (PCT): CPT | Performed by: HOSPITALIST

## 2022-06-26 RX ORDER — HYDRALAZINE HYDROCHLORIDE 20 MG/ML
10 INJECTION INTRAMUSCULAR; INTRAVENOUS EVERY 6 HOURS PRN
Status: DISCONTINUED | OUTPATIENT
Start: 2022-06-26 | End: 2022-06-29 | Stop reason: HOSPADM

## 2022-06-26 RX ORDER — HYDROMORPHONE HYDROCHLORIDE 2 MG/1
1 TABLET ORAL ONCE
Status: COMPLETED | OUTPATIENT
Start: 2022-06-26 | End: 2022-06-26

## 2022-06-26 RX ORDER — HYDRALAZINE HYDROCHLORIDE 20 MG/ML
10 INJECTION INTRAMUSCULAR; INTRAVENOUS ONCE
Status: COMPLETED | OUTPATIENT
Start: 2022-06-26 | End: 2022-06-26

## 2022-06-26 RX ORDER — MAGNESIUM HYDROXIDE/ALUMINUM HYDROXICE/SIMETHICONE 120; 1200; 1200 MG/30ML; MG/30ML; MG/30ML
30 SUSPENSION ORAL EVERY 4 HOURS PRN
Status: DISCONTINUED | OUTPATIENT
Start: 2022-06-26 | End: 2022-06-29 | Stop reason: HOSPADM

## 2022-06-26 RX ORDER — AMLODIPINE BESYLATE 5 MG/1
5 TABLET ORAL DAILY
Status: DISCONTINUED | OUTPATIENT
Start: 2022-06-27 | End: 2022-06-29 | Stop reason: HOSPADM

## 2022-06-26 RX ORDER — LACTULOSE 20 G/30ML
30 SOLUTION ORAL ONCE
Status: COMPLETED | OUTPATIENT
Start: 2022-06-26 | End: 2022-06-26

## 2022-06-26 RX ORDER — POLYETHYLENE GLYCOL 3350 17 G/17G
17 POWDER, FOR SOLUTION ORAL DAILY
Status: DISCONTINUED | OUTPATIENT
Start: 2022-06-27 | End: 2022-06-29 | Stop reason: HOSPADM

## 2022-06-26 RX ADMIN — TAMSULOSIN HYDROCHLORIDE 0.4 MG: 0.4 CAPSULE ORAL at 18:04

## 2022-06-26 RX ADMIN — HYDRALAZINE HYDROCHLORIDE 10 MG: 20 INJECTION INTRAMUSCULAR; INTRAVENOUS at 18:21

## 2022-06-26 RX ADMIN — HYDRALAZINE HYDROCHLORIDE 10 MG: 20 INJECTION INTRAMUSCULAR; INTRAVENOUS at 21:58

## 2022-06-26 RX ADMIN — LACTULOSE 30 G: 20 SOLUTION ORAL at 17:52

## 2022-06-26 RX ADMIN — HYDROMORPHONE HYDROCHLORIDE 1 MG: 2 TABLET ORAL at 21:57

## 2022-06-26 RX ADMIN — OXYCODONE HYDROCHLORIDE 5 MG: 5 TABLET ORAL at 08:59

## 2022-06-26 RX ADMIN — CEFEPIME HYDROCHLORIDE 2000 MG: 2 INJECTION, POWDER, FOR SOLUTION INTRAVENOUS at 18:03

## 2022-06-26 RX ADMIN — MULTIPLE VITAMINS W/ MINERALS TAB 1 TABLET: TAB ORAL at 08:59

## 2022-06-26 RX ADMIN — OXYCODONE HYDROCHLORIDE 5 MG: 5 TABLET ORAL at 02:39

## 2022-06-26 RX ADMIN — OXYCODONE HYDROCHLORIDE 5 MG: 5 TABLET ORAL at 18:33

## 2022-06-26 RX ADMIN — ENOXAPARIN SODIUM 40 MG: 40 INJECTION SUBCUTANEOUS at 08:59

## 2022-06-26 RX ADMIN — OXYCODONE HYDROCHLORIDE 5 MG: 5 TABLET ORAL at 13:23

## 2022-06-26 RX ADMIN — OMEGA-3 FATTY ACIDS CAP 1000 MG 1000 MG: 1000 CAP at 09:04

## 2022-06-26 NOTE — UTILIZATION REVIEW
Initial Clinical Review    Admission: Date/Time/Statement:   Admission Orders (From admission, onward)     Ordered        06/25/22 1122  Inpatient Admission  Once                      Orders Placed This Encounter   Procedures    Inpatient Admission     Standing Status:   Standing     Number of Occurrences:   1     Order Specific Question:   Level of Care     Answer:   Med Surg [16]     Order Specific Question:   Estimated length of stay     Answer:   More than 2 Midnights     Order Specific Question:   Certification     Answer:   I certify that inpatient services are medically necessary for this patient for a duration of greater than two midnights  See H&P and MD Progress Notes for additional information about the patient's course of treatment  Initial Presentation: 40 y o  female with PMH of cervical cancer status post chemo radiation treatment, asymmetric septal hypertrophy, vaping, and hypertension who initially presented to [facility] ED on 6/24 with c/o right sided flank pain, abd pain and no appetite  CT revealed right ureteral stone, see full report below  Also of note, pt w/ fever and complicated UTI  Patient has been treated with antibiotics outpatient and pain medication  She was transferred to [facility] [place], operated by [facility] for urological procedure  Patient was taken immediately to the OR  Admit Inpatient med surg telemetry, urology consult, NPO, IV ABX, monitor renal function, pain control, IVF, f/u on urine and blood cxs, check procal in am, monitor temp, VS and labs, trend leuks, monitor electrolytes and replete prn     6/25 Urology Consult:  Complicated UTI, fever, right-sided flank pain, right perinephric stranding  Proceeding to the operating room for cystoscopy with ureteral stent placement and all indicated procedures    SURGERY DATE: 6/25/2022  Procedure(s) (LRB):  CYSTOSCOPY RETROGRADE PYELOGRAM WITH INSERTION STENT URETERAL (Right)  Anesthesia Type:   General  Operative Findings:  Successful [facility] of a [facility] by [facility] centimeter right ureteral stent, turbid urine is seen to drain, good curl within the kidney and bladder  The patient will need a planned, subsequent ureteroscopic intervention and admission hospital, this will be in a number of weeks     Date: 6/26   Day 2:   Patient complaining of some intermittent low back pain, midline  Has some burning with urination  Urine is noemi, no blood or clots seen  Continue IV ABX, monitor temp, dc on abx for 7-10 days, will require second stage procedure at interval of time after antibiotics, urology follow up outp  Urology signing off      ED Triage Vitals   Temperature Pulse Respirations Blood Pressure SpO2   06/25/22 0756 06/25/22 0756 06/25/22 0756 06/25/22 0756 06/25/22 0756   (!) 102 2 °F (39 °C) 89 20 153/84 90 %      Temp Source Heart Rate Source Patient Position - Orthostatic VS BP Location FiO2 (%)   06/25/22 0954 -- -- -- --   Temporal          Pain Score       06/25/22 1015       No Pain          Wt Readings from Last 1 Encounters:   06/24/22 69 4 kg (153 lb)     Additional Vital Signs:   Date/Time Temp Pulse Resp BP MAP (mmHg) SpO2 O2 Flow Rate (L/min) O2 Device Cardiac (WDL)   06/26/22 11:05:04 98 3 °F (36 8 °C) 66 18 183/103 Abnormal  130 94 % -- -- --   06/26/22 08:42:48 98 3 °F (36 8 °C) 56 17 176/101 Abnormal  126 96 % -- -- --   06/25/22 22:37:22 97 6 °F (36 4 °C) 53 Abnormal  18 151/89 110 92 % -- -- --   06/25/22 2100 -- -- -- -- -- 92 % -- None (Room air) --   06/25/22 20:05:31 -- -- -- 126/74 91 -- -- -- --   06/25/22 15:48:33 97 6 °F (36 4 °C) 57 18 122/74 90 -- -- -- --   06/25/22 1041 -- 77 -- -- -- -- -- None (Room air) --   06/25/22 1015 99 3 °F (37 4 °C) 82 16 112/66 86 98 % -- None (Room air) [facility]   06/25/22 1000 -- 88 26 Abnormal  112/63 76 99 % -- -- --   06/25/22 0954 99 9 °F (37 7 °C) 86 20 117/56 76 99 % 6 L/min Simple mask [facility]   06/25/22 07:56:16 102 2 °F (39 °C) Abnormal  89 20 153/84 107 90 % -- -- --     Pertinent Labs/Diagnostic Test Results:   6/24 CT AP:  Mild right hydroureteronephrosis secondary to 3 mm distal ureteric calculus  Asymmetric right perinephric and peripelvic fat stranding may be reactive  Correlate clinically for infection  Tiny nonobstructing right renal calculus  Colonic diverticulosis without evidence of diverticulitis      FL retrograde pyelogram    (Results Pending)     Results from last 7 days   Lab Units 06/24/22 1942   SARS-COV-2  Negative     Results from last 7 days   Lab Units 06/26/22  0635 06/25/22  1012 06/24/22 1949   WBC Thousand/uL 11 16* 5 47 6 25   HEMOGLOBIN g/dL 12 0 10 0* 12 1   HEMATOCRIT % 36 9 29 4* 35 5   PLATELETS Thousands/uL 183 107* 130*   NEUTROS ABS Thousands/µL  --  4 43 5 23   BANDS PCT % 1  --   --          Results from last 7 days   Lab Units 06/26/22  0635 06/25/22  1834 06/25/22  1025 06/24/22 1949   SODIUM mmol/L 143  --  142 135*   POTASSIUM mmol/L 3 9 3 4* 2 5* 2 7*   CHLORIDE mmol/L 110*  --  110* 97*   CO2 mmol/L 29  --  28 27   ANION GAP mmol/L 4  --  4 11   BUN mg/dL 26*  --  18 29*   CREATININE mg/dL 1 18  --  1 09 1 28   EGFR ml/min/1 73sq m 55  --  61 50   CALCIUM mg/dL 11 5*  --  9 8 10 0   MAGNESIUM mg/dL 2 3  --   --  1 9     Results from last 7 days   Lab Units 06/25/22  1025 06/24/22 1949   AST U/L 11 28   ALT U/L 21 24   ALK PHOS U/L 62 72   TOTAL PROTEIN g/dL 5 9* 6 8   ALBUMIN g/dL 2 0* 2 4*   TOTAL BILIRUBIN mg/dL 0 66 0 78         Results from last 7 days   Lab Units 06/26/22 0635 06/25/22  1025 06/24/22 1949   GLUCOSE RANDOM mg/dL 170* 106 135     Results from last 7 days   Lab Units 06/24/22 1949   PROTIME seconds 13 7   INR  1 09   PTT seconds 32         Results from last 7 days   Lab Units 06/26/22 0635 06/24/22 1949   PROCALCITONIN ng/ml 1 29* 2 74*     Results from last 7 days   Lab Units 06/24/22  2332 06/24/22 2228 06/24/22 1949   LACTIC ACID mmol/L 0 4* 0 4* 2 2*     Results from last 7 days   Lab Units 06/24/22 1949 LIPASE u/L 22*     Results from last 7 days   Lab Units 06/24/22 1942   CLARITY UA  Clear   COLOR UA  Yellow   SPEC GRAV UA  1 020   PH UA  6 0   GLUCOSE UA mg/dl Negative   KETONES UA mg/dl Negative   BLOOD UA  Large*   PROTEIN UA mg/dl 100 (2+)*   NITRITE UA  Negative   BILIRUBIN UA  Negative   UROBILINOGEN UA E U /dl 0 2   LEUKOCYTES UA  Moderate*   WBC UA /hpf 30-50*   RBC UA /hpf 10-20*   BACTERIA UA /hpf Moderate*   EPITHELIAL CELLS WET PREP /hpf Occasional     Results from last 7 days   Lab Units 06/24/22 1942   INFLUENZA A PCR  Negative   INFLUENZA B PCR  Negative   RSV PCR  Negative     Results from last 7 days   Lab Units 06/25/22  1401 06/24/22 2016 06/24/22  1950 06/24/22 1942   BLOOD CULTURE  Received in Microbiology Lab  Culture in Progress  Received in Microbiology Lab  Culture in Progress    --   --   --    GRAM STAIN RESULT   --  Gram negative rods* Gram negative rods*  --    URINE CULTURE   --   --   --  >100,000 cfu/ml Klebsiella-Enterobacter  group*       Past Medical History:   Diagnosis Date    Abnormal Pap smear of cervix     Acute hip pain     Cancer (HCC)     cervix    Hypokalemia     Low back pain     Lymphadenopathy     Pelvic pain      Present on Admission:   Pyelonephritis   Hydronephrosis due to obstruction of ureter   Malignant neoplasm of overlapping sites of cervix (HCC)   Gram-negative bacteremia      Admitting Diagnosis: Ureteral stone with hydronephrosis  Age/Sex: 40 y o  female  Admission Orders:  Scheduled Medications:  cefTRIAXone, 1,000 mg, Intravenous, Q24H  enoxaparin, 40 mg, Subcutaneous, Daily  fish oil, 1,000 mg, Oral, Daily  metoprolol tartrate, 25 mg, Oral, Q12H  multivitamin-minerals, 1 tablet, Oral, Daily  tamsulosin, 0 4 mg, Oral, Daily With Dinner      Continuous IV Infusions:     sodium chloride 0 9 % infusion  Rate: 100 mL/hr Dose: 100 mL/hr  Freq: Continuous Route: IV  Indications of Use: IV Hydration  Last Dose: Stopped (06/25/22 1138)  Start: 06/25/22 0900 End: 06/25/22 1137    PRN Meds:  acetaminophen, 650 mg, Oral, Q6H PRN  oxyCODONE, 2 5 mg, Oral, Q4H PRN  oxyCODONE, 5 mg, Oral, Q4H PRN x5 thus far    scd    Network Utilization Review Department  ATTENTION: Please call with any questions or concerns to [phone] and carefully listen to the prompts so that you are directed to the right person  All voicemails are confidential   Suhail Mark all requests for admission clinical reviews, approved or denied determinations and any other requests to dedicated fax number below belonging to the campus where the patient is receiving treatment   List of dedicated fax numbers for the Facilities:  [address] DENIALS (Administrative/Medical Necessity) [phone]   [address] (Maternity/NICU/Pediatrics) [phone]   [address] [place] [address]  [address] [facility] [employer] [address] [address] [place] [address] [address] [facility] [place] [address] [phone]

## 2022-06-26 NOTE — ASSESSMENT & PLAN NOTE
Hypokalemia in setting of no po intake dt nausea for last several days  · Was repleted aggressively upon arrival to hospital this am much improved   · Pt now able to tolerate diet for first time, has not eaten in last 4 - 6 days dt acute illness   · Repeat labs in am

## 2022-06-26 NOTE — ASSESSMENT & PLAN NOTE
In setting of right ureteral stone, fever with complicated UTI  · Blood cultures obtained at [facility], positive x2  -Klebsiella aerogenes monitor for final result in susceptibility  · Repeat blood cultures obtained at [facility] negative x 24 hrs   · Dc ceftriaxone, will transition to cefepime per recs as susceptibilities still pending   · Monitor vitals - T-max 102 2°  · Hydration with IV fluids, dc dt elevated sbp   · Trend leukocytosis 11 16 down trend today   · Lactic stable

## 2022-06-26 NOTE — ASSESSMENT & PLAN NOTE
Asymmetric Septal Hypertrophy  · Prior TTE demonstrates severe septal asymmetric hypertrophy  Septum 1 8 cm by posterior wall 1 4 cm  · Per Cardiology no evidence of left ventricular hypertrophy no family history of HCM  · Patient reports that she had follow-up visit however was canceled secondary to this acute illness and inability to go to the office  · Patient also noted to have bradycardia overnight recently placed on beta-blocker  · Trialed on Norvasc and HCTZ per prior notation from Cardiology, then transition to beta-blocker  · Beta-blocker held secondary to bradycardia, however blood pressure is elevated - would consider rebound hypertension      · - give one time dose norvasc for now   · - will add hydralazine for sbp   · Place consult for Cardiology input

## 2022-06-26 NOTE — PROGRESS NOTES
[address] [facility]  Progress Note - [address] 1975, 40 y o  female MRN: [id number]  [facility] Encounter: [id number]  Primary Care Provider: Harlene Kanner   Date and time admitted to hospital: 6/25/2022  7:41 AM    * Pyelonephritis  Assessment & Plan  Pt presented from [facility] per request of Urology for right ureteral stone , right hydronephrosis , fever, sepsis dt urinary source and complicated UTI  PT sent directly to the OR on admission to hospitals   · Seen by Medicine post op   · Post op day # 1 for cystoscopy retrograde pyelogram with insertion of right ureteral stent   · Turbid urine noted continued on ceftriaxone day # 2 today based on newly noted bacteremia will change iv abx to cefepime based on bc pending final sensitivity   · Blood cultures [facility] x 2 positive for Klebsiella aerogenes - not yet finalized   · 2nd set obtained on admission to \[facility]\"" - negative 24 hrs   · Plan for outpt fu in a number of weeks for subsequent ureteroscopic intervention and admission to hospital   · Monitor renal function   · Pain control - oxy prn and tylenol   · IVF hydration discontinued dt elevated sbp   · Urine culture - >100,000 cfu/ml Klebsiella aerogenes   · Procalcitonin elevated 1 29 trending down continue iv abx     Atrial septal hypertrophy  Assessment & Plan  Asymmetric Septal Hypertrophy  · Prior TTE demonstrates severe septal asymmetric hypertrophy  Septum 1 8 cm by posterior wall 1 4 cm  · Per Cardiology no evidence of left ventricular hypertrophy no family history of HCM  · Patient reports that she had follow-up visit however was canceled secondary to this acute illness and inability to go to the office    · Patient also noted to have bradycardia overnight recently placed on beta-blocker  · Trialed on Norvasc and HCTZ per prior notation from Cardiology, then transition to beta-blocker  · Beta-blocker held secondary to bradycardia, however blood pressure is elevated - would consider rebound hypertension      · - give one time dose norvasc for now   · - will add hydralazine for sbp   · Place consult for Cardiology input      Gram-negative bacteremia  Assessment & Plan  In setting of right ureteral stone, fever with complicated UTI  · Blood cultures obtained at [facility], positive x2  -Klebsiella aerogenes monitor for final result in susceptibility  · Repeat blood cultures obtained at [facility] negative x 24 hrs   · Dc ceftriaxone, will transition to cefepime per recs as susceptibilities still pending   · Monitor vitals - T-max 102 2°  · Hydration with IV fluids, dc dt elevated sbp   · Trend leukocytosis 11 16 down trend today   · Lactic stable     Sepsis (HCC)  Assessment & Plan  Secondary to ureteral stone and right hydronephrosis, fever of 102 2, tachypnea,  elevated lactic   · Blood cultures obtained at [place]  Gram-negative rods  · - repeat obtained  · Monitor both bc and urine culture in process   · Monitor for leukocytosis   · ivf hydration discontinued   · Monitor vital signs - Bradycardia noted with elevated sbp   · On iv ceftriaxone transitioned today to cefepime per bc results     Hypercalcemia  Assessment & Plan  · Prior hx of hypercalcemia per pt report   · Would not recommend tums at this time   · Pt with some acid reflux will add maalox for now   · Cmp/tsh pth in am/ionized calcium in am     Malignant neoplasm of overlapping sites of cervix [facility])  Assessment & Plan  Pt with hx of malignant neoplasm of overlapping sites of cervix   · Stage 1 B2 cervical cancer   · Sp tx with chemo radiation and now over 2 yrs out   · Pap smear reveals high grade LUIS   · On 3/3/21 pt had colposcopy which was essentially normal with exception of radiation change   · No biopsys were performed at that time  Per Dr Maximiliano Salter  · She also had right labial skin tag removed during this procedure   · Will need ongoing surveillance with gyn onc     Hypokalemia  Assessment & Plan  Hypokalemia in setting of no po intake dt nausea for last several days  · Was repleted aggressively upon arrival to hospital this am much improved   · Pt now able to tolerate diet for first time, has not eaten in last 4 - 6 days dt acute illness   · Repeat labs in am        VTE Pharmacologic Prophylaxis: VTE Score: 5 High Risk (Score >/= 5) - Pharmacological DVT Prophylaxis Ordered: enoxaparin (Lovenox)  Sequential Compression Devices Ordered  Patient Centered Rounds: I performed bedside rounds with nursing staff today  Discussions with Specialists or Other Care Team Provider: nursing     Education and Discussions with Family / Patient: patient   Time Spent for Care: 60 minutes  More than 50% of total time spent on counseling and coordination of care as described above  Current Length of Stay: 1 day(s)  Current Patient Status: Inpatient   Certification Statement: The patient will continue to require additional inpatient hospital stay due to newly diagnosed bacteremia pending urine cultures and final blood culture sensitivities   Discharge Plan: Anticipate discharge in 24-48 hrs to home  Code Status: Level 1 - Full Code    Subjective:   Pt is doing better today no n/v and no further diarrhea no sob  Pt reports that she is not having any abd pain or urinary pain at this time   She has no dizziness or lightheadedness  No chest pain or palpitations  She does feel like she is drinking a lot of water at this time     Objective:     Vitals:   Temp (24hrs), Av °F (36 7 °C), Min:97 6 °F (36 4 °C), Max:98 3 °F (36 8 °C)    Temp:  [97 6 °F (36 4 °C)-98 3 °F (36 8 °C)] 97 9 °F (36 6 °C)  HR:  [53-66] 59  Resp:  [17-20] 20  BP: (126-183)/() 181/102  SpO2:  [92 %-96 %] 96 %  There is no height or weight on file to calculate BMI  Input and Output Summary (last 24 hours):      Intake/Output Summary (Last 24 hours) at 2022 1801  Last data filed at 2022 2237  Gross per 24 hour   Intake 360 ml   Output 400 ml   Net -40 ml       Physical Exam:   Physical Exam  Constitutional:       General: She is not in acute distress  Appearance: She is not ill-appearing, toxic-appearing or diaphoretic  HENT:      Head: Normocephalic and atraumatic  Nose: No congestion  Mouth/Throat:      Pharynx: Oropharynx is clear  Cardiovascular:      Rate and Rhythm: Bradycardia present  Heart sounds: Murmur heard  No friction rub  No gallop  Comments: With normal rate when oob   Pulmonary:      Effort: No respiratory distress  Breath sounds: No stridor  No wheezing, rhonchi or rales  Chest:      Chest wall: No tenderness  Abdominal:      General: There is no distension  Palpations: Abdomen is soft  There is no mass  Tenderness: There is no abdominal tenderness  There is no guarding or rebound  Hernia: No hernia is present  Musculoskeletal:         General: No swelling, tenderness, deformity or signs of injury  Right lower leg: No edema  Left lower leg: No edema  Skin:     Coloration: Skin is not jaundiced or pale  Findings: No bruising, erythema, lesion or rash  Neurological:      Mental Status: She is alert and oriented to person, place, and time     Psychiatric:         Behavior: Behavior normal           Additional Data:     Labs:  Results from last 7 days   Lab Units 06/26/22  0635 06/25/22  1012   WBC Thousand/uL 11 16* 5 47   HEMOGLOBIN g/dL 12 0 10 0*   HEMATOCRIT % 36 9 29 4*   PLATELETS Thousands/uL 183 107*   BANDS PCT % 1  --    NEUTROS PCT %  --  81*   LYMPHS PCT %  --  8*   LYMPHO PCT % 5*  --    MONOS PCT %  --  9   MONO PCT % 1*  --    EOS PCT % 0 1     Results from last 7 days   Lab Units 06/26/22  0635 06/25/22  1834 06/25/22  1025   SODIUM mmol/L 143  --  142   POTASSIUM mmol/L 3 9   < > 2 5*   CHLORIDE mmol/L 110*  --  110*   CO2 mmol/L 29  --  28   BUN mg/dL 26*  --  18   CREATININE mg/dL 1 18  --  1 09   ANION GAP mmol/L 4  --  4 CALCIUM mg/dL 11 5*  --  9 8   ALBUMIN g/dL  --   --  2 0*   TOTAL BILIRUBIN mg/dL  --   --  0 66   ALK PHOS U/L  --   --  62   ALT U/L  --   --  21   AST U/L  --   --  11   GLUCOSE RANDOM mg/dL 170*  --  106    < > = values in this interval not displayed  Results from last 7 days   Lab Units 06/24/22  1949   INR  1 09             Results from last 7 days   Lab Units 06/26/22  0635 06/24/22  2332 06/24/22  2228 06/24/22 1949   LACTIC ACID mmol/L  --  0 4* 0 4* 2 2*   PROCALCITONIN ng/ml 1 29*  --   --  2 74*       Lines/Drains:  Invasive Devices  Report    Drain  Duration           Urethral Catheter Non-latex 16 Fr  1102 days              Urinary Catheter:  Goal for removal: paz is not in place not removed from rn charting doing that now            Telemetry:  Telemetry Orders (From admission, onward)             24 Hour Telemetry Monitoring  Continuous x 24 Hours (Telem)        References:    Telemetry Guidelines   Question:  Reason for 24 Hour Telemetry  Answer:  Metabolic/Electrolyte Disturbance with High Probability of Dysrhythmia (K level <3 or >6, or KCL infusion >=10mEq/hr)                 Telemetry Reviewed: Sinus Bradycardia to normal sinus   Indication for Continued Telemetry Use: Arrthymias requiring medical therapy             Imaging: Reviewed radiology reports from this admission including: abdominal/pelvic CT    Recent Cultures (last 7 days):   Results from last 7 days   Lab Units 06/25/22  1401 06/24/22  2016 06/24/22  1950 06/24/22 1942   BLOOD CULTURE  No Growth at 24 hrs    No Growth at 24 hrs   --   --   --    GRAM STAIN RESULT   --  Gram negative rods* Gram negative rods*  --    URINE CULTURE   --   --   --  >100,000 cfu/ml Klebsiella aerogenes*       Last 24 Hours Medication List:   Current Facility-Administered Medications   Medication Dose Route Frequency Provider Last Rate    acetaminophen  650 mg Oral Q6H PRN Landon Rivero MD      aluminum-magnesium hydroxide-simethicone 30 mL Oral Q4H PRN DEVEN Brooks      [START ON 6/27/2022] amLODIPine  5 mg Oral Daily DEVEN Brooks      cefepime  2,000 mg Intravenous Q12H DEVEN Brooks      enoxaparin  40 mg Subcutaneous Daily Anna Marie Vargas MD      fish oil  1,000 mg Oral Daily Rosalino Moya MD      hydrALAZINE  10 mg Intravenous Q6H PRN DEVEN Brooks      multivitamin-minerals  1 tablet Oral Daily Rosalino Moya MD      oxyCODONE  2 5 mg Oral Q4H PRN Rosalino Moya MD      oxyCODONE  5 mg Oral Q4H PRN Rosalino Moya MD      [START ON 6/27/2022] polyethylene glycol  17 g Oral Daily DEVEN Brooks      tamsulosin  0 4 mg Oral Daily With Nehemias Graff MD          Today, Patient Was Seen By: DEVEN Brooks    **Please Note: This note may have been constructed using a voice recognition system  **

## 2022-06-26 NOTE — ASSESSMENT & PLAN NOTE
Pt presented from [facility] per request of Urology for right ureteral stone , right hydronephrosis , fever, sepsis dt urinary source and complicated UTI  PT sent directly to the OR on admission to [facility]   · Seen by Medicine post op   · Post op day # 1 for cystoscopy retrograde pyelogram with insertion of right ureteral stent   · Turbid urine noted continued on ceftriaxone day # 2 today based on newly noted bacteremia will change iv abx to cefepime based on bc pending final sensitivity   · Blood cultures MO x 2 positive for Klebsiella aerogenes - not yet finalized   · 2nd set obtained on admission to [facility] - negative 24 hrs   · Plan for outpt fu in a number of weeks for subsequent ureteroscopic intervention and admission to hospital   · Monitor renal function   · Pain control - oxy prn and tylenol   · IVF hydration discontinued dt elevated sbp   · Urine culture - >100,000 cfu/ml Klebsiella aerogenes   · Procalcitonin elevated 1 29 trending down continue iv abx

## 2022-06-26 NOTE — ASSESSMENT & PLAN NOTE
Secondary to ureteral stone and right hydronephrosis, fever of 102 2, tachypnea,  elevated lactic   · Blood cultures obtained at [facility]  Gram-negative rods  · - repeat obtained  · Monitor both bc and urine culture in process   · Monitor for leukocytosis   · ivf hydration discontinued   · Monitor vital signs - Bradycardia noted with elevated sbp   · On iv ceftriaxone transitioned today to cefepime per bc results

## 2022-06-26 NOTE — ASSESSMENT & PLAN NOTE
· Prior hx of hypercalcemia per pt report   · Would not recommend tums at this time   · Pt with some acid reflux will add maalox for now   · Cmp/tsh pth in am/ionized calcium in am

## 2022-06-26 NOTE — PROGRESS NOTES
Progress Note - Urology  Della Hillman 1975, 40 y o  female MRN: [id number]    Unit/Bed#: [facility] 919-01 Encounter: [id number]    Right pyelonephritis   · Continue antibiotics  · Monitor fever curve, has been afebrile for last 24 hours   · Recommend  7-10 days of antibiotics on discharge       Left ureteral stone 3 mm   · POD # 1 cystoscopy and left ureteral stent placement   · Will require second stage procedure at interval of time after antibiotics  Our office will reach out to patient for scheduling   · Discharge stone meds sent to pharmacy   · Will sign off     Subjective:   HPI: Patient complaining of some intermittent low back pain, midline  Has some burning with urination  Urine is noemi, no blood or clots seen  Objective:    Vitals: Blood pressure (!) 183/103, pulse 66, temperature 98 3 °F (36 8 °C), resp  rate 18, last menstrual period 04/15/2019, SpO2 94 %, not currently breastfeeding  ,There is no height or weight on file to calculate BMI  Physical Exam  Vitals reviewed  Constitutional:       Appearance: Normal appearance  She is obese  HENT:      Head: Normocephalic and atraumatic  Cardiovascular:      Rate and Rhythm: Normal rate  Pulmonary:      Effort: Pulmonary effort is normal  No respiratory distress  Abdominal:      General: Bowel sounds are normal  There is no distension  Tenderness: There is no abdominal tenderness  There is right CVA tenderness  Genitourinary:     Comments: Urine noemi  Musculoskeletal:         General: Normal range of motion  Cervical back: Normal range of motion  Skin:     General: Skin is warm and dry  Neurological:      Mental Status: She is alert and oriented to person, place, and time  Psychiatric:         Mood and Affect: Mood normal          Thought Content:  Thought content normal          Judgment: Judgment normal              Labs:  Recent Labs     06/24/22  1949 06/25/22  1012 06/26/22  0635   WBC 6 25 5 47 11 16* Recent Labs     22  1012 22  0635   HGB 12 1 10 0* 12 0     Recent Labs     22  1012 22  0635   HCT 35 5 29 4* 36 9     Recent Labs     22  1025 22  0635   CREATININE 1 28 1 09 1 18       Urine Culture: Growth: klebsiella-enterobacter  and Sensitivities Pending    History:    Past Medical History:   Diagnosis Date    Abnormal Pap smear of cervix     Acute hip pain     Cancer (HCC)     cervix    Hypokalemia     Low back pain     Lymphadenopathy     Pelvic pain      Social History     Socioeconomic History    Marital status: Significant Other     Spouse name: None    Number of children: None    Years of education: None    Highest education level: None   Occupational History    None   Tobacco Use    Smoking status: Former Smoker     Packs/day: 0 50     Years: 1 00     Pack years: 0 50     Types: Cigarettes     Quit date: 2020     Years since quittin 3    Smokeless tobacco: Never Used   Vaping Use    Vaping Use: Never used   Substance and Sexual Activity    Alcohol use: Not Currently    Drug use: Never    Sexual activity: Yes   Other Topics Concern    None   Social History Narrative    None     Social Determinants of Health     Financial Resource Strain: Not on file   Food Insecurity: Not on file   Transportation Needs: Not on file   Physical Activity: Not on file   Stress: Not on file   Social Connections: Not on file   Intimate Partner Violence: Not on file   Housing Stability: Not on file     Past Surgical History:   Procedure Laterality Date    SD BRONCHOSCOPY NEEDLE BX TRACHEA MAIN STEM&/BRON N/A 2020    Procedure: ENDOBRONCHIAL ULTRASOUND (EBUS);   Surgeon: Peggy Carrasco MD;  [facility];  Service: Thoracic    SD [address] N/A 2020    Procedure: Madyson Filler;  Surgeon: Peggy Carrasco MD;  [facility];  Service: Thoracic    SD DILATION OF VAGINA W ANESTH N/A 6/20/2019    Procedure: EXAM UNDER ANESTHESIA (EUA);   Surgeon: Rosario Montgomery MD;  [facility];  Service: Gynecology Oncology    ND INSERT VAGINAL RADIATION DEVICE N/A 6/20/2019    Procedure: PIERRE SLEEVE PLACEMENT;  Surgeon: Rosario Montgomery MD;  [facility];  Service: Gynecology Oncology    TONSILLECTOMY      US GUIDANCE  6/20/2019     Family History   Problem Relation Age of Onset    Uterine cancer Maternal Grandmother     Heart disease Mother     Canavan disease Father     Cancer Father        DEVEN Gardner  Date: 6/26/2022 Time: 11:37 AM

## 2022-06-27 ENCOUNTER — APPOINTMENT (INPATIENT)
Dept: NON INVASIVE DIAGNOSTICS | Facility: HOSPITAL | Age: 47
DRG: 720 | End: 2022-06-27
Payer: COMMERCIAL

## 2022-06-27 PROBLEM — R79.89 LOW TSH LEVEL: Status: ACTIVE | Noted: 2022-06-27

## 2022-06-27 LAB
ALBUMIN SERPL BCP-MCNC: 2.1 G/DL (ref 3.5–5)
ALP SERPL-CCNC: 78 U/L (ref 46–116)
ALT SERPL W P-5'-P-CCNC: 28 U/L (ref 12–78)
ANION GAP SERPL CALCULATED.3IONS-SCNC: 8 MMOL/L (ref 4–13)
AORTIC ROOT: 2.9 CM
APICAL FOUR CHAMBER EJECTION FRACTION: 67 %
AST SERPL W P-5'-P-CCNC: 17 U/L (ref 5–45)
BACTERIA BLD CULT: ABNORMAL
BACTERIA BLD CULT: ABNORMAL
BILIRUB SERPL-MCNC: 0.57 MG/DL (ref 0.2–1)
BUN SERPL-MCNC: 26 MG/DL (ref 5–25)
CA-I BLD-SCNC: 1.39 MMOL/L (ref 1.12–1.32)
CALCIUM ALBUM COR SERPL-MCNC: 11.9 MG/DL (ref 8.3–10.1)
CALCIUM SERPL-MCNC: 10.4 MG/DL (ref 8.3–10.1)
CHLORIDE SERPL-SCNC: 110 MMOL/L (ref 100–108)
CO2 SERPL-SCNC: 26 MMOL/L (ref 21–32)
CREAT SERPL-MCNC: 0.88 MG/DL (ref 0.6–1.3)
E WAVE DECELERATION TIME: 156 MS
ERYTHROCYTE [DISTWIDTH] IN BLOOD BY AUTOMATED COUNT: 14.6 % (ref 11.6–15.1)
FRACTIONAL SHORTENING: 33 (ref 28–44)
GFR SERPL CREATININE-BSD FRML MDRD: 79 ML/MIN/1.73SQ M
GLOBAL LONGITUIDAL STRAIN: -18 %
GLUCOSE SERPL-MCNC: 92 MG/DL (ref 65–140)
GRAM STN SPEC: ABNORMAL
GRAM STN SPEC: ABNORMAL
HCT VFR BLD AUTO: 33.2 % (ref 34.8–46.1)
HGB BLD-MCNC: 11 G/DL (ref 11.5–15.4)
INTERVENTRICULAR SEPTUM IN DIASTOLE (PARASTERNAL SHORT AXIS VIEW): 2.4 CM
INTERVENTRICULAR SEPTUM: 2.4 CM (ref 0.6–1.1)
KLEBSIELLA SP DNA BLD POS QL NAA+NON-PRB: DETECTED
LAAS-AP2: 24.2 CM2
LAAS-AP4: 17.5 CM2
LEFT ATRIUM AREA SYSTOLE SINGLE PLANE A4C: 16.5 CM2
LEFT ATRIUM SIZE: 3.9 CM
LEFT INTERNAL DIMENSION IN SYSTOLE: 2.8 CM (ref 2.1–4)
LEFT VENTRICULAR INTERNAL DIMENSION IN DIASTOLE: 4.2 CM (ref 3.5–6)
LEFT VENTRICULAR POSTERIOR WALL IN END DIASTOLE: 1.3 CM
LEFT VENTRICULAR STROKE VOLUME: 48 ML
LVSV (TEICH): 48 ML
MCH RBC QN AUTO: 27.6 PG (ref 26.8–34.3)
MCHC RBC AUTO-ENTMCNC: 33.1 G/DL (ref 31.4–37.4)
MCV RBC AUTO: 83 FL (ref 82–98)
MV E'TISSUE VEL-LAT: 9 CM/S
MV E'TISSUE VEL-SEP: 5 CM/S
MV PEAK A VEL: 0.76 M/S
MV PEAK E VEL: 61 CM/S
MV STENOSIS PRESSURE HALF TIME: 45 MS
MV VALVE AREA P 1/2 METHOD: 4.89
PLATELET # BLD AUTO: 215 THOUSANDS/UL (ref 149–390)
PMV BLD AUTO: 11.3 FL (ref 8.9–12.7)
POTASSIUM SERPL-SCNC: 3.1 MMOL/L (ref 3.5–5.3)
PROT SERPL-MCNC: 6.2 G/DL (ref 6.4–8.2)
PTH-INTACT SERPL-MCNC: 62.5 PG/ML (ref 18.4–80.1)
RA PRESSURE ESTIMATED: 3 MMHG
RBC # BLD AUTO: 3.98 MILLION/UL (ref 3.81–5.12)
RIGHT ATRIUM AREA SYSTOLE A4C: 16.9 CM2
RIGHT VENTRICLE ID DIMENSION: 3.9 CM
RV PSP: 18 MMHG
SL CV ECHO LV DYNAMIC OBSTRUCTION PEAK GRADIENT (VALSAL: 12 MMHG
SL CV LEFT ATRIUM LENGTH A2C: 5.3 CM
SL CV LV EF: 70
SL CV PED ECHO LEFT VENTRICLE DIASTOLIC VOLUME (MOD BIPLANE) 2D: 78 ML
SL CV PED ECHO LEFT VENTRICLE SYSTOLIC VOLUME (MOD BIPLANE) 2D: 30 ML
SODIUM SERPL-SCNC: 144 MMOL/L (ref 136–145)
T4 FREE SERPL-MCNC: 1.92 NG/DL (ref 0.76–1.46)
TR MAX PG: 15 MMHG
TR PEAK VELOCITY: 1.9 M/S
TRICUSPID VALVE PEAK REGURGITATION VELOCITY: 1.92 M/S
TSH SERPL DL<=0.05 MIU/L-ACNC: 0.02 UIU/ML (ref 0.45–4.5)
WBC # BLD AUTO: 10.04 THOUSAND/UL (ref 4.31–10.16)

## 2022-06-27 PROCEDURE — 84439 ASSAY OF FREE THYROXINE: CPT | Performed by: NURSE PRACTITIONER

## 2022-06-27 PROCEDURE — 83970 ASSAY OF PARATHORMONE: CPT | Performed by: NURSE PRACTITIONER

## 2022-06-27 PROCEDURE — 80053 COMPREHEN METABOLIC PANEL: CPT | Performed by: NURSE PRACTITIONER

## 2022-06-27 PROCEDURE — 93306 TTE W/DOPPLER COMPLETE: CPT | Performed by: INTERNAL MEDICINE

## 2022-06-27 PROCEDURE — 85027 COMPLETE CBC AUTOMATED: CPT | Performed by: NURSE PRACTITIONER

## 2022-06-27 PROCEDURE — 82330 ASSAY OF CALCIUM: CPT | Performed by: NURSE PRACTITIONER

## 2022-06-27 PROCEDURE — 99255 IP/OBS CONSLTJ NEW/EST HI 80: CPT | Performed by: INTERNAL MEDICINE

## 2022-06-27 PROCEDURE — 99232 SBSQ HOSP IP/OBS MODERATE 35: CPT | Performed by: NURSE PRACTITIONER

## 2022-06-27 PROCEDURE — 93306 TTE W/DOPPLER COMPLETE: CPT

## 2022-06-27 PROCEDURE — 84443 ASSAY THYROID STIM HORMONE: CPT | Performed by: NURSE PRACTITIONER

## 2022-06-27 RX ORDER — PANTOPRAZOLE SODIUM 40 MG/1
40 TABLET, DELAYED RELEASE ORAL
Status: DISCONTINUED | OUTPATIENT
Start: 2022-06-27 | End: 2022-06-29 | Stop reason: HOSPADM

## 2022-06-27 RX ORDER — POTASSIUM CHLORIDE 20 MEQ/1
40 TABLET, EXTENDED RELEASE ORAL ONCE
Status: COMPLETED | OUTPATIENT
Start: 2022-06-27 | End: 2022-06-27

## 2022-06-27 RX ORDER — POTASSIUM CHLORIDE 14.9 MG/ML
20 INJECTION INTRAVENOUS ONCE
Status: COMPLETED | OUTPATIENT
Start: 2022-06-27 | End: 2022-06-27

## 2022-06-27 RX ADMIN — OXYCODONE HYDROCHLORIDE 5 MG: 5 TABLET ORAL at 05:29

## 2022-06-27 RX ADMIN — PANTOPRAZOLE SODIUM 40 MG: 40 TABLET, DELAYED RELEASE ORAL at 16:27

## 2022-06-27 RX ADMIN — ALUMINA, MAGNESIA, AND SIMETHICONE ORAL SUSPENSION REGULAR STRENGTH 30 ML: 1200; 1200; 120 SUSPENSION ORAL at 08:59

## 2022-06-27 RX ADMIN — OXYCODONE HYDROCHLORIDE 5 MG: 5 TABLET ORAL at 20:53

## 2022-06-27 RX ADMIN — POTASSIUM CHLORIDE 40 MEQ: 1500 TABLET, EXTENDED RELEASE ORAL at 08:59

## 2022-06-27 RX ADMIN — OXYCODONE HYDROCHLORIDE 2.5 MG: 5 TABLET ORAL at 16:26

## 2022-06-27 RX ADMIN — HYDRALAZINE HYDROCHLORIDE 10 MG: 20 INJECTION INTRAMUSCULAR; INTRAVENOUS at 05:30

## 2022-06-27 RX ADMIN — ENOXAPARIN SODIUM 40 MG: 40 INJECTION SUBCUTANEOUS at 09:00

## 2022-06-27 RX ADMIN — POTASSIUM CHLORIDE 20 MEQ: 14.9 INJECTION, SOLUTION INTRAVENOUS at 09:00

## 2022-06-27 RX ADMIN — TAMSULOSIN HYDROCHLORIDE 0.4 MG: 0.4 CAPSULE ORAL at 16:27

## 2022-06-27 RX ADMIN — CEFEPIME HYDROCHLORIDE 2000 MG: 2 INJECTION, POWDER, FOR SOLUTION INTRAVENOUS at 22:02

## 2022-06-27 RX ADMIN — CEFEPIME HYDROCHLORIDE 2000 MG: 2 INJECTION, POWDER, FOR SOLUTION INTRAVENOUS at 05:30

## 2022-06-27 RX ADMIN — Medication 12.5 MG: at 08:59

## 2022-06-27 RX ADMIN — Medication 12.5 MG: at 20:53

## 2022-06-27 RX ADMIN — ALUMINA, MAGNESIA, AND SIMETHICONE ORAL SUSPENSION REGULAR STRENGTH 30 ML: 1200; 1200; 120 SUSPENSION ORAL at 16:27

## 2022-06-27 RX ADMIN — OMEGA-3 FATTY ACIDS CAP 1000 MG 1000 MG: 1000 CAP at 09:00

## 2022-06-27 RX ADMIN — MULTIPLE VITAMINS W/ MINERALS TAB 1 TABLET: TAB ORAL at 09:00

## 2022-06-27 RX ADMIN — AMLODIPINE BESYLATE 5 MG: 5 TABLET ORAL at 09:00

## 2022-06-27 NOTE — CASE MANAGEMENT
Case Management Assessment & Discharge Planning Note    Patient name Nery [facility]  [facility] MRN [id number]  : 1975 Date 2022       Current Admission Date: 2022  Current Admission Diagnosis:Pyelonephritis   Patient Active Problem List    Diagnosis Date Noted    Atrial septal hypertrophy 2022    Hypercalcemia 2022    Pyelonephritis 2022    Hydronephrosis due to obstruction of ureter 2022    Sepsis ([facility] [address] ) 2022    Gram-negative bacteremia 2022    Mediastinal lymphadenopathy 2020    Acute midline low back pain without sciatica 2020    Menopausal symptoms 2019    Cancer associated pain 2019    Insomnia due to medical condition 2019    Loss of appetite 2019    Malignant neoplasm of overlapping sites of cervix ([facility] [address] ) 2019    Hypokalemia 2019      LOS (days): 2  Geometric Mean LOS (GMLOS) (days):   Days to GMLOS:     OBJECTIVE:    Risk of Unplanned Readmission Score: 15 98    Current admission status: Inpatient       Preferred Pharmacy:   [address] [place] [address] [id number] R R 1 ([phone] [id number])  [address] [place] [address]  Phone: [phone] Fax: [phone] [address] 94 [facility] [id number] [place] [address] 77 [id number]  Phone: [phone] Fax: [phone]    Primary Care Provider: Sagrario Epps    Primary Insurance: Zain Trujillo [facility]  Secondary Insurance:     ASSESSMENT:  Donna 26 Proxies    There are no active Health Care Proxies on file                   Readmission Root Cause  30 Day Readmission: No    Patient Information  Admitted from[de-identified] Home  Mental Status: Alert  During Assessment patient was accompanied by: Not accompanied during assessment  Assessment information provided by[de-identified] Patient  Primary Caregiver: Self  Support Systems: Family members, [address] of Residence: [address] you live in?: [address] [facility] [address]  entry access options   Select all that apply : Stairs  Number of steps to enter home : 3  Type of Current Residence: Apartment  Floor Level: 1  Upon entering residence, is there a bedroom on the main floor (no further steps)?: Yes  Upon entering residence, is there a bathroom on the main floor (no further steps)?: Yes  In the last 12 months, was there a time when you were not able to pay the mortgage or rent on time?: No  In the last 12 months, how many places have you lived?: 2  In the last 12 months, was there a time when you did not have a steady place to sleep or slept in a shelter (including now)?: No  Homeless/housing insecurity resource given?: No  Living Arrangements: Lives Alone    Activities of Daily Living Prior to Admission  Functional Status: Independent  Completes ADLs independently?: Yes  Ambulates independently?: Yes  Does patient use assisted devices?: No  Does patient currently own DME?: No  Does patient have a history of Outpatient Therapy (PT/OT)?: Yes  Does the patient have a history of Short-Term Rehab?: No  Does patient have a history of HHC?: No  Does patient currently have [address]?: No     Patient Information Continued  Income Source: Employed  Does patient have prescription coverage?: Yes  Within the past 12 months, you worried that your food would run out before you got the money to buy more : Never true  Within the past 12 months, the food you bought just didn't last and you didn't have money to get more : Never true  Food insecurity resource given?: No  Does patient receive dialysis treatments?: No  Does patient have a history of substance abuse?: No  Does patient have a history of Mental Health Diagnosis?: No    Means of Transportation  Means of Transport to Appts[de-identified] Drives Self  In the past 12 months, has lack of transportation kept you from medical appointments or from getting medications?: No  In the past 12 months, has lack of transportation kept you from meetings, work, or from getting things needed for daily living?: No  Was application for public transport provided?: No    DISCHARGE DETAILS:    Discharge planning discussed with[de-identified] Patient  Freedom of Choice: Yes     CM contacted family/caregiver?: No- see comments (Pt alert and oriented)     [address]         Is the patient interested in [place] [address] at discharge?: No    DME Referral Provided  Referral made for DME?: No    Other Referral/Resources/Interventions Provided:  Interventions: None Indicated    Treatment Team Recommendation: Home  Discharge Destination Plan[de-identified] Home  Transport at Discharge : Family      Additional Comments: CM met with pt at bedside to complete Open  Pt was able to complete Open without difficulty and identified no needs  Pt states that she drove herself to [facility] and was then transferred to [facility], pt's car is still at OSH  At d/c pt states that she will attempt to have a family member drive her home and assist in retrieving her car  Pt states that if family is not able to help get her home she may need assistance with transport

## 2022-06-27 NOTE — CONSULTS
Reason for Consult / Principal Problem:HCM    Physician Requesting Consult:  Olimpia Vasquez MD    Cardiologist: DEVEN Martinez             Assessment and Plan      Current Problem List   Principal Problem:    Pyelonephritis  Active Problems:    Hypokalemia    Malignant neoplasm of overlapping sites of cervix [facility])    Hydronephrosis due to obstruction of ureter    Sepsis ([facility] [address] )    Gram-negative bacteremia    Atrial septal hypertrophy    Hypercalcemia    Assessment/Plan:    1  Hypertrophic Cardiomyopathy:  · HCM diagnosed on cardiac MRI  Normal EF and LV Systolic function (16%)  · History does not disclose any discrete symptoms of HCM besides some subjective fatigue  · Restart home metoprolol at 12 5mg BID, pt complained of fatigue on 25mg BID so we will trial 12 5  · Initial echo had poor measurements  Repeat echo inpatient and measure LVOT gradients  · Monitor telemetry and blood pressure    2  Sepsis  · Continue IV cefepime and monitor WBC and temps    3  Hypokalemia  · Continue oral and IV potassium until K+above 4 0  Subjective     CC: Back pain    HPI: Della Hillman 40y o  year old female who presents with pyelonephritis, atrial septal hypertrophy, sepsis, hypercalcemia, cervical cancer in remission and hypokalemia  She presented febrile to the ED after several hours of N/V/D and low back pain  ED CT shows R-sided uterolithiasis and started on ceftriaxone  Patient was taken to OR at [facility] by urology for pyelogram and stent placement  She is being treated for urosepsis with cefepime  Patient denies syncope, SOB, palpitation, or chest pain, denies family history of same  No family history of sudden death  Primary cardiology is [facility]  She has had an outpatient echo and cardiac MRI done  Cardiac MRI showed HCM  She has a history of significant exertional physical activity including being a  and doing mud runs on the weekend   She says that she sometimes felt fatigued when participating in physical activity but attributed this to "sitting around during covid" rather than a medical problem  Other than this mild fatigue with significant physical exertion, she is asymptomatic from the HCM  Telemetry: NSR with episodes of sinus herbert into the low 40's  Weight: 69 4 Kg    EK22: NSR  Biatrial enlargement  RSR' or QR pattern in V1 suggests right ventricular conduction delay  Left ventricular hypertrophy with repolarization abnormality    ECHO: 22  Severe Septal Asymmetric hypertrophy  EF 60-65%, septum 1 8cm  Posterior wall 1 4cm, Mild aortic regurgitation    Stress test: No history of ischemic workup     Cardiac Catheterization:  No history of ischemic workup     CT scan: Mild right hydroureteronephrosis secondary to 3 mm distal ureteric calculus  Asymmetric right perinephric and peripelvic fat stranding may be reactive  Correlate clinically for infection  Labs: Klebsiella Bacteremia, K+ 3 1 from 3 9 (2 7 on admit), WBC 10 from 11, HGB 11 from 12, creat   88 from 1 18        Family History: non-contributory  Historical Information   Past Medical History:   Diagnosis Date    Abnormal Pap smear of cervix     Acute hip pain     Cancer (HCC)     cervix    Hypokalemia     Low back pain     Lymphadenopathy     Pelvic pain      Past Surgical History:   Procedure Laterality Date    FL RETROGRADE PYELOGRAM  2022    MD BRONCHOSCOPY NEEDLE BX TRACHEA MAIN STEM&/BRON N/A 2020    Procedure: ENDOBRONCHIAL ULTRASOUND (EBUS); Surgeon: Jannette Echeverria MD;  [facility];  Service: Thoracic    MD [address] N/A 2020    Procedure: Chris Spaniel;  Surgeon: Jannette Echeverria MD;  [facility];  Service: Thoracic    MD DILATION OF VAGINA W ANESTH N/A 2019    Procedure: EXAM UNDER ANESTHESIA (EUA);   Surgeon: Allison Davila MD;  [facility];  Service: Gynecology Oncology    MD INSERT VAGINAL RADIATION DEVICE N/A 2019    Procedure: PIERRE SLEEVE PLACEMENT;  Surgeon: Elysia Quezada MD;  [facility];  Service: Gynecology Oncology    TONSILLECTOMY      US GUIDANCE  2019     Social History   Social History     Substance and Sexual Activity   Alcohol Use Not Currently     Social History     Substance and Sexual Activity   Drug Use Never     Social History     Tobacco Use   Smoking Status Former Smoker    Packs/day: 0 50    Years: 1 00    Pack years: 0 50    Types: Cigarettes    Quit date: 2020    Years since quittin 3   Smokeless Tobacco Never Used     Family History:   Family History   Problem Relation Age of Onset    Uterine cancer Maternal Grandmother     Heart disease Mother     Canavan disease Father     Cancer Father        Review of Systems:  Review of Systems   Constitutional: Positive for fatigue  Negative for activity change, chills, diaphoresis and fever  Respiratory: Negative for apnea, cough, chest tightness, shortness of breath, wheezing and stridor  Cardiovascular: Negative for chest pain, palpitations and leg swelling  Skin: Negative for color change and wound  Neurological: Negative for dizziness, syncope, weakness, light-headedness and headaches             Scheduled Meds:  Current Facility-Administered Medications   Medication Dose Route Frequency Provider Last Rate    acetaminophen  650 mg Oral Q6H PRN Donald Martínez MD      aluminum-magnesium hydroxide-simethicone  30 mL Oral Q4H PRN Co[place] DEVEN Caballero      amLODIPine  5 mg Oral Daily Coit DEVEN Caballero      cefepime  2,000 mg Intravenous Q12H Coit DEVEN Caballero 2,000 mg (22 0530)    enoxaparin  40 mg Subcutaneous Daily Anna Marie Vargas MD      fish oil  1,000 mg Oral Daily Donald Martínez MD      hydrALAZINE  10 mg Intravenous Q6H PRN DEVEN Orourke      multivitamin-minerals  1 tablet Oral Daily Donald Martínez MD      oxyCODONE  2 5 mg Oral Q4H PRN Cameron Jensen MD      oxyCODONE  5 mg Oral Q4H PRN Cameron Jensen MD      polyethylene glycol  17 g Oral Daily Tyler Soulier, CRNP      potassium chloride  40 mEq Oral Once [employer], PA-C      potassium chloride  20 mEq Intravenous Once [employer], PA-C      tamsulosin  0 4 mg Oral Daily With Maribell Coffman MD       Continuous Infusions:   PRN Meds:   acetaminophen    aluminum-magnesium hydroxide-simethicone    hydrALAZINE    oxyCODONE    oxyCODONE  all current active meds have been reviewed    Allergies   Allergen Reactions    Hydrochlorothiazide Arthralgia    Latex Hives    Other      Tomatoes   Vomiting and diarhhea       Objective   Vitals: Temp (24hrs), Av 5 °F (36 9 °C), Min:97 9 °F (36 6 °C), Max:98 9 °F (37 2 °C)  Current: Temperature: 98 9 °F (37 2 °C)  Patient Vitals for the past 24 hrs:   BP Temp Temp src Pulse Resp SpO2 Height Weight   22 0810 126/74 98 9 °F (37 2 °C) -- 89 -- 94 % -- --   22 0527 (!) 170/105 -- -- 81 -- 96 % -- --   22 0502 (!) 177/100 98 8 °F (37 1 °C) Oral 79 20 -- 5' 1" (1 549 m) 69 4 kg (153 lb)   22 (!) 177/100 98 8 °F (37 1 °C) -- 79 20 95 % -- --   22 (!) 177/100 -- -- -- -- -- -- --   22 (!) 178/101 -- -- 77 -- 95 % -- --   22 1832 -- 98 5 °F (36 9 °C) -- 67 -- 96 % -- --   22 1759 (!) 181/102 -- -- 59 -- 96 % -- --   22 1454 (!) 178/104 97 9 °F (36 6 °C) -- 61 20 94 % -- --   22 1105 (!) 183/103 98 3 °F (36 8 °C) Oral 66 18 94 % -- --   22 0842 (!) 176/101 98 3 °F (36 8 °C) -- 56 17 96 % -- --    Body mass index is 28 91 kg/m²    Orthostatic Blood Pressures    Flowsheet Row Most Recent Value   Blood Pressure 126/74 filed at 2022 0810              Invasive Devices  Report    Peripheral Intravenous Line  Duration           Peripheral IV 22 Right Antecubital <1 day                Physical Exam:  Physical Exam  Constitutional: General: She is not in acute distress  Appearance: Normal appearance  She is normal weight  She is not ill-appearing, toxic-appearing or diaphoretic  Eyes:      General: No scleral icterus  Cardiovascular:      Rate and Rhythm: Normal rate and regular rhythm  Pulses: Normal pulses  Pulmonary:      Effort: Pulmonary effort is normal       Breath sounds: Normal breath sounds  Skin:     General: Skin is warm and dry  Capillary Refill: Capillary refill takes less than 2 seconds  Coloration: Skin is not jaundiced  Neurological:      Mental Status: She is alert               Lab Results:   Results from last 7 days   Lab Units 06/27/22 0448 06/26/22  0635 06/25/22  1012 06/24/22 1949   WBC Thousand/uL 10 04 11 16* 5 47 6 25   HEMOGLOBIN g/dL 11 0* 12 0 10 0* 12 1   HEMATOCRIT % 33 2* 36 9 29 4* 35 5   PLATELETS Thousands/uL 215 183 107* 130*   NEUTROS PCT %  --   --  81* 83*   MONOS PCT %  --   --  9 9   MONO PCT %  --  1*  --   --       Results from last 7 days   Lab Units 06/27/22 0448 06/26/22  0635 06/25/22  1834 06/25/22  1025 06/24/22 1949   SODIUM mmol/L 144 143  --  142 135*   POTASSIUM mmol/L 3 1* 3 9 3 4* 2 5* 2 7*   CHLORIDE mmol/L 110* 110*  --  110* 97*   CO2 mmol/L 26 29  --  28 27   BUN mg/dL 26* 26*  --  18 29*   CREATININE mg/dL 0 88 1 18  --  1 09 1 28   CALCIUM mg/dL 10 4* 11 5*  --  9 8 10 0   ALK PHOS U/L 78  --   --  62 72   ALT U/L 28  --   --  21 24   AST U/L 17  --   --  11 28   MAGNESIUM mg/dL  --  2 3  --   --  1 9   INR   --   --   --   --  1 09   PTT seconds  --   --   --   --  32   EGFR ml/min/1 73sq m 79 55  --  61 50     Results from last 7 days   Lab Units 06/24/22  1949   INR  1 09   PTT seconds 32     Results from last 7 days   Lab Units 06/24/22  2332   LACTIC ACID mmol/L 0 4*         No results found for: PHART, SAY2UIS, PO2ART, LKE0SBO, H9TFCCCB, BEART, SOURCE  No components found for: HIV1X2  No results found for: HAV, HEPAIGM, HEPBIGM, HEPBCAB, HBEAG, HEPCAB  No results found for: SPEP, UPEP No results found for: HGBA1C  No results found for: CHOL No results found for: HDL No results found for: LDLCALC No results found for: TRIG  No components found for: PROCAL          Imaging: I have personally reviewed pertinent reports

## 2022-06-28 PROBLEM — E87.6 HYPOKALEMIA: Status: RESOLVED | Noted: 2019-04-26 | Resolved: 2022-06-28

## 2022-06-28 LAB
ALBUMIN SERPL BCP-MCNC: 2 G/DL (ref 3.5–5)
ALP SERPL-CCNC: 64 U/L (ref 46–116)
ALT SERPL W P-5'-P-CCNC: 20 U/L (ref 12–78)
ANION GAP SERPL CALCULATED.3IONS-SCNC: 3 MMOL/L (ref 4–13)
AST SERPL W P-5'-P-CCNC: 12 U/L (ref 5–45)
BACTERIA UR CULT: ABNORMAL
BILIRUB SERPL-MCNC: 0.95 MG/DL (ref 0.2–1)
BLAIMP ISLT/SPM QL: NOT DETECTED
BLAKPC ISLT/SPM QL: NOT DETECTED
BLAOXA-48 ISLT/SPM QL: NOT DETECTED
BLAVIM ISLT/SPM QL: NOT DETECTED
BUN SERPL-MCNC: 17 MG/DL (ref 5–25)
CALCIUM ALBUM COR SERPL-MCNC: 11.4 MG/DL (ref 8.3–10.1)
CALCIUM SERPL-MCNC: 9.8 MG/DL (ref 8.3–10.1)
CHLORIDE SERPL-SCNC: 107 MMOL/L (ref 100–108)
CO2 SERPL-SCNC: 29 MMOL/L (ref 21–32)
CREAT SERPL-MCNC: 0.83 MG/DL (ref 0.6–1.3)
GFR SERPL CREATININE-BSD FRML MDRD: 85 ML/MIN/1.73SQ M
GLUCOSE SERPL-MCNC: 91 MG/DL (ref 65–140)
NDM CEPHEID: NOT DETECTED
POTASSIUM SERPL-SCNC: 4.1 MMOL/L (ref 3.5–5.3)
PROCALCITONIN SERPL-MCNC: 0.36 NG/ML
PROT SERPL-MCNC: 5.9 G/DL (ref 6.4–8.2)
SODIUM SERPL-SCNC: 139 MMOL/L (ref 136–145)

## 2022-06-28 PROCEDURE — 80053 COMPREHEN METABOLIC PANEL: CPT | Performed by: NURSE PRACTITIONER

## 2022-06-28 PROCEDURE — 99232 SBSQ HOSP IP/OBS MODERATE 35: CPT | Performed by: PHYSICIAN ASSISTANT

## 2022-06-28 PROCEDURE — 99232 SBSQ HOSP IP/OBS MODERATE 35: CPT | Performed by: INTERNAL MEDICINE

## 2022-06-28 PROCEDURE — 99254 IP/OBS CNSLTJ NEW/EST MOD 60: CPT | Performed by: INTERNAL MEDICINE

## 2022-06-28 PROCEDURE — 84145 PROCALCITONIN (PCT): CPT | Performed by: NURSE PRACTITIONER

## 2022-06-28 RX ORDER — CIPROFLOXACIN 500 MG/5ML
500 KIT ORAL EVERY 12 HOURS SCHEDULED
Status: DISCONTINUED | OUTPATIENT
Start: 2022-06-28 | End: 2022-06-29 | Stop reason: HOSPADM

## 2022-06-28 RX ORDER — POTASSIUM CHLORIDE 20 MEQ/1
40 TABLET, EXTENDED RELEASE ORAL ONCE
Status: COMPLETED | OUTPATIENT
Start: 2022-06-28 | End: 2022-06-28

## 2022-06-28 RX ORDER — PHENAZOPYRIDINE HYDROCHLORIDE 100 MG/1
100 TABLET, FILM COATED ORAL
Status: DISCONTINUED | OUTPATIENT
Start: 2022-06-28 | End: 2022-06-29 | Stop reason: HOSPADM

## 2022-06-28 RX ADMIN — OXYCODONE HYDROCHLORIDE 5 MG: 5 TABLET ORAL at 04:47

## 2022-06-28 RX ADMIN — Medication 12.5 MG: at 22:30

## 2022-06-28 RX ADMIN — TAMSULOSIN HYDROCHLORIDE 0.4 MG: 0.4 CAPSULE ORAL at 16:25

## 2022-06-28 RX ADMIN — PANTOPRAZOLE SODIUM 40 MG: 40 TABLET, DELAYED RELEASE ORAL at 05:34

## 2022-06-28 RX ADMIN — MULTIPLE VITAMINS W/ MINERALS TAB 1 TABLET: TAB ORAL at 09:22

## 2022-06-28 RX ADMIN — OXYCODONE HYDROCHLORIDE 5 MG: 5 TABLET ORAL at 16:25

## 2022-06-28 RX ADMIN — OMEGA-3 FATTY ACIDS CAP 1000 MG 1000 MG: 1000 CAP at 09:22

## 2022-06-28 RX ADMIN — CIPROFLOXACIN 500 MG: KIT at 22:31

## 2022-06-28 RX ADMIN — CEFEPIME HYDROCHLORIDE 2000 MG: 2 INJECTION, POWDER, FOR SOLUTION INTRAVENOUS at 09:32

## 2022-06-28 RX ADMIN — POTASSIUM CHLORIDE 40 MEQ: 1500 TABLET, EXTENDED RELEASE ORAL at 12:56

## 2022-06-28 RX ADMIN — PHENAZOPYRIDINE 100 MG: 100 TABLET ORAL at 09:32

## 2022-06-28 RX ADMIN — Medication 12.5 MG: at 09:22

## 2022-06-28 RX ADMIN — POLYETHYLENE GLYCOL 3350 17 G: 17 POWDER, FOR SOLUTION ORAL at 09:22

## 2022-06-28 RX ADMIN — AMLODIPINE BESYLATE 5 MG: 5 TABLET ORAL at 09:22

## 2022-06-28 RX ADMIN — OXYCODONE HYDROCHLORIDE 2.5 MG: 5 TABLET ORAL at 09:27

## 2022-06-28 RX ADMIN — PHENAZOPYRIDINE 100 MG: 100 TABLET ORAL at 12:55

## 2022-06-28 RX ADMIN — PHENAZOPYRIDINE 100 MG: 100 TABLET ORAL at 16:24

## 2022-06-28 RX ADMIN — PANTOPRAZOLE SODIUM 40 MG: 40 TABLET, DELAYED RELEASE ORAL at 16:25

## 2022-06-28 RX ADMIN — ENOXAPARIN SODIUM 40 MG: 40 INJECTION SUBCUTANEOUS at 09:22

## 2022-06-28 RX ADMIN — ALUMINA, MAGNESIA, AND SIMETHICONE ORAL SUSPENSION REGULAR STRENGTH 30 ML: 1200; 1200; 120 SUSPENSION ORAL at 05:34

## 2022-06-28 RX ADMIN — OXYCODONE HYDROCHLORIDE 5 MG: 5 TABLET ORAL at 22:31

## 2022-06-28 NOTE — PROGRESS NOTES
[facility]  Progress Note - [address] 1975, 40 y o  female MRN: [id number]  [facility] Encounter: [id number]  Primary Care Provider: [place] Blocker   Date and time admitted to hospital: 6/25/2022  7:41 AM    * Pyelonephritis  Assessment & Plan  Pt presented from [facility] per request of Urology for right ureteral stone , right hydronephrosis , fever, sepsis dt urinary source and complicated UTI  PT sent directly to the OR on admission to [facility]   · Seen by Medicine post op   · Post op day # 4 for cystoscopy retrograde pyelogram with insertion of right ureteral stent   · Blood cultures SLMO x 2 positive for Klebsiella aerogenes  Based on sensitivity and d/w ID pharmacy will transition to PO cipro   · Plan for outpt fu in a number of weeks for subsequent ureteroscopic intervention and admission to hospital   · Monitor renal function   · Pain control - oxy prn and tylenol     Low TSH level  Assessment & Plan  TSH 0 017/ free t4 1 92  · ? Hyperthyroidism in setting of infection/bacteremia   · Has had abnormal cardiac rhythm but on bradycardic side   · Positive for fatigue, hyperactivity, possibly mild exophthalmos, diarrhea improved   · Endocrinology consult appreciated, given acute illness recommend repeating labs in about 6 weeks     Hypercalcemia  Assessment & Plan  · Prior hx of hypercalcemia per pt report   · Would not recommend tums at this time or multivitamin   · PTH normal  · Recommend outpt f/u, pt aware     Atrial septal hypertrophy  Assessment & Plan  Asymmetric Septal Hypertrophy  · Prior TTE demonstrates severe septal asymmetric hypertrophy  Septum 1 8 cm by posterior wall 1 4 cm  · Per Cardiology no evidence of left ventricular hypertrophy no family history of HCM  · Patient reports that she had follow-up visit however was canceled secondary to this acute illness and inability to go to the office    · Patient also noted to have bradycardia overnight recently placed on beta-blocker   · Currently on 12 5 mg metoprolol BID, continue on discharge with outpt f/u with cardiology       Gram-negative bacteremia  Assessment & Plan  In setting of right ureteral stone, fever with complicated UTI  · Blood cultures obtained at [facility], positive x2  -Klebsiella aerogenes susceptible to bactrim or quinolones  · Repeat blood cultures obtained at [facility] negative x 48 hrs   · Per sensitivity report and per d/w ID pharmacist, will transition to Cipro to complete antibiotic course x 10 days     Sepsis [facility])  Assessment & Plan  Secondary to ureteral stone and right hydronephrosis/pyelo, fever of 102 2, tachypnea,  elevated lactic   · Blood cultures obtained at [place] klebsielle aerogenes  · Monitor both bc and urine culture same organism therefore urinary source most likely   · IV cefepime transitioned to PO cipro today based on culture    Malignant neoplasm of overlapping sites of cervix [facility])  Assessment & Plan  Pt with hx of malignant neoplasm of overlapping sites of cervix   · Stage 1 B2 cervical cancer   · Sp tx with chemo radiation and now over 2 yrs out   · Pap smear reveals high grade LUIS   · On 3/3/21 pt had colposcopy which was essentially normal with exception of radiation change   · No biopsys were performed at that time  Per Dr Donavan Tanner  · She also had right labial skin tag removed during this procedure   · Will need ongoing surveillance with gyn onc     Hypokalemia-resolved as of 6/28/2022  Assessment & Plan  Hypokalemia in setting of no po intake dt nausea for last several days  · Now resolved with replacement/improved PO intake           VTE Pharmacologic Prophylaxis: VTE Score: 5 High Risk (Score >/= 5) - Pharmacological DVT Prophylaxis Ordered: enoxaparin (Lovenox)  Sequential Compression Devices Ordered  Patient Centered Rounds: I performed bedside rounds with nursing staff today    Discussions with Specialists or Other Care Team Provider: RN    Education and Discussions with Family / Patient: Patient declined call to   Time Spent for Care: 30 minutes  More than 50% of total time spent on counseling and coordination of care as described above  Current Length of Stay: 3 day(s)  Current Patient Status: Inpatient   Certification Statement: The patient will continue to require additional inpatient hospital stay due to sepsis  Discharge Plan: Anticipate discharge tomorrow to home  Code Status: Level 1 - Full Code    Subjective:   Pt reports some persistent R sided discomfort  Otherwise no complaints  Objective:     Vitals:   Temp (24hrs), Av 8 °F (37 1 °C), Min:98 4 °F (36 9 °C), Max:99 6 °F (37 6 °C)    Temp:  [98 4 °F (36 9 °C)-99 6 °F (37 6 °C)] 98 4 °F (36 9 °C)  HR:  [68-97] 80  Resp:  [16-20] 18  BP: (143-175)/() 148/89  SpO2:  [95 %-97 %] 95 %  Body mass index is 28 91 kg/m²  Input and Output Summary (last 24 hours): Intake/Output Summary (Last 24 hours) at 2022 1618  Last data filed at 2022 0945  Gross per 24 hour   Intake 720 ml   Output 3600 ml   Net -2880 ml       Physical Exam:   Physical Exam  Vitals reviewed  Constitutional:       General: She is not in acute distress  Appearance: She is not toxic-appearing  HENT:      Head: Normocephalic and atraumatic  Eyes:      Extraocular Movements: Extraocular movements intact  Cardiovascular:      Rate and Rhythm: Normal rate and regular rhythm  Pulmonary:      Effort: Pulmonary effort is normal  No respiratory distress  Breath sounds: Normal breath sounds  Abdominal:      General: Bowel sounds are normal  There is no distension  Palpations: Abdomen is soft  Tenderness: There is abdominal tenderness (mild RLQ)  Musculoskeletal:         General: Normal range of motion  Cervical back: Normal range of motion  Skin:     General: Skin is warm and dry  Neurological:      General: No focal deficit present  Mental Status: She is alert and oriented to person, place, and time  Psychiatric:         Mood and Affect: Mood normal          Behavior: Behavior normal          Thought Content: Thought content normal           Additional Data:     Labs:  Results from last 7 days   Lab Units 06/27/22 0448 06/26/22  0635 06/25/22  1012   WBC Thousand/uL 10 04 11 16* 5 47   HEMOGLOBIN g/dL 11 0* 12 0 10 0*   HEMATOCRIT % 33 2* 36 9 29 4*   PLATELETS Thousands/uL 215 183 107*   BANDS PCT %  --  1  --    NEUTROS PCT %  --   --  81*   LYMPHS PCT %  --   --  8*   LYMPHO PCT %  --  5*  --    MONOS PCT %  --   --  9   MONO PCT %  --  1*  --    EOS PCT %  --  0 1     Results from last 7 days   Lab Units 06/28/22 0447   SODIUM mmol/L 139   POTASSIUM mmol/L 4 1   CHLORIDE mmol/L 107   CO2 mmol/L 29   BUN mg/dL 17   CREATININE mg/dL 0 83   ANION GAP mmol/L 3*   CALCIUM mg/dL 9 8   ALBUMIN g/dL 2 0*   TOTAL BILIRUBIN mg/dL 0 95   ALK PHOS U/L 64   ALT U/L 20   AST U/L 12   GLUCOSE RANDOM mg/dL 91     Results from last 7 days   Lab Units 06/24/22  1949   INR  1 09             Results from last 7 days   Lab Units 06/28/22 0447 06/26/22  0635 06/24/22  2332 06/24/22 2228 06/24/22 1949   LACTIC ACID mmol/L  --   --  0 4* 0 4* 2 2*   PROCALCITONIN ng/ml 0 36* 1 29*  --   --  2 74*       Lines/Drains:  Invasive Devices  Report    Peripheral Intravenous Line  Duration           Peripheral IV 06/27/22 Left;Ventral (anterior) Forearm <1 day                      Imaging: No pertinent imaging reviewed  Recent Cultures (last 7 days):   Results from last 7 days   Lab Units 06/25/22  1401 06/24/22 2016 06/24/22  1950 06/24/22  1942   BLOOD CULTURE  No Growth at 48 hrs  No Growth at 48 hrs   Klebsiella aerogenes* Klebsiella aerogenes*  --    GRAM STAIN RESULT   --  Gram negative rods* Gram negative rods*  --    URINE CULTURE   --   --   --  >100,000 cfu/ml Klebsiella aerogenes*       Last 24 Hours Medication List:   Current Facility-Administered Medications   Medication Dose Route Frequency Provider Last Rate    acetaminophen  650 mg Oral Q6H PRN Lorena Mayfield MD      aluminum-magnesium hydroxide-simethicone  30 mL Oral Q4H PRN Robinson Antoines, CRNP      amLODIPine  5 mg Oral Daily Robinson Betters, CRNP      ciprofloxacin  500 mg Oral Q12H [facility] Patricia Real PA-C      enoxaparin  40 mg Subcutaneous Daily Anna Marie Vargas MD      fish oil  1,000 mg Oral Daily Lorena Mayfield MD      hydrALAZINE  10 mg Intravenous Q6H PRN Robinson Antoines, CRNP      metoprolol tartrate  12 5 mg Oral Q12H [facility] Kiara Atwood DO      multivitamin-minerals  1 tablet Oral Daily Lorena Mayfield MD      oxyCODONE  2 5 mg Oral Q4H PRN Lorena Mayfield MD      oxyCODONE  5 mg Oral Q4H PRN Lorena Mayfield MD      pantoprazole  40 mg Oral BID AC Robinson Betters, CRNP      phenazopyridine  100 mg Oral TID With Meals Robinson Sol, CRNP      polyethylene glycol  17 g Oral Daily Robinson Antoines, CRNP      tamsulosin  0 4 mg Oral Daily With Romelia Tolbert MD          Today, Patient Was Seen By: David Zamora PA-C    **Please Note: This note may have been constructed using a voice recognition system  **

## 2022-06-28 NOTE — ASSESSMENT & PLAN NOTE
Pt presented from [facility] per request of Urology for right ureteral stone , right hydronephrosis , fever, sepsis dt urinary source and complicated UTI  PT sent directly to the OR on admission to [facility]   · Seen by Medicine post op   · Post op day # 4 for cystoscopy retrograde pyelogram with insertion of right ureteral stent   · Blood cultures SLMO x 2 positive for Klebsiella aerogenes    Based on sensitivity and d/w ID pharmacy will transition to PO cipro   · Plan for outpt fu in a number of weeks for subsequent ureteroscopic intervention and admission to hospital   · Monitor renal function   · Pain control - oxy prn and tylenol

## 2022-06-28 NOTE — ASSESSMENT & PLAN NOTE
Pt with hx of malignant neoplasm of overlapping sites of cervix   · Stage 1 B2 cervical cancer   · Sp tx with chemo radiation and now over 2 yrs out   · Pap smear reveals high grade LUIS   · On 3/3/21 pt had colposcopy which was essentially normal with exception of radiation change   · No biopsys were performed at that time  Per Dr Frederick Bae  · She also had right labial skin tag removed during this procedure   · Will need ongoing surveillance with gyn onc

## 2022-06-28 NOTE — ASSESSMENT & PLAN NOTE
Hypokalemia in setting of no po intake dt nausea for last several days  · Was repleted aggressively upon arrival to hospital now improved   · Pt now able to tolerate diet for first time, has not eaten in last 4 - 6 days dt acute illness   · Hydrating well   · Cardiology repleted potassium   · Repeat labs in am

## 2022-06-28 NOTE — ASSESSMENT & PLAN NOTE
In setting of right ureteral stone, fever with complicated UTI  · Blood cultures obtained at [facility], positive x2  -Klebsiella aerogenes susceptible to bactrim or quinolones    · Repeat blood cultures obtained at [facility] negative x 48 hrs   · Per sensitivity report and per d/w ID pharmacist, will transition to Cipro to complete antibiotic course x 10 days

## 2022-06-28 NOTE — CONSULTS
Consultation - Eve Singleton 40 y o  female MRN: [id number]    Unit/Bed#: [facility] 919-01 Encounter: [id number]      Impression:   Abnormal thyroid panel   UTI sepsis  Nephrolithiasis status post retrograde pyelogram    Assessment:   This is a 40y o -year-old female with past medical history of mitral septal hypertrophy who presents to the hospital with chief complaint of dizziness dysuria diagnosed with UTI during admission and nephrolithiasis status post retrograde pyelogram endocrinology consulted for abnormal thyroid panel    Plan:  TSH 0 1 T4 1 9, 2 months ago TSH within normal range 2 0  Patient denies any history of hyperthyroidism or hypothyroidism currently she denies any symptoms associated with hyperthyroidism denies any history of radiation therapy, takes bioptin,  patient received contrast she got a pyelogram due to nephrolithiasis and stent placement that will affect the thyroid panel as well instead being of sepsis infections that can suppress TSH  Patient will need a repeat thyroid panel outpatient in 6-8 weeks, recommended pt to hold biotin few days before thyroid panel is done to avoid interference with thyroid panel assays     CC:  Abnormal thyroid panel      HPI: Eve Singleton is a 40y o  year old female with past medical history of atrial septal hypertrophy who presents to the hospital with chief complaint of dysuria patient diagnosed with UTI nephrolithiasis and underwent post retrograde pyelogram endocrinology was consulted for abnormal thyroid panel patient denies any history of hyperthyroidism or hypothyroidism patient denies any history of radiation therapy to her head neck or chest and denies taking over-the-counter supplements patient is not sure if she has family history of thyroid disorders currently patient denies any symptoms associated with hyperthyroidism such as palpitations tremors diarrhea on intentional weight lost pain in eyes or any other associated symptoms such as neck tenderness or dysphagia    Review of Systems   Constitutional: Negative for activity change and appetite change  HENT: Negative for congestion and facial swelling  Eyes: Negative for photophobia and discharge  Respiratory: Negative for apnea, shortness of breath and wheezing  Cardiovascular: Negative for chest pain and palpitations  Gastrointestinal: Negative for abdominal distention and abdominal pain  Endocrine: Negative for cold intolerance, heat intolerance and polydipsia  Musculoskeletal: Negative for arthralgias and back pain  Skin: Negative for color change and wound  Neurological: Negative for tremors, weakness and headaches  Psychiatric/Behavioral: Negative for agitation, behavioral problems and confusion  Historical Information   Past Medical History:   Diagnosis Date    Abnormal Pap smear of cervix     Acute hip pain     Cancer (HCC)     cervix    Hypokalemia     Low back pain     Lymphadenopathy     Pelvic pain      Past Surgical History:   Procedure Laterality Date    FL RETROGRADE PYELOGRAM  6/25/2022    AL BRONCHOSCOPY NEEDLE BX TRACHEA MAIN STEM&/BRON N/A 2/26/2020    Procedure: ENDOBRONCHIAL ULTRASOUND (EBUS); Surgeon: Maureen Sam MD;  [facility];  Service: Thoracic    AL BRONCHOSCOPY,DIAGNOSTIC N/A 2/26/2020    Procedure: Trista Axe;  Surgeon: Maureen Sam MD;  [facility];  Service: Thoracic    AL CYSTOURETHROSCOPY,URETER CATHETER Right 6/25/2022    Procedure: CYSTOSCOPY RETROGRADE PYELOGRAM WITH INSERTION STENT URETERAL;  Surgeon: Avis Pham MD;  [facility];  Service: Urology    AL DILATION OF VAGINA W ANESTH N/A 6/20/2019    Procedure: EXAM UNDER ANESTHESIA (EUA);   Surgeon: Mertha Babinski, MD;  [facility];  Service: Gynecology Oncology    AL INSERT VAGINAL RADIATION DEVICE N/A 6/20/2019    Procedure: PIERRE SLEEVE PLACEMENT;  Surgeon: Mertha Babinski, MD;  [facility]; Service: Gynecology Oncology    TONSILLECTOMY      US GUIDANCE  2019     Social History   Social History     Substance and Sexual Activity   Alcohol Use Not Currently     Social History     Substance and Sexual Activity   Drug Use Never     Social History     Tobacco Use   Smoking Status Former Smoker    Packs/day: 0 50    Years: 1 00    Pack years: 0 50    Types: Cigarettes    Quit date: 2020    Years since quittin 3   Smokeless Tobacco Never Used     Family History:   Family History   Problem Relation Age of Onset    Uterine cancer Maternal Grandmother     Heart disease Mother     Canavan disease Father     Cancer Father        Meds/Allergies   Current Facility-Administered Medications   Medication Dose Route Frequency Provider Last Rate Last Admin    acetaminophen (TYLENOL) tablet 650 mg  650 mg Oral Q6H PRN Brodie Best MD        aluminum-magnesium hydroxide-simethicone (MYLANTA) oral suspension 30 mL  30 mL Oral Q4H PRN DEVEN Junior   30 mL at 22 0534    amLODIPine (NORVASC) tablet 5 mg  5 mg Oral Daily DEVEN Junior   5 mg at 22 2676    cefepime (MAXIPIME) 2,000 mg in dextrose 5 % 50 mL IVPB  2,000 mg Intravenous Q12H DEVEN Junior 100 mL/hr at 22 0932 2,000 mg at 22 0932    enoxaparin (LOVENOX) subcutaneous injection 40 mg  40 mg Subcutaneous Daily Anna Marie Vargas MD   40 mg at 22 9381    fish oil capsule 1,000 mg  1,000 mg Oral Daily Brodie Best MD   1,000 mg at 22 6194    hydrALAZINE (APRESOLINE) injection 10 mg  10 mg Intravenous Q6H PRN DEVEN Junior   10 mg at 22 0530    metoprolol tartrate (LOPRESSOR) partial tablet 12 5 mg  12 5 mg Oral Q12H [address] Brandie Shannon DO   12 5 mg at 22 8309    multivitamin-minerals (CENTRUM) tablet 1 tablet  1 tablet Oral Daily Brodie Best MD   1 tablet at 22 0922    oxyCODONE (ROXICODONE) IR tablet 2 5 mg  2 5 mg Oral Q4H PRN Anna Marie WICK Armand Laughlin MD   2 5 mg at 06/28/22 0927    oxyCODONE (ROXICODONE) IR tablet 5 mg  5 mg Oral Q4H PRN Shama Pringle MD   5 mg at 06/28/22 0447    pantoprazole (PROTONIX) EC tablet 40 mg  40 mg Oral BID AC DEVEN Richey   40 mg at 06/28/22 0534    phenazopyridine (PYRIDIUM) tablet 100 mg  100 mg Oral TID With Meals DEVEN Garcia   100 mg at 06/28/22 1255    polyethylene glycol (MIRALAX) packet 17 g  17 g Oral Daily DEVEN Garcia   17 g at 06/28/22 1247    tamsulosin (FLOMAX) capsule 0 4 mg  0 4 mg Oral Daily With Belgica Farley MD   0 4 mg at 06/27/22 1627     Allergies   Allergen Reactions    Hydrochlorothiazide Arthralgia    Latex Hives    Other      Tomatoes   Vomiting and diarhhea       Objective   Vitals: Blood pressure 152/89, pulse 68, temperature 98 5 °F (36 9 °C), resp  rate 16, height 5' 1" (1 549 m), weight 69 4 kg (153 lb), last menstrual period 04/15/2019, SpO2 96 %, not currently breastfeeding  Intake/Output Summary (Last 24 hours) at 6/28/2022 1321  Last data filed at 6/28/2022 0208  Gross per 24 hour   Intake 720 ml   Output 3600 ml   Net -2880 ml     Invasive Devices  Report    Peripheral Intravenous Line  Duration           Peripheral IV 06/27/22 Left;Ventral (anterior) Forearm <1 day                Physical Exam  Constitutional:       Appearance: Normal appearance  HENT:      Nose: No congestion or rhinorrhea  Mouth/Throat:      Pharynx: No oropharyngeal exudate or posterior oropharyngeal erythema  Eyes:      Conjunctiva/sclera: Conjunctivae normal       Pupils: Pupils are equal, round, and reactive to light  Cardiovascular:      Rate and Rhythm: Normal rate and regular rhythm  Pulses: Normal pulses  Heart sounds: No murmur heard  No friction rub  Pulmonary:      Effort: No respiratory distress  Breath sounds: No stridor  No wheezing  Abdominal:      General: There is no distension  Palpations: Abdomen is soft  Tenderness: There is no abdominal tenderness  Musculoskeletal:         General: No swelling  Cervical back: No rigidity or tenderness  Skin:     Coloration: Skin is not jaundiced  Findings: No bruising  Neurological:      General: No focal deficit present  Mental Status: She is alert and oriented to person, place, and time  Motor: No weakness  Psychiatric:         Mood and Affect: Mood normal          Thought Content: Thought content normal          Judgment: Judgment normal            Lab Results:       Lab Results   Component Value Date    WBC 10 04 06/27/2022    HGB 11 0 (L) 06/27/2022    HCT 33 2 (L) 06/27/2022    MCV 83 06/27/2022     06/27/2022     Lab Results   Component Value Date/Time    BUN 17 06/28/2022 04:47 AM    K 4 1 06/28/2022 04:47 AM     06/28/2022 04:47 AM    CO2 29 06/28/2022 04:47 AM    CREATININE 0 83 06/28/2022 04:47 AM    AST 12 06/28/2022 04:47 AM    ALT 20 06/28/2022 04:47 AM    ALB 2 0 (L) 06/28/2022 04:47 AM     No results for input(s): CHOL, HDL, LDL, TRIG, VLDL in the last 72 hours  No results found for: Kemi Garrido  POC Glucose (mg/dl)   Date Value   05/04/2020 86   01/29/2020 87   10/21/2019 84   05/06/2019 92         Portions of the record may have been created with voice recognition software

## 2022-06-28 NOTE — ASSESSMENT & PLAN NOTE
In setting of right ureteral stone, fever with complicated UTI  · Blood cultures obtained at [facility], positive x2  -Klebsiella aerogenes susceptible to bactrim or quinolones   Total 7 day course  · Repeat blood cultures obtained at \[facility]\"" negative x 48 hrs   · Dc ceftriaxone, will transition to cefepime per recs as susceptibilities still pending   · Monitor vitals - T-max 102 2°  · Hydration with IV fluids, dc dt elevated sbp   · Trend leukocytosis 11 16 down trend today   · Lactic stable

## 2022-06-28 NOTE — ASSESSMENT & PLAN NOTE
Asymmetric Septal Hypertrophy  · Prior TTE demonstrates severe septal asymmetric hypertrophy  Septum 1 8 cm by posterior wall 1 4 cm  · Per Cardiology no evidence of left ventricular hypertrophy no family history of HCM  · Patient reports that she had follow-up visit however was canceled secondary to this acute illness and inability to go to the office  · Patient also noted to have bradycardia overnight recently placed on beta-blocker - discussed with nursing since not evidenced however review of eduardo positive hr 49 located   · - Now back on lopressor will need to monitor hr if bradycardic spoke to rn to obtain ekg   · Trialed on Norvasc and HCTZ per prior notation from Cardiology, then transition to beta-blocker  · Beta-blocker held secondary to bradycardia, however blood pressure is elevated - would consider rebound hypertension      · - s/p time dose norvasc for now   · - will add hydralazine for sbp   · Appreciate cardiology - resumed on lower dose bb 12 5mg and echo ordered

## 2022-06-28 NOTE — PROGRESS NOTES
[address] [facility]  Progress Note - [address] 1975, 40 y o  female MRN: [id number]  Unit/Bed#: [facility] 919-01 Encounter: [id number]  Primary Care Provider: Gnadenhutten Blocker   Date and time admitted to hospital: 6/25/2022  7:41 AM    * Pyelonephritis  Assessment & Plan  Pt presented from [facility] per request of Urology for right ureteral stone , right hydronephrosis , fever, sepsis dt urinary source and complicated UTI  PT sent directly to the OR on admission to [facility]   · Seen by Medicine post op   · Post op day # 3 for cystoscopy retrograde pyelogram with insertion of right ureteral stent   · Turbid urine noted continued on ceftriaxone day # 2  based on newly noted bacteremia changed iv abx to cefepime based on bc pending 6/26  · Blood cultures [facility] x 2 positive for Klebsiella aerogenes - sensitivity 6/27 would continue cefepime now until dc and then transition to bactrim vs quinolone for 7 days total  · 2nd set obtained on admission to b - negative 48 hrs   · Plan for outpt fu in a number of weeks for subsequent ureteroscopic intervention and admission to hospital   · Monitor renal function   · Pain control - oxy prn and tylenol   · IVF hydration discontinued dt elevated sbp   · Urine culture - >100,000 cfu/ml Klebsiella aerogenes   · Procalcitonin elevated 1 29 trending down continue iv abx     Atrial septal hypertrophy  Assessment & Plan  Asymmetric Septal Hypertrophy  · Prior TTE demonstrates severe septal asymmetric hypertrophy  Septum 1 8 cm by posterior wall 1 4 cm  · Per Cardiology no evidence of left ventricular hypertrophy no family history of HCM  · Patient reports that she had follow-up visit however was canceled secondary to this acute illness and inability to go to the office    · Patient also noted to have bradycardia overnight recently placed on beta-blocker - discussed with nursing since not evidenced however review of eduardo positive hr 49 located   · - Now back on lopressor will need to monitor hr if bradycardic spoke to rn to obtain ekg   · Trialed on Norvasc and HCTZ per prior notation from Cardiology, then transition to beta-blocker  · Beta-blocker held secondary to bradycardia, however blood pressure is elevated - would consider rebound hypertension   · - s/p time dose norvasc for now   · - will add hydralazine for sbp   · Appreciate cardiology - resumed on lower dose bb 12 5mg and echo ordered       Gram-negative bacteremia  Assessment & Plan  In setting of right ureteral stone, fever with complicated UTI  · Blood cultures obtained at [facility], positive x2  -Klebsiella aerogenes susceptible to bactrim or quinolones  Total 7 day course  · Repeat blood cultures obtained at [facility] negative x 48 hrs   · Dc ceftriaxone, will transition to cefepime per recs as susceptibilities still pending   · Monitor vitals - T-max 102 2°  · Hydration with IV fluids, dc dt elevated sbp   · Trend leukocytosis 11 16 down trend today   · Lactic stable     Sepsis (HCC)  Assessment & Plan  Secondary to ureteral stone and right hydronephrosis, fever of 102 2, tachypnea,  elevated lactic   · Blood cultures obtained at [place] klebsielle aerogenes  · - repeat obtained negative at 48 hrs   · Monitor both bc and urine culture same organism therefore urinary source most likely   · Monitor for leukocytosis   · ivf hydration discontinued dt elevated sbp   · Monitor vital signs - Bradycardia noted with elevated sbp  · On iv ceftriaxone transitioned 6/26 to cefepime per bc results would tx for 7 days total discharge on bactrim or quinolone avoid 1st , 2nd or 3rd generation cehalosporins    Low TSH level  Assessment & Plan  TSH 0 017/ free t4 1 92  · ?  Hyperthyroidism in setting of infection/bacteremia   · Has had abnormal cardiac rhythm but on bradycardic side   · Positive for fatigue, hyperactivity, possibly mild exophthalmos, diarrhea improved ,   · Will need to repeat labs in 6-8 weeks · Consult endocrinology       Hypercalcemia  Assessment & Plan  · Prior hx of hypercalcemia per pt report   · Would not recommend tums at this time - in setting of reflux  · Pt with some acid reflux added maalox - pt continues to have reflucalsiumx  · Pt sp ivfluids slight down trend, would avoid medicines rich in    · Cmp/tsh pth in am/ionized calcium in am     Hypokalemia  Assessment & Plan  Hypokalemia in setting of no po intake dt nausea for last several days  · Was repleted aggressively upon arrival to hospital now improved   · Pt now able to tolerate diet for first time, has not eaten in last 4 - 6 days dt acute illness   · Hydrating well   · Cardiology repleted potassium   · Repeat labs in am        VTE Pharmacologic Prophylaxis: VTE Score: 5 High Risk (Score >/= 5) - Pharmacological DVT Prophylaxis Ordered: enoxaparin (Lovenox)  Sequential Compression Devices Ordered  Patient Centered Rounds: I performed bedside rounds with nursing staff today  Discussions with Specialists or Other Care Team Provider: nursing     Education and Discussions with Family / Patient: patient   Time Spent for Care: 60 minutes  More than 50% of total time spent on counseling and coordination of care as described above  Current Length of Stay: 2 day(s)  Current Patient Status: Inpatient   Certification Statement: The patient will continue to require additional inpatient hospital stay due to ongoing tx   Discharge Plan: Anticipate discharge in 24-48 hrs to home  Code Status: Level 1 - Full Code    Subjective:   Pt looking better but still fatigued and no longer nausea    She has ongoing burning for about ten minutes post void   She denies dizziness or lightheadedness      Objective:     Vitals:   Temp (24hrs), Av 9 °F (37 2 °C), Min:98 5 °F (36 9 °C), Max:99 6 °F (37 6 °C)    Temp:  [98 5 °F (36 9 °C)-99 6 °F (37 6 °C)] 99 6 °F (37 6 °C)  HR:  [75-97] 77  Resp:  [16-20] 16  BP: (126-177)/() 155/89  SpO2:  [94 %-97 %] 95 %  Body mass index is 28 91 kg/m²  Input and Output Summary (last 24 hours): Intake/Output Summary (Last 24 hours) at 6/27/2022 2310  Last data filed at 6/27/2022 2205  Gross per 24 hour   Intake 770 ml   Output 2700 ml   Net -1930 ml       Physical Exam:   Physical Exam  Constitutional:       General: She is not in acute distress  Appearance: She is not ill-appearing, toxic-appearing or diaphoretic  HENT:      Head: Normocephalic  Nose: No congestion  Mouth/Throat:      Pharynx: Oropharynx is clear  No oropharyngeal exudate or posterior oropharyngeal erythema  Cardiovascular:      Rate and Rhythm: Normal rate  Heart sounds: Murmur heard  No gallop  Pulmonary:      Effort: No respiratory distress  Breath sounds: No stridor  No wheezing or rales  Abdominal:      Palpations: Abdomen is soft  Musculoskeletal:         General: No swelling or deformity  Right lower leg: No edema  Neurological:      Mental Status: She is alert            Additional Data:     Labs:  Results from last 7 days   Lab Units 06/27/22  0448 06/26/22  0635 06/25/22  1012   WBC Thousand/uL 10 04 11 16* 5 47   HEMOGLOBIN g/dL 11 0* 12 0 10 0*   HEMATOCRIT % 33 2* 36 9 29 4*   PLATELETS Thousands/uL 215 183 107*   BANDS PCT %  --  1  --    NEUTROS PCT %  --   --  81*   LYMPHS PCT %  --   --  8*   LYMPHO PCT %  --  5*  --    MONOS PCT %  --   --  9   MONO PCT %  --  1*  --    EOS PCT %  --  0 1     Results from last 7 days   Lab Units 06/27/22  0448   SODIUM mmol/L 144   POTASSIUM mmol/L 3 1*   CHLORIDE mmol/L 110*   CO2 mmol/L 26   BUN mg/dL 26*   CREATININE mg/dL 0 88   ANION GAP mmol/L 8   CALCIUM mg/dL 10 4*   ALBUMIN g/dL 2 1*   TOTAL BILIRUBIN mg/dL 0 57   ALK PHOS U/L 78   ALT U/L 28   AST U/L 17   GLUCOSE RANDOM mg/dL 92     Results from last 7 days   Lab Units 06/24/22  1949   INR  1 09             Results from last 7 days   Lab Units 06/26/22  0635 06/24/22  2332 06/24/22  2228 06/24/22 1949   LACTIC ACID mmol/L  --  0 4* 0 4* 2 2*   PROCALCITONIN ng/ml 1 29*  --   --  2 74*       Lines/Drains:  Invasive Devices  Report    Peripheral Intravenous Line  Duration           Peripheral IV 06/27/22 Left;Ventral (anterior) Forearm <1 day                  Telemetry:  Telemetry Orders (From admission, onward)             24 Hour Telemetry Monitoring  Continuous x 24 Hours (Telem)        References:    Telemetry Guidelines   Question:  Reason for 24 Hour Telemetry  Answer:  Metabolic/Electrolyte Disturbance with High Probability of Dysrhythmia (K level <3 or >6, or KCL infusion >=10mEq/hr)                 Telemetry Reviewed: Sinus Bradycardia sinus rhythm  Indication for Continued Telemetry Use: Arrthymias requiring medical therapy             Imaging: No pertinent imaging reviewed  Recent Cultures (last 7 days):   Results from last 7 days   Lab Units 06/25/22  1401 06/24/22 2016 06/24/22  1950 06/24/22 1942   BLOOD CULTURE  No Growth at 48 hrs  No Growth at 48 hrs   Klebsiella aerogenes* Klebsiella aerogenes*  --    GRAM STAIN RESULT   --  Gram negative rods* Gram negative rods*  --    URINE CULTURE   --   --   --  >100,000 cfu/ml Klebsiella aerogenes*       Last 24 Hours Medication List:   Current Facility-Administered Medications   Medication Dose Route Frequency Provider Last Rate    acetaminophen  650 mg Oral Q6H PRN Anna Marie Vargas MD      aluminum-magnesium hydroxide-simethicone  30 mL Oral Q4H PRN DEVEN Steward      amLODIPine  5 mg Oral Daily DEVEN Steward      cefepime  2,000 mg Intravenous Q12H Asa Yang, ELDERNP 2,000 mg (06/27/22 2202)    enoxaparin  40 mg Subcutaneous Daily Anna Marie Vargas MD      fish oil  1,000 mg Oral Daily Claribel Machuca MD      hydrALAZINE  10 mg Intravenous Q6H PRN DEVEN Steward      metoprolol tartrate  12 5 mg Oral Q12H [facility] Cesar Knapp DO      multivitamin-minerals  1 tablet Oral Daily Claribel Machuca MD  oxyCODONE  2 5 mg Oral Q4H PRN Xochilt Bunch MD      oxyCODONE  5 mg Oral Q4H PRN Xochilt Bunch MD      pantoprazole  40 mg Oral BID AC DEVEN Rouse      polyethylene glycol  17 g Oral Daily DEVEN Rouse      tamsulosin  0 4 mg Oral Daily With Rommel Rivera MD          Today, Patient Was Seen By: DEVEN Rouse    **Please Note: This note may have been constructed using a voice recognition system  **

## 2022-06-28 NOTE — ASSESSMENT & PLAN NOTE
· Prior hx of hypercalcemia per pt report   · Would not recommend tums at this time - in setting of reflux  · Pt with some acid reflux added maalox - pt continues to have reflucalsiumx  · Pt sp ivfluids slight down trend, would avoid medicines rich in    · Cmp/tsh pth in am/ionized calcium in am

## 2022-06-28 NOTE — PROGRESS NOTES
Reason for Consult / Principal Problem: HCM    Physician Requesting Consult:  Debby Silvestre MD    Cardiologist: DEVEN Barbosa        Assessment and Plan      Current Problem List   Principal Problem:    Pyelonephritis  Active Problems:    Hypokalemia    Malignant neoplasm of overlapping sites of cervix [facility])    Hydronephrosis due to obstruction of ureter    Sepsis ([facility] [address] )    Gram-negative bacteremia    Atrial septal hypertrophy    Hypercalcemia    Low TSH level    Assessment/Plan:     1  Hypertrophic Cardiomyopathy:  ? HCM diagnosed on cardiac MRI  Normal EF and LV Systolic function (15%)  ? History does not disclose any discrete symptoms of HCM besides some subjective fatigue  ? Restart home metoprolol at 12 5mg BID, pt complained of fatigue on 25mg BID so we will trial 12 5  ? Repeat inpatient echo showed gradient 12mmhg, LVEF 70%, small LV cavity size, Findings overall consistent with  hypertrophic cardiomyopathy without evidence of dynamic obstruction  ? Monitor telemetry and blood pressure  ? Follow up outpatient with normal cardiologist or HCM specialist       2  Sepsis  ? Continue IV cefepime and monitor WBC and temps  ? Follow outpatient abx recommendations per primary team/urology       3  Hypokalemia  ? Potassium today 4 1 Continue to monitor K+ levels  Subjective     CC: Back Pain    24hr/Overnight Events:    · Patient has no complaints this morning  · Slept well  Denies CP, SOB, Palpitations, dizziness, nausea  Telemetry: NSR  No ventricular arrhythmias  No episodes of significant bradycardia     Weight: 69 4 Kg     EK22: NSR  Biatrial enlargement  RSR' or QR pattern in V1 suggests right ventricular conduction delay  Left ventricular hypertrophy with repolarization abnormality     ECHO: 22  Findings overall consistent with  hypertrophic cardiomyopathy, without evidence of dynamic obstruction  Left Ventricle: Left ventricular cavity size is small   Wall thickness is increased  There is severe asymmetric hypertrophy of the mid septal wall (22 mm)  The left ventricular ejection fraction is 70%  Systolic function is vigorous  Global longitudinal strain is normal at -18%, with focal reduction at the mid-septal segments  Wall motion is normal  Diastolic function is mildly abnormal, consistent with grade I (abnormal) relaxation  Left atrial filling pressure is normal  There is no LV dynamic obstruction    Left Atrium: The atrium is moderately dilated (42-48 mL/m2)    Tricuspid Valve: There is trace regurgitation  The right ventricular systolic pressure is normal  The estimated right ventricular systolic pressure is 94 22 mmHg  · Peak Gradient (Valsalva) 12 mmhg     Stress test: No history of ischemic workup      Cardiac Catheterization:  No history of ischemic workup      CT scan: Mild right hydroureteronephrosis secondary to 3 mm distal ureteric calculus   Asymmetric right perinephric and peripelvic fat stranding may be reactive   Correlate clinically for infection       Labs: Klebsiella Bacteremia, K+ 4 1 from 3 1 (2 7 on admit), WBC 10 from 11 (6/27), HGB 11 from 12 (6/27), creat   83 from   80      Family History:   Family History   Problem Relation Age of Onset    Uterine cancer Maternal Grandmother     Heart disease Mother     Canavan disease Father     Cancer Father      Historical Information   Past Medical History:   Diagnosis Date    Abnormal Pap smear of cervix     Acute hip pain     Cancer (HCC)     cervix    Hypokalemia     Low back pain     Lymphadenopathy     Pelvic pain      Past Surgical History:   Procedure Laterality Date    FL RETROGRADE PYELOGRAM  6/25/2022    [place] BRONCHOSCOPY NEEDLE BX TRACHEA MAIN STEM&/BRON N/A 2/26/2020    Procedure: ENDOBRONCHIAL ULTRASOUND (EBUS);   Surgeon: Samira Weinstein MD;  [facility];  Service: Thoracic    [place] [address] N/A 2/26/2020    Procedure: BRONCHOSCOPY FLEXIBLE;  Surgeon: Coty Elizondo Joselito Kim MD;  [facility];  Service: Thoracic    VT CYSTOURETHROSCOPY,URETER CATHETER Right 2022    Procedure: CYSTOSCOPY RETROGRADE PYELOGRAM WITH INSERTION STENT URETERAL;  Surgeon: Cyril Mustafa MD;  [facility];  Service: Urology    VT DILATION OF VAGINA W ANESTH N/A 2019    Procedure: EXAM UNDER ANESTHESIA (EUA);   Surgeon: Laverne Holloway MD;  [facility];  Service: Gynecology Oncology    VT INSERT VAGINAL RADIATION DEVICE N/A 2019    Procedure: PIERRE SLEEVE PLACEMENT;  Surgeon: Laverne Holloway MD;  [facility];  Service: Gynecology Oncology    TONSILLECTOMY      US GUIDANCE  2019     Social History   Social History     Substance and Sexual Activity   Alcohol Use Not Currently     Social History     Substance and Sexual Activity   Drug Use Never     Social History     Tobacco Use   Smoking Status Former Smoker    Packs/day: 0 50    Years: 1 00    Pack years: 0 50    Types: Cigarettes    Quit date: 2020    Years since quittin 3   Smokeless Tobacco Never Used     Family History:   Family History   Problem Relation Age of Onset    Uterine cancer Maternal Grandmother     Heart disease Mother     Canavan disease Father     Cancer Father        Review of Systems:  Review of Systems        Scheduled Meds:  Current Facility-Administered Medications   Medication Dose Route Frequency Provider Last Rate    acetaminophen  650 mg Oral Q6H PRN Claribel Machuca MD      aluminum-magnesium hydroxide-simethicone  30 mL Oral Q4H PRN Asa Hoop, CRNP      amLODIPine  5 mg Oral Daily Asa Hoop, CRNP      cefepime  2,000 mg Intravenous Q12H Asa Hoop, CRNP 2,000 mg (22)    enoxaparin  40 mg Subcutaneous Daily Anna Marie Vargas MD      fish oil  1,000 mg Oral Daily Claribel Machuca MD      hydrALAZINE  10 mg Intravenous Q6H PRN Asa Hoop, CRNP      metoprolol tartrate  12 5 mg Oral Q12H [address] DO [place]      multivitamin-minerals  1 tablet Oral Daily Leander To MD      oxyCODONE  2 5 mg Oral Q4H PRN Leander To MD      oxyCODONE  5 mg Oral Q4H PRN Leander To MD      pantoprazole  40 mg Oral BID AC Lewis Alicea CRMICAH      phenazopyridine  100 mg Oral TID With Meals DEVEN Mccray      polyethylene glycol  17 g Oral Daily DEVEN Mccray      tamsulosin  0 4 mg Oral Daily With Shahab Strange MD       Continuous Infusions:   PRN Meds:   acetaminophen    aluminum-magnesium hydroxide-simethicone    hydrALAZINE    oxyCODONE    oxyCODONE  all current active meds have been reviewed    Allergies   Allergen Reactions    Hydrochlorothiazide Arthralgia    Latex Hives    Other      Tomatoes   Vomiting and diarhhea       Objective   Vitals: Temp (24hrs), Av 8 °F (37 1 °C), Min:98 5 °F (36 9 °C), Max:99 6 °F (37 6 °C)  Current: Temperature: 98 5 °F (36 9 °C)  Patient Vitals for the past 24 hrs:   BP Temp Pulse Resp SpO2 Height Weight   22 0719 152/89 98 5 °F (36 9 °C) 68 16 96 % -- --   22 2255 155/89 99 6 °F (37 6 °C) 77 16 95 % -- --   22 2049 (!) 175/104 -- 97 -- 95 % -- --   22 1956 143/85 98 7 °F (37 1 °C) 95 20 97 % -- --   22 1508 146/94 98 5 °F (36 9 °C) -- 20 -- -- --   22 1210 (!) 177/100 -- 75 -- -- 5' 1" (1 549 m) 69 4 kg (153 lb)    Body mass index is 28 91 kg/m²    Orthostatic Blood Pressures    Flowsheet Row Most Recent Value   Blood Pressure 152/89 filed at 2022 0719              Invasive Devices  Report    Peripheral Intravenous Line  Duration           Peripheral IV 22 Left;Ventral (anterior) Forearm <1 day                Physical Exam:  Physical Exam        Lab Results:   Results from last 7 days   Lab Units 22  0448 22  0635 22  1012 22  1949   WBC Thousand/uL 10 04 11 16* 5 47 6 25   HEMOGLOBIN g/dL 11 0* 12 0 10 0* 12 1   HEMATOCRIT % 33 2* 36 9 29 4* 35 5 PLATELETS Thousands/uL 215 183 107* 130*   NEUTROS PCT %  --   --  81* 83*   MONOS PCT %  --   --  9 9   MONO PCT %  --  1*  --   --       Results from last 7 days   Lab Units 06/28/22  0447 06/27/22  0448 06/26/22  0635 06/25/22  1834 06/25/22  1025 06/24/22  1949   SODIUM mmol/L 139 144 143  --  142 135*   POTASSIUM mmol/L 4 1 3 1* 3 9 3 4* 2 5* 2 7*   CHLORIDE mmol/L 107 110* 110*  --  110* 97*   CO2 mmol/L 29 26 29  --  28 27   BUN mg/dL 17 26* 26*  --  18 29*   CREATININE mg/dL 0 83 0 88 1 18  --  1 09 1 28   CALCIUM mg/dL 9 8 10 4* 11 5*  --  9 8 10 0   ALK PHOS U/L 64 78  --   --  62 72   ALT U/L 20 28  --   --  21 24   AST U/L 12 17  --   --  11 28   MAGNESIUM mg/dL  --   --  2 3  --   --  1 9   INR   --   --   --   --   --  1 09   PTT seconds  --   --   --   --   --  32   EGFR ml/min/1 73sq m 85 79 55  --  61 50     Results from last 7 days   Lab Units 06/24/22  1949   INR  1 09   PTT seconds 32     Results from last 7 days   Lab Units 06/24/22  2332   LACTIC ACID mmol/L 0 4*         No results found for: PHART, QAH8ZHJ, PO2ART, ZDT2TIQ, O2NHHDKI, BEART, SOURCE  No components found for: HIV1X2  No results found for: HAV, HEPAIGM, HEPBIGM, HEPBCAB, HBEAG, HEPCAB  No results found for: SPEP, UPEP No results found for: HGBA1C  No results found for: CHOL No results found for: HDL No results found for: LDLCALC No results found for: TRIG  No components found for: PROCAL          Imaging: I have personally reviewed pertinent reports

## 2022-06-28 NOTE — ASSESSMENT & PLAN NOTE
TSH 0 017/ free t4 1 92  · ?  Hyperthyroidism in setting of infection/bacteremia   · Has had abnormal cardiac rhythm but on bradycardic side   · Positive for fatigue, hyperactivity, possibly mild exophthalmos, diarrhea improved ,   · Will need to repeat labs in 6-8 weeks   · Consult endocrinology

## 2022-06-28 NOTE — ASSESSMENT & PLAN NOTE
TSH 0 017/ free t4 1 92  · ?  Hyperthyroidism in setting of infection/bacteremia   · Has had abnormal cardiac rhythm but on bradycardic side   · Positive for fatigue, hyperactivity, possibly mild exophthalmos, diarrhea improved   · Endocrinology consult appreciated, given acute illness recommend repeating labs in about 6 weeks

## 2022-06-28 NOTE — PLAN OF CARE
Problem: Potential for Falls  Goal: Patient will remain free of falls  Description: INTERVENTIONS:  - Educate patient/family on patient safety including physical limitations  - Instruct patient to call for assistance with activity   - Consult OT/PT to assist with strengthening/mobility   - Keep Call bell within reach  - Keep bed low and locked with side rails adjusted as appropriate  - Keep care items and personal belongings within reach  - Initiate and maintain comfort rounds  - Make Fall Risk Sign visible to staff  - Offer Toileting every 3 Hours, in advance of need  - Initiate/Maintain bed alarm  - Obtain necessary fall risk management equipment: alarm  - Apply yellow socks and bracelet for high fall risk patients  - Consider moving patient to room near nurses station  Outcome: Progressing

## 2022-06-28 NOTE — ASSESSMENT & PLAN NOTE
Asymmetric Septal Hypertrophy  · Prior TTE demonstrates severe septal asymmetric hypertrophy  Septum 1 8 cm by posterior wall 1 4 cm  · Per Cardiology no evidence of left ventricular hypertrophy no family history of HCM  · Patient reports that she had follow-up visit however was canceled secondary to this acute illness and inability to go to the office    · Patient also noted to have bradycardia overnight recently placed on beta-blocker   · Currently on 12 5 mg metoprolol BID, continue on discharge with outpt f/u with cardiology

## 2022-06-28 NOTE — ASSESSMENT & PLAN NOTE
Secondary to ureteral stone and right hydronephrosis/pyelo, fever of 102 2, tachypnea,  elevated lactic   · Blood cultures obtained at [place] [facility]  · Monitor both bc and urine culture same organism therefore urinary source most likely   · IV cefepime transitioned to PO cipro today based on culture

## 2022-06-28 NOTE — ASSESSMENT & PLAN NOTE
Secondary to ureteral stone and right hydronephrosis, fever of 102 2, tachypnea,  elevated lactic   · Blood cultures obtained at [place] [facility]  · - repeat obtained negative at 48 hrs   · Monitor both bc and urine culture same organism therefore urinary source most likely   · Monitor for leukocytosis   · ivf hydration discontinued dt elevated sbp   · Monitor vital signs - Bradycardia noted with elevated sbp  · On iv ceftriaxone transitioned 6/26 to cefepime per bc results would tx for 7 days total discharge on bactrim or quinolone avoid 1st , 2nd or 3rd generation cehalosporins

## 2022-06-28 NOTE — ASSESSMENT & PLAN NOTE
Hypokalemia in setting of no po intake dt nausea for last several days  · Now resolved with replacement/improved PO intake

## 2022-06-29 VITALS
TEMPERATURE: 98.3 F | HEART RATE: 67 BPM | DIASTOLIC BLOOD PRESSURE: 88 MMHG | SYSTOLIC BLOOD PRESSURE: 142 MMHG | RESPIRATION RATE: 20 BRPM | HEIGHT: 61 IN | WEIGHT: 153 LBS | BODY MASS INDEX: 28.89 KG/M2 | OXYGEN SATURATION: 95 %

## 2022-06-29 PROBLEM — A41.9 SEPSIS (HCC): Status: RESOLVED | Noted: 2022-06-25 | Resolved: 2022-06-29

## 2022-06-29 LAB
ANION GAP SERPL CALCULATED.3IONS-SCNC: 4 MMOL/L (ref 4–13)
BUN SERPL-MCNC: 15 MG/DL (ref 5–25)
CALCIUM SERPL-MCNC: 9.8 MG/DL (ref 8.3–10.1)
CHLORIDE SERPL-SCNC: 105 MMOL/L (ref 100–108)
CO2 SERPL-SCNC: 31 MMOL/L (ref 21–32)
CREAT SERPL-MCNC: 0.76 MG/DL (ref 0.6–1.3)
ERYTHROCYTE [DISTWIDTH] IN BLOOD BY AUTOMATED COUNT: 14.3 % (ref 11.6–15.1)
GFR SERPL CREATININE-BSD FRML MDRD: 95 ML/MIN/1.73SQ M
GLUCOSE SERPL-MCNC: 96 MG/DL (ref 65–140)
HCT VFR BLD AUTO: 33 % (ref 34.8–46.1)
HGB BLD-MCNC: 10.6 G/DL (ref 11.5–15.4)
MCH RBC QN AUTO: 27.5 PG (ref 26.8–34.3)
MCHC RBC AUTO-ENTMCNC: 32.1 G/DL (ref 31.4–37.4)
MCV RBC AUTO: 86 FL (ref 82–98)
PLATELET # BLD AUTO: 277 THOUSANDS/UL (ref 149–390)
PMV BLD AUTO: 10.4 FL (ref 8.9–12.7)
POTASSIUM SERPL-SCNC: 4.6 MMOL/L (ref 3.5–5.3)
RBC # BLD AUTO: 3.86 MILLION/UL (ref 3.81–5.12)
SODIUM SERPL-SCNC: 140 MMOL/L (ref 136–145)
WBC # BLD AUTO: 9.38 THOUSAND/UL (ref 4.31–10.16)

## 2022-06-29 PROCEDURE — 85027 COMPLETE CBC AUTOMATED: CPT | Performed by: PHYSICIAN ASSISTANT

## 2022-06-29 PROCEDURE — 99239 HOSP IP/OBS DSCHRG MGMT >30: CPT | Performed by: PHYSICIAN ASSISTANT

## 2022-06-29 PROCEDURE — 80048 BASIC METABOLIC PNL TOTAL CA: CPT | Performed by: PHYSICIAN ASSISTANT

## 2022-06-29 PROCEDURE — 99232 SBSQ HOSP IP/OBS MODERATE 35: CPT | Performed by: INTERNAL MEDICINE

## 2022-06-29 RX ORDER — CIPROFLOXACIN 500 MG/1
500 TABLET, FILM COATED ORAL EVERY 12 HOURS SCHEDULED
Qty: 7 TABLET | Refills: 0 | Status: SHIPPED | OUTPATIENT
Start: 2022-06-29 | End: 2022-07-03

## 2022-06-29 RX ORDER — PANTOPRAZOLE SODIUM 40 MG/1
40 TABLET, DELAYED RELEASE ORAL DAILY
Qty: 30 TABLET | Refills: 0 | Status: SHIPPED | OUTPATIENT
Start: 2022-06-29

## 2022-06-29 RX ORDER — AMLODIPINE BESYLATE 5 MG/1
5 TABLET ORAL DAILY
Qty: 30 TABLET | Refills: 0 | Status: SHIPPED | OUTPATIENT
Start: 2022-06-29

## 2022-06-29 RX ORDER — PHENAZOPYRIDINE HYDROCHLORIDE 100 MG/1
100 TABLET, FILM COATED ORAL
Qty: 10 TABLET | Refills: 0 | Status: SHIPPED | OUTPATIENT
Start: 2022-06-29

## 2022-06-29 RX ADMIN — PHENAZOPYRIDINE 100 MG: 100 TABLET ORAL at 07:57

## 2022-06-29 RX ADMIN — OMEGA-3 FATTY ACIDS CAP 1000 MG 1000 MG: 1000 CAP at 07:58

## 2022-06-29 RX ADMIN — Medication 12.5 MG: at 07:58

## 2022-06-29 RX ADMIN — POLYETHYLENE GLYCOL 3350 17 G: 17 POWDER, FOR SOLUTION ORAL at 07:57

## 2022-06-29 RX ADMIN — MULTIPLE VITAMINS W/ MINERALS TAB 1 TABLET: TAB ORAL at 07:58

## 2022-06-29 RX ADMIN — ENOXAPARIN SODIUM 40 MG: 40 INJECTION SUBCUTANEOUS at 07:58

## 2022-06-29 RX ADMIN — AMLODIPINE BESYLATE 5 MG: 5 TABLET ORAL at 07:59

## 2022-06-29 RX ADMIN — ALUMINA, MAGNESIA, AND SIMETHICONE ORAL SUSPENSION REGULAR STRENGTH 30 ML: 1200; 1200; 120 SUSPENSION ORAL at 11:57

## 2022-06-29 RX ADMIN — CIPROFLOXACIN 500 MG: KIT at 08:00

## 2022-06-29 RX ADMIN — OXYCODONE HYDROCHLORIDE 5 MG: 5 TABLET ORAL at 05:04

## 2022-06-29 RX ADMIN — PHENAZOPYRIDINE 100 MG: 100 TABLET ORAL at 11:56

## 2022-06-29 RX ADMIN — PANTOPRAZOLE SODIUM 40 MG: 40 TABLET, DELAYED RELEASE ORAL at 05:04

## 2022-06-29 NOTE — CASE MANAGEMENT
Case Management Discharge Planning Note    Patient name Chuckie Man  [facility] [id number]  : 1975 Date 2022       Current Admission Date: 2022  Current Admission Diagnosis:Pyelonephritis   Patient Active Problem List    Diagnosis Date Noted    Low TSH level 2022    Atrial septal hypertrophy 2022    Hypercalcemia 2022    Pyelonephritis 2022    Hydronephrosis due to obstruction of ureter 2022    Gram-negative bacteremia 2022    Mediastinal lymphadenopathy 2020    Acute midline low back pain without sciatica 2020    Menopausal symptoms 2019    Cancer associated pain 2019    Insomnia due to medical condition 2019    Loss of appetite 2019    Malignant neoplasm of overlapping sites of cervix ([facility] [address] ) 2019      LOS (days): 4  Geometric Mean LOS (GMLOS) (days):   Days to GMLOS:     OBJECTIVE:  Risk of Unplanned Readmission Score: 12 64         Current admission status: Inpatient   Preferred Pharmacy:   [address] [place], [place] [id number] R R 1 ([phone] [id number] [phone] [id number])  [address] [place] [address]  Phone: [phone] Fax: [phone] [address] [address]  Phone: [phone] Fax: [phone]    Primary Care Provider: Chris Bear    Primary Insurance: Reji Blum MA [facility]  Secondary Insurance:     DISCHARGE DETAILS:       Additional Comments: CM was made aware that pt is cleared for d/c home today  Pt stated that her car is located at [facility] and would need transportation assistance  Pt signed lyft waiver

## 2022-06-29 NOTE — NURSING NOTE
Pt stable at time of discharge  Meds sent to community pharmacy patient stated she will  post discharge  Pt voiced no further questions at this time

## 2022-06-29 NOTE — ASSESSMENT & PLAN NOTE
Pt with hx of malignant neoplasm of overlapping sites of cervix   · Stage 1 B2 cervical cancer   · Sp tx with chemo radiation and now over 2 yrs out   · Pap smear reveals high grade LUIS   · On 3/3/21 pt had colposcopy which was essentially normal with exception of radiation change   · No biopsys were performed at that time  Per Dr Poncho Horowitz  · She also had right labial skin tag removed during this procedure   · Will need ongoing surveillance with gyn onc

## 2022-06-29 NOTE — PLAN OF CARE
Problem: Potential for Falls  Goal: Patient will remain free of falls  Description: INTERVENTIONS:  - Educate patient/family on patient safety including physical limitations  - Instruct patient to call for assistance with activity   - Consult OT/PT to assist with strengthening/mobility   - Keep Call bell within reach  - Keep bed low and locked with side rails adjusted as appropriate  - Keep care items and personal belongings within reach  - Initiate and maintain comfort rounds  - Make Fall Risk Sign visible to staff  - Offer Toileting every 2 Hours, in advance of need  - Initiate/Maintain bed  chair alarm  - Obtain necessary fall risk management equipment: nonskid socsk  - Apply yellow socks and bracelet for high fall risk patients  - Consider moving patient to room near nurses station  Outcome: Progressing     Problem: PAIN - ADULT  Goal: Verbalizes/displays adequate comfort level or baseline comfort level  Description: Interventions:  - Encourage patient to monitor pain and request assistance  - Assess pain using appropriate pain scale  - Administer analgesics based on type and severity of pain and evaluate response  - Implement non-pharmacological measures as appropriate and evaluate response  - Consider cultural and social influences on pain and pain management  - Notify physician/advanced practitioner if interventions unsuccessful or patient reports new pain  Outcome: Progressing

## 2022-06-29 NOTE — ASSESSMENT & PLAN NOTE
Secondary to ureteral stone and right hydronephrosis/pyelo, fever of 102 2, tachypnea,  elevated lactic   · Blood cultures obtained at [place] [facility]  · Monitor both bc and urine culture same organism therefore urinary source most likely   · IV cefepime transitioned to PO cipro today based on culture  · Resolved 31-Aug-2019 18:24

## 2022-06-29 NOTE — ASSESSMENT & PLAN NOTE
Pt presented from [facility] per request of Urology for right ureteral stone , right hydronephrosis , fever, sepsis dt urinary source and complicated UTI  PT sent directly to the OR on admission to [facility]   · Seen by Medicine post op   · Post op day # 4 for cystoscopy retrograde pyelogram with insertion of right ureteral stent   · Blood cultures [facility] x 2 positive for Klebsiella aerogenes    Based on sensitivity and d/w ID pharmacy will transition to PO cipro   · Plan for outpt fu in a number of weeks for subsequent ureteroscopic intervention and admission to hospital   · Monitor renal function   · Pain control - oxy prn and tylenol

## 2022-06-29 NOTE — ASSESSMENT & PLAN NOTE
· Prior hx of hypercalcemia per pt report   · Would not recommend tums at this time or multivitamin   · PTH normal  · Recommend outpt f/u, pt aware

## 2022-06-29 NOTE — DISCHARGE SUMMARY
[address] [facility]  Discharge- Jayant Rodriguez 1975, 40 y o  female MRN: [id number]  Unit/Bed#: [facility] 919-01 Encounter: [id number]  Primary Care Provider: Errol Barrera   Date and time admitted to hospital: 6/25/2022  7:41 AM    * Pyelonephritis  Assessment & Plan  Pt presented from [facility] per request of Urology for right ureteral stone , right hydronephrosis , fever, sepsis dt urinary source and complicated UTI  PT sent directly to the OR on admission to [facility]   · Seen by Medicine post op   · Post op day # 4 for cystoscopy retrograde pyelogram with insertion of right ureteral stent   · Blood cultures [facility] x 2 positive for Klebsiella aerogenes  Based on sensitivity and d/w ID pharmacy will transition to PO cipro   · Plan for outpt fu in a number of weeks for subsequent ureteroscopic intervention and admission to hospital   · Monitor renal function   · Pain control - oxy prn and tylenol     Low TSH level  Assessment & Plan  TSH 0 017/ free t4 1 92  · ? Hyperthyroidism in setting of infection/bacteremia   · Has had abnormal cardiac rhythm but on bradycardic side   · Positive for fatigue, hyperactivity, possibly mild exophthalmos, diarrhea improved   · Endocrinology consult appreciated, given acute illness recommend repeating labs in about 6 weeks     Hypercalcemia  Assessment & Plan  · Prior hx of hypercalcemia per pt report   · Would not recommend tums at this time or multivitamin   · PTH normal  · Recommend outpt f/u, pt aware     Atrial septal hypertrophy  Assessment & Plan  Asymmetric Septal Hypertrophy  · Prior TTE demonstrates severe septal asymmetric hypertrophy  Septum 1 8 cm by posterior wall 1 4 cm  · Per Cardiology no evidence of left ventricular hypertrophy no family history of HCM  · Patient reports that she had follow-up visit however was canceled secondary to this acute illness and inability to go to the office    · Patient also noted to have bradycardia overnight recently placed on beta-blocker   · Currently on 12 5 mg metoprolol BID, continue on discharge with outpt f/u with cardiology       Gram-negative bacteremia  Assessment & Plan  In setting of right ureteral stone, fever with complicated UTI  · Blood cultures obtained at [facility], positive x2  -Klebsiella aerogenes susceptible to bactrim or quinolones    · Repeat blood cultures obtained at [facility] negative x 48 hrs   · Per sensitivity report and per d/w ID pharmacist, will transition to Cipro to complete antibiotic course x 10 days     Malignant neoplasm of overlapping sites of cervix [facility])  Assessment & Plan  Pt with hx of malignant neoplasm of overlapping sites of cervix   · Stage 1 B2 cervical cancer   · Sp tx with chemo radiation and now over 2 yrs out   · Pap smear reveals high grade LUIS   · On 3/3/21 pt had colposcopy which was essentially normal with exception of radiation change   · No biopsys were performed at that time  Per Dr Maximiliano Salter  · She also had right labial skin tag removed during this procedure   · Will need ongoing surveillance with gyn onc     Sepsis (HCC)-resolved as of 6/29/2022  Assessment & Plan  Secondary to ureteral stone and right hydronephrosis/pyelo, fever of 102 2, tachypnea,  elevated lactic   · Blood cultures obtained at [place] [facility] aerogenes  · Monitor both bc and urine culture same organism therefore urinary source most likely   · IV cefepime transitioned to PO cipro today based on culture  · Resolved         Medical Problems             Resolved Problems  Date Reviewed: 6/29/2022          Resolved    Hypokalemia 6/28/2022     Resolved by  Reema Zuniga PA-C    Sepsis ([facility] [address] ) 6/29/2022     Resolved by  Reema Zuniga PA-C              Discharging Physician / Practitioner: Reema Zuniga PA-C  PCP: Francine Rodrigez  Admission Date:   Admission Orders (From admission, onward)     Ordered        06/25/22 1122  Inpatient Admission  Once                      Discharge Date: 06/29/22    Consultations During Hospital Stay:  · Urology  · Cardiology  · Endocrinology    Procedures Performed:   · 6/25:  Cystoscopy retrograde pyelogram with insertion of right ureteral stent    Significant Findings / Test Results:   · 6/24 CT abdomen pelvis:  Mild right hydroureteronephrosis secondary to 3 mm distal ureteral calculus  Asymmetric right perinephric and peripelvic fat stranding may be reactive  Correlate clinically for infection  · Urine culture with >100,000 Klebsiella aerogenes, blood culture same results and repeat BC negative   · Abnormal TSH, recommend repeat labs in 6 weeks     Incidental Findings:   None    Test Results Pending at Discharge (will require follow up): · None     Outpatient Tests Requested:  · None    Complications:  None    Reason for Admission: sepsis    Hospital Course:   Ilan Hernandez is a 40 y o  female patient who originally presented to the hospital on 6/25/2022 due to right sided flank pain, poor appetite  CT a/p revealed RT hydro with obstructing stone  She was transferred to [facility] for urologic intervention  She is s/p cystoscopy, retrograde pyelogram with R ureteral stent placement on 6/25  She was treated for sepsis/bacteremia/pyelo with IV abx and improved clinically  She will need to have urologic procedure in the next several weeks for stent and definitive stone management  She will be discharged on PO Cipro x 10 days total abx for pyelo/bacteremia based on culture data  She was seen by cardiology given hx of HCM and bradycardia noted on vitals  Her BB dose was reduced and she was started on amlodipine for elevated blood pressures  She will need to f/u with cardiology on discharge  She was seen by endocrinology for abnormal TFTs  Given acute illness and recent normal TFTs, they recommend repeat labs in about 6 weeks        She is stable for discharge with outpt f/u    Please see above list of diagnoses and related plan for additional information  Condition at Discharge: good    Discharge Day Visit / Exam:   Subjective:  Pt reports this AM she felt a little funny and she did not have a good appetite/want to eat breakfast   She feels better at time of encounter  Wants to eat lunch  Vitals: Blood Pressure: 142/88 (06/29/22 0650)  Pulse: 67 (06/29/22 0650)  Temperature: 98 3 °F (36 8 °C) (06/29/22 0650)  Temp Source: Oral (06/27/22 0502)  Respirations: 20 (06/29/22 0650)  Height: 5' 1" (154 9 cm) (06/27/22 1210)  Weight - Scale: 69 4 kg (153 lb) (06/27/22 1210)  SpO2: 95 % (06/29/22 0650)  Exam:   Physical Exam  Vitals reviewed  Constitutional:       General: She is not in acute distress  Appearance: She is not toxic-appearing  HENT:      Head: Normocephalic and atraumatic  Eyes:      Extraocular Movements: Extraocular movements intact  Cardiovascular:      Rate and Rhythm: Normal rate and regular rhythm  Pulmonary:      Effort: Pulmonary effort is normal       Breath sounds: Normal breath sounds  Abdominal:      General: Bowel sounds are normal  There is no distension  Palpations: Abdomen is soft  Tenderness: There is no abdominal tenderness  Musculoskeletal:         General: Normal range of motion  Cervical back: Normal range of motion  Neurological:      General: No focal deficit present  Mental Status: She is alert and oriented to person, place, and time  Psychiatric:         Mood and Affect: Mood normal          Behavior: Behavior normal          Thought Content: Thought content normal           Discussion with Family: patient  Discharge instructions/Information to patient and family:   See after visit summary for information provided to patient and family  Provisions for Follow-Up Care:  See after visit summary for information related to follow-up care and any pertinent home health orders         Disposition:   Home    Planned Readmission: No     Discharge Statement:  I spent 40 minutes discharging the patient  This time was spent on the day of discharge  I had direct contact with the patient on the day of discharge  Greater than 50% of the total time was spent examining patient, answering all patient questions, arranging and discussing plan of care with patient as well as directly providing post-discharge instructions  Additional time then spent on discharge activities  Discharge Medications:  See after visit summary for reconciled discharge medications provided to patient and/or family        **Please Note: This note may have been constructed using a voice recognition system**

## 2022-06-30 LAB
BACTERIA BLD CULT: NORMAL
BACTERIA BLD CULT: NORMAL

## 2022-06-30 NOTE — UTILIZATION REVIEW
Notification of Discharge   This is a Notification of Discharge from our facility [address]  Please be advised that this patient has been discharge from our facility  Below you will find the admission and discharge date and time including the patients disposition  UTILIZATION REVIEW CONTACT:  Myra Thompson  Utilization   Network Utilization Review Department  Phone: [phone] x carefully listen to the prompts  All voicemails are confidential   Email: [email or web address]     PHYSICIAN ADVISORY SERVICES:  FOR RGMN-OT-ZNIZ REVIEW - MEDICAL NECESSITY DENIAL  Phone: [phone]  Fax: [phone]  Email: [email or web address]     PRESENTATION DATE: 6/25/2022  7:41 AM  OBERVATION ADMISSION DATE:   INPATIENT ADMISSION DATE: 6/25/22  7:41 AM   DISCHARGE DATE: 6/29/2022  2:00 PM  DISPOSITION: Home/Self Care Home/Self Care      IMPORTANT INFORMATION:  Send all requests for admission clinical reviews, approved or denied determinations and any other requests to dedicated fax number below belonging to the campus where the patient is receiving treatment   List of dedicated fax numbers:  [address] DENIALS (Administrative/Medical Necessity) [phone]   [address] [place] [address] (Maternity/NICU/Pediatrics) [phone]   [facility] [phone]   [address] [facility] [phone]   [facility] [place]  [phone]   [address] [facility] [address] [place] [address],[address] [place] [address] [phone]   [facility]  [phone]   [address] [facility] [address]  [address] [facility] [address] [facility] [phone]

## 2022-07-01 NOTE — TELEPHONE ENCOUNTER
Pt is calling to make an appointment for a stent removal from her stay in the hospital      Pt can be reached at [phone]

## 2022-07-01 NOTE — TELEPHONE ENCOUNTER
Called and spoke to patient  Informed patient our surgery scheduler is working with the provider to find a date for second surgery  Informed her once they have a date, surgery scheduler will call patient  Patient verbalized understanding

## 2022-07-04 ENCOUNTER — HOSPITAL ENCOUNTER (EMERGENCY)
Facility: HOSPITAL | Age: 47
Discharge: HOME | End: 2022-07-04
Attending: EMERGENCY MEDICINE
Payer: COMMERCIAL

## 2022-07-04 VITALS
OXYGEN SATURATION: 98 % | RESPIRATION RATE: 20 BRPM | HEART RATE: 53 BPM | DIASTOLIC BLOOD PRESSURE: 73 MMHG | TEMPERATURE: 98.1 F | SYSTOLIC BLOOD PRESSURE: 123 MMHG

## 2022-07-04 DIAGNOSIS — E05.90 HYPERTHYROIDISM: ICD-10-CM

## 2022-07-04 DIAGNOSIS — R53.83 FATIGUE: Primary | ICD-10-CM

## 2022-07-04 DIAGNOSIS — R53.1 WEAKNESS: ICD-10-CM

## 2022-07-04 DIAGNOSIS — R79.89 ABNORMAL CBC: ICD-10-CM

## 2022-07-04 DIAGNOSIS — N17.9 AKI (ACUTE KIDNEY INJURY) (HCC): ICD-10-CM

## 2022-07-04 LAB
ALBUMIN SERPL BCP-MCNC: 3 G/DL (ref 3.5–5)
ALP SERPL-CCNC: 75 U/L (ref 46–116)
ALT SERPL W P-5'-P-CCNC: 27 U/L (ref 12–78)
ANION GAP SERPL CALCULATED.3IONS-SCNC: 5 MMOL/L (ref 4–13)
AST SERPL W P-5'-P-CCNC: 15 U/L (ref 5–45)
BACTERIA UR QL AUTO: ABNORMAL /HPF
BASOPHILS # BLD MANUAL: 0.09 THOUSAND/UL (ref 0–0.1)
BASOPHILS NFR MAR MANUAL: 1 % (ref 0–1)
BILIRUB SERPL-MCNC: 0.72 MG/DL (ref 0.2–1)
BILIRUB UR QL STRIP: NEGATIVE
BUN SERPL-MCNC: 30 MG/DL (ref 5–25)
CALCIUM ALBUM COR SERPL-MCNC: 11.7 MG/DL (ref 8.3–10.1)
CALCIUM SERPL-MCNC: 10.9 MG/DL (ref 8.3–10.1)
CARDIAC TROPONIN I PNL SERPL HS: 9 NG/L (ref 8–18)
CHLORIDE SERPL-SCNC: 105 MMOL/L (ref 100–108)
CLARITY UR: CLEAR
CO2 SERPL-SCNC: 27 MMOL/L (ref 21–32)
COLOR UR: YELLOW
COLOR, POC: YELLOW
CREAT SERPL-MCNC: 1.32 MG/DL (ref 0.6–1.3)
EOSINOPHIL # BLD MANUAL: 0 THOUSAND/UL (ref 0–0.4)
EOSINOPHIL NFR BLD MANUAL: 0 % (ref 0–6)
ERYTHROCYTE [DISTWIDTH] IN BLOOD BY AUTOMATED COUNT: 13 % (ref 11.6–15.1)
EXT PREG TEST URINE: NEGATIVE
EXT. CONTROL ED NAV: NORMAL
GFR SERPL CREATININE-BSD FRML MDRD: 48 ML/MIN/1.73SQ M
GIANT PLATELETS BLD QL SMEAR: PRESENT
GLUCOSE SERPL-MCNC: 100 MG/DL (ref 65–140)
GLUCOSE UR STRIP-MCNC: NEGATIVE MG/DL
GRAN CASTS #/AREA URNS LPF: ABNORMAL /[LPF]
HCT VFR BLD AUTO: 36.4 % (ref 34.8–46.1)
HGB BLD-MCNC: 11.6 G/DL (ref 11.5–15.4)
HGB UR QL STRIP.AUTO: ABNORMAL
HYALINE CASTS #/AREA URNS LPF: ABNORMAL /LPF
KETONES UR STRIP-MCNC: NEGATIVE MG/DL
LEUKOCYTE ESTERASE UR QL STRIP: ABNORMAL
LYMPHOCYTES # BLD AUTO: 0.84 THOUSAND/UL (ref 0.6–4.47)
LYMPHOCYTES # BLD AUTO: 9 % (ref 14–44)
MCH RBC QN AUTO: 27.2 PG (ref 26.8–34.3)
MCHC RBC AUTO-ENTMCNC: 31.9 G/DL (ref 31.4–37.4)
MCV RBC AUTO: 85 FL (ref 82–98)
METAMYELOCYTES NFR BLD MANUAL: 2 % (ref 0–1)
MONOCYTES # BLD AUTO: 0.28 THOUSAND/UL (ref 0–1.22)
MONOCYTES NFR BLD: 3 % (ref 4–12)
MUCOUS THREADS UR QL AUTO: ABNORMAL
NEUTROPHILS # BLD MANUAL: 7.76 THOUSAND/UL (ref 1.85–7.62)
NEUTS BAND NFR BLD MANUAL: 1 % (ref 0–8)
NEUTS SEG NFR BLD AUTO: 82 % (ref 43–75)
NITRITE UR QL STRIP: NEGATIVE
NON-SQ EPI CELLS URNS QL MICRO: ABNORMAL /HPF
PH UR STRIP.AUTO: 5.5 [PH] (ref 4.5–8)
PLATELET # BLD AUTO: 332 THOUSANDS/UL (ref 149–390)
PLATELET BLD QL SMEAR: ADEQUATE
PMV BLD AUTO: 9.7 FL (ref 8.9–12.7)
POLYCHROMASIA BLD QL SMEAR: PRESENT
POTASSIUM SERPL-SCNC: 4.8 MMOL/L (ref 3.5–5.3)
PROT SERPL-MCNC: 8 G/DL (ref 6.4–8.2)
PROT UR STRIP-MCNC: >=300 MG/DL
RBC # BLD AUTO: 4.27 MILLION/UL (ref 3.81–5.12)
RBC #/AREA URNS AUTO: ABNORMAL /HPF
RBC MORPH BLD: PRESENT
SODIUM SERPL-SCNC: 137 MMOL/L (ref 136–145)
SP GR UR STRIP.AUTO: >=1.03 (ref 1–1.03)
T4 FREE SERPL-MCNC: 1.54 NG/DL (ref 0.76–1.46)
TRANS CELLS #/AREA URNS HPF: PRESENT /[HPF]
TSH SERPL DL<=0.05 MIU/L-ACNC: 0.03 UIU/ML (ref 0.45–4.5)
UROBILINOGEN UR QL STRIP.AUTO: 0.2 E.U./DL
VARIANT LYMPHS # BLD AUTO: 2 %
WBC # BLD AUTO: 9.35 THOUSAND/UL (ref 4.31–10.16)
WBC #/AREA URNS AUTO: ABNORMAL /HPF

## 2022-07-04 PROCEDURE — 99285 EMERGENCY DEPT VISIT HI MDM: CPT | Performed by: EMERGENCY MEDICINE

## 2022-07-04 PROCEDURE — 36415 COLL VENOUS BLD VENIPUNCTURE: CPT

## 2022-07-04 PROCEDURE — 85007 BL SMEAR W/DIFF WBC COUNT: CPT

## 2022-07-04 PROCEDURE — 84484 ASSAY OF TROPONIN QUANT: CPT

## 2022-07-04 PROCEDURE — 96374 THER/PROPH/DIAG INJ IV PUSH: CPT

## 2022-07-04 PROCEDURE — 81001 URINALYSIS AUTO W/SCOPE: CPT

## 2022-07-04 PROCEDURE — 99285 EMERGENCY DEPT VISIT HI MDM: CPT

## 2022-07-04 PROCEDURE — 81025 URINE PREGNANCY TEST: CPT | Performed by: EMERGENCY MEDICINE

## 2022-07-04 PROCEDURE — 93005 ELECTROCARDIOGRAM TRACING: CPT

## 2022-07-04 PROCEDURE — 96361 HYDRATE IV INFUSION ADD-ON: CPT

## 2022-07-04 PROCEDURE — 85027 COMPLETE CBC AUTOMATED: CPT

## 2022-07-04 PROCEDURE — 80053 COMPREHEN METABOLIC PANEL: CPT

## 2022-07-04 PROCEDURE — 84443 ASSAY THYROID STIM HORMONE: CPT

## 2022-07-04 PROCEDURE — 87086 URINE CULTURE/COLONY COUNT: CPT

## 2022-07-04 PROCEDURE — 84439 ASSAY OF FREE THYROXINE: CPT

## 2022-07-04 RX ORDER — KETOROLAC TROMETHAMINE 30 MG/ML
15 INJECTION, SOLUTION INTRAMUSCULAR; INTRAVENOUS ONCE
Status: COMPLETED | OUTPATIENT
Start: 2022-07-04 | End: 2022-07-04

## 2022-07-04 RX ADMIN — KETOROLAC TROMETHAMINE 15 MG: 30 INJECTION, SOLUTION INTRAMUSCULAR; INTRAVENOUS at 18:37

## 2022-07-04 RX ADMIN — SODIUM CHLORIDE 1000 ML: 0.9 INJECTION, SOLUTION INTRAVENOUS at 19:44

## 2022-07-04 NOTE — Clinical Note
Goyo Lua was seen and treated in our emergency department on 7/4/2022  No work until cleared by Family Doctor/Orthopedics        Diagnosis:     Della    She may return on this date: If you have any questions or concerns, please don't hesitate to call        Dianna Ballesteros MD    ______________________________           _______________          _______________  Hospital Representative                              Date                                Time

## 2022-07-04 NOTE — ED PROVIDER NOTES
History  Chief Complaint   Patient presents with    Weakness - Generalized     Pt stated that she recently had a ureteral stent placed and her cardiac medications changed  She stated that since her discharge she has been having episodes of dizziness and lightheaded  She also stated that she has generalized weakness  Noted that today was her first day back to work  She also stated that she has lower abdominal pain  I also stated, "my stent feels like a tampon is in crooked  "      78-year-old female presenting with a chief complaint of generalized weakness  Patient states that she had a ureteral stent placed back at the end of June and ever since that hospitalization she has been feeling generally weak and fatigued  She does note that she has been having difficulty with exertion ever since her discharge  She attempted to go back to work today at [employer] where she works and was having difficulty even with simple tasks such as carrying 2 plates back to the kitchen  She is coming in today for evaluation of her weakness and fatigue  Patient is also having some arthralgias notably at the hips  She is denying any infectious symptoms such as fever, chills, nausea, vomiting, diarrhea, constipation, chest pain, congestion, cough  She does note that she is having occasional sharp sensations in her bladder which she attributes to feeling the stents that were placed inside of her  She is denying any past cancer history, recent weight loss, states that she has had more less a normal appetite and normal p o  Intake  On review of systems she does endorse occasional palpitations especially with exertion  Prior to Admission Medications   Prescriptions Last Dose Informant Patient Reported? Taking?    Omega-3 Fatty Acids (FISH OIL) 1,000 mg  Self Yes No   Sig: Fish Oil   acetaminophen (TYLENOL) 325 mg tablet  Self No No   Sig: Take 2 tablets (650 mg total) by mouth every 6 (six) hours as needed for mild pain   Patient not taking: No sig reported   amLODIPine (NORVASC) 5 mg tablet   No No   Sig: Take 1 tablet (5 mg total) by mouth daily   ciprofloxacin (CIPRO) 500 mg tablet   No No   Sig: Take 1 tablet (500 mg total) by mouth every 12 (twelve) hours for 7 doses   metoprolol tartrate (LOPRESSOR) 25 mg tablet   No No   Sig: Take 0 5 tablets (12 5 mg total) by mouth every 12 (twelve) hours   oxybutynin (DITROPAN-XL) 10 MG 24 hr tablet   No No   Sig: Take 1 tablet (10 mg total) by mouth daily at bedtime   pantoprazole (PROTONIX) 40 mg tablet   No No   Sig: Take 1 tablet (40 mg total) by mouth daily   phenazopyridine (PYRIDIUM) 100 mg tablet   No No   Sig: Take 1 tablet (100 mg total) by mouth 3 (three) times a day with meals   senna (SENOKOT) 8 6 mg   No No   Sig: Take 1 tablet (8 6 mg total) by mouth daily at bedtime for 5 days   tamsulosin (FLOMAX) 0 4 mg   No No   Sig: Take 1 capsule (0 4 mg total) by mouth daily with dinner      Facility-Administered Medications: None       Past Medical History:   Diagnosis Date    Abnormal Pap smear of cervix     Acute hip pain     Cancer (HCC)     cervix    Hypokalemia     Hypokalemia 4/26/2019    Low back pain     Lymphadenopathy     Pelvic pain     Sepsis ([address] ) 6/25/2022       Past Surgical History:   Procedure Laterality Date    FL RETROGRADE PYELOGRAM  6/25/2022    [place] BRONCHOSCOPY NEEDLE BX TRACHEA MAIN STEM&/BRON N/A 2/26/2020    Procedure: ENDOBRONCHIAL ULTRASOUND (EBUS);   Surgeon: Brown Ham MD;  [facility];  Service: Thoracic    [place] [address] N/A 2/26/2020    Procedure: Rachel Hedges;  Surgeon: Brown Ham MD;  [facility];  Service: Thoracic    [place] CYSTOURETHROSCOPY,URETER CATHETER Right 6/25/2022    Procedure: CYSTOSCOPY RETROGRADE PYELOGRAM WITH INSERTION STENT URETERAL;  Surgeon: Staci Branham MD;  [facility];  Service: Urology    [place] N/A 6/20/2019    Procedure: EXAM UNDER ANESTHESIA (EUA); Surgeon: Gordon Hicks MD;  [facility];  Service: Gynecology Oncology    ME INSERT VAGINAL RADIATION DEVICE N/A 2019    Procedure: PIERRE SLEEVE PLACEMENT;  Surgeon: Gordon Hicks MD;  [facility];  Service: Gynecology Oncology    TONSILLECTOMY      US GUIDANCE  2019       Family History   Problem Relation Age of Onset    Uterine cancer Maternal Grandmother     Heart disease Mother     Canavan disease Father     Cancer Father      I have reviewed and agree with the history as documented  E-Cigarette/Vaping    E-Cigarette Use Never User      E-Cigarette/Vaping Substances    Nicotine No     THC No     CBD No     Flavoring No     Other No     Unknown No      Social History     Tobacco Use    Smoking status: Former Smoker     Packs/day: 0 50     Years: 1 00     Pack years: 0 50     Types: Cigarettes     Quit date: 2020     Years since quittin 3    Smokeless tobacco: Never Used   Vaping Use    Vaping Use: Never used   Substance Use Topics    Alcohol use: Not Currently    Drug use: Never        Review of Systems   Constitutional: Positive for activity change and fatigue  Negative for appetite change, chills and fever  HENT: Negative for ear pain and sore throat  Eyes: Negative for pain and visual disturbance  Respiratory: Negative for cough and shortness of breath  Cardiovascular: Positive for palpitations  Negative for chest pain  Gastrointestinal: Negative for abdominal pain and vomiting  Genitourinary: Negative for dysuria and hematuria  Musculoskeletal: Negative for arthralgias and back pain  Skin: Negative for color change and rash  Neurological: Positive for weakness  Negative for seizures and syncope  All other systems reviewed and are negative        Physical Exam  ED Triage Vitals   Temperature Pulse Respirations Blood Pressure SpO2   22 1616 22 1616 22 1616 22 1616 07/04/22 1616   98 1 °F (36 7 °C) 72 18 122/70 98 %      Temp Source Heart Rate Source Patient Position - Orthostatic VS BP Location FiO2 (%)   07/04/22 1616 07/04/22 1733 07/04/22 1733 07/04/22 1733 --   Oral Monitor Sitting Right arm       Pain Score       07/04/22 1615       5             Orthostatic Vital Signs  Vitals:    07/04/22 1616 07/04/22 1733   BP: 122/70 123/73   Pulse: 72 (!) 53   Patient Position - Orthostatic VS:  Sitting       Physical Exam  Vitals and nursing note reviewed  Constitutional:       General: She is not in acute distress  Appearance: She is well-developed  She is not toxic-appearing  HENT:      Head: Normocephalic and atraumatic  Right Ear: External ear normal  There is no impacted cerumen  Left Ear: External ear normal  There is no impacted cerumen  Nose: Nose normal  No congestion or rhinorrhea  Mouth/Throat:      Mouth: Mucous membranes are moist       Pharynx: Oropharynx is clear  No oropharyngeal exudate or posterior oropharyngeal erythema  Eyes:      General: No scleral icterus  Extraocular Movements: Extraocular movements intact  Conjunctiva/sclera: Conjunctivae normal       Pupils: Pupils are equal, round, and reactive to light  Cardiovascular:      Rate and Rhythm: Regular rhythm  Bradycardia present  Pulses: Normal pulses  Heart sounds: Normal heart sounds  No murmur heard  Pulmonary:      Effort: Pulmonary effort is normal  No respiratory distress  Breath sounds: Normal breath sounds  No wheezing or rhonchi  Abdominal:      General: Abdomen is flat  There is no distension  Palpations: Abdomen is soft  Tenderness: There is no abdominal tenderness  There is no guarding  Musculoskeletal:         General: No swelling  Cervical back: Neck supple  No rigidity  Right lower leg: No edema  Left lower leg: No edema  Lymphadenopathy:      Cervical: No cervical adenopathy     Skin:     General: Skin is warm and dry  Capillary Refill: Capillary refill takes less than 2 seconds  Coloration: Skin is not jaundiced  Findings: No rash  Neurological:      General: No focal deficit present  Mental Status: She is alert and oriented to person, place, and time  Mental status is at baseline  Psychiatric:         Mood and Affect: Mood normal          Behavior: Behavior normal          ED Medications  Medications   ketorolac (TORADOL) injection 15 mg (15 mg Intravenous Given 7/4/22 1837)   sodium chloride 0 9 % bolus 1,000 mL (0 mL Intravenous Stopped 7/4/22 2040)       Diagnostic Studies  Results Reviewed     Procedure Component Value Units Date/Time    Urine culture [[id number]] Collected: 07/04/22 1744    Lab Status: Preliminary result Specimen: Urine, Clean Catch Updated: 07/05/22 1723     Urine Culture Culture too young- will reincubate    CBC and differential [[id number]]  (Normal) Collected: 07/04/22 1819    Lab Status: Final result Specimen: Blood from Arm, Left Updated: 07/04/22 1912     WBC 9 35 Thousand/uL      RBC 4 27 Million/uL      Hemoglobin 11 6 g/dL      Hematocrit 36 4 %      MCV 85 fL      MCH 27 2 pg      MCHC 31 9 g/dL      RDW 13 0 %      MPV 9 7 fL      Platelets 506 Thousands/uL     Narrative: This is an appended report  These results have been appended to a previously verified report      Manual Differential(PHLEBS Do Not Order) [[id number]]  (Abnormal) Collected: 07/04/22 1819    Lab Status: Final result Specimen: Blood from Arm, Left Updated: 07/04/22 1912     Segmented % 82 %      Bands % 1 %      Lymphocytes % 9 %      Monocytes % 3 %      Eosinophils, % 0 %      Basophils % 1 %      Metamyelocytes% 2 %      Atypical Lymphocytes % 2 %      Absolute Neutrophils 7 76 Thousand/uL      Lymphocytes Absolute 0 84 Thousand/uL      Monocytes Absolute 0 28 Thousand/uL      Eosinophils Absolute 0 00 Thousand/uL      Basophils Absolute 0 09 Thousand/uL      Total Counted --     RBC Morphology Present     Polychromasia Present     Platelet Estimate Adequate     Giant PLTs Present    T4, free [[id number]]  (Abnormal) Collected: 07/04/22 1819    Lab Status: Final result Specimen: Blood from Arm, Left Updated: 07/04/22 1909     Free T4 1 54 ng/dL     TSH, 3rd generation with Free T4 reflex [[id number]]  (Abnormal) Collected: 07/04/22 1819    Lab Status: Final result Specimen: Blood from Arm, Left Updated: 07/04/22 1852     TSH 3RD GENERATON 0 031 uIU/mL     Narrative:      Patients undergoing fluorescein dye angiography may retain small amounts of fluorescein in the body for 48-72 hours post procedure  Samples containing fluorescein can produce falsely depressed TSH values  If the patient had this procedure,a specimen should be resubmitted post fluorescein clearance        High Sensitivity Troponin I Random [[id number]]  (Normal) Collected: 07/04/22 1819    Lab Status: Final result Specimen: Blood from Arm, Left Updated: 07/04/22 1852     HS TnI random 9 ng/L     Comprehensive metabolic panel [[id number]]  (Abnormal) Collected: 07/04/22 1819    Lab Status: Final result Specimen: Blood from Arm, Left Updated: 07/04/22 1847     Sodium 137 mmol/L      Potassium 4 8 mmol/L      Chloride 105 mmol/L      CO2 27 mmol/L      ANION GAP 5 mmol/L      BUN 30 mg/dL      Creatinine 1 32 mg/dL      Glucose 100 mg/dL      Calcium 10 9 mg/dL      Corrected Calcium 11 7 mg/dL      AST 15 U/L      ALT 27 U/L      Alkaline Phosphatase 75 U/L      Total Protein 8 0 g/dL      Albumin 3 0 g/dL      Total Bilirubin 0 72 mg/dL      eGFR 48 ml/min/1 73sq m     Narrative:      [place] guidelines for Chronic Kidney Disease (CKD):     Stage 1 with normal or high GFR (GFR > 90 mL/min/1 73 square meters)    Stage 2 Mild CKD (GFR = 60-89 mL/min/1 73 square meters)    Stage 3A Moderate CKD (GFR = 45-59 mL/min/1 73 square meters)    Stage 3B Moderate CKD (GFR = 30-44 mL/min/1 73 square meters)    Stage 4 Severe CKD (GFR = 15-29 mL/min/1 73 square meters)    Stage 5 End Stage CKD (GFR <15 mL/min/1 73 square meters)  Note: GFR calculation is accurate only with a steady state creatinine    Urine Microscopic [[id number]]  (Abnormal) Collected: 07/04/22 1744    Lab Status: Final result Specimen: Urine, Clean Catch Updated: 07/04/22 1829     RBC, UA Innumerable /hpf      WBC, UA 20-30 /hpf      Epithelial Cells Occasional /hpf      Bacteria, UA Occasional /hpf      MUCUS THREADS Occasional     Hyaline Casts, UA 10-25 /lpf      Granular Casts, UA 10-25     Transitional Epithelial Cells Present    POCT pregnancy, urine [[id number]]  (Normal) Resulted: 07/04/22 1747    Lab Status: Final result Updated: 07/04/22 1747     EXT PREG TEST UR (Ref: Negative) negative     Control valid    POCT urinalysis dipstick [[id number]]  (Normal) Resulted: 07/04/22 1747    Lab Status: Final result Specimen: Urine Updated: 07/04/22 1747     Color, UA yellow    Urine Macroscopic, POC [[id number]]  (Abnormal) Collected: 07/04/22 1744    Lab Status: Final result Specimen: Urine Updated: 07/04/22 1745     Color, UA Yellow     Clarity, UA Clear     pH, UA 5 5     Leukocytes, UA Trace     Nitrite, UA Negative     Protein, UA >=300 mg/dl      Glucose, UA Negative mg/dl      Ketones, UA Negative mg/dl      Urobilinogen, UA 0 2 E U /dl      Bilirubin, UA Negative     Occult Blood, UA Large     Specific Gravity, UA >=1 030    Narrative:      CLINITEK RESULT                 No orders to display         Procedures  ECG 12 Lead Documentation Only    Date/Time: 7/4/2022 4:42 PM  Performed by: Kushal Chery MD  Authorized by: Kushal Chery MD     Indications / Diagnosis:  Generalized weakness, fatigue  ECG reviewed by me, the ED Provider: yes    Patient location:  ED  Previous ECG:     Previous ECG:  Compared to current    Comparison ECG info:  6/27/22    Similarity:  Changes noted    Comparison to cardiac monitor: Yes    Interpretation: Interpretation: abnormal    Rate:     ECG rate:  51    ECG rate assessment: bradycardic    Rhythm:     Rhythm: sinus rhythm    Ectopy:     Ectopy: none    QRS:     QRS axis:  Normal  Conduction:     Conduction: abnormal      Abnormal conduction: incomplete RBBB    ST segments:     ST segments:  Abnormal  T waves:     T waves: normal    Other findings:     Other findings: NARAYAN            ED Course                                       MDM  Number of Diagnoses or Management Options  Abnormal CBC  ARIEL (acute kidney injury) ([facility] )  Fatigue  Hyperthyroidism  Weakness  Diagnosis management comments:   CC: Generalized Weakness    Impression:  Unclear etiology of patient's symptoms  Will workup with broad generalized labs including CBC with differential, CMP, TSH, ECG, and urinalysis with reflex to culture  Reassessment/disposition:  Patient has multiple abnormalities on workup including hyperthyroidism, acute kidney injury for which 1 L fluid bolus was given, and metamyelocytes and atypical lymphocytes on CBC with differential   I advised the patient that while these findings are concerning they are no reason that she necessarily needs to stay in the hospital   Patient was given instructions to follow-up with her PCP about the abnormalities        Disposition  Final diagnoses:   Fatigue   Weakness   Hyperthyroidism   Abnormal CBC   ARIEL (acute kidney injury) ([facility] )     Time reflects when diagnosis was documented in both MDM as applicable and the Disposition within this note     Time User Action Codes Description Comment    7/4/2022  8:15 PM Junior Landryby Add [R53 83] Fatigue     7/4/2022  8:15 PM Jen Ramos [R53 1] Weakness     7/4/2022  8:32 PM Pacheco Cage Add [E05 90] Hyperthyroidism     7/4/2022  8:33 PM Pacheco Cage Add [R79 89] Abnormal CBC     7/4/2022  8:33 PM Pacheco Cage Add [N17 9] ARIEL (acute kidney injury) [facility])       ED Disposition     ED Disposition   Discharge    Condition   Stable Date/Time   Mon Jul 4, 2022  8:14 PM    Comment   Della Hillman discharge to home/self care  Follow-up Information     Follow up With Specialties Details Why Contact Info Additional [id number] [facility] Internal Medicine Schedule an appointment as soon as possible for a visit in 1 day Call as soon as possible [address] Emergency Department Emergency Medicine Go to  If you're having chest pain, shortness of breath or other severe symptoms   [address] Emergency Department, [address] [place] [address], [address]          Discharge Medication List as of 7/4/2022  8:34 PM      CONTINUE these medications which have NOT CHANGED    Details   acetaminophen (TYLENOL) 325 mg tablet Take 2 tablets (650 mg total) by mouth every 6 (six) hours as needed for mild pain, Starting Wed 2/26/2020, Normal      amLODIPine (NORVASC) 5 mg tablet Take 1 tablet (5 mg total) by mouth daily, Starting Wed 6/29/2022, Normal      metoprolol tartrate (LOPRESSOR) 25 mg tablet Take 0 5 tablets (12 5 mg total) by mouth every 12 (twelve) hours, Starting Wed 6/29/2022, Normal      Omega-3 Fatty Acids (FISH OIL) 1,000 mg Fish Oil, Historical Med      oxybutynin (DITROPAN-XL) 10 MG 24 hr tablet Take 1 tablet (10 mg total) by mouth daily at bedtime, Starting Sat 6/25/2022, Until Mon 7/25/2022, Normal      pantoprazole (PROTONIX) 40 mg tablet Take 1 tablet (40 mg total) by mouth daily, Starting Wed 6/29/2022, Normal      phenazopyridine (PYRIDIUM) 100 mg tablet Take 1 tablet (100 mg total) by mouth 3 (three) times a day with meals, Starting Wed 6/29/2022, Normal      senna (SENOKOT) 8 6 mg Take 1 tablet (8 6 mg total) by mouth daily at bedtime for 5 days, Starting Sat 6/25/2022, Until Thu 6/30/2022, Normal      tamsulosin (FLOMAX) 0 4 mg Take 1 capsule (0 4 mg total) by mouth daily with dinner, Starting Sat 6/25/2022, Until Mon 7/25/2022, Normal         STOP taking these medications       ciprofloxacin (CIPRO) 500 mg tablet Comments:   Reason for Stopping:             No discharge procedures on file  PDMP Review     None           ED Provider  Attending physically available and evaluated Verónica Mcduffie  RUBEN managed the patient along with the ED Attending      Electronically Signed by         Anastacia Cruz MD  07/06/22 0028

## 2022-07-05 ENCOUNTER — PREP FOR PROCEDURE (OUTPATIENT)
Dept: UROLOGY | Facility: CLINIC | Age: 47
End: 2022-07-05

## 2022-07-05 DIAGNOSIS — N20.0 CALCULUS OF KIDNEY: Primary | ICD-10-CM

## 2022-07-05 LAB
ATRIAL RATE: 51 BPM
P AXIS: 71 DEGREES
PR INTERVAL: 146 MS
QRS AXIS: 72 DEGREES
QRSD INTERVAL: 86 MS
QT INTERVAL: 456 MS
QTC INTERVAL: 420 MS
T WAVE AXIS: 63 DEGREES
VENTRICULAR RATE: 51 BPM

## 2022-07-05 PROCEDURE — 93010 ELECTROCARDIOGRAM REPORT: CPT | Performed by: INTERNAL MEDICINE

## 2022-07-05 NOTE — DISCHARGE INSTRUCTIONS
1) Your CBC abnormalities        A) metamyelocytes        B) atypical lymphocytes  2) Your thyroid function is high (hyperthyroid)  3) Your kidney function is worse than normal (ARIEL)    All of these issues need to be addressed with your primary care provider this week  Please make an appointment as soon as possible and be sure to recheck your blood work at their discretion  As we discussed you do have what we call an acute kidney injury meaning that your kidney function is worse than it usually is  It does appear that this is likely due to dehydration  You need to be drinking plenty of fluids even more so than your normal intake  If you notice changes in your urine including last urine production than normal then you need to come back to the emergency department

## 2022-07-05 NOTE — TELEPHONE ENCOUNTER
Spoke w patient and she is sched for 7/28 at the [facility] w Dr Ferdinand Harris  She is unable to travel to [place] for procedure  She is aware hospital will contact her day prior w  time of arrival and she will take uber to procedure  I did inform her that she needs to have a  to go home b/c hospital will not allow an uber to go back  She will go for [facility] on 7/18 and I am mailing her a surgical packet w this info  I did remind her to stay hydrated and to contact us w any issues or concerns

## 2022-07-06 LAB — BACTERIA UR CULT: NORMAL

## 2022-07-08 NOTE — ED ATTENDING ATTESTATION
7/4/2022  I, Stoney Tran MD, saw and evaluated the patient  I have discussed the patient with the resident/non-physician practitioner and agree with the resident's/non-physician practitioner's findings, Plan of Care, and MDM as documented in the resident's/non-physician practitioner's note, except where noted  All available labs and Radiology studies were reviewed  I was present for key portions of any procedure(s) performed by the resident/non-physician practitioner and I was immediately available to provide assistance  At this point I agree with the current assessment done in the Emergency Department  I have conducted an independent evaluation of this patient a history and physical is as follows:    ED Course    patient is a 50-year-old female with history of recent kidney stone status post stenting while as an patient had her blood pressure medications recently changed she reports generalized weakness and fatigue  Patient states that she was unable to finish her day at work today secondary to weakness and fatigue  Patient admits to being compliant with all medications including the recent antibiotic course  She denies any fevers chills nausea vomiting diarrhea    Vital signs reviewed  On heart regular rate rhythm without murmurs  Lungs clear to auscultation bilaterally  Abdomen soft nontender nondistended normal bowel sounds  Extremities no edema  Neuro exam nonfocal     Is impression: Generalized weakness fatigue he will check screening labs ECG urinalysis neuro pregnancy anticipate discharge with close follow-up PCP if negative ED workup      Critical Care Time  Procedures

## 2022-07-13 ENCOUNTER — TELEPHONE (OUTPATIENT)
Dept: UROLOGY | Facility: MEDICAL CENTER | Age: 47
End: 2022-07-13

## 2022-07-18 ENCOUNTER — APPOINTMENT (OUTPATIENT)
Dept: LAB | Facility: HOSPITAL | Age: 47
End: 2022-07-18
Payer: COMMERCIAL

## 2022-07-18 DIAGNOSIS — N20.0 CALCULUS OF KIDNEY: ICD-10-CM

## 2022-07-18 PROCEDURE — 87086 URINE CULTURE/COLONY COUNT: CPT

## 2022-07-19 LAB — BACTERIA UR CULT: NORMAL

## 2022-07-20 ENCOUNTER — ANESTHESIA EVENT (OUTPATIENT)
Dept: PERIOP | Facility: HOSPITAL | Age: 47
End: 2022-07-20
Payer: COMMERCIAL

## 2022-07-20 NOTE — PRE-PROCEDURE INSTRUCTIONS
Pre-Surgery Instructions:   Medication Instructions    amLODIPine (NORVASC) 5 mg tablet Take day of surgery   metoprolol tartrate (LOPRESSOR) 25 mg tablet Take day of surgery   Omega-3 Fatty Acids (FISH OIL) 1,000 mg Stop taking 7 days prior to surgery   oxybutynin (DITROPAN-XL) 10 MG 24 hr tablet Take night before surgery    pantoprazole (PROTONIX) 40 mg tablet Take day of surgery   tamsulosin (FLOMAX) 0 4 mg Take night before surgery   Verbal pre-op instructions given to pt via phone  Pt verbalizes understanding

## 2022-07-28 ENCOUNTER — ANESTHESIA (OUTPATIENT)
Dept: PERIOP | Facility: HOSPITAL | Age: 47
End: 2022-07-28
Payer: COMMERCIAL

## 2022-07-28 ENCOUNTER — APPOINTMENT (OUTPATIENT)
Dept: RADIOLOGY | Facility: HOSPITAL | Age: 47
End: 2022-07-28
Payer: COMMERCIAL

## 2022-07-28 ENCOUNTER — HOSPITAL ENCOUNTER (OUTPATIENT)
Facility: HOSPITAL | Age: 47
Setting detail: OUTPATIENT SURGERY
Discharge: HOME | End: 2022-07-28
Attending: UROLOGY | Admitting: UROLOGY
Payer: COMMERCIAL

## 2022-07-28 ENCOUNTER — TELEPHONE (OUTPATIENT)
Dept: UROLOGY | Facility: CLINIC | Age: 47
End: 2022-07-28

## 2022-07-28 VITALS
HEIGHT: 61 IN | TEMPERATURE: 98.4 F | DIASTOLIC BLOOD PRESSURE: 70 MMHG | HEART RATE: 50 BPM | RESPIRATION RATE: 20 BRPM | WEIGHT: 143.52 LBS | OXYGEN SATURATION: 100 % | SYSTOLIC BLOOD PRESSURE: 162 MMHG | BODY MASS INDEX: 27.1 KG/M2

## 2022-07-28 DIAGNOSIS — N20.1 RIGHT URETERAL STONE: Primary | ICD-10-CM

## 2022-07-28 DIAGNOSIS — N20.0 NEPHROLITHIASIS: Primary | ICD-10-CM

## 2022-07-28 DIAGNOSIS — Z46.6 ENCOUNTER FOR ADJUSTMENT OF URETERAL STENT: ICD-10-CM

## 2022-07-28 PROBLEM — K21.9 GASTROESOPHAGEAL REFLUX DISEASE: Status: ACTIVE | Noted: 2022-07-28

## 2022-07-28 PROCEDURE — C1769 GUIDE WIRE: HCPCS | Performed by: UROLOGY

## 2022-07-28 PROCEDURE — 82360 CALCULUS ASSAY QUANT: CPT | Performed by: UROLOGY

## 2022-07-28 PROCEDURE — 74420 UROGRAPHY RTRGR +-KUB: CPT

## 2022-07-28 PROCEDURE — 52356 CYSTO/URETERO W/LITHOTRIPSY: CPT | Performed by: UROLOGY

## 2022-07-28 PROCEDURE — NC001 PR NO CHARGE: Performed by: UROLOGY

## 2022-07-28 PROCEDURE — C1758 CATHETER, URETERAL: HCPCS | Performed by: UROLOGY

## 2022-07-28 PROCEDURE — C2617 STENT, NON-COR, TEM W/O DEL: HCPCS | Performed by: UROLOGY

## 2022-07-28 PROCEDURE — C1894 INTRO/SHEATH, NON-LASER: HCPCS | Performed by: UROLOGY

## 2022-07-28 DEVICE — INLAY OPTIMA URETERAL STENT W/O GUIDEWIRE
Type: IMPLANTABLE DEVICE | Status: FUNCTIONAL
Brand: BARD® INLAY OPTIMA® URETERAL STENT

## 2022-07-28 RX ORDER — OXYCODONE HYDROCHLORIDE 5 MG/1
5 TABLET ORAL EVERY 4 HOURS PRN
Status: DISCONTINUED | OUTPATIENT
Start: 2022-07-28 | End: 2022-07-28 | Stop reason: HOSPADM

## 2022-07-28 RX ORDER — GLYCOPYRROLATE 0.2 MG/ML
INJECTION INTRAMUSCULAR; INTRAVENOUS AS NEEDED
Status: DISCONTINUED | OUTPATIENT
Start: 2022-07-28 | End: 2022-07-28

## 2022-07-28 RX ORDER — SODIUM CHLORIDE, SODIUM LACTATE, POTASSIUM CHLORIDE, CALCIUM CHLORIDE 600; 310; 30; 20 MG/100ML; MG/100ML; MG/100ML; MG/100ML
125 INJECTION, SOLUTION INTRAVENOUS CONTINUOUS
Status: DISCONTINUED | OUTPATIENT
Start: 2022-07-28 | End: 2022-07-28 | Stop reason: HOSPADM

## 2022-07-28 RX ORDER — METOCLOPRAMIDE HYDROCHLORIDE 5 MG/ML
5 INJECTION INTRAMUSCULAR; INTRAVENOUS ONCE AS NEEDED
Status: DISCONTINUED | OUTPATIENT
Start: 2022-07-28 | End: 2022-07-28 | Stop reason: HOSPADM

## 2022-07-28 RX ORDER — MAGNESIUM HYDROXIDE/ALUMINUM HYDROXICE/SIMETHICONE 120; 1200; 1200 MG/30ML; MG/30ML; MG/30ML
30 SUSPENSION ORAL EVERY 6 HOURS PRN
Status: DISCONTINUED | OUTPATIENT
Start: 2022-07-28 | End: 2022-07-28 | Stop reason: HOSPADM

## 2022-07-28 RX ORDER — ONDANSETRON 2 MG/ML
4 INJECTION INTRAMUSCULAR; INTRAVENOUS ONCE AS NEEDED
Status: DISCONTINUED | OUTPATIENT
Start: 2022-07-28 | End: 2022-07-28 | Stop reason: HOSPADM

## 2022-07-28 RX ORDER — OXYCODONE HYDROCHLORIDE 10 MG/1
10 TABLET ORAL EVERY 4 HOURS PRN
Status: DISCONTINUED | OUTPATIENT
Start: 2022-07-28 | End: 2022-07-28 | Stop reason: HOSPADM

## 2022-07-28 RX ORDER — CEFAZOLIN SODIUM 1 G/50ML
1000 SOLUTION INTRAVENOUS ONCE
Status: COMPLETED | OUTPATIENT
Start: 2022-07-28 | End: 2022-07-28

## 2022-07-28 RX ORDER — ONDANSETRON 2 MG/ML
INJECTION INTRAMUSCULAR; INTRAVENOUS AS NEEDED
Status: DISCONTINUED | OUTPATIENT
Start: 2022-07-28 | End: 2022-07-28

## 2022-07-28 RX ORDER — LIDOCAINE HYDROCHLORIDE 20 MG/ML
INJECTION, SOLUTION EPIDURAL; INFILTRATION; INTRACAUDAL; PERINEURAL AS NEEDED
Status: DISCONTINUED | OUTPATIENT
Start: 2022-07-28 | End: 2022-07-28

## 2022-07-28 RX ORDER — OXYCODONE HYDROCHLORIDE 5 MG/1
5 TABLET ORAL EVERY 4 HOURS PRN
Qty: 8 TABLET | Refills: 0 | Status: SHIPPED | OUTPATIENT
Start: 2022-07-28 | End: 2022-08-02

## 2022-07-28 RX ORDER — MAGNESIUM HYDROXIDE 1200 MG/15ML
LIQUID ORAL AS NEEDED
Status: DISCONTINUED | OUTPATIENT
Start: 2022-07-28 | End: 2022-07-28 | Stop reason: HOSPADM

## 2022-07-28 RX ORDER — PROPOFOL 10 MG/ML
INJECTION, EMULSION INTRAVENOUS AS NEEDED
Status: DISCONTINUED | OUTPATIENT
Start: 2022-07-28 | End: 2022-07-28

## 2022-07-28 RX ORDER — SULFAMETHOXAZOLE AND TRIMETHOPRIM 800; 160 MG/1; MG/1
1 TABLET ORAL EVERY 12 HOURS SCHEDULED
Qty: 6 TABLET | Refills: 0 | Status: SHIPPED | OUTPATIENT
Start: 2022-07-28 | End: 2022-07-29

## 2022-07-28 RX ORDER — SENNOSIDES 8.6 MG
8.6 TABLET ORAL
Qty: 5 TABLET | Refills: 0 | Status: SHIPPED | OUTPATIENT
Start: 2022-07-28 | End: 2022-08-02

## 2022-07-28 RX ORDER — ACETAMINOPHEN 325 MG/1
650 TABLET ORAL EVERY 6 HOURS SCHEDULED
Status: DISCONTINUED | OUTPATIENT
Start: 2022-07-28 | End: 2022-07-28 | Stop reason: HOSPADM

## 2022-07-28 RX ORDER — FENTANYL CITRATE/PF 50 MCG/ML
50 SYRINGE (ML) INJECTION
Status: DISCONTINUED | OUTPATIENT
Start: 2022-07-28 | End: 2022-07-28 | Stop reason: HOSPADM

## 2022-07-28 RX ORDER — DEXAMETHASONE SODIUM PHOSPHATE 10 MG/ML
INJECTION, SOLUTION INTRAMUSCULAR; INTRAVENOUS AS NEEDED
Status: DISCONTINUED | OUTPATIENT
Start: 2022-07-28 | End: 2022-07-28

## 2022-07-28 RX ORDER — ONDANSETRON 2 MG/ML
4 INJECTION INTRAMUSCULAR; INTRAVENOUS EVERY 6 HOURS PRN
Status: DISCONTINUED | OUTPATIENT
Start: 2022-07-28 | End: 2022-07-28 | Stop reason: HOSPADM

## 2022-07-28 RX ORDER — HYDROMORPHONE HCL/PF 1 MG/ML
0.5 SYRINGE (ML) INJECTION
Status: DISCONTINUED | OUTPATIENT
Start: 2022-07-28 | End: 2022-07-28 | Stop reason: HOSPADM

## 2022-07-28 RX ORDER — MIDAZOLAM HYDROCHLORIDE 2 MG/2ML
INJECTION, SOLUTION INTRAMUSCULAR; INTRAVENOUS AS NEEDED
Status: DISCONTINUED | OUTPATIENT
Start: 2022-07-28 | End: 2022-07-28

## 2022-07-28 RX ORDER — FENTANYL CITRATE 50 UG/ML
INJECTION, SOLUTION INTRAMUSCULAR; INTRAVENOUS AS NEEDED
Status: DISCONTINUED | OUTPATIENT
Start: 2022-07-28 | End: 2022-07-28

## 2022-07-28 RX ORDER — HYDROMORPHONE HCL/PF 1 MG/ML
0.5 SYRINGE (ML) INJECTION EVERY 2 HOUR PRN
Status: DISCONTINUED | OUTPATIENT
Start: 2022-07-28 | End: 2022-07-28 | Stop reason: HOSPADM

## 2022-07-28 RX ADMIN — FENTANYL CITRATE 50 MCG: 50 INJECTION, SOLUTION INTRAMUSCULAR; INTRAVENOUS at 15:06

## 2022-07-28 RX ADMIN — PHENYLEPHRINE HYDROCHLORIDE 30 MCG/MIN: 10 INJECTION INTRAVENOUS at 13:40

## 2022-07-28 RX ADMIN — DEXAMETHASONE SODIUM PHOSPHATE 10 MG: 10 INJECTION INTRAMUSCULAR; INTRAVENOUS at 13:43

## 2022-07-28 RX ADMIN — MIDAZOLAM 2 MG: 1 INJECTION INTRAMUSCULAR; INTRAVENOUS at 13:30

## 2022-07-28 RX ADMIN — FENTANYL CITRATE 50 MCG: 50 INJECTION, SOLUTION INTRAMUSCULAR; INTRAVENOUS at 14:02

## 2022-07-28 RX ADMIN — OXYCODONE HYDROCHLORIDE 10 MG: 10 TABLET ORAL at 15:34

## 2022-07-28 RX ADMIN — CEFAZOLIN SODIUM 1000 MG: 1 SOLUTION INTRAVENOUS at 13:28

## 2022-07-28 RX ADMIN — SODIUM CHLORIDE, SODIUM LACTATE, POTASSIUM CHLORIDE, AND CALCIUM CHLORIDE 125 ML/HR: .6; .31; .03; .02 INJECTION, SOLUTION INTRAVENOUS at 11:40

## 2022-07-28 RX ADMIN — ONDANSETRON 4 MG: 2 INJECTION INTRAMUSCULAR; INTRAVENOUS at 13:43

## 2022-07-28 RX ADMIN — LIDOCAINE HYDROCHLORIDE 60 MG: 20 INJECTION, SOLUTION EPIDURAL; INFILTRATION; INTRACAUDAL; PERINEURAL at 13:39

## 2022-07-28 RX ADMIN — FENTANYL CITRATE 50 MCG: 50 INJECTION, SOLUTION INTRAMUSCULAR; INTRAVENOUS at 14:42

## 2022-07-28 RX ADMIN — PROPOFOL 140 MG: 10 INJECTION, EMULSION INTRAVENOUS at 13:39

## 2022-07-28 RX ADMIN — GLYCOPYRROLATE 0.2 MCG: 0.2 INJECTION, SOLUTION INTRAMUSCULAR; INTRAVENOUS at 13:47

## 2022-07-28 RX ADMIN — FENTANYL CITRATE 50 MCG: 50 INJECTION, SOLUTION INTRAMUSCULAR; INTRAVENOUS at 13:39

## 2022-07-28 NOTE — OP NOTE
OPERATIVE REPORT  PATIENT NAME: Eve Singleton    :  1975  MRN: [id number]  Pt Location: [facility] OR ROOM 04    SURGERY DATE: 2022    Surgeon(s) and Role:     * Stacy Walsh MD - Primary    Preop Diagnosis:  Right ureteral stone [N20 1]  Encounter for adjustment of ureteral stent [Z46 6]    Post-Op Diagnosis Codes:     * Right ureteral stone [N20 1]     * Encounter for adjustment of ureteral stent [Z46 6]    Procedure(s) (LRB):  CYSTOSCOPY URETEROSCOPY WITH LITHOTRIPSY HOLMIUM LASER, RETROGRADE PYELOGRAM AND RIGHT INSERTION STENT URETERAL (Right), stone basket extraction    Specimen(s):  ID Type Source Tests Collected by Time Destination   A : Distal Right Ureteral Stone Calculus Kidney, Right Ivy Jean MD 2022 1411        Estimated Blood Loss:   Minimal    Drains:  Ureteral Internal Stent 6 Fr  (Active)   Number of days: 0       Anesthesia Type:   General    Operative Indications:  Right ureteral stone [N20 1]  Encounter for adjustment of ureteral stent [Z46 6]      Operative Findings: The distal right ureteral stone is still present, a rigid scope could not be placed, a flexible scope was used to perform ureteroscopy with laser lithotripsy followed by stone basket extraction  The upper, middle, and lower pole calices were fully examined and were free of stones    Exchange of a 6 Western Stephanie by 24 centimeter right ureteral stent was performed and a string was left in place    Complications:   None    Procedure and Technique:      PROCEDURES PERFORMED:  1) Cystoscopy  2) right retrograde pyelography with fluoroscopic interpretation  3) right ureteroscopy with laser lithotripsy and basket extraction of stone  4) Right ureteral stent placement (6F x 24cm)    SURGEON:  Stacy Walsh MD    ASSISTANTS:  None    NOTE:  There were no qualified teaching residents to assist with this case    ANESTHESIA: General     COMPLICATIONS:   None    ANTIBIOTICS:  Ancef    INTRAOPERATIVE THROMBOEMBOLISM PROPHYLAXIS:  Pneumatic compression stockings         FINDINGS:    1  The right calculus was not radiopaque on plain fluoroscopy  2  Retrograde pyelogram was performed on the right side using a 5 Fr open ended catheter  16 milliliters of contrast used     3  The following findings were noted: Moderate hydronephrosis noted      INDICATIONS FOR PROCEDURE:  Verónica Mcduffie is an 40 y o  old female with a right ureteral calculus status post previous stenting for complicated UTI returns for definitive stone management today  After discussing the options for treatment, including medical expulsive therapy, extracorporeal shockwave lithotripsy, and ureteroscopy, the patient elected to undergo ureteroscopy and ureteral stent placement  We discussed the procedure in detail, the alternatives, and the risks, and they signed informed consent to proceed (these are outlined in the surgical consent form)  PROCEDURE IN DETAIL:     The patient was identified by name, date of birth, and MRN  and brought to the OR  Antibiotic prophylaxis and DVT prophylaxis were administered as per the guidelines  They were placed in the dorsal lithotomy position with care to pad all pressure points  They were prepped and draped in the usual sterile fashion  A surgical time out was performed with all in the room in agreement with the correct patient, procedure, indications, and laterality  A 21-Malawian rigid cystoscope was used to enter the bladder  The bladder was inspected in its entirety and there were no lesions noted  The ureteral orifices were identified in their normal orthotopic positions  The right stent was grasped and delivered per meatus  A wire was placed  A retrograde pyelogram was performed with the findings as described above  A Solo wire was advanced up to the kidney under fluoroscopic guidance  Leaving this safety wire in place, the bladder was drained          A "7 5 Fr semi-rigid ureteroscope was advanced up the ureter under vision   This would not pass proximal to the pelvic brim  A 2nd wire was placed and a flexible scope was loaded to the proximal collecting system and upper, middle, and lower pole calices were examined systematically showing no stone burden  The flexible scope was withdrawn slowly and the stone was noted in the distal ureter location  The stone was noted to be impacted  A holmium laser fiber was passed through the ureteroscope and laser lithotripsy was commenced at settings of 1 J and 10 Hz  The stones were fragmented to very small pieces and dust       Once we were satisfied that the stone was fragmented to dust, a 1 9 Western [place] zero tip nitinol basket was passed through the ureteroscope  The residual fragments were basketed out and removed  The ureteroscope was backed down the ureter under vision and there were no residual fragments and the ureter was noted to be intact with no injury and mild edema where the stone was located  A 6 Yoruba by 24 centimeter right JJ stent was then passed up the wire  under fluoroscopic guidance into the kidney with a good curl noted in the kidney and in the bladder  The stent string was not removed  The bladder was drained  All instrument counts and sponge counts were correct  The patient was placed back into the supine position, awakened from general anesthesia and brought to recovery room in stable condition  ESTIMATED BLOOD LOSS:  Minimal      DRAINS:   Ureteral Internal Stent 6 Fr  (Active)       SPECIMENS:   Order Name Source Comment Collection Info Order Time   STONE ANALYSIS Kidney, Right  Collected By: Esther Sanchez MD 7/28/2022  2:13 PM        IMPLANTS:   Implant Name Type Inv   Item Serial No   Lot No  LRB No  Used Action   STENT URETERAL 6FR 24CML [place]  STENT URETERAL 6FR 24CML [facility] [id number] Right 1 Implanted        COMPLICATIONS: None    DISPOSITION: PACU     PLAN:  The patient is status post ureteroscopic laser lithotripsy with stone basketing  She will remove her stent in 5 days    She will see office in 3 months with imaging prior     I was present for the entire procedure and A qualified resident physician was not available    Patient Disposition:  PACU       SIGNATURE: Rajesh Colby MD  DATE: July 28, 2022  TIME: 2:20 PM

## 2022-07-28 NOTE — H&P
H&P Exam - Urology   Della Hillman 40 y o  female MRN: [id number]  Unit/Bed#: OR [place] Encounter: [id number]    Assessment/Plan     Assessment:  27-year-old woman status post previous stenting for complicated UTI, here today for definitive right ureteroscopy with laser lithotripsy and all indicated procedures  Marked on her right arm, ready for surgery today  Plan:  Procedure right ureteroscopy with laser lithotripsy and all indicated procedures    History of Present Illness   HPI:  Nathalie Villa is a 40 y o  female who presents with a right ureteral stone, here today for right ureteroscopy with laser lithotripsy  Ready for surgery  Marked on her right arm  The following portions of the patient's history were reviewed and updated as appropriate: allergies, current medications, past family history, past medical history, past social history, past surgical history and problem list     Review of Systems   Constitutional: Negative  HENT: Negative  Eyes: Negative  Respiratory: Negative  Cardiovascular: Negative  Gastrointestinal: Negative  Endocrine: Negative  Genitourinary: Negative  Stent discomfort   Musculoskeletal: Negative  Skin: Negative  Allergic/Immunologic: Negative  Neurological: Negative  Hematological: Negative  Psychiatric/Behavioral: Negative          Historical Information   Past Medical History:   Diagnosis Date    Abnormal Pap smear of cervix     Acute hip pain     tristin    Cancer ([address] )     cervix- had chemo    COVID     Feb 2022    Heart palpitations     frequent -7/20 instructed to call cardiology to report    Hypertension     Hypokalemia 04/26/2019    Low back pain     Lymphadenopathy     Pelvic pain     Sepsis ([address] ) 06/25/2022    Wears contact lenses     Wears dentures     partial lower     Past Surgical History:   Procedure Laterality Date    FL RETROGRADE PYELOGRAM  6/25/2022    WY BRONCHOSCOPY NEEDLE BX TRACHEA MAIN STEM&/BRON N/A 2020    Procedure: ENDOBRONCHIAL ULTRASOUND (EBUS); Surgeon: Hugo James MD;  [facility];  Service: Thoracic    TN BRONCHOSCOPY,DIAGNOSTIC N/A 2020    Procedure: [place] [employer];  Surgeon: Hugo James MD;  [facility];  Service: Thoracic    TN CYSTOURETHROSCOPY,URETER CATHETER Right 2022    Procedure: CYSTOSCOPY RETROGRADE PYELOGRAM WITH INSERTION STENT URETERAL;  Surgeon: Cassandra Early MD;  [facility];  Service: Urology    TN DILATION OF VAGINA W ANESTH N/A 2019    Procedure: EXAM UNDER ANESTHESIA (EUA);   Surgeon: Arnoldo Naidu MD;  [facility];  Service: Gynecology Oncology    TN INSERT VAGINAL RADIATION DEVICE N/A 2019    Procedure: Harlo Certain;  Surgeon: Arnoldo Naidu MD;  [facility];  Service: Gynecology Oncology    TONSILLECTOMY      US GUIDANCE  2019     Social History   Social History     Substance and Sexual Activity   Alcohol Use Yes    Comment: occ     Social History     Substance and Sexual Activity   Drug Use Never     Social History     Tobacco Use   Smoking Status Former Smoker    Packs/day: 0 50    Years: 1 00    Pack years: 0 50    Types: Cigarettes    Quit date: 2020    Years since quittin 4   Smokeless Tobacco Never Used   Tobacco Comment    vapes occ nicotene     E-Cigarette/Vaping    E-Cigarette Use Current Every Day User      E-Cigarette/Vaping Substances    Nicotine Yes     THC No     CBD No     Flavoring No     Other No     Unknown No      Family History:   Family History   Problem Relation Age of Onset    Uterine cancer Maternal Grandmother     Heart disease Mother     Canavan disease Father     Cancer Father        Meds/Allergies   all medications and allergies reviewed  Allergies   Allergen Reactions    Hydrochlorothiazide Arthralgia    Latex Hives    Other      Tomatoes   Vomiting and diarhhea       Objective   Vitals: Blood pressure 148/76, pulse (!) 51, temperature (!) 97 4 °F (36 3 °C), temperature source Temporal, resp  rate 22, height 5' 1" (1 549 m), weight 65 1 kg (143 lb 8 3 oz), last menstrual period 04/15/2019, SpO2 97 %, not currently breastfeeding  No intake/output data recorded  Invasive Devices  Report    Peripheral Intravenous Line  Duration           Peripheral IV 07/28/22 Right Antecubital <1 day                Physical Exam  Vitals reviewed  Constitutional:       General: She is not in acute distress  Appearance: Normal appearance  She is not ill-appearing, toxic-appearing or diaphoretic  HENT:      Head: Normocephalic and atraumatic  Eyes:      General: No scleral icterus  Right eye: No discharge  Left eye: No discharge  Cardiovascular:      Pulses: Normal pulses  Pulmonary:      Effort: Pulmonary effort is normal    Abdominal:      General: There is no distension  Palpations: There is no mass  Genitourinary:     Comments: Deferred for the operating room  Musculoskeletal:         General: No swelling  Skin:     General: Skin is warm  Neurological:      General: No focal deficit present  Mental Status: She is alert and oriented to person, place, and time  Cranial Nerves: No cranial nerve deficit  Psychiatric:         Mood and Affect: Mood normal          Behavior: Behavior normal          Thought Content: Thought content normal          Judgment: Judgment normal          Lab Results: I have personally reviewed pertinent reports  Imaging: I have personally reviewed pertinent reports  EKG, Pathology, and Other Studies: I have personally reviewed pertinent reports  VTE Prophylaxis: Sequential compression device Boaz Avers)     Code Status: Prior  Advance Directive and Living Will:      Power of :    POLST:      Counseling / Coordination of Care  Total floor / unit time spent today 15 minutes    Greater than 50% of total time was spent with the patient and / or family counseling and / or coordination of care  A description of the counseling / coordination of care:  Review of today's case

## 2022-07-28 NOTE — ANESTHESIA POSTPROCEDURE EVALUATION
Post-Op Assessment Note    CV Status:  Stable  Pain Score: 0    Pain management: adequate     Mental Status:  Sleepy   Hydration Status:  Euvolemic   PONV Controlled:  Controlled   Airway Patency:  Patent      Post Op Vitals Reviewed: Yes      Staff: CRNA   Comments: vss sv nonobstructed uneventful        No complications documented      BP   118/62   Temp 98 5   Pulse 47   Resp 24   SpO2 98

## 2022-07-28 NOTE — DISCHARGE INSTRUCTIONS
Della,    Today you had right ureteroscopy with laser lithotripsy and stone removal with basketing  We performed a right ureteral stent exchange as well  Your stent is on a string, please use the black thread coming out of the vagina to remove your stent in 5 days  We will see you back in the office in roughly 3 months with an x-ray and ultrasound prior to ensure that the kidney has healed nicely  After surgery like this it is quite common to have urgency and frequency of urination along with pain in the kidney and blood in the urine  If you have questions or concerns as you recover, please let us know  Thank you for allowing us take care of you today,  Dr Melonie Armendariz      Ureteral Stent Placement   WHAT YOU NEED TO KNOW:   Ureteral stent placement is a procedure to open a blocked or narrow ureter  The ureter is the tube that carries urine from your kidney into your bladder  A stent is a thin hollow plastic tube used to hold your ureter open and allow urine to flow  The stent may stay in for several weeks  Long-term stents will stay in longer and need to be replaced within a certain period of time  DISCHARGE INSTRUCTIONS:   Seek care immediately if:   You urinate very little or not at all  You have severe pain in your abdomen, even after you take medicine  You have heavy bleeding from your urethra  You see large blood clots in your urine, or your urine is bright red  Contact your healthcare provider or urologist if:   You have a fever or chills  You feel like you need to urinate very often  Your symptoms get worse, or you develop new symptoms  You have questions or concerns about your condition or care  Medicines: You may  need any of the following:  Acetaminophen  decreases pain and fever  It is available without a doctor's order  Ask how much to take and how often to take it  Follow directions   Read the labels of all other medicines you are using to see if they also contain acetaminophen, or ask your doctor or pharmacist  Acetaminophen can cause liver damage if not taken correctly  Do not use more than 4 grams (4,000 milligrams) total of acetaminophen in one day  Prescription pain medicine  may be given  Ask your healthcare provider how to take this medicine safely  Some prescription pain medicines contain acetaminophen  Do not take other medicines that contain acetaminophen without talking to your healthcare provider  Too much acetaminophen may cause liver damage  Prescription pain medicine may cause constipation  Ask your healthcare provider how to prevent or treat constipation  Antibiotics  help prevent or treat bacterial infections  Your healthcare provider may prescribe these for you while you have a ureteral stent  Take your medicine as directed  Contact your healthcare provider if you think your medicine is not helping or if you have side effects  Tell him or her if you are allergic to any medicine  Keep a list of the medicines, vitamins, and herbs you take  Include the amounts, and when and why you take them  Bring the list or the pill bottles to follow-up visits  Carry your medicine list with you in case of an emergency  Self-care:   Drink liquids as directed  Liquids will help flush your urinary tract and prevent infection  Ask your healthcare provider how much liquid to drink each day and which liquids are best for you  Ask when you can return to daily activities  You may be able to return to normal activities the day after your stent placement  Follow up with your urologist as directed: You will need regular follow-up visits with your urologist as long as you have a stent  He or she will check to make sure the stent is working properly  He or she will remove your temporary stent in several weeks  Your provider may do urine cultures to check for infection  Write down your questions so you remember to ask them during your visits    © Copyright [employer] 2022 Information is for [employer] use only and may not be sold, redistributed or otherwise used for commercial purposes  All illustrations and images included in CareNotes® are the copyrighted property of A D A M , Inc  or Chelsea Proctor  The above information is an  only  It is not intended as medical advice for individual conditions or treatments  Talk to your doctor, nurse or pharmacist before following any medical regimen to see if it is safe and effective for you

## 2022-07-28 NOTE — TELEPHONE ENCOUNTER
Status post ureteroscopy with laser lithotripsy and stone basketing  The original, 6 Western Stephanie by 24 centimeter stent has been removed  She has a new, 6 Western Stephanie by 24 centimeter ureteral stent on a string  Please arrange for her to remove this in 5 days    She can then see us back in the office in 3 months with imaging prior, ordered

## 2022-07-28 NOTE — ANESTHESIA PREPROCEDURE EVALUATION
Procedure:  CYSTOSCOPY URETEROSCOPY WITH LITHOTRIPSY HOLMIUM LASER, RETROGRADE PYELOGRAM AND INSERTION STENT URETERAL (Right Bladder)    Relevant Problems   CARDIO   (+) HTN (hypertension)      GI/HEPATIC   (+) Gastroesophageal reflux disease      /RENAL   (+) Hydronephrosis due to obstruction of ureter      GYN   (+) Malignant neoplasm of overlapping sites of cervix (HCC)      Cardiovascular and Mediastinum   (+) Atrial septal hypertrophy      Other   (+) Mediastinal lymphadenopathy   former smoker  METS 10 on stress test    Cardiac MRI:  IMPRESSION:  Impression:  1  Severe hypertrophy of the basal to mid anteroseptal wall with normal left ventricular systolic function  2  Normal right ventricular size and systolic function  3  No significant valvular abnormalities  4  No evidence of myocardial scarring, inflammation, or infiltrative disease  5  These findings are consistent with an asymmetric septal hypertrophic cardiomyopathy  Echo Findings overall consistent with  hypertrophic cardiomyopathy, without evidence of dynamic obstruction  Vitals:    07/28/22 1129   BP: 148/76   Pulse: (!) 51   Resp: 22   Temp: (!) 97 4 °F (36 3 °C)   SpO2: 97%         Physical Exam    Airway    Mallampati score: III  TM Distance: >3 FB  Neck ROM: full     Dental   Comment: Denies loose teeth,     Cardiovascular  Cardiovascular exam normal    Pulmonary  Pulmonary exam normal     Other Findings  Portions of exam deferred due to low yield and/or unknown COVID status      Anesthesia Plan  ASA Score- 3     Anesthesia Type- general with ASA Monitors  Additional Monitors:   Airway Plan: LMA  Plan Factors-Exercise tolerance (METS): >4 METS  Chart reviewed  Existing labs reviewed  Patient summary reviewed  Patient is not a current smoker  Induction- intravenous  Postoperative Plan-     Informed Consent- Anesthetic plan and risks discussed with patient    I personally reviewed this patient with the CRNA  Discussed and agreed on the Anesthesia Plan with the CRNA  Seble Murphy

## 2022-07-29 ENCOUNTER — TELEPHONE (OUTPATIENT)
Dept: CARDIOLOGY CLINIC | Facility: CLINIC | Age: 47
End: 2022-07-29

## 2022-07-29 ENCOUNTER — TELEPHONE (OUTPATIENT)
Dept: OTHER | Facility: OTHER | Age: 47
End: 2022-07-29

## 2022-07-29 DIAGNOSIS — N20.0 NEPHROLITHIASIS: Primary | ICD-10-CM

## 2022-07-29 RX ORDER — CEPHALEXIN 500 MG/1
500 CAPSULE ORAL EVERY 12 HOURS SCHEDULED
Qty: 6 CAPSULE | Refills: 0 | Status: SHIPPED | OUTPATIENT
Start: 2022-07-29 | End: 2022-08-01

## 2022-07-29 RX ORDER — CEPHALEXIN 500 MG/1
500 CAPSULE ORAL EVERY 12 HOURS SCHEDULED
Qty: 20 CAPSULE | Refills: 0 | Status: SHIPPED | OUTPATIENT
Start: 2022-07-29 | End: 2022-07-29

## 2022-07-29 NOTE — TELEPHONE ENCOUNTER
Patient called in and said that she has unable to sleep since sx yesterday   She also is calling to make sure antibiotic is at the pharmacy  She called the pharmacy and was told that there was nothing there  Confirmed pharmacy

## 2022-07-29 NOTE — TELEPHONE ENCOUNTER
Spoke with patient at length  She has questions regarding medication changes made while in hosp in June 2022 which she states has been causing her heart rate to drop  Offered her sooner appt with [facility] Cardiologist to review meds  She declined  Provided with direct number to [facility] office to schedule with General or sooner appt with Yisel  No further action from [facility] Cardiology was requested by the patient at this time

## 2022-07-29 NOTE — TELEPHONE ENCOUNTER
Called and advised patient to call [facility] and ask them to check their in baskets since we sent it twice  Patient complains of feeling overall unwell and that her HR right now is 40's  She has baseline heart issues which has worsened today  She has been trying to get ahold of cardiology and says she isnt being called back  Advised patient to call and ask to speak with the on call and if they dont call then to go to the ER  Patient declines stating she took an oxy and wants to wait to see if they return her call      Advised to call office with any further urological issues

## 2022-07-29 NOTE — TELEPHONE ENCOUNTER
Patient was told to call cardiology asap @ [facility] d/t heart rate dropping  She is supposed ot speak with someone or make a sooner appt asap   Please call back to update patient

## 2022-07-29 NOTE — TELEPHONE ENCOUNTER
Spoke with patient  She wants to be seen in the [place] office  Please contact them to schedule her appointment  She states she called and was told there was a 40 minute wait to speak to someone      This task was sent to [facility] in error

## 2022-07-29 NOTE — TELEPHONE ENCOUNTER
This is a pt of [facility]  She called here because she does not want to deal with that office anymore  She needs medication refills   Advised to call PCP  She will see Catarino Argueta in Sept   Did place her on wait list     Verbally understood

## 2022-07-29 NOTE — TELEPHONE ENCOUNTER
Called and spoke to patient  Educated patient on post-op instructions regarding stent  Reviewed medications with patient  Patient did have a question regarding the bactrim  Patient stated when she went to pharmacy to  medications and pharmacist stated there was a red flag with bactrim because she is allergic to hydrochlorothiazide and there is an ingredient that is in both of those medications  She just wanted to make sure she would not have a reaction because she lives alone  Informed patient I would route to provider for further recommendations  Patient was educated on increasing water intake to 60-80oz a day, utilize in heating pad if there is any flank pain with stent, utilize in NSAID's if able but do not overlap with narcotic  Patient aware to take oxycodone if moderate to severe pain  Patient stated she is very uncomfortable and when she urinates there is extreme burning  Informed patient I would also have provider review labs and see if we can send medication to help with dysuria  Patient is scheduled for 3 month f/u with our AP to review imaging  Patient given CS # and knows to have the imaging done at least 2 weeks prior to appointment

## 2022-07-29 NOTE — TELEPHONE ENCOUNTER
Per Naa Strickland in office, labs show slight acute kidney injury, however has had intervention with ureteroscopy  Therefore patient can take up to 3 days of azo  Called and spoke to patient  Informed patient of Naa Strickland PA-C's recommendations as well as different abx sent to pharmacy  Patient verbalized understanding

## 2022-08-03 LAB
CALCIUM OXALATE DIHYDRATE MFR STONE IR: 20 %
COLOR STONE: NORMAL
COM MFR STONE: 70 %
COMMENT-STONE3: NORMAL
COMPOSITION: NORMAL
HYDROXYAPATITE 24H ENGDIFF UR: 10 %
LABORATORY COMMENT REPORT: NORMAL
PHOTO: NORMAL
SIZE STONE: NORMAL MM
SPEC SOURCE SUBJ: NORMAL
STONE ANALYSIS-IMP: NORMAL
WT STONE: 5 MG

## 2022-08-04 NOTE — ASSESSMENT & PLAN NOTE
In setting of right ureteral stone, fever with complicated UTI  · Blood cultures obtained at [facility], positive x2  -Klebsiella aerogenes susceptible to bactrim or quinolones    · Repeat blood cultures obtained at [facility] negative x 48 hrs   · Per sensitivity report and per d/w ID pharmacist, will transition to Cipro to complete antibiotic course x 10 days fely @7am

## 2022-09-20 ENCOUNTER — OFFICE VISIT (OUTPATIENT)
Dept: CARDIAC SURGERY | Facility: CLINIC | Age: 47
End: 2022-09-20
Payer: COMMERCIAL

## 2022-09-20 VITALS
DIASTOLIC BLOOD PRESSURE: 84 MMHG | WEIGHT: 143 LBS | OXYGEN SATURATION: 100 % | BODY MASS INDEX: 27 KG/M2 | SYSTOLIC BLOOD PRESSURE: 182 MMHG | HEART RATE: 55 BPM | HEIGHT: 61 IN

## 2022-09-20 DIAGNOSIS — I10 PRIMARY HYPERTENSION: ICD-10-CM

## 2022-09-20 DIAGNOSIS — I51.7 ATRIAL SEPTAL HYPERTROPHY: ICD-10-CM

## 2022-09-20 DIAGNOSIS — I42.2 HYPERTROPHIC CARDIOMYOPATHY (HCC): Primary | ICD-10-CM

## 2022-09-20 PROCEDURE — 99205 OFFICE O/P NEW HI 60 MIN: CPT | Performed by: INTERNAL MEDICINE

## 2022-09-20 PROCEDURE — 93000 ELECTROCARDIOGRAM COMPLETE: CPT | Performed by: INTERNAL MEDICINE

## 2022-09-20 NOTE — PROGRESS NOTES
HCM Consultation - Cardiology   Della Hillman 56 y o  female MRN: [id number]  Unit/Bed#:  Encounter: [id number]    Active problem list:    Patient Active Problem List    Diagnosis Date Noted    Gastroesophageal reflux disease 07/28/2022    Right ureteral stone 07/28/2022    Low TSH level 06/27/2022    Atrial septal hypertrophy 06/26/2022    Hypercalcemia 06/26/2022    Hydronephrosis due to obstruction of ureter 06/25/2022    Mediastinal lymphadenopathy 02/18/2020    Acute midline low back pain without sciatica 02/05/2020    Menopausal symptoms 08/20/2019    Cancer associated pain 07/23/2019    Insomnia due to medical condition 07/23/2019    Loss of appetite 07/23/2019    Malignant neoplasm of overlapping sites of cervix ([address] ) 05/01/2019    HTN (hypertension) 04/26/2019     Plan: Ms Blanka Jesus was interviewed and examined with Dr Cesar Bermeo, cardiology fellow  She has established systemic hypertension and so far non-obstructive hypertrophic cardiomyopathy in need of further evaluation  She also is in the midst of evaluation of other relevant problems including hyperthyroidism, hypercalcemia and kidney disease  She is currently on amlodipine and metoprolol for blood pressure management that appear to be in need of further refinement  I have asked the patient to cut back on taking high caffeine containing drinks and watch salt in her diet  She is scheduled for a bicycle exercise stress echo on 9-  If she is truly non-obstructive we can switch her to a RAAS inhibitor instead in view of her kidney disease  She will also have an ambulatory ECG monitor for evaluation of her palpitation  I have offered her genetic testing and she has agreed to have that done      Physician Requesting Consult: Dr Eryn Torres  Reason for Consult / Principal Problem: Hypertrophic Cardiomyopathy    HPI: Ms Reva Hernandez is a 56year old woman with long-standing hypertension who was originally seen in consultation by cardiology due to accelerated hypertension  Work up then indicated:      ECG: Sinus bradycardia, incomplete RBBB, Moderate voltage criteria for LVH, and nonspecific ST abnormality  Transthoracic echocardiography revealed: Left Ventricle: Left ventricular cavity size is small  Wall thickness is increased  There is severe asymmetric hypertrophy of the mid septal wall (22 mm)  The left ventricular ejection fraction is 70%  Systolic function is vigorous  Global longitudinal strain is normal at -18%, with focal reduction at the mid-septal segments  Wall motion is normal  Diastolic function is mildly abnormal, consistent with grade I (abnormal) relaxation  Left atrial filling pressure is normal  There is no LV dynamic obstruction  Left Atrium: The atrium is moderately dilated (42-48 mL/m2)  Tricuspid Valve: There is trace regurgitation  The right ventricular systolic pressure is normal  The estimated right ventricular systolic pressure is 14 52 mmHg  Findings overall consistent with  hypertrophic cardiomyopathy, without evidence of dynamic obstruction  Left ventricular global longitudinal strain was normal at -18%         A recent cardiac MRI has shown: 1  Severe hypertrophy of the basal to mid anteroseptal wall with normal left ventricular systolic function  2  Normal right ventricular size and systolic function  3  No significant valvular abnormalities  4  No evidence of myocardial scarring, inflammation, or infiltrative disease  5  These findings are consistent with an asymmetric septal hypertrophic cardiomyopathy  She has had hypertension for a long time treated with a combination of metoprolol and amlodipine  The metoprolol has been decreased in dosage due to symptomatic bradycardia  On current medical therapy and relatively restricted physical activity, she denies shortness of breath, chest pain, syncope, lower extremity edema, orthopnea or PND   She had noticed occasional orthostatic dizziness and relatively frequent palpitations  She does not check her blood pressure at home  On laboratory workup she has had low TSH, high free T4 and elevated calcium level that are being evaluated  She does take energy drinks during the day, does not smoke and drinks socially  She has taken creatine and other body building supplements in the past  There is a history of cervical cancer treated with chemotherapy and radiation  She states that she has been in remission for 2 years  Enlarged mediastinal lymph node had been seen and CT with negative workup  Review of Systems: On current medical therapy and relatively restricted physical activity, she denies shortness of breath, chest pain, syncope, lower extremity edema, orthopnea or PND  She had noticed occasional orthostatic dizziness and relatively frequent palpitations  Genetic testing: Initiated on this visit  Family history: Mother has WPW, father passed away at 62 yo from biliary cancer, also suffered from HTN and HLD  Maternal uncles have cardiac issues  Only has a sister who is 44 yo, however patient does not stay in touch with her  Devang Lucy has two boys, 12 and 20 yo, no known cardiac problems  Distant cousin passed away suddenly while going out to get the mail but patient is unaware of the culprit  Risk stratification: Ms Yemi Mayes will have a stress echo and ambulatory ECG monitor to complete her risk stratification      Historical Information   Past Medical History:   Diagnosis Date    Abnormal Pap smear of cervix     Acute hip pain     tristin    Cancer ([address] )     cervix- had chemo    COVID     Feb 2022    Heart palpitations     frequent -7/20 instructed to call cardiology to report    Hypertension     Hypokalemia 04/26/2019    Low back pain     Lymphadenopathy     Pelvic pain     Sepsis ([address] ) 06/25/2022    Wears contact lenses     Wears dentures     partial lower     Past Surgical History:   Procedure Laterality Date    FL RETROGRADE PYELOGRAM 6/25/2022    FL RETROGRADE PYELOGRAM  7/28/2022    ID BRONCHOSCOPY NEEDLE BX TRACHEA MAIN STEM&/BRON N/A 2/26/2020    Procedure: ENDOBRONCHIAL ULTRASOUND (EBUS); Surgeon: Edy Dixon MD;  [facility];  Service: Thoracic    ID [address] N/A 2/26/2020    Procedure: Beverley Crook;  Surgeon: Edy Dixon MD;  [facility];  Service: Thoracic    ID CYSTO/URETERO W/LITHOTRIPSY &INDWELL STENT INSRT Right 7/28/2022    Procedure: CYSTOSCOPY URETEROSCOPY WITH LITHOTRIPSY HOLMIUM LASER, RETROGRADE PYELOGRAM AND RIGHT INSERTION STENT URETERAL;  Surgeon: Vernon Goltz, MD;  [facility];  Service: Urology    ID CYSTOURETHROSCOPY,URETER CATHETER Right 6/25/2022    Procedure: CYSTOSCOPY RETROGRADE PYELOGRAM WITH INSERTION STENT URETERAL;  Surgeon: Vernon Goltz, MD;  [facility];  Service: Urology    ID DILATION OF VAGINA W ANESTH N/A 6/20/2019    Procedure: EXAM UNDER ANESTHESIA (EUA);   Surgeon: Alonso Thomas MD;  [facility];  Service: Gynecology Oncology    ID INSERT VAGINAL RADIATION DEVICE N/A 6/20/2019    Procedure: [address];  Surgeon: Alonso Thomas MD;  [facility];  Service: Gynecology Oncology    TONSILLECTOMY      US GUIDANCE  6/20/2019     Family History   Problem Relation Age of Onset    Uterine cancer Maternal Grandmother     Heart disease Mother     Canavan disease Father     Cancer Father      Current Outpatient Medications on File Prior to Visit   Medication Sig Dispense Refill    acetaminophen (TYLENOL) 325 mg tablet Take 2 tablets (650 mg total) by mouth every 6 (six) hours as needed for mild pain (Patient not taking: No sig reported) 30 tablet 0    amLODIPine (NORVASC) 5 mg tablet Take 1 tablet (5 mg total) by mouth daily 30 tablet 0    metoprolol tartrate (LOPRESSOR) 25 mg tablet Take 0 5 tablets (12 5 mg total) by mouth every 12 (twelve) hours 180 tablet 0    Omega-3 Fatty Acids (FISH OIL) 1,000 mg 1,000 mg daily      oxybutynin (DITROPAN-XL) 10 MG 24 hr tablet Take 1 tablet (10 mg total) by mouth daily at bedtime 30 tablet 0    pantoprazole (PROTONIX) 40 mg tablet Take 1 tablet (40 mg total) by mouth daily 30 tablet 0    phenazopyridine (PYRIDIUM) 100 mg tablet Take 1 tablet (100 mg total) by mouth 3 (three) times a day with meals (Patient not taking: Reported on 2022) 10 tablet 0    senna (SENOKOT) 8 6 mg Take 1 tablet (8 6 mg total) by mouth daily at bedtime for 5 days 5 tablet 0    tamsulosin (FLOMAX) 0 4 mg Take 1 capsule (0 4 mg total) by mouth daily with dinner 30 capsule 0     No current facility-administered medications on file prior to visit  Allergies   Allergen Reactions    Hydrochlorothiazide Arthralgia    Latex Hives    Other      Tomatoes   Vomiting and diarhhea     Social History     Substance and Sexual Activity   Alcohol Use Yes    Comment: occ     Social History     Substance and Sexual Activity   Drug Use Never     Social History     Tobacco Use   Smoking Status Former Smoker    Packs/day: 0 50    Years: 1 00    Pack years: 0 50    Types: Cigarettes    Quit date: 2020    Years since quittin 6   Smokeless Tobacco Never Used   Tobacco Comment    vapes occ nicotene     Objective   Vitals:   Vitals:    22 1312   BP: (!) 182/84   BP Location: Left arm   Patient Position: Sitting   Cuff Size: Standard   Pulse: 55   SpO2: 100%   Weight: 64 9 kg (143 lb)   Height: 5' 1" (1 549 m)   Body surface area is 1 64 meters squared  Body mass index is 27 02 kg/m²      Invasive Devices  Report    Drain  Duration           Ureteral Internal Stent 6 Fr  53 days              Physical Exam:  GEN: Arielle Knowles appears well, alert and oriented x 3, pleasant and cooperative   HEENT: pupils equal, round, and reactive to light; extraocular muscles intact  NECK: supple, no carotid bruits   HEART: regular rhythm, normal S1 and S2, 6-1/8 mid-systolic murmur best heard over the clavicle and accentuated with standing, clicks, gallops or rubs   LUNGS: clear to auscultation bilaterally; no wheezes, rales, or rhonchi   ABDOMEN: normal bowel sounds, soft, no tenderness, no distention  EXTREMITIES: peripheral pulses normal; no clubbing, cyanosis, or edema  NEURO: no focal findings   SKIN: normal without suspicious lesions on exposed skin    Lab Results:   Labs:  Lab Results   Component Value Date    WBC 9 35 07/04/2022    RBC 4 27 07/04/2022    HGB 11 6 07/04/2022    HCT 36 4 07/04/2022    MCV 85 07/04/2022     07/04/2022    RDW 13 0 07/04/2022     Lab Results   Component Value Date    K 4 8 07/04/2022     07/04/2022    CO2 27 07/04/2022    BUN 30 (H) 07/04/2022    CREATININE 1 32 (H) 07/04/2022    EGFR 48 07/04/2022    CALCIUM 10 9 (H) 07/04/2022    AST 15 07/04/2022    ALT 27 07/04/2022    ALKPHOS 75 07/04/2022     Lab Results   Component Value Date    MG 2 3 06/26/2022     No results found for: CHOL, HDL, TRIG, LDLCALC  Lab Results   Component Value Date    EMI0RVZLQRPV 0 031 (L) 07/04/2022    FREET4 1 54 (H) 07/04/2022     Imaging:   I have personally reviewed pertinent films in PACS    MRI cardiac  w wo contrastStudy Result    Narrative & Impression   40year old woman for evaluation of hypertrophic cardiomyopathy      Technique:  1  3 plane SSFP localizers  2  SSFP cine imaging in long and short axis plane  3  T2 weighted DIR FSE in short axis plane  4  12 ml gadobutrol power injected  5  2D inversion recovery FGRE for delayed myocardial enhancement    6  The patient tolerated the procedure well without complication      Measurements:   David-septal wall 23 mm  Postero-lateral wall 8 mm  Left ventricular end-diastolic dimension 50 mm  Left ventricular end-systolic dimension 24 mm  Left ventricular end-diastolic volume 836 ml  Left ventricular end-systolic volume  34 ml  Stroke volume 102 ml  Cardiac Output 4 7 L/min  Ejection fraction 75 %  Left atrium 32 mm  Aortic Root 31 mm     Findings:    1  The left ventricle is normal in size with severe hypertrophy of the basal to mid anteroseptal wall  Left ventricular systolic function is normal with a measured ejection fraction of 75%  There are no regional left ventricular wall motion   abnormalities  2  The right ventricle is normal in size with normal right ventricular systolic function  3  The aortic, mitral, and tricuspid valves open without restriction  There is no significant valvular regurgitation, however cine MRI is inaccurate in the qualitative assessment of valvular regurgitation  4  The left atrium is normal in size  The aortic root is normal in size  5  There is no evidence of myocardial edema  6  Delayed post-gadolinium imaging demonstrates no region of hyperenhancement      IMPRESSION:  Impression:  1  Severe hypertrophy of the basal to mid anteroseptal wall with normal left ventricular systolic function  2  Normal right ventricular size and systolic function  3  No significant valvular abnormalities  4  No evidence of myocardial scarring, inflammation, or infiltrative disease  5  These findings are consistent with an asymmetric septal hypertrophic cardiomyopathy       Workstation performed: [id number]     Name: Jose Martin Moran                       : 1975  MRN: [id number]                       Age: 56 y o  Patient Status: Inpatient          Gender: female    Vitals    Height Weight BSA (Calculated - m2) BP Pulse   5' 1" (1 549 m) 69 4 kg (153 lb) 1 69 sq meters 177/100 75       PACS Images     Show images for Echo complete w/ contrast if indicated    Study Details    This transthoracic echocardiogram was performed at bedside  This was a routine, inpatient study  Study quality was adequate  A complete 2D, color flow Doppler, spectral Doppler, 2D, color flow Doppler and spectral Doppler transthoracic echocardiogram was performed    The apical, parasternal, subcostal and suprasternal views were obtained  Indications  Priority: Routine  Cardiomyopathy   Comments: Patient with HCM on MRI  Echo done at [facility] did not mention gradients, and measurements were severely off - can we please repeat full echo here  Thank you  History    DELFINO, hypercalcemia  Interpretation Summary         Left Ventricle: Left ventricular cavity size is small  Wall thickness is increased  There is severe asymmetric hypertrophy of the mid septal wall (22 mm)  The left ventricular ejection fraction is 70%  Systolic function is vigorous  Global longitudinal strain is normal at -18%, with focal reduction at the mid-septal segments  Wall motion is normal  Diastolic function is mildly abnormal, consistent with grade I (abnormal) relaxation  Left atrial filling pressure is normal  There is no LV dynamic obstruction    Left Atrium: The atrium is moderately dilated (42-48 mL/m2)    Tricuspid Valve: There is trace regurgitation  The right ventricular systolic pressure is normal  The estimated right ventricular systolic pressure is 82 76 mmHg      Findings overall consistent with  hypertrophic cardiomyopathy, without evidence of dynamic obstruction           Findings    Left Ventricle Left ventricular cavity size is small  Wall thickness is increased  There is severe asymmetric hypertrophy of the mid septal wall (22 mm)  The left ventricular ejection fraction is 70%  Systolic function is vigorous  Global longitudinal strain is normal at -18%, with focal reduction at the mid-septal segments  Wall motion is normal  Diastolic function is mildly abnormal, consistent with grade I (abnormal) relaxation  Left atrial filling pressure is normal  There is no LV dynamic obstruction  Right Ventricle Right ventricular cavity size is normal  Systolic function is normal    Left Atrium The atrium is moderately dilated (42-48 mL/m2)  Right Atrium The atrium is normal in size  Aortic Valve The aortic valve is trileaflet   The leaflets are not thickened  The leaflets are not calcified  The leaflets exhibit normal mobility  There is trace regurgitation  The aortic valve has no significant stenosis  Mitral Valve The mitral valve has normal structure and function  There is trace regurgitation  There is no evidence of stenosis  There is no systolic anterior motion  Tricuspid Valve Tricuspid valve structure is normal  There is trace regurgitation  There is no evidence of stenosis  The right ventricular systolic pressure is normal  The estimated right ventricular systolic pressure is 52 08 mmHg  Pulmonic Valve Pulmonic valve structure is normal  There is trace regurgitation  There is no evidence of stenosis  Ascending Aorta The aortic root is normal in size  IVC/SVC The right atrial pressure is estimated at 3 0 mmHg  The inferior vena cava is normal in size  Respirophasic changes were normal    Pericardium There is no pericardial effusion  The pericardium is normal in appearance     Left Ventricle Measurements    Function/Volumes   A4C EF 67 %         Dimensions   LVIDd 4 2 cm         LVIDS 2 8 cm         IVSd 2 4 cm         LVPWd 1 3 cm         FS 33          Diastolic Filling   MV E' Tissue Velocity Septal 5 cm/s         MV E' Tissue Velocity Lateral 9 cm/s         E wave deceleration time 156 ms         MV Peak E Mark 61 cm/s         MV Peak A Mark 0 76 m/s         Strain   GLS -18 %          Other Measurements   Peak Gradient (Valsalva) 12 mmHg              Right Ventricle Measurements    Dimensions   RVID d 3 9 cm               Left Atrium Measurements    Dimensions   LA size 3 9 cm         LA length (A2C) 5 3 cm         RODRIGO A4C 16 5 cm2               Right Atrium Measurements    Dimensions   RAA A4C 16 9 cm2               Mitral Valve Measurements    Stenosis   MV stenosis pressure 1/2 time 45 ms         MV valve area p 1/2 method 4 89                Tricuspid Valve Measurements    RVSP Parameters   TR Peak Mark 1 9 m/s         Est  RA pres 3 mmHg         Triscuspid Valve Regurgitation Peak Gradient 15 mmHg         Right Ventricular Peak Systolic Pressure 18 mmHg               Aorta Measurements    Aortic Dimensions   Ao root 2 9 cm               IVC/SVC Measurements    IVC/SVC   Est  RA pres 3 mmHg              Exam Details    Performed Procedure Technologist Supporting Staff Performing Physician   Echo complete Leigha Trevino           Appointment Date/Status Modality Department    6/27/2022     Completed [facility] ECHO RM 1 [facility] NON INV           Begin Exam End Exam  End Exam Questionnaires   6/27/2022 12:00 PM 6/27/2022 12:51 PM  PATIENT EDUCATION            All Reviewers List    Heath Cruz DO on 6/27/2022  4:37 PM   Mary Jo Nuno on 6/27/2022  3:46 PM       EKG: Sinus bradycardia, Right atrial enlargement, RSR' or QR pattern in V1 suggests right ventricular conduction delay, Moderate voltage criteria for LVH, may be normal variant, Nonspecific ST abnormality  Abnormal ECG    Counseling / Coordination of Care  Total floor / unit time spent today 60 minutes  Greater than 50% of total time was spent with the patient and / or family counseling and / or coordination of care

## 2022-09-21 ENCOUNTER — TELEPHONE (OUTPATIENT)
Dept: CARDIOLOGY CLINIC | Facility: CLINIC | Age: 47
End: 2022-09-21

## 2022-09-23 ENCOUNTER — PATIENT MESSAGE (OUTPATIENT)
Dept: CARDIOLOGY CLINIC | Facility: CLINIC | Age: 47
End: 2022-09-23

## 2022-09-23 DIAGNOSIS — I42.2 HYPERTROPHIC CARDIOMYOPATHY (HCC): Primary | ICD-10-CM

## 2022-09-23 NOTE — PROGRESS NOTES
Della was seen in the clinic for a new diagnosis of HCM. She complaint of palpitations and needs 48hours of holter monitoring. Order was placed and it was communicated with the patient.

## 2022-09-28 ENCOUNTER — TELEPHONE (OUTPATIENT)
Dept: CARDIOLOGY CLINIC | Facility: CLINIC | Age: 47
End: 2022-09-28

## 2022-09-28 NOTE — TELEPHONE ENCOUNTER
----- Message from Elmo Harrell sent at 9/28/2022  1:58 PM EDT -----  Regarding: Genetic testing  Can someone please call this patient to go over what she should do with the genetic testing that was ordered for her  She called our office today stating that she received the testing with a vial in it but no one has called her to schedule an appointment or anything  She can be reached at the number on file       Thank you,  Wilfrido Mclean

## 2022-09-28 NOTE — TELEPHONE ENCOUNTER
Called and gave advice on protocol  Advised that Eusebio would get in contact with her to schedule blood draw

## 2022-09-29 ENCOUNTER — TELEPHONE (OUTPATIENT)
Dept: CARDIOLOGY CLINIC | Facility: CLINIC | Age: 47
End: 2022-09-29

## 2022-09-29 ENCOUNTER — HOSPITAL ENCOUNTER (OUTPATIENT)
Dept: NON INVASIVE DIAGNOSTICS | Facility: HOSPITAL | Age: 47
Discharge: HOME | End: 2022-09-29
Payer: COMMERCIAL

## 2022-09-29 VITALS
HEIGHT: 61 IN | DIASTOLIC BLOOD PRESSURE: 82 MMHG | SYSTOLIC BLOOD PRESSURE: 156 MMHG | BODY MASS INDEX: 27 KG/M2 | WEIGHT: 143 LBS | HEART RATE: 55 BPM | OXYGEN SATURATION: 98 %

## 2022-09-29 DIAGNOSIS — I42.2 HYPERTROPHIC CARDIOMYOPATHY (HCC): ICD-10-CM

## 2022-09-29 DIAGNOSIS — I10 PRIMARY HYPERTENSION: Primary | ICD-10-CM

## 2022-09-29 LAB
BASELINE ST DEPRESSION: 0 MM
GLOBAL LONGITUIDAL STRAIN: -16 %
MAX HR PERCENT: 93 %
MAX HR: 162 BPM
RATE PRESSURE PRODUCT: NORMAL
SL CV LV EF: 71
SL CV STRESS RECOVERY BP: NORMAL MMHG
SL CV STRESS RECOVERY HR: 80 BPM
SL CV STRESS RECOVERY O2 SAT: 99 %
SL CV STRESS STAGE REACHED: 4
STRESS ANGINA INDEX: 0
STRESS BASELINE BP: NORMAL MMHG
STRESS BASELINE HR: 55 BPM
STRESS DUKE TREADMILL SCORE: 7
STRESS O2 SAT REST: 98 %
STRESS PEAK HR: 162 BPM
STRESS POST ESTIMATED WORKLOAD: 5.7 METS
STRESS POST EXERCISE DUR MIN: 6 MIN
STRESS POST EXERCISE DUR SEC: 31 SEC
STRESS POST O2 SAT PEAK: 100 %
STRESS POST PEAK BP: 214 MMHG
STRESS ST DEPRESSION: 0 MM

## 2022-09-29 PROCEDURE — 93350 STRESS TTE ONLY: CPT

## 2022-09-29 PROCEDURE — 93226 XTRNL ECG REC<48 HR SCAN A/R: CPT

## 2022-09-29 PROCEDURE — 93225 XTRNL ECG REC<48 HRS REC: CPT

## 2022-09-29 PROCEDURE — 93350 STRESS TTE ONLY: CPT | Performed by: INTERNAL MEDICINE

## 2022-09-29 PROCEDURE — 93356 MYOCRD STRAIN IMG SPCKL TRCK: CPT | Performed by: INTERNAL MEDICINE

## 2022-09-29 PROCEDURE — 93356 MYOCRD STRAIN IMG SPCKL TRCK: CPT

## 2022-09-29 RX ORDER — LOSARTAN POTASSIUM 50 MG/1
50 TABLET ORAL DAILY
Qty: 30 TABLET | Refills: 0 | Status: SHIPPED | OUTPATIENT
Start: 2022-09-29 | End: 2022-10-29

## 2022-09-29 NOTE — TELEPHONE ENCOUNTER
Talked to patient- Dr Monique Sam want her to continue to take metoprolol and amlodipine  Start Losartan and take in evening  Log BP twice a day and send readings in a week after starting Losartan    BMP one week after starting losartan  Pt verbally acknowledged

## 2022-10-08 ENCOUNTER — HOSPITAL ENCOUNTER (EMERGENCY)
Facility: HOSPITAL | Age: 47
Discharge: HOME | End: 2022-10-08
Attending: EMERGENCY MEDICINE
Payer: COMMERCIAL

## 2022-10-08 VITALS
DIASTOLIC BLOOD PRESSURE: 71 MMHG | TEMPERATURE: 98.4 F | OXYGEN SATURATION: 100 % | HEART RATE: 61 BPM | SYSTOLIC BLOOD PRESSURE: 149 MMHG | RESPIRATION RATE: 18 BRPM

## 2022-10-08 DIAGNOSIS — B07.0 PLANTAR WART: ICD-10-CM

## 2022-10-08 DIAGNOSIS — M79.672 LEFT FOOT PAIN: Primary | ICD-10-CM

## 2022-10-08 PROCEDURE — 99283 EMERGENCY DEPT VISIT LOW MDM: CPT

## 2022-10-08 PROCEDURE — 99284 EMERGENCY DEPT VISIT MOD MDM: CPT | Performed by: EMERGENCY MEDICINE

## 2022-10-11 NOTE — ED PROVIDER NOTES
History  Chief Complaint   Patient presents with   • Foot Pain     Noticed a lump on her left foot about a week and a half ago and just tried to ignore it but started to hurt really bad tonight and throbbing      28-year-old female presenting to emergency department for evaluation of foot pain  Patient noted some lumps on the bottom of her left foot progressive for about the past 1-2 weeks  Associated with aching pain over the area, worse with bearing weight  Patient is a   No redness no swelling no numbness weakness or tingling  No trauma or inciting event she can not recall  Prior to Admission Medications   Prescriptions Last Dose Informant Patient Reported? Taking?    Omega-3 Fatty Acids (FISH OIL) 1,000 mg  Self Yes No   Si,000 mg daily   acetaminophen (TYLENOL) 325 mg tablet  Self No No   Sig: Take 2 tablets (650 mg total) by mouth every 6 (six) hours as needed for mild pain   Patient not taking: No sig reported   amLODIPine (NORVASC) 5 mg tablet  Self No No   Sig: Take 1 tablet (5 mg total) by mouth daily   losartan (COZAAR) 50 mg tablet   No No   Sig: Take 1 tablet (50 mg total) by mouth daily   metoprolol tartrate (LOPRESSOR) 25 mg tablet  Self No No   Sig: Take 0 5 tablets (12 5 mg total) by mouth every 12 (twelve) hours   oxybutynin (DITROPAN-XL) 10 MG 24 hr tablet   No No   Sig: Take 1 tablet (10 mg total) by mouth daily at bedtime   Patient not taking: Reported on 2022   pantoprazole (PROTONIX) 40 mg tablet  Self No No   Sig: Take 1 tablet (40 mg total) by mouth daily   phenazopyridine (PYRIDIUM) 100 mg tablet  Self No No   Sig: Take 1 tablet (100 mg total) by mouth 3 (three) times a day with meals   Patient not taking: No sig reported   senna (SENOKOT) 8 6 mg   No No   Sig: Take 1 tablet (8 6 mg total) by mouth daily at bedtime for 5 days   tamsulosin (FLOMAX) 0 4 mg   No No   Sig: Take 1 capsule (0 4 mg total) by mouth daily with dinner   Patient not taking: Reported on 9/20/2022      Facility-Administered Medications: None       Past Medical History:   Diagnosis Date   • Abnormal Pap smear of cervix    • Acute hip pain     tristin   • Cancer ([address] )     cervix- had chemo   • COVID     Feb 2022   • Heart palpitations     frequent -7/20 instructed to call cardiology to report   • Hypertension    • Hypokalemia 04/26/2019   • Low back pain    • Lymphadenopathy    • Pelvic pain    • Sepsis ([address] ) 06/25/2022   • Wears contact lenses    • Wears dentures     partial lower       Past Surgical History:   Procedure Laterality Date   • FL RETROGRADE PYELOGRAM  6/25/2022   • FL RETROGRADE PYELOGRAM  7/28/2022   • MT BRONCHOSCOPY NEEDLE BX TRACHEA MAIN STEM&/BRON N/A 2/26/2020    Procedure: ENDOBRONCHIAL ULTRASOUND (EBUS); Surgeon: Boy Donovan MD;  [facility];  Service: Thoracic   • MT BRONCHOSCOPY,DIAGNOSTIC N/A 2/26/2020    Procedure: [place];  Surgeon: Boy Donovan MD;  [facility];  Service: Thoracic   • MT CYSTO/URETERO W/LITHOTRIPSY &INDWELL STENT INSRT Right 7/28/2022    Procedure: CYSTOSCOPY URETEROSCOPY WITH LITHOTRIPSY HOLMIUM LASER, RETROGRADE PYELOGRAM AND RIGHT INSERTION STENT URETERAL;  Surgeon: Roberto Rodriges MD;  [facility];  Service: Urology   • MT CYSTOURETHROSCOPY,URETER CATHETER Right 6/25/2022    Procedure: CYSTOSCOPY RETROGRADE PYELOGRAM WITH INSERTION STENT URETERAL;  Surgeon: Roberto Rodriges MD;  [facility];  Service: Urology   • MT DILATION OF VAGINA W ANESTH N/A 6/20/2019    Procedure: EXAM UNDER ANESTHESIA (EUA);   Surgeon: Lynn Pérez MD;  [facility];  Service: Gynecology Oncology   • MT INSERT VAGINAL RADIATION DEVICE N/A 6/20/2019    Procedure: PIERRE SLEEVE PLACEMENT;  Surgeon: Lynn Pérez MD;  [facility];  Service: Gynecology Oncology   • TONSILLECTOMY     • US GUIDANCE  6/20/2019       Family History   Problem Relation Age of Onset   • Uterine cancer Maternal Grandmother • Heart disease Mother    • Canavan disease Father    • Cancer Father      I have reviewed and agree with the history as documented  E-Cigarette/Vaping   • E-Cigarette Use Current Every Day User      E-Cigarette/Vaping Substances   • Nicotine Yes    • THC No    • CBD No    • Flavoring No    • Other No    • Unknown No      Social History     Tobacco Use   • Smoking status: Former Smoker     Packs/day: 0 50     Years: 1 00     Pack years: 0 50     Types: Cigarettes     Quit date: 2020     Years since quittin 6   • Smokeless tobacco: Never Used   • Tobacco comment: vapes occ nicotene   Vaping Use   • Vaping Use: Every day   • Substances: Nicotine   Substance Use Topics   • Alcohol use: Yes     Comment: occ   • Drug use: Never       Review of Systems   Constitutional: Negative for appetite change, chills, fatigue and fever  HENT: Negative for sneezing and sore throat  Eyes: Negative for visual disturbance  Respiratory: Negative for cough, choking, chest tightness, shortness of breath and wheezing  Cardiovascular: Negative for chest pain and palpitations  Gastrointestinal: Negative for abdominal pain, constipation, diarrhea, nausea and vomiting  Genitourinary: Negative for difficulty urinating and dysuria  Musculoskeletal: Positive for arthralgias  Neurological: Negative for dizziness, weakness, light-headedness, numbness and headaches  All other systems reviewed and are negative  Physical Exam  Physical Exam  Vitals and nursing note reviewed  Constitutional:       General: She is not in acute distress  Appearance: She is well-developed  She is not diaphoretic  HENT:      Head: Normocephalic and atraumatic  Eyes:      Pupils: Pupils are equal, round, and reactive to light  Neck:      Vascular: No JVD  Trachea: No tracheal deviation  Cardiovascular:      Rate and Rhythm: Normal rate and regular rhythm  Heart sounds: Normal heart sounds  No murmur heard      No friction rub  No gallop  Pulmonary:      Effort: Pulmonary effort is normal  No respiratory distress  Breath sounds: Normal breath sounds  No wheezing or rales  Abdominal:      General: Bowel sounds are normal  There is no distension  Palpations: Abdomen is soft  Tenderness: There is no abdominal tenderness  There is no guarding or rebound  Skin:     General: Skin is warm and dry  Coloration: Skin is not pale  Comments: Several tender nodules located over the plantar fascia, no surface skin changes  Neurological:      Mental Status: She is alert and oriented to person, place, and time  Cranial Nerves: No cranial nerve deficit  Motor: No abnormal muscle tone  Psychiatric:         Behavior: Behavior normal          Vital Signs  ED Triage Vitals [10/08/22 2205]   Temperature Pulse Respirations Blood Pressure SpO2   98 4 °F (36 9 °C) 61 18 149/71 100 %      Temp src Heart Rate Source Patient Position - Orthostatic VS BP Location FiO2 (%)   -- Monitor Sitting Right arm --      Pain Score       --           Vitals:    10/08/22 2205   BP: 149/71   Pulse: 61   Patient Position - Orthostatic VS: Sitting         Visual Acuity      ED Medications  Medications - No data to display    Diagnostic Studies  Results Reviewed     None                 No orders to display              Procedures  Procedures         ED Course                                             [facility]  Number of Diagnoses or Management Options  Left foot pain  Plantar wart  Diagnosis management comments: 73-year-old female with plantar wart verses plantar fascia cyst, recommend NSAIDs, rest, nonweightbearing as often as possible, follow up with Podiatry        Disposition  Final diagnoses:   Left foot pain   Plantar wart     Time reflects when diagnosis was documented in both MDM as applicable and the Disposition within this note     Time User Action Codes Description Comment    10/8/2022 10:27 PM En, [facility] [D23 822] Left foot pain     10/8/2022 10:27 PM Will Hams Add [B07 0] Plantar wart       ED Disposition     ED Disposition   Discharge    Condition   Stable    Date/Time   Sat Oct 8, 2022 10:27 PM    Comment   Della Hillman discharge to home/self care                 Follow-up Information     Follow up With Specialties Details Why [address] Donna Gaitan DPM Podiatry   [address] [facility] [place] [address] [phone]            Discharge Medication List as of 10/8/2022 10:28 PM      CONTINUE these medications which have NOT CHANGED    Details   acetaminophen (TYLENOL) 325 mg tablet Take 2 tablets (650 mg total) by mouth every 6 (six) hours as needed for mild pain, Starting Wed 2/26/2020, Normal      amLODIPine (NORVASC) 5 mg tablet Take 1 tablet (5 mg total) by mouth daily, Starting Wed 6/29/2022, Normal      losartan (COZAAR) 50 mg tablet Take 1 tablet (50 mg total) by mouth daily, Starting Thu 9/29/2022, Until Sat 10/29/2022, Normal      metoprolol tartrate (LOPRESSOR) 25 mg tablet Take 0 5 tablets (12 5 mg total) by mouth every 12 (twelve) hours, Starting Wed 6/29/2022, Normal      Omega-3 Fatty Acids (FISH OIL) 1,000 mg 1,000 mg daily, Historical Med      oxybutynin (DITROPAN-XL) 10 MG 24 hr tablet Take 1 tablet (10 mg total) by mouth daily at bedtime, Starting Sat 6/25/2022, Until Mon 7/25/2022, Normal      pantoprazole (PROTONIX) 40 mg tablet Take 1 tablet (40 mg total) by mouth daily, Starting Wed 6/29/2022, Normal      phenazopyridine (PYRIDIUM) 100 mg tablet Take 1 tablet (100 mg total) by mouth 3 (three) times a day with meals, Starting Wed 6/29/2022, Normal      senna (SENOKOT) 8 6 mg Take 1 tablet (8 6 mg total) by mouth daily at bedtime for 5 days, Starting Thu 7/28/2022, Until Tue 8/2/2022, Normal      tamsulosin (FLOMAX) 0 4 mg Take 1 capsule (0 4 mg total) by mouth daily with dinner, Starting Sat 6/25/2022, Until Mon 7/25/2022, Normal             No discharge procedures on file     PDMP Review     None          ED Provider  Electronically Signed by           Meaghan Crisostomo MD  10/10/22 1436

## 2022-10-12 PROCEDURE — 93227 XTRNL ECG REC<48 HR R&I: CPT | Performed by: INTERNAL MEDICINE

## 2022-10-25 ENCOUNTER — HOSPITAL ENCOUNTER (OUTPATIENT)
Dept: ULTRASOUND IMAGING | Facility: HOSPITAL | Age: 47
Discharge: HOME | End: 2022-10-25
Attending: UROLOGY
Payer: COMMERCIAL

## 2022-10-25 ENCOUNTER — HOSPITAL ENCOUNTER (OUTPATIENT)
Dept: RADIOLOGY | Facility: HOSPITAL | Age: 47
Discharge: HOME | End: 2022-10-25
Payer: COMMERCIAL

## 2022-10-25 DIAGNOSIS — N20.0 NEPHROLITHIASIS: ICD-10-CM

## 2022-10-25 PROCEDURE — 74018 RADEX ABDOMEN 1 VIEW: CPT

## 2022-10-25 PROCEDURE — 76775 US EXAM ABDO BACK WALL LIM: CPT

## 2022-11-01 NOTE — PROGRESS NOTES
11/2/2022      Chief Complaint   Patient presents with   • Follow-up     Assessment and Plan    1  Nephrolithiasis   - S/p right ureteroscopy with laser lithotripsy on 7/28/22   - Stone analysis - CaOx  - KUB from 10/25/22 - Negative  - US from 10/25/22 - No hydronephrosis  Possible nonshadowing calculi in the left kidney  Bilateral ureteral jets are detected  - Recommend dietary modifications and proper hydration to prevent future kidney stone formation   - follow-up in 1 year with renal ultrasound to monitor stone burden  History of Present Illness  Dominique Guzman is a 56 y o  female here for follow up evaluation of  nephrolithiasis  She was initially seen inpatient in June of this year with fevers, complicated UTI, and obstructing right ureteral stone  She underwent ureteral stent placement on 06/25/2022  She then subsequently underwent definitive ureteroscopy with laser lithotripsy on 07/28/2022  She presents today for imaging follow-up  Stone analysis revealed primarily calcium oxalate in composition stone  She is asymptomatic and denies any flank pain or urinary symptoms  Review of Systems   Constitutional: Negative for chills and fever  Respiratory: Negative for shortness of breath  Cardiovascular: Negative for chest pain  Gastrointestinal: Negative for abdominal pain  Genitourinary: Negative for difficulty urinating, dysuria, flank pain, frequency, hematuria and urgency  Neurological: Negative for dizziness                    Past Medical History  Past Medical History:   Diagnosis Date   • Abnormal Pap smear of cervix    • Acute hip pain     tristin   • Cancer ([address] )     cervix- had chemo   • COVID     Feb 2022   • Heart palpitations     frequent -7/20 instructed to call cardiology to report   • Hypertension    • Hypokalemia 04/26/2019   • Low back pain    • Lymphadenopathy    • Pelvic pain    • Sepsis ([address] ) 06/25/2022   • Wears contact lenses    • Wears dentures     partial lower Past Social History  Past Surgical History:   Procedure Laterality Date   • FL RETROGRADE PYELOGRAM  2022   • FL RETROGRADE PYELOGRAM  2022   • MN BRONCHOSCOPY NEEDLE BX TRACHEA MAIN STEM&/BRON N/A 2020    Procedure: ENDOBRONCHIAL ULTRASOUND (EBUS); Surgeon: Khari Solo MD;  [facility];  Service: Thoracic   • MN BRONCHOSCOPY,DIAGNOSTIC N/A 2020    Procedure: [place];  Surgeon: Khrai Solo MD;  [facility];  Service: Thoracic   • MN CYSTO/URETERO W/LITHOTRIPSY &INDWELL STENT INSRT Right 2022    Procedure: CYSTOSCOPY URETEROSCOPY WITH LITHOTRIPSY HOLMIUM LASER, RETROGRADE PYELOGRAM AND RIGHT INSERTION STENT URETERAL;  Surgeon: Rolanda Baldwin MD;  [facility];  Service: Urology   • MN CYSTOURETHROSCOPY,URETER CATHETER Right 2022    Procedure: CYSTOSCOPY RETROGRADE PYELOGRAM WITH INSERTION STENT URETERAL;  Surgeon: Rolanda Baldwin MD;  [facility];  Service: Urology   • MN DILATION OF VAGINA W ANESTH N/A 2019    Procedure: EXAM UNDER ANESTHESIA (EUA);   Surgeon: Wero Cheek MD;  [facility];  Service: Gynecology Oncology   • MN INSERT VAGINAL RADIATION DEVICE N/A 2019    Procedure: PIERRE SLEEVE PLACEMENT;  Surgeon: Wero Cheek MD;  [facility];  Service: Gynecology Oncology   • TONSILLECTOMY     • US GUIDANCE  2019     Social History     Tobacco Use   Smoking Status Former Smoker   • Packs/day: 0 50   • Years: 1 00   • Pack years: 0 50   • Types: Cigarettes   • Quit date: 2020   • Years since quittin 7   Smokeless Tobacco Never Used   Tobacco Comment    vapes occ nicotene       Past Family History  Family History   Problem Relation Age of Onset   • Uterine cancer Maternal Grandmother    • Heart disease Mother    • Canavan disease Father    • Cancer Father        Past Social history  Social History     Socioeconomic History   • Marital status: Significant Other Spouse name: Not on file   • Number of children: Not on file   • Years of education: Not on file   • Highest education level: Not on file   Occupational History   • Not on file   Tobacco Use   • Smoking status: Former Smoker     Packs/day: 0 50     Years: 1 00     Pack years: 0 50     Types: Cigarettes     Quit date: 2020     Years since quittin 7   • Smokeless tobacco: Never Used   • Tobacco comment: vapes occ nicotene   Vaping Use   • Vaping Use: Every day   • Substances: Nicotine   Substance and Sexual Activity   • Alcohol use: Yes     Comment: occ   • Drug use: Never   • Sexual activity: Yes   Other Topics Concern   • Not on file   Social History Narrative   • Not on file     Social Determinants of Health     Financial Resource Strain: Not on file   Food Insecurity: No Food Insecurity   • Worried About Running Out of Food in the Last Year: Never true   • Ran Out of Food in the Last Year: Never true   Transportation Needs: No Transportation Needs   • Lack of Transportation (Medical): No   • Lack of Transportation (Non-Medical):  No   Physical Activity: Not on file   Stress: Not on file   Social Connections: Not on file   Intimate Partner Violence: Not on file   Housing Stability: Low Risk    • Unable to Pay for Housing in the Last Year: No   • Number of Places Lived in the Last Year: 2   • Unstable Housing in the Last Year: No       Current Medications  Current Outpatient Medications   Medication Sig Dispense Refill   • amLODIPine (NORVASC) 5 mg tablet Take 1 tablet (5 mg total) by mouth daily 30 tablet 0   • losartan (COZAAR) 50 mg tablet Take 1 tablet (50 mg total) by mouth daily 30 tablet 0   • metoprolol tartrate (LOPRESSOR) 25 mg tablet Take 0 5 tablets (12 5 mg total) by mouth every 12 (twelve) hours 180 tablet 0   • Omega-3 Fatty Acids (FISH OIL) 1,000 mg 1,000 mg daily     • acetaminophen (TYLENOL) 325 mg tablet Take 2 tablets (650 mg total) by mouth every 6 (six) hours as needed for mild pain (Patient not taking: No sig reported) 30 tablet 0   • oxybutynin (DITROPAN-XL) 10 MG 24 hr tablet Take 1 tablet (10 mg total) by mouth daily at bedtime (Patient not taking: No sig reported) 30 tablet 0   • pantoprazole (PROTONIX) 40 mg tablet Take 1 tablet (40 mg total) by mouth daily (Patient not taking: Reported on 11/2/2022) 30 tablet 0   • phenazopyridine (PYRIDIUM) 100 mg tablet Take 1 tablet (100 mg total) by mouth 3 (three) times a day with meals (Patient not taking: No sig reported) 10 tablet 0   • senna (SENOKOT) 8 6 mg Take 1 tablet (8 6 mg total) by mouth daily at bedtime for 5 days (Patient not taking: Reported on 11/2/2022) 5 tablet 0   • tamsulosin (FLOMAX) 0 4 mg Take 1 capsule (0 4 mg total) by mouth daily with dinner (Patient not taking: No sig reported) 30 capsule 0     No current facility-administered medications for this visit  Allergies  Allergies   Allergen Reactions   • Hydrochlorothiazide Arthralgia   • Latex Hives   • Other      Tomatoes   Vomiting and diarhhea         The following portions of the patient's history were reviewed and updated as appropriate: allergies, current medications, past medical history, past social history, past surgical history and problem list       Vitals  Vitals:    11/02/22 0838   BP: 150/90   BP Location: Left arm   Patient Position: Sitting   Cuff Size: Adult   Pulse: 65   SpO2: 99%   Weight: 66 1 kg (145 lb 12 8 oz)   Height: 5' 1" (1 549 m)           Physical Exam  Physical Exam  Constitutional:       Appearance: Normal appearance  HENT:      Head: Normocephalic and atraumatic  Right Ear: External ear normal       Left Ear: External ear normal       Nose: Nose normal    Eyes:      General: No scleral icterus  Conjunctiva/sclera: Conjunctivae normal    Cardiovascular:      Pulses: Normal pulses  Pulmonary:      Effort: Pulmonary effort is normal    Musculoskeletal:         General: Normal range of motion        Cervical back: Normal range of motion  Neurological:      General: No focal deficit present  Mental Status: She is alert and oriented to person, place, and time  Psychiatric:         Mood and Affect: Mood normal          Behavior: Behavior normal          Thought Content: Thought content normal          Judgment: Judgment normal            Results  No results found for this or any previous visit (from the past 1 hour(s))  ]  No results found for: PSA  Lab Results   Component Value Date    CALCIUM 10 9 (H) 07/04/2022    K 4 8 07/04/2022    CO2 27 07/04/2022     07/04/2022    BUN 30 (H) 07/04/2022    CREATININE 1 32 (H) 07/04/2022     Lab Results   Component Value Date    WBC 9 35 07/04/2022    HGB 11 6 07/04/2022    HCT 36 4 07/04/2022    MCV 85 07/04/2022     07/04/2022           Orders  No orders of the defined types were placed in this encounter        Lois Delgado

## 2022-11-02 ENCOUNTER — OFFICE VISIT (OUTPATIENT)
Dept: UROLOGY | Facility: CLINIC | Age: 47
End: 2022-11-02

## 2022-11-02 VITALS
BODY MASS INDEX: 27.53 KG/M2 | DIASTOLIC BLOOD PRESSURE: 90 MMHG | WEIGHT: 145.8 LBS | SYSTOLIC BLOOD PRESSURE: 150 MMHG | HEIGHT: 61 IN | HEART RATE: 65 BPM | OXYGEN SATURATION: 99 %

## 2022-11-02 DIAGNOSIS — N20.0 NEPHROLITHIASIS: Primary | ICD-10-CM

## 2023-01-05 ENCOUNTER — APPOINTMENT (OUTPATIENT)
Dept: LAB | Facility: HOSPITAL | Age: 48
End: 2023-01-05

## 2023-01-05 ENCOUNTER — HOSPITAL ENCOUNTER (EMERGENCY)
Facility: HOSPITAL | Age: 48
Discharge: HOME | End: 2023-01-05
Attending: EMERGENCY MEDICINE

## 2023-01-05 VITALS
HEART RATE: 65 BPM | TEMPERATURE: 98.4 F | DIASTOLIC BLOOD PRESSURE: 88 MMHG | OXYGEN SATURATION: 100 % | RESPIRATION RATE: 18 BRPM | SYSTOLIC BLOOD PRESSURE: 176 MMHG

## 2023-01-05 DIAGNOSIS — I51.7 ATRIAL SEPTAL HYPERTROPHY: ICD-10-CM

## 2023-01-05 DIAGNOSIS — E87.6 HYPOKALEMIA: Primary | ICD-10-CM

## 2023-01-05 DIAGNOSIS — I10 ESSENTIAL HYPERTENSION, MALIGNANT: ICD-10-CM

## 2023-01-05 DIAGNOSIS — R07.9 CHEST PAIN: ICD-10-CM

## 2023-01-05 DIAGNOSIS — Z76.89 ENCOUNTER TO ESTABLISH CARE WITH NEW DOCTOR: ICD-10-CM

## 2023-01-05 DIAGNOSIS — I10 PRIMARY HYPERTENSION: ICD-10-CM

## 2023-01-05 LAB
2HR DELTA HS TROPONIN: 4 NG/L
ALBUMIN SERPL BCP-MCNC: 3.3 G/DL (ref 3.5–5)
ALBUMIN SERPL BCP-MCNC: 3.6 G/DL (ref 3.5–5)
ALP SERPL-CCNC: 71 U/L (ref 46–116)
ALP SERPL-CCNC: 73 U/L (ref 46–116)
ALT SERPL W P-5'-P-CCNC: 17 U/L (ref 12–78)
ALT SERPL W P-5'-P-CCNC: 19 U/L (ref 12–78)
ANION GAP SERPL CALCULATED.3IONS-SCNC: 7 MMOL/L (ref 4–13)
ANION GAP SERPL CALCULATED.3IONS-SCNC: 8 MMOL/L (ref 4–13)
ANION GAP SERPL CALCULATED.3IONS-SCNC: 9 MMOL/L (ref 4–13)
AST SERPL W P-5'-P-CCNC: 10 U/L (ref 5–45)
AST SERPL W P-5'-P-CCNC: 16 U/L (ref 5–45)
BASOPHILS # BLD AUTO: 0.02 THOUSANDS/ÂΜL (ref 0–0.1)
BASOPHILS NFR BLD AUTO: 0 % (ref 0–1)
BILIRUB DIRECT SERPL-MCNC: 0.09 MG/DL (ref 0–0.2)
BILIRUB SERPL-MCNC: 0.62 MG/DL (ref 0.2–1)
BILIRUB SERPL-MCNC: 0.91 MG/DL (ref 0.2–1)
BUN SERPL-MCNC: 15 MG/DL (ref 5–25)
BUN SERPL-MCNC: 17 MG/DL (ref 5–25)
BUN SERPL-MCNC: 17 MG/DL (ref 5–25)
CALCIUM SERPL-MCNC: 10.1 MG/DL (ref 8.3–10.1)
CALCIUM SERPL-MCNC: 9.9 MG/DL (ref 8.3–10.1)
CALCIUM SERPL-MCNC: 9.9 MG/DL (ref 8.3–10.1)
CARDIAC TROPONIN I PNL SERPL HS: 15 NG/L
CARDIAC TROPONIN I PNL SERPL HS: 19 NG/L
CHLORIDE SERPL-SCNC: 104 MMOL/L (ref 96–108)
CHLORIDE SERPL-SCNC: 105 MMOL/L (ref 96–108)
CHLORIDE SERPL-SCNC: 106 MMOL/L (ref 96–108)
CHOLEST SERPL-MCNC: 195 MG/DL
CO2 SERPL-SCNC: 31 MMOL/L (ref 21–32)
CO2 SERPL-SCNC: 32 MMOL/L (ref 21–32)
CO2 SERPL-SCNC: 32 MMOL/L (ref 21–32)
CREAT SERPL-MCNC: 0.56 MG/DL (ref 0.6–1.3)
CREAT SERPL-MCNC: 0.59 MG/DL (ref 0.6–1.3)
CREAT SERPL-MCNC: 0.66 MG/DL (ref 0.6–1.3)
EOSINOPHIL # BLD AUTO: 0.15 THOUSAND/ÂΜL (ref 0–0.61)
EOSINOPHIL NFR BLD AUTO: 3 % (ref 0–6)
ERYTHROCYTE [DISTWIDTH] IN BLOOD BY AUTOMATED COUNT: 13.2 % (ref 11.6–15.1)
GFR SERPL CREATININE-BSD FRML MDRD: 106 ML/MIN/1.73SQ M
GFR SERPL CREATININE-BSD FRML MDRD: 110 ML/MIN/1.73SQ M
GFR SERPL CREATININE-BSD FRML MDRD: 112 ML/MIN/1.73SQ M
GLUCOSE P FAST SERPL-MCNC: 82 MG/DL (ref 65–99)
GLUCOSE SERPL-MCNC: 102 MG/DL (ref 65–140)
GLUCOSE SERPL-MCNC: 141 MG/DL (ref 65–140)
HCT VFR BLD AUTO: 32.9 % (ref 34.8–46.1)
HDLC SERPL-MCNC: 53 MG/DL
HGB BLD-MCNC: 11.3 G/DL (ref 11.5–15.4)
IMM GRANULOCYTES # BLD AUTO: 0.02 THOUSAND/UL (ref 0–0.2)
IMM GRANULOCYTES NFR BLD AUTO: 0 % (ref 0–2)
INR PPP: 1.06 (ref 0.84–1.19)
LDLC SERPL CALC-MCNC: 117 MG/DL (ref 0–100)
LYMPHOCYTES # BLD AUTO: 1.02 THOUSANDS/ÂΜL (ref 0.6–4.47)
LYMPHOCYTES NFR BLD AUTO: 22 % (ref 14–44)
MCH RBC QN AUTO: 26.7 PG (ref 26.8–34.3)
MCHC RBC AUTO-ENTMCNC: 34.3 G/DL (ref 31.4–37.4)
MCV RBC AUTO: 78 FL (ref 82–98)
MONOCYTES # BLD AUTO: 0.46 THOUSAND/ÂΜL (ref 0.17–1.22)
MONOCYTES NFR BLD AUTO: 10 % (ref 4–12)
NEUTROPHILS # BLD AUTO: 2.96 THOUSANDS/ÂΜL (ref 1.85–7.62)
NEUTS SEG NFR BLD AUTO: 65 % (ref 43–75)
NONHDLC SERPL-MCNC: 142 MG/DL
NRBC BLD AUTO-RTO: 0 /100 WBCS
PLATELET # BLD AUTO: 211 THOUSANDS/UL (ref 149–390)
PMV BLD AUTO: 10 FL (ref 8.9–12.7)
POTASSIUM SERPL-SCNC: 2.4 MMOL/L (ref 3.5–5.3)
POTASSIUM SERPL-SCNC: 2.6 MMOL/L (ref 3.5–5.3)
POTASSIUM SERPL-SCNC: 2.9 MMOL/L (ref 3.5–5.3)
PROT SERPL-MCNC: 6.8 G/DL (ref 6.4–8.4)
PROT SERPL-MCNC: 7.4 G/DL (ref 6.4–8.4)
PROTHROMBIN TIME: 13.6 SECONDS (ref 11.6–14.5)
RBC # BLD AUTO: 4.23 MILLION/UL (ref 3.81–5.12)
SODIUM SERPL-SCNC: 144 MMOL/L (ref 135–147)
SODIUM SERPL-SCNC: 145 MMOL/L (ref 135–147)
SODIUM SERPL-SCNC: 145 MMOL/L (ref 135–147)
TRIGL SERPL-MCNC: 125 MG/DL
WBC # BLD AUTO: 4.63 THOUSAND/UL (ref 4.31–10.16)

## 2023-01-05 RX ORDER — POTASSIUM CHLORIDE 20 MEQ/1
40 TABLET, EXTENDED RELEASE ORAL ONCE
Status: COMPLETED | OUTPATIENT
Start: 2023-01-05 | End: 2023-01-05

## 2023-01-05 RX ORDER — POTASSIUM CHLORIDE 14.9 MG/ML
20 INJECTION INTRAVENOUS ONCE
Status: COMPLETED | OUTPATIENT
Start: 2023-01-05 | End: 2023-01-05

## 2023-01-05 RX ADMIN — POTASSIUM CHLORIDE 40 MEQ: 1500 TABLET, EXTENDED RELEASE ORAL at 17:06

## 2023-01-05 RX ADMIN — POTASSIUM CHLORIDE 20 MEQ: 14.9 INJECTION, SOLUTION INTRAVENOUS at 17:06

## 2023-01-05 NOTE — ED PROVIDER NOTES
History  Chief Complaint   Patient presents with   • Abnormal Lab     Pt had blood work drawn this morning, told to come to ED for potassium 2 6  pt c/o upper back pain and cp last night with muscle cramps  Pt states hx HCM     HPI patient is a 60-year-old female reports a history of hypertension, she reports history of hypokalemia  Apparently had blood work drawn yesterday and told that her potassium was 2 6  Patient was advised to come to the emergency department for hypokalemia  Patient reports she works in [employer] and has been working several days in a row  She reports during 1 of her  shifts she developed some sharp upper back pain  Believes it was a few days ago but last 1 to 2 seconds  Pain across the back of her chest at that time  Again resolved very quickly  Sharp in nature  Ports that she does see a cardiologist but currently for blood pressure control used to be on diuretics but is no longer on diuretics and requires potassium supplement but she does not know why  Denies any shortness of breath  She denies any muscle spasm  Past medical history of hypertension, hypokalemia, palpitations  Family history noncontributory  Social history evketceh-p-xqyvttnay use    Prior to Admission Medications   Prescriptions Last Dose Informant Patient Reported? Taking?    Omega-3 Fatty Acids (FISH OIL) 1,000 mg   Yes No   Si,000 mg daily   acetaminophen (TYLENOL) 325 mg tablet   No No   Sig: Take 2 tablets (650 mg total) by mouth every 6 (six) hours as needed for mild pain   Patient not taking: No sig reported   amLODIPine (NORVASC) 5 mg tablet   No No   Sig: Take 1 tablet (5 mg total) by mouth daily   losartan (COZAAR) 50 mg tablet   No No   Sig: Take 1 tablet (50 mg total) by mouth daily   metoprolol tartrate (LOPRESSOR) 25 mg tablet   No No   Sig: Take 0 5 tablets (12 5 mg total) by mouth every 12 (twelve) hours   oxybutynin (DITROPAN-XL) 10 MG 24 hr tablet   No No   Sig: Take 1 tablet (10 mg total) by mouth daily at bedtime   Patient not taking: No sig reported   pantoprazole (PROTONIX) 40 mg tablet   No No   Sig: Take 1 tablet (40 mg total) by mouth daily   Patient not taking: Reported on 11/2/2022   phenazopyridine (PYRIDIUM) 100 mg tablet   No No   Sig: Take 1 tablet (100 mg total) by mouth 3 (three) times a day with meals   Patient not taking: No sig reported   senna (SENOKOT) 8 6 mg   No No   Sig: Take 1 tablet (8 6 mg total) by mouth daily at bedtime for 5 days   Patient not taking: Reported on 11/2/2022   tamsulosin (FLOMAX) 0 4 mg   No No   Sig: Take 1 capsule (0 4 mg total) by mouth daily with dinner   Patient not taking: No sig reported      Facility-Administered Medications: None       Past Medical History:   Diagnosis Date   • Abnormal Pap smear of cervix    • Acute hip pain     tristin   • Cancer (HCC)     cervix- had chemo   • COVID     Feb 2022   • Heart palpitations     frequent -7/20 instructed to call cardiology to report   • Hypertension    • Hypokalemia 04/26/2019   • Low back pain    • Lymphadenopathy    • Pelvic pain    • Sepsis ([facility] [address] ) 06/25/2022   • Wears contact lenses    • Wears dentures     partial lower       Past Surgical History:   Procedure Laterality Date   • FL RETROGRADE PYELOGRAM  6/25/2022   • FL RETROGRADE PYELOGRAM  7/28/2022   • AR [address] INCL FLUOR GDNCE DX W/CELL WASHG SPX N/A 2/26/2020    Procedure: Stephane Lama;  Surgeon: Porfirio Severino MD;  [facility];  Service: Thoracic   • AR BRONCHOSCOPY NEEDLE BX TRACHEA MAIN STEM&/BRON N/A 2/26/2020    Procedure: ENDOBRONCHIAL ULTRASOUND (EBUS);   Surgeon: Porfirio Severino MD;  [facility];  Service: Thoracic   • AR CYSTO BLADDER W/URETERAL CATHETERIZATION Right 6/25/2022    Procedure: CYSTOSCOPY RETROGRADE PYELOGRAM WITH INSERTION STENT URETERAL;  Surgeon: Christopher Bowles MD;  [facility];  Service: Urology   • AR CYSTO/URETERO W/LITHOTRIPSY &INDWELL STENT INSRT Right 7/28/2022 Procedure: CYSTOSCOPY URETEROSCOPY WITH LITHOTRIPSY HOLMIUM LASER, RETROGRADE PYELOGRAM AND RIGHT INSERTION STENT URETERAL;  Surgeon: Khloe Nixon MD;  Location: [facility];  Service: Urology   • PA DILATION VAGINA W/ANESTHESIA OTHER THAN LOCAL N/A 2019    Procedure: EXAM UNDER ANESTHESIA (EUA); Surgeon: Eir Stover MD;  Location:  MAIN OR;  Service: Gynecology Oncology   • PA INSERTION VAGINAL RADIATION DEVICE N/A 2019    Procedure: PIERRE SLEEVE PLACEMENT;  Surgeon: Eri Stover MD;  Location:  MAIN OR;  Service: Gynecology Oncology   • TONSILLECTOMY     • US GUIDANCE  2019       Family History   Problem Relation Age of Onset   • Uterine cancer Maternal Grandmother    • Heart disease Mother    • Canavan disease Father    • Cancer Father      I have reviewed and agree with the history as documented  E-Cigarette/Vaping   • E-Cigarette Use Current Every Day User      E-Cigarette/Vaping Substances   • Nicotine Yes    • THC No    • CBD No    • Flavoring No    • Other No    • Unknown No      Social History     Tobacco Use   • Smoking status: Former     Packs/day: 0 50     Years: 1 00     Pack years: 0 50     Types: Cigarettes     Quit date: 2020     Years since quittin 8   • Smokeless tobacco: Never   • Tobacco comments:     vapes occ nicotene   Vaping Use   • Vaping Use: Every day   • Substances: Nicotine   Substance Use Topics   • Alcohol use: Yes     Comment: occ   • Drug use: Never       Review of Systems   Constitutional: Negative for diaphoresis, fatigue and fever  HENT: Negative for congestion, ear pain, nosebleeds and sore throat  Eyes: Negative for photophobia, pain, discharge and visual disturbance  Respiratory: Negative for cough, choking, chest tightness, shortness of breath and wheezing  Cardiovascular: Positive for chest pain  Negative for palpitations  Gastrointestinal: Negative for abdominal distention, abdominal pain, diarrhea and vomiting  Genitourinary: Negative for dysuria, flank pain and frequency  Musculoskeletal: Positive for back pain  Negative for gait problem and joint swelling  Skin: Negative for color change and rash  Neurological: Negative for dizziness, syncope and headaches  Psychiatric/Behavioral: Negative for behavioral problems and confusion  The patient is not nervous/anxious  All other systems reviewed and are negative  Physical Exam  Physical Exam  Vitals and nursing note reviewed  Constitutional:       Appearance: She is well-developed  HENT:      Head: Normocephalic  Right Ear: External ear normal       Left Ear: External ear normal       Nose: Nose normal    Eyes:      General: Lids are normal       Pupils: Pupils are equal, round, and reactive to light  Cardiovascular:      Rate and Rhythm: Normal rate and regular rhythm  Pulses: Normal pulses  Heart sounds: Normal heart sounds  Pulmonary:      Effort: Pulmonary effort is normal  No respiratory distress  Breath sounds: Normal breath sounds  Abdominal:      General: Abdomen is flat  Bowel sounds are normal       Tenderness: There is no abdominal tenderness  Musculoskeletal:         General: No deformity  Normal range of motion  Cervical back: Normal range of motion and neck supple  Skin:     General: Skin is warm and dry  Neurological:      Mental Status: She is alert and oriented to person, place, and time         Pulse oximetry normal at 98% adequate oxygenation, there is no hypoxia  Vital Signs  ED Triage Vitals   Temperature Pulse Respirations Blood Pressure SpO2   01/05/23 1509 01/05/23 1509 01/05/23 1509 01/05/23 1509 01/05/23 1509   97 6 °F (36 4 °C) 69 20 161/76 98 %      Temp src Heart Rate Source Patient Position - Orthostatic VS BP Location FiO2 (%)   -- 01/05/23 1627 01/05/23 1627 01/05/23 1627 --    Monitor Lying Right arm       Pain Score       --                  Vitals:    01/05/23 1509 01/05/23 1519 01/05/23 1627 01/05/23 1938   BP: 161/76  (!) 183/84 (!) 176/88   Pulse: 69 94 65 65   Patient Position - Orthostatic VS:   Lying Lying         Visual Acuity      ED Medications  Medications   potassium chloride 20 mEq IVPB (premix) (0 mEq Intravenous Stopped 1/5/23 1926)   potassium chloride (K-DUR,KLOR-CON) CR tablet 40 mEq (40 mEq Oral Given 1/5/23 1706)       Diagnostic Studies  Results Reviewed     Procedure Component Value Units Date/Time    HS Troponin I 2hr [[id number]]  (Normal) Collected: 01/05/23 1926    Lab Status: Final result Specimen: Blood from Arm, Left Updated: 01/05/23 2020     hs TnI 2hr 19 ng/L      Delta 2hr hsTnI 4 ng/L     Basic metabolic panel [[id number]]  (Abnormal) Collected: 01/05/23 1926    Lab Status: Final result Specimen: Blood from Arm, Left Updated: 01/05/23 1949     Sodium 145 mmol/L      Potassium 2 9 mmol/L      Chloride 106 mmol/L      CO2 32 mmol/L      ANION GAP 7 mmol/L      BUN 15 mg/dL      Creatinine 0 59 mg/dL      Glucose 102 mg/dL      Calcium 9 9 mg/dL      eGFR 110 ml/min/1 73sq m     Narrative:      [place] guidelines for Chronic Kidney Disease (CKD):   •  Stage 1 with normal or high GFR (GFR > 90 mL/min/1 73 square meters)  •  Stage 2 Mild CKD (GFR = 60-89 mL/min/1 73 square meters)  •  Stage 3A Moderate CKD (GFR = 45-59 mL/min/1 73 square meters)  •  Stage 3B Moderate CKD (GFR = 30-44 mL/min/1 73 square meters)  •  Stage 4 Severe CKD (GFR = 15-29 mL/min/1 73 square meters)  •  Stage 5 End Stage CKD (GFR <15 mL/min/1 73 square meters)  Note: GFR calculation is accurate only with a steady state creatinine    HS Troponin I 4hr [[id number]]     Lab Status: No result Specimen: Blood     HS Troponin 0hr (reflex protocol) [[id number]]  (Normal) Collected: 01/05/23 1557    Lab Status: Final result Specimen: Blood from Arm, Left Updated: 01/05/23 1703     hs TnI 0hr 15 ng/L     Basic metabolic panel [[id number]]  (Abnormal) Collected: 01/05/23 2228 Lab Status: Final result Specimen: Blood from Arm, Left Updated: 01/05/23 1648     Sodium 145 mmol/L      Potassium 2 4 mmol/L      Chloride 105 mmol/L      CO2 32 mmol/L      ANION GAP 8 mmol/L      BUN 17 mg/dL      Creatinine 0 66 mg/dL      Glucose 141 mg/dL      Calcium 9 9 mg/dL      eGFR 106 ml/min/1 73sq m     Narrative:      [place] guidelines for Chronic Kidney Disease (CKD):   •  Stage 1 with normal or high GFR (GFR > 90 mL/min/1 73 square meters)  •  Stage 2 Mild CKD (GFR = 60-89 mL/min/1 73 square meters)  •  Stage 3A Moderate CKD (GFR = 45-59 mL/min/1 73 square meters)  •  Stage 3B Moderate CKD (GFR = 30-44 mL/min/1 73 square meters)  •  Stage 4 Severe CKD (GFR = 15-29 mL/min/1 73 square meters)  •  Stage 5 End Stage CKD (GFR <15 mL/min/1 73 square meters)  Note: GFR calculation is accurate only with a steady state creatinine    Hepatic function panel [[id number]]  (Abnormal) Collected: 01/05/23 1557    Lab Status: Final result Specimen: Blood from Arm, Left Updated: 01/05/23 1636     Total Bilirubin 0 62 mg/dL      Bilirubin, Direct 0 09 mg/dL      Alkaline Phosphatase 73 U/L      AST 10 U/L      ALT 17 U/L      Total Protein 6 8 g/dL      Albumin 3 3 g/dL     Protime-INR [[id number]]  (Normal) Collected: 01/05/23 1557    Lab Status: Final result Specimen: Blood from Arm, Left Updated: 01/05/23 1625     Protime 13 6 seconds      INR 1 06    CBC and differential [[id number]]  (Abnormal) Collected: 01/05/23 1557    Lab Status: Final result Specimen: Blood from Arm, Left Updated: 01/05/23 1603     WBC 4 63 Thousand/uL      RBC 4 23 Million/uL      Hemoglobin 11 3 g/dL      Hematocrit 32 9 %      MCV 78 fL      MCH 26 7 pg      MCHC 34 3 g/dL      RDW 13 2 %      MPV 10 0 fL      Platelets 802 Thousands/uL      nRBC 0 /100 WBCs      Neutrophils Relative 65 %      Immat GRANS % 0 %      Lymphocytes Relative 22 %      Monocytes Relative 10 %      Eosinophils Relative 3 % Basophils Relative 0 %      Neutrophils Absolute 2 96 Thousands/µL      Immature Grans Absolute 0 02 Thousand/uL      Lymphocytes Absolute 1 02 Thousands/µL      Monocytes Absolute 0 46 Thousand/µL      Eosinophils Absolute 0 15 Thousand/µL      Basophils Absolute 0 02 Thousands/µL                  No orders to display              Procedures  ECG 12 Lead Documentation Only    Date/Time: 1/5/2023 3:26 PM  Performed by: Nick Londono MD  Authorized by: Nick Londono MD     Indications / Diagnosis:  Hypokalemia, posterior chest pain  ECG reviewed by me, the ED Provider: yes    Patient location:  ED  Previous ECG:     Previous ECG:  Compared to current    Comparison ECG info:  July fourth 2022    Similarity:  Changes noted  Interpretation:     Interpretation: non-specific    Rate:     ECG rate:  71    ECG rate assessment: normal    Rhythm:     Rhythm: sinus rhythm    Comments:      Normal sinus rhythm, complete right bundle branch block, no acute ST-T wave changes specific anterior changes             ED Course         Diagnostic testing, cardiac troponin showed initial number of 15, repeat was 19 delta of 4  Since initial potassium here was 2 4  She received IV potassium and oral potassium  Repeat potassium was 2 9  White count was normal at 4 6 no sign of information hemoglobin was normal 11 no sign of anemia  HEART Risk Score    Flowsheet Row Most Recent Value   Heart Score Risk Calculator    History 0 Filed at: 01/05/2023 2220   ECG 0 Filed at: 01/05/2023 2220   Age 1 Filed at: 01/05/2023 2220   Risk Factors 1 Filed at: 01/05/2023 2220   Troponin 0 Filed at: 01/05/2023 2220   HEART Score 2 Filed at: 01/05/2023 2220                        SBIRT 22yo+    Flowsheet Row Most Recent Value   SBIRT (23 yo +)    In order to provide better care to our patients, we are screening all of our patients for alcohol and drug use  Would it be okay to ask you these screening questions?  Yes Filed at: 01/05/2023 1958 Initial Alcohol Screen: US AUDIT-C     1  How often do you have a drink containing alcohol? 0 Filed at: 01/05/2023 1958   2  How many drinks containing alcohol do you have on a typical day you are drinking? 0 Filed at: 01/05/2023 1958   3a  Male UNDER 65: How often do you have five or more drinks on one occasion? 0 Filed at: 01/05/2023 1958   3b  FEMALE Any Age, or MALE 65+: How often do you have 4 or more drinks on one occassion? 0 Filed at: 01/05/2023 1958   Audit-C Score 0 Filed at: 01/05/2023 1958   GRISELDA: How many times in the past year have you    Used an illegal drug or used a prescription medication for non-medical reasons? Never Filed at: 01/05/2023 1958                    Medical Decision Making  Chest pain: acute illness or injury  Hypokalemia: acute illness or injury  Amount and/or Complexity of Data Reviewed  Labs: ordered  Discussion of management or test interpretation with external provider(s): Medical decision making 30-year-old female presents emergency department for 2 complaints, referred to the emergency department for hypokalemia calcium was 2 6 as an outpatient  Patient's potassium here was also low 2 4, she was repleted and repeat potassium was 2 9  We discussed outpatient potassium use  Patient also presented with chest pain early sharp stabbing chest pain lasting 1 to 2 seconds most consistent with musculoskeletal pain  Heart Score was low  Cardiac troponin was negative EKG showed no acute changes  Discussed with patient advised outpatient follow-up  Risk  Prescription drug management            Disposition  Final diagnoses:   Hypokalemia   Chest pain     Time reflects when diagnosis was documented in both MDM as applicable and the Disposition within this note     Time User Action Codes Description Comment    1/5/2023  8:40 PM Josep Olivier [E87 [id number]] Hypokalemia     1/5/2023  8:40 PM Nate Lock [R07 [id number]] Chest pain       ED Disposition     ED Disposition   Discharge Condition   Stable    Date/Time   Thu Jan 5, 2023  8:40 PM    Comment   Della Hillman discharge to home/self care  Follow-up Information    None         Discharge Medication List as of 1/5/2023  8:41 PM      CONTINUE these medications which have NOT CHANGED    Details   acetaminophen (TYLENOL) 325 mg tablet Take 2 tablets (650 mg total) by mouth every 6 (six) hours as needed for mild pain, Starting Wed 2/26/2020, Normal      amLODIPine (NORVASC) 5 mg tablet Take 1 tablet (5 mg total) by mouth daily, Starting Wed 6/29/2022, Normal      losartan (COZAAR) 50 mg tablet Take 1 tablet (50 mg total) by mouth daily, Starting Thu 9/29/2022, Until Wed 11/2/2022, Normal      metoprolol tartrate (LOPRESSOR) 25 mg tablet Take 0 5 tablets (12 5 mg total) by mouth every 12 (twelve) hours, Starting Wed 6/29/2022, Normal      Omega-3 Fatty Acids (FISH OIL) 1,000 mg 1,000 mg daily, Historical Med      oxybutynin (DITROPAN-XL) 10 MG 24 hr tablet Take 1 tablet (10 mg total) by mouth daily at bedtime, Starting Sat 6/25/2022, Until Mon 7/25/2022, Normal      pantoprazole (PROTONIX) 40 mg tablet Take 1 tablet (40 mg total) by mouth daily, Starting Wed 6/29/2022, Normal      phenazopyridine (PYRIDIUM) 100 mg tablet Take 1 tablet (100 mg total) by mouth 3 (three) times a day with meals, Starting Wed 6/29/2022, Normal      senna (SENOKOT) 8 6 mg Take 1 tablet (8 6 mg total) by mouth daily at bedtime for 5 days, Starting Thu 7/28/2022, Until Wed 11/2/2022, Normal      tamsulosin (FLOMAX) 0 4 mg Take 1 capsule (0 4 mg total) by mouth daily with dinner, Starting Sat 6/25/2022, Until Mon 7/25/2022, Normal             No discharge procedures on file      PDMP Review     None          ED Provider  Electronically Signed by           Juana Carreno MD  01/05/23 0567

## 2023-01-06 DIAGNOSIS — I42.2 HYPERTROPHIC CARDIOMYOPATHY (HCC): Primary | ICD-10-CM

## 2023-01-06 DIAGNOSIS — E87.6 HYPOKALEMIA: ICD-10-CM

## 2023-01-06 LAB
ATRIAL RATE: 71 BPM
ATRIAL RATE: 71 BPM
P AXIS: 71 DEGREES
P AXIS: 72 DEGREES
PR INTERVAL: 146 MS
PR INTERVAL: 156 MS
QRS AXIS: 88 DEGREES
QRS AXIS: 88 DEGREES
QRSD INTERVAL: 106 MS
QRSD INTERVAL: 98 MS
QT INTERVAL: 426 MS
QT INTERVAL: 438 MS
QTC INTERVAL: 462 MS
QTC INTERVAL: 475 MS
T WAVE AXIS: 37 DEGREES
T WAVE AXIS: 62 DEGREES
VENTRICULAR RATE: 71 BPM
VENTRICULAR RATE: 71 BPM

## 2023-01-06 RX ORDER — POTASSIUM CHLORIDE 750 MG/1
40 CAPSULE, EXTENDED RELEASE ORAL DAILY
Qty: 5 CAPSULE | Refills: 0 | Status: SHIPPED | OUTPATIENT
Start: 2023-01-06

## 2023-01-10 ENCOUNTER — APPOINTMENT (OUTPATIENT)
Dept: LAB | Facility: HOSPITAL | Age: 48
End: 2023-01-10

## 2023-01-10 ENCOUNTER — TELEPHONE (OUTPATIENT)
Dept: NEPHROLOGY | Facility: CLINIC | Age: 48
End: 2023-01-10

## 2023-01-10 DIAGNOSIS — E87.6 HYPOKALEMIA: ICD-10-CM

## 2023-01-10 LAB
ANION GAP SERPL CALCULATED.3IONS-SCNC: 5 MMOL/L (ref 4–13)
BUN SERPL-MCNC: 14 MG/DL (ref 5–25)
CALCIUM SERPL-MCNC: 10.2 MG/DL (ref 8.3–10.1)
CHLORIDE SERPL-SCNC: 104 MMOL/L (ref 96–108)
CO2 SERPL-SCNC: 33 MMOL/L (ref 21–32)
CREAT SERPL-MCNC: 0.57 MG/DL (ref 0.6–1.3)
GFR SERPL CREATININE-BSD FRML MDRD: 111 ML/MIN/1.73SQ M
GLUCOSE P FAST SERPL-MCNC: 91 MG/DL (ref 65–99)
POTASSIUM SERPL-SCNC: 4 MMOL/L (ref 3.5–5.3)
SODIUM SERPL-SCNC: 142 MMOL/L (ref 135–147)

## 2023-01-10 NOTE — TELEPHONE ENCOUNTER
Good Morning,      Hi, Team I schedule patient for 01/18/2023 with Dr Radha Carbajal for a Nephrology Consult Ref by Mary Patel for Hypokalemia  Patient will be going to do other bood work from different provider can you please enter blood work in [employer] so patient can have all the blood work done together      Thank you

## 2023-01-10 NOTE — TELEPHONE ENCOUNTER
Alex Self,  Patient had BMP labs done 1/10/23 from referring provider for appointment with Earlene Becker

## 2023-01-16 NOTE — PROGRESS NOTES
Assessment/Plan:    Problem List Items Addressed This Visit        Genitourinary    Malignant neoplasm of overlapping sites of cervix [facility])     Patient is a pleasant 60-year-old white female with a history of stage 1 B to cervical carcinoma  She has completed therapy in approximately July of 2019  Her PET scan reveals significant reduction of disease  Upon my review there were not any significant hot spots and this most likely represents resolution of disease  Her exam today is consistent with completion of treatment  Routine Pap smear was performed  I have recommended a follow-up PET-CT scan in approximately 3 months  This point I see no benefit to adding needle biopsies  Regarding the patient's multiple social issues, an urgent consult to  was performed today  Additionally the patient's PET scan this is notable for a thyroid nodule  We have recommended a thyroid ultrasound  The patient will follow up in 3 months for repeat evaluation           Other Visit Diagnoses     Abnormal thyroid scan    -  Primary    Relevant Orders    US thyroid    NM PET CT skull base to mid thigh    Liquid-based pap, diagnostic            CHIEF COMPLAINT:  Surveillance cervical cancer      Problem:  Cancer Staging  Malignant neoplasm of overlapping sites of cervix [facility])  Staging form: Cervix Uteri, AJCC 8th Edition  - Clinical stage from 5/1/2019: FIGO Stage IB2 (cT1b2, cN1, cM0) - Signed by Isauro Baca MD on 5/1/2019        Previous therapy:  Oncology History    Returns for first follow up post pelvic/paraaortic and tandem and ring radiation completed on 7/5/19 7/16/19 Dr Angy Burciaga  Complains of  diffuse pain, pelvic swelling and discomfort; no exam performed  Plan PET-CT in 3-4 months post treatment    7/23/19 Dr Honey Izquierdo certification issued    8/20/19 Dr Angy Burciaga  9/17/19 Dr Leatha Jeffries        Malignant neoplasm of overlapping sites of cervix ([facility] [address] )    4/26/2019 Biopsy     Final Diagnosis   A  Call Reason: Reschedule  Visit Type: FU  When appointment should be scheduled:  2/9 or 2/10  Provider: Finseth      Left message   Cervix, 12:00, biopsy:  -  Invasive moderately to poorly differentiated non-keratinizing squamous cell carcinoma (see note)  5/1/2019 Initial Diagnosis     Malignant neoplasm of overlapping sites of cervix (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 with potential metastatic disease to the left iliac region and sacral region  5/1/2019 -  Cancer Staged     Staging form: Cervix Uteri, AJCC 8th Edition  - Clinical stage from 5/1/2019: FIGO Stage IB2 (cT1b2, cN1, cM0) - Signed by Nanci Sánchez MD on 5/1/2019  Histologic grade (G): G3  Histologic grading system: 3 grade system        5/6/2019 -  Chemotherapy     CISplatin (PLATINOL) 62 4 mg in sodium chloride 0 9 % 250 mL infusion, 40 mg/m2 = 62 4 mg, Intravenous, Once, 6 of 6 cycles  Administration: 62 4 mg (5/21/2019), 62 4 mg (5/29/2019), 62 4 mg (6/4/2019), 62 4 mg (6/11/2019), 62 4 mg (6/18/2019), 62 4 mg (6/26/2019)      5/20/2019 - 7/3/2019 Radiation     Whole pelvic radiation therapy  45Gy in 25 daily fractions  With a parametrial boost of an additional 5 4Gy in 3 daily fractions  6/20/2019 - 7/5/2019 Radiation     HDR brachytherapy utilizing tandem and ring  6Gy in 5 fractions on dates: 6/20/19, 6/28/19, 6/25/19, 7/2/19, 7/5/19  Patient ID: Rosmery Palacios is a 38 y o  female  Patient presents today in 1st follow-up from combination chemo radiation therapy for stage IIB cervical cancer with treatment completed in July of 2019  Since her last visit the patient has been doing physically well  She has no significant complaints  She does have a number of psychosocial situation including possible loss of housing and spouse will support issues which are causing significant stressors  Physically she is doing well  She has recently  undergone a PET-CT scan which reveals the following:    IMPRESSION:  1  Significantly decreased FDG activity in the region of the cervix, suggesting response to therapy    Mildly hypermetabolic left pelvic nodes also have decreased  2   No new hypermetabolic metastases demonstrated  3   Persistent hypermetabolic nodule along the right lower posterior thyroid lesion  Ultrasound evaluation may be considered if not ready performed  Today, the patient is doing well  She denies significant abdominal pain, pelvic pain, nausea, vomiting, constipation, diarrhea, fevers, chills, or vaginal bleeding  The following portions of the patient's history were reviewed and updated as appropriate: allergies, current medications, past medical history, past social history, past surgical history and problem list     Review of Systems   Constitutional: Negative  HENT: Negative  Eyes: Negative  Respiratory: Negative  Cardiovascular: Negative  Gastrointestinal: Negative  Endocrine: Negative  Genitourinary: Negative  Musculoskeletal: Negative  Skin: Negative  Neurological: Negative  Hematological: Negative  Psychiatric/Behavioral: Negative  Current Outpatient Medications   Medication Sig Dispense Refill    estrogen, conjugated,-medroxyprogesterone (PREMPRO) 0 625-2 5 MG per tablet Take 1 tablet by mouth daily 30 tablet 6    lidocaine (LMX) 4 % cream Apply topically as needed for mild pain (Patient not taking: Reported on 8/5/2019) 30 g 0     No current facility-administered medications for this visit  Objective:    Blood pressure (!) 180/110, pulse 68, temperature 99 4 °F (37 4 °C), resp  rate 18, height 5' 1" (1 549 m), weight 57 2 kg (126 lb), not currently breastfeeding  Body mass index is 23 81 kg/m²  Body surface area is 1 55 meters squared  Physical Exam   Constitutional: She is oriented to person, place, and time  She appears well-developed and well-nourished  HENT:   Head: Normocephalic and atraumatic  Eyes: Pupils are equal, round, and reactive to light  EOM are normal    Neck: Normal range of motion  Neck supple     Cardiovascular: Normal rate, regular rhythm and normal heart sounds  Pulmonary/Chest: Effort normal and breath sounds normal  No respiratory distress  Abdominal: Soft  Bowel sounds are normal  She exhibits no distension and no ascites  There is no tenderness  There is no rigidity, no rebound and no guarding  Genitourinary:   Genitourinary Comments: -Normal external female genitalia, normal Bartholin's and Venersborg's glands                  -Normal midline urethral meatus  No lesions notes                  -Bladder without fullness mass or tenderness                  -Vagina without lesion or discharge No significant cystocele or rectocele noted                  -Cervix approximately 2 x 3 centimeters without visible lesion  The parametria are unremarkable however the left parametria is foreshortened  There is no palpable residual disease  Routine Pap smear was performed                   -Uterus with normal contour, mobility  No tenderness,                  -Adnexae without  mass or tenderness                  - Anus without fissure of lesion     Musculoskeletal: Normal range of motion  Lymphadenopathy:     She has no cervical adenopathy  She has no axillary adenopathy  Right: No inguinal and no supraclavicular adenopathy present  Left: No inguinal and no supraclavicular adenopathy present  Neurological: She is alert and oriented to person, place, and time  Skin: Skin is warm and dry  Psychiatric: She has a normal mood and affect   Her behavior is normal

## 2023-01-18 ENCOUNTER — CONSULT (OUTPATIENT)
Dept: NEPHROLOGY | Facility: CLINIC | Age: 48
End: 2023-01-18

## 2023-01-18 VITALS
TEMPERATURE: 97.5 F | OXYGEN SATURATION: 98 % | DIASTOLIC BLOOD PRESSURE: 110 MMHG | BODY MASS INDEX: 27.75 KG/M2 | WEIGHT: 147 LBS | SYSTOLIC BLOOD PRESSURE: 166 MMHG | HEART RATE: 75 BPM | HEIGHT: 61 IN | RESPIRATION RATE: 16 BRPM

## 2023-01-18 DIAGNOSIS — E87.6 HYPOKALEMIA: Primary | ICD-10-CM

## 2023-01-18 DIAGNOSIS — I10 PRIMARY HYPERTENSION: ICD-10-CM

## 2023-01-18 RX ORDER — CYCLOBENZAPRINE HCL 5 MG
TABLET ORAL
COMMUNITY
Start: 2023-01-09

## 2023-01-18 RX ORDER — IBUPROFEN 600 MG/1
TABLET ORAL
COMMUNITY

## 2023-01-18 RX ORDER — POTASSIUM CHLORIDE 750 MG/1
10 TABLET, EXTENDED RELEASE ORAL DAILY
COMMUNITY
Start: 2023-01-10

## 2023-01-18 NOTE — PROGRESS NOTES
NEPHROLOGY OFFICE CONSULT  Della Hillamn 56 y  o female YOB: 1975 MRN: [id number]    Encounter: [id number] DATE: 1/18/2023    REASON FOR VISIT: Marisela Richmond is a 56 y  o female who was referred by Dr Cornelius for further management of hypokalemia    HPI:    This is a 79-year-old female with past medical history significant for hypertension, kidney stone was referred to nephrology office for further management of hypokalemia  Patient has underlying hypertension and currently does not check blood pressure at home but according to patient she has been compliant with her antihypertensive medications and blood pressure with PCPs office is under good control  Recently patient had blood work done on 1/5/2023 and found to have hypokalemia and was advised to go to the ER  Patient is now taking oral potassium supplementation and last known potassium from 1/10/2023 is at goal at 4 0  Patient also have underlying kidney stone and currently also been followed by urologist       REVIEW OF SYSTEMS:    Review of Systems   Constitutional: Negative for chills and fever  HENT: Negative for nosebleeds and sore throat  Eyes: Negative for photophobia and pain  Respiratory: Negative for choking and wheezing  Cardiovascular: Negative for chest pain and palpitations  Gastrointestinal: Negative for blood in stool  Genitourinary: Negative for hematuria  Musculoskeletal: Negative for neck stiffness  Skin: Negative for pallor  Neurological: Negative for seizures and syncope  Psychiatric/Behavioral: Negative for confusion and suicidal ideas           PAST MEDICAL HISTORY:  Past Medical History:   Diagnosis Date   • Abnormal Pap smear of cervix    • Acute hip pain     tristin   • Cancer ([facility] [address] )     cervix- had chemo   • COVID     Feb 2022   • Heart palpitations     frequent -7/20 instructed to call cardiology to report   • Hypertension    • Hypokalemia 04/26/2019   • Low back pain    • Lymphadenopathy    • Pelvic pain    • Sepsis ([facility] [address] ) 2022   • Wears contact lenses    • Wears dentures     partial lower       PAST SURGICAL HISTORY:  Past Surgical History:   Procedure Laterality Date   • [place] RETROGRADE PYELOGRAM  2022   • [place] RETROGRADE PYELOGRAM  2022   • [place] [address] INCL FLUOR GDNCE DX W/CELL WASHG SPX N/A 2020    Procedure: Svetlana Chance;  Surgeon: Charmayne Ferguson, MD;  [facility];  Service: Thoracic   • [place] BRONCHOSCOPY NEEDLE BX TRACHEA MAIN STEM&/BRON N/A 2020    Procedure: ENDOBRONCHIAL ULTRASOUND (EBUS); Surgeon: Charmayne Ferguson, MD;  [facility];  Service: Thoracic   • IL CYSTO BLADDER W/URETERAL CATHETERIZATION Right 2022    Procedure: CYSTOSCOPY RETROGRADE PYELOGRAM WITH INSERTION STENT URETERAL;  Surgeon: Nely Wylie MD;  [facility];  Service: Urology   • IL CYSTO/URETERO W/LITHOTRIPSY &INDWELL STENT INSRT Right 2022    Procedure: CYSTOSCOPY URETEROSCOPY WITH LITHOTRIPSY HOLMIUM LASER, RETROGRADE PYELOGRAM AND RIGHT INSERTION STENT URETERAL;  Surgeon: Nely Wylie MD;  Location: [facility];  Service: Urology   • IL DILATION VAGINA W/ANESTHESIA OTHER THAN LOCAL N/A 2019    Procedure: EXAM UNDER ANESTHESIA (EUA);   Surgeon: Krishan Pérez MD;  [facility];  Service: Gynecology Oncology   • IL INSERTION VAGINAL RADIATION DEVICE N/A 2019    Procedure: Elfego Gilliam;  Surgeon: Krishan Pérze MD;  [facility];  Service: Gynecology Oncology   • TONSILLECTOMY     • US GUIDANCE  2019       SOCIAL HISTORY:  Social History     Substance and Sexual Activity   Alcohol Use Yes    Comment: occ     Social History     Substance and Sexual Activity   Drug Use Never     Social History     Tobacco Use   Smoking Status Former   • Packs/day: 0 50   • Years: 1 00   • Pack years: 0 50   • Types: Cigarettes   • Quit date: 2020   • Years since quittin 9   Smokeless Tobacco Never   Tobacco Comments    vapes occ nicotene       FAMILY HISTORY:  Family History   Problem Relation Age of Onset   • Uterine cancer Maternal Grandmother    • Heart disease Mother    • Canavan disease Father    • Cancer Father        ALLERGY:  Allergies   Allergen Reactions   • Hydrochlorothiazide Arthralgia   • Latex Hives   • Other      Tomatoes   Vomiting and diarhhea       MEDICATIONS:    Current Outpatient Medications:   •  amLODIPine (NORVASC) 5 mg tablet, Take 1 tablet (5 mg total) by mouth daily, Disp: 30 tablet, Rfl: 0  •  losartan (COZAAR) 50 mg tablet, Take 1 tablet (50 mg total) by mouth daily, Disp: 30 tablet, Rfl: 0  •  metoprolol tartrate (LOPRESSOR) 25 mg tablet, Take 0 5 tablets (12 5 mg total) by mouth every 12 (twelve) hours, Disp: 180 tablet, Rfl: 0  •  Omega-3 Fatty Acids (FISH OIL) 1,000 mg, 1,000 mg daily, Disp: , Rfl:   •  acetaminophen (TYLENOL) 325 mg tablet, Take 2 tablets (650 mg total) by mouth every 6 (six) hours as needed for mild pain (Patient not taking: Reported on 4/1/2022), Disp: 30 tablet, Rfl: 0  •  oxybutynin (DITROPAN-XL) 10 MG 24 hr tablet, Take 1 tablet (10 mg total) by mouth daily at bedtime (Patient not taking: Reported on 9/20/2022), Disp: 30 tablet, Rfl: 0  •  pantoprazole (PROTONIX) 40 mg tablet, Take 1 tablet (40 mg total) by mouth daily (Patient not taking: Reported on 11/2/2022), Disp: 30 tablet, Rfl: 0  •  phenazopyridine (PYRIDIUM) 100 mg tablet, Take 1 tablet (100 mg total) by mouth 3 (three) times a day with meals (Patient not taking: Reported on 7/20/2022), Disp: 10 tablet, Rfl: 0  •  potassium chloride (MICRO-K) 10 MEQ CR capsule, Take 4 capsules (40 mEq total) by mouth in the morning (Patient not taking: Reported on 1/18/2023), Disp: 5 capsule, Rfl: 0  •  senna (SENOKOT) 8 6 mg, Take 1 tablet (8 6 mg total) by mouth daily at bedtime for 5 days (Patient not taking: Reported on 11/2/2022), Disp: 5 tablet, Rfl: 0  •  tamsulosin (FLOMAX) 0 4 mg, Take 1 capsule (0 4 mg total) by mouth daily with dinner (Patient not taking: Reported on 9/20/2022), Disp: 30 capsule, Rfl: 0    PHYSICAL EXAM:  Vitals:    01/18/23 0859   BP: (!) 166/110   BP Location: Left arm   Patient Position: Sitting   Cuff Size: Standard   Pulse: 75   Resp: 16   Temp: 97 5 °F (36 4 °C)   TempSrc: Temporal   SpO2: 98%   Weight: 66 7 kg (147 lb)   Height: 5' 1" (1 549 m)     Body mass index is 27 78 kg/m²  Physical Exam  Constitutional:       General: She is not in acute distress  HENT:      Right Ear: External ear normal    Eyes:      General: No scleral icterus  Conjunctiva/sclera:      Right eye: No hemorrhage  Neck:      Thyroid: No thyromegaly  Vascular: No JVD  Cardiovascular:      Rate and Rhythm: Normal rate  Pulmonary:      Effort: Pulmonary effort is normal  No accessory muscle usage or respiratory distress  Breath sounds: No wheezing  Abdominal:      General: There is no distension  Musculoskeletal:      Right ankle: No swelling  Left ankle: No swelling  Skin:     General: Skin is warm  Coloration: Skin is not jaundiced  Neurological:      Mental Status: She is alert  She is not disoriented  Psychiatric:         Speech: She is communicative  Behavior: Behavior is not combative  LAB RESULTS:  Results for orders placed or performed in visit on 77/55/86   Basic metabolic panel   Result Value Ref Range    Sodium 142 135 - 147 mmol/L    Potassium 4 0 3 5 - 5 3 mmol/L    Chloride 104 96 - 108 mmol/L    CO2 33 (H) 21 - 32 mmol/L    ANION GAP 5 4 - 13 mmol/L    BUN 14 5 - 25 mg/dL    Creatinine 0 57 (L) 0 60 - 1 30 mg/dL    Glucose, Fasting 91 65 - 99 mg/dL    Calcium 10 2 (H) 8 3 - 10 1 mg/dL    eGFR 111 ml/min/1 73sq m      Renal ultrasound done on 10/25/2022 showed no hydronephrosis  Possible nonshadowing calculi in left kidney  ASSESSMENT and PLAN:  Della was seen today for consult and hypokalemia      Diagnoses and all orders for this visit:    Hypokalemia  -     Ambulatory Referral to Nephrology  -     Basic metabolic panel; Future  -     Magnesium; Future  -     Renin Direct Assay; Future  -     Aldosterone; Future  -     Potassium W/Creatinine, Random Urine; Future    Primary hypertension  -     Basic metabolic panel; Future      1  Hypokalemia  Exact etiology is current unclear  Patient had labs done on 1/5/2023 and found to have low potassium level of 2 6 and was advised to go to the ER and now taking oral potassium supplementation  Labs done on 1/10/2023 showed normal potassium at 4 3  According to patient she had diarrhea for few weeks and started taking some supplementation during Karon time resulting to improvement in diarrhea by January 1, 2023  Patient had labs done on 1/5/2023 showed potassium of 2 6 and hypokalemia can be due to GI loss of potassium due to diarrhea although other etiologies cannot be ruled out  Patient does not use any diuretics  Since patient has underlying hypertension and BMP also showed elevated bicarb of 33 [? Metabolic alkalosis] and in the setting of hypokalemia, underlying liddle syndrome cannot be ruled out either  I would plan to check renin, aldosterone level along with urine potassium to creatinine ratio (ratio > 13 suggest renal wasting of potassium]  We will also plan to check magnesium and plan to repeat potassium before next visit  Since potassium level is currently normal, plan to continue potassium chloride 10 mEq p o  daily    2  Hypertension  Essential, today's blood pressure was elevated anymore the goal   According to patient her blood pressure with PCP is under good control and she was little anxious during office visit today  Patient can have whitecoat hypertension and advised patient to check blood pressure on regular basis at home and make a log for our review with the next visit  For time being monitor hypertension with amlodipine 5 mg p o  daily, losartan 50 mg p o  daily and metoprolol 12 5 mg p o  twice daily  Returns to nephrology office in 2 months  Plan to check BMP, magnesium, renin, aldosterone along with urine potassium to creatinine ratio  Portions of the record may have been created with voice recognition software  Occasional wrong word or "sound a like" substitutions may have occurred due to the inherent limitations of voice recognition software  Read the chart carefully and recognize, using context, where substitutions have occurred  If you have any questions, please contact the dictating provider

## 2023-02-07 NOTE — ASSESSMENT & PLAN NOTE
Obstructive sleep apnea (SAMUEL) is a condition that makes it hard for you to breathe when you  sleep. People with SAMUEL often experience the following:   Snoring or making gasping noises when they sleep   Are tired when they wake up or during the day, even if sleep all night   Waking up with headaches   Having trouble focusing on tasks     Next Steps  A sleep test is required to diagnose sleep apnea. People of all ages can have it. SAMUEL is  most common among men older than age 40; women older than age 50; those who are  overweight; people with high blood pressure; men with a neck size greater than 17 inches; and  women with a neck size greater than 16 inches.     Risks  If SAMUEL goes untreated, the long-term health risks can include:   High blood pressure   Heart attack   Stroke   Pre-diabetes/Diabetes   Depression     Treatments  There are a variety of treatment options for SAMUEL patients. Continuous positive airway  pressure, or CPAP for short, lets a stream of air flow from a machine, through tubing to a mask  you wear while you sleep. This flow of air keeps your throat open so that you can breathe  freely and not snore. Another option might be an oral appliance, a mouth guard that holds  your bottom jaw forward and keeps your tongue from blocking your airway while you sleep.     Even with these treatments, there are other things you can do to improve your SAMUEL, including  weight loss, quitting smoking and not drinking alcohol.               Pt presented from [facility] per request of Urology for right ureteral stone , right hydronephrosis , fever, sepsis dt urinary source and complicated UTI  PT sent directly to the OR on admission to [facility]   · Seen by Medicine post op   · Post op day # 3 for cystoscopy retrograde pyelogram with insertion of right ureteral stent   · Turbid urine noted continued on ceftriaxone day # 2  based on newly noted bacteremia changed iv abx to cefepime based on bc pending 6/26  · Blood cultures MO x 2 positive for Klebsiella aerogenes - sensitivity 6/27 would continue cefepime now until dc and then transition to bactrim vs quinolone for 7 days total  · 2nd set obtained on admission to b - negative 48 hrs   · Plan for outpt fu in a number of weeks for subsequent ureteroscopic intervention and admission to hospital   · Monitor renal function   · Pain control - oxy prn and tylenol   · IVF hydration discontinued dt elevated sbp   · Urine culture - >100,000 cfu/ml Klebsiella aerogenes   · Procalcitonin elevated 1 29 trending down continue iv abx

## 2023-03-20 ENCOUNTER — TELEPHONE (OUTPATIENT)
Dept: NEPHROLOGY | Facility: CLINIC | Age: 48
End: 2023-03-20

## 2023-03-20 NOTE — TELEPHONE ENCOUNTER
I called [facility] @ [phone] and spoke to Irlanda () to check eligible for the patient and as of 3/20/2023 the patient has current active coverage as of 3/29/2021  This patient plan does not require a referral from patient PCP Dr Jose Tran on file and this patient plan has a $0 Co-Pay for PCP, $0 Co-pay for Specialist, and $0 Co-pay for the ER  The reference number for this call is: [id number]    Poornima Vicente,

## 2023-03-28 ENCOUNTER — TELEPHONE (OUTPATIENT)
Dept: NEPHROLOGY | Facility: CLINIC | Age: 48
End: 2023-03-28

## 2023-03-28 NOTE — TELEPHONE ENCOUNTER
i called and left message on patients answering confirming appointment for tomorrow 03/29/2023 with Dr Olguin Sayer    Reminded patient to have blood work done prior [employer]

## 2023-03-29 ENCOUNTER — TELEPHONE (OUTPATIENT)
Dept: NEPHROLOGY | Facility: CLINIC | Age: 48
End: 2023-03-29

## 2023-03-29 NOTE — TELEPHONE ENCOUNTER
I tried to call patient about her appointment for 3/29/2023 nephrology follow up appointment with Dr Ne Andrews, that she No Showed for, but the patient cell phone voice mail was full and I was unable to leave a message

## 2023-05-27 ENCOUNTER — HOSPITAL ENCOUNTER (OUTPATIENT)
Facility: HOSPITAL | Age: 48
Setting detail: OBSERVATION
Discharge: HOME | End: 2023-05-29
Attending: EMERGENCY MEDICINE | Admitting: INTERNAL MEDICINE

## 2023-05-27 ENCOUNTER — APPOINTMENT (EMERGENCY)
Dept: CT IMAGING | Facility: HOSPITAL | Age: 48
End: 2023-05-27

## 2023-05-27 DIAGNOSIS — E87.6 HYPOKALEMIA: ICD-10-CM

## 2023-05-27 DIAGNOSIS — R42 LIGHTHEADEDNESS: ICD-10-CM

## 2023-05-27 DIAGNOSIS — I10 HYPERTENSION: Primary | ICD-10-CM

## 2023-05-27 DIAGNOSIS — I63.81 LACUNAR INFARCTION (HCC): ICD-10-CM

## 2023-05-27 LAB
ANION GAP SERPL CALCULATED.3IONS-SCNC: 9 MMOL/L (ref 4–13)
BASOPHILS # BLD AUTO: 0.04 THOUSANDS/ÂΜL (ref 0–0.1)
BASOPHILS NFR BLD AUTO: 1 % (ref 0–1)
BUN SERPL-MCNC: 14 MG/DL (ref 5–25)
CALCIUM SERPL-MCNC: 10.8 MG/DL (ref 8.4–10.2)
CARDIAC TROPONIN I PNL SERPL HS: 21 NG/L
CHLORIDE SERPL-SCNC: 104 MMOL/L (ref 96–108)
CO2 SERPL-SCNC: 28 MMOL/L (ref 21–32)
CREAT SERPL-MCNC: 0.69 MG/DL (ref 0.6–1.3)
EOSINOPHIL # BLD AUTO: 0.2 THOUSAND/ÂΜL (ref 0–0.61)
EOSINOPHIL NFR BLD AUTO: 3 % (ref 0–6)
ERYTHROCYTE [DISTWIDTH] IN BLOOD BY AUTOMATED COUNT: 13.4 % (ref 11.6–15.1)
GFR SERPL CREATININE-BSD FRML MDRD: 104 ML/MIN/1.73SQ M
GLUCOSE SERPL-MCNC: 96 MG/DL (ref 65–140)
HCT VFR BLD AUTO: 39.2 % (ref 34.8–46.1)
HGB BLD-MCNC: 13.3 G/DL (ref 11.5–15.4)
IMM GRANULOCYTES # BLD AUTO: 0.01 THOUSAND/UL (ref 0–0.2)
IMM GRANULOCYTES NFR BLD AUTO: 0 % (ref 0–2)
LYMPHOCYTES # BLD AUTO: 1.94 THOUSANDS/ÂΜL (ref 0.6–4.47)
LYMPHOCYTES NFR BLD AUTO: 32 % (ref 14–44)
MCH RBC QN AUTO: 26.2 PG (ref 26.8–34.3)
MCHC RBC AUTO-ENTMCNC: 33.9 G/DL (ref 31.4–37.4)
MCV RBC AUTO: 77 FL (ref 82–98)
MONOCYTES # BLD AUTO: 0.54 THOUSAND/ÂΜL (ref 0.17–1.22)
MONOCYTES NFR BLD AUTO: 9 % (ref 4–12)
NEUTROPHILS # BLD AUTO: 3.29 THOUSANDS/ÂΜL (ref 1.85–7.62)
NEUTS SEG NFR BLD AUTO: 55 % (ref 43–75)
NRBC BLD AUTO-RTO: 0 /100 WBCS
PLATELET # BLD AUTO: 252 THOUSANDS/UL (ref 149–390)
PMV BLD AUTO: 9.8 FL (ref 8.9–12.7)
POTASSIUM SERPL-SCNC: 2.7 MMOL/L (ref 3.5–5.3)
RBC # BLD AUTO: 5.08 MILLION/UL (ref 3.81–5.12)
SODIUM SERPL-SCNC: 141 MMOL/L (ref 135–147)
WBC # BLD AUTO: 6.02 THOUSAND/UL (ref 4.31–10.16)

## 2023-05-27 RX ORDER — HYDRALAZINE HYDROCHLORIDE 20 MG/ML
20 INJECTION INTRAMUSCULAR; INTRAVENOUS ONCE
Status: COMPLETED | OUTPATIENT
Start: 2023-05-27 | End: 2023-05-27

## 2023-05-27 RX ORDER — ACETAMINOPHEN 325 MG/1
650 TABLET ORAL ONCE
Status: COMPLETED | OUTPATIENT
Start: 2023-05-27 | End: 2023-05-27

## 2023-05-27 RX ORDER — LABETALOL HYDROCHLORIDE 5 MG/ML
10 INJECTION, SOLUTION INTRAVENOUS ONCE
Status: DISCONTINUED | OUTPATIENT
Start: 2023-05-27 | End: 2023-05-28

## 2023-05-27 RX ORDER — POTASSIUM CHLORIDE 20 MEQ/1
40 TABLET, EXTENDED RELEASE ORAL ONCE
Status: COMPLETED | OUTPATIENT
Start: 2023-05-27 | End: 2023-05-27

## 2023-05-27 RX ORDER — ASPIRIN 81 MG/1
81 TABLET, CHEWABLE ORAL ONCE
Status: DISCONTINUED | OUTPATIENT
Start: 2023-05-27 | End: 2023-05-28

## 2023-05-27 RX ADMIN — HYDRALAZINE HYDROCHLORIDE 20 MG: 20 INJECTION INTRAMUSCULAR; INTRAVENOUS at 21:39

## 2023-05-27 RX ADMIN — ACETAMINOPHEN 650 MG: 325 TABLET ORAL at 21:32

## 2023-05-27 RX ADMIN — POTASSIUM CHLORIDE 40 MEQ: 1500 TABLET, EXTENDED RELEASE ORAL at 22:17

## 2023-05-28 ENCOUNTER — APPOINTMENT (OUTPATIENT)
Dept: MRI IMAGING | Facility: HOSPITAL | Age: 48
End: 2023-05-28

## 2023-05-28 PROBLEM — R42 LIGHTHEADEDNESS: Status: ACTIVE | Noted: 2023-05-28

## 2023-05-28 PROBLEM — I16.1 HYPERTENSIVE EMERGENCY: Status: ACTIVE | Noted: 2023-05-28

## 2023-05-28 LAB
2HR DELTA HS TROPONIN: -1 NG/L
4HR DELTA HS TROPONIN: 3 NG/L
ALBUMIN SERPL BCP-MCNC: 3.5 G/DL (ref 3.5–5)
ALP SERPL-CCNC: 66 U/L (ref 34–104)
ALT SERPL W P-5'-P-CCNC: 12 U/L (ref 7–52)
ANION GAP SERPL CALCULATED.3IONS-SCNC: 5 MMOL/L (ref 4–13)
AST SERPL W P-5'-P-CCNC: 12 U/L (ref 13–39)
ATRIAL RATE: 62 BPM
BILIRUB SERPL-MCNC: 0.67 MG/DL (ref 0.2–1)
BUN SERPL-MCNC: 15 MG/DL (ref 5–25)
CALCIUM SERPL-MCNC: 10.5 MG/DL (ref 8.4–10.2)
CARDIAC TROPONIN I PNL SERPL HS: 20 NG/L
CARDIAC TROPONIN I PNL SERPL HS: 24 NG/L
CHLORIDE SERPL-SCNC: 109 MMOL/L (ref 96–108)
CHOLEST SERPL-MCNC: 189 MG/DL
CO2 SERPL-SCNC: 31 MMOL/L (ref 21–32)
CREAT SERPL-MCNC: 0.61 MG/DL (ref 0.6–1.3)
GFR SERPL CREATININE-BSD FRML MDRD: 109 ML/MIN/1.73SQ M
GLUCOSE SERPL-MCNC: 103 MG/DL (ref 65–140)
HCG SERPL QL: NEGATIVE
HDLC SERPL-MCNC: 40 MG/DL
LDLC SERPL CALC-MCNC: 99 MG/DL (ref 0–100)
MAGNESIUM SERPL-MCNC: 1.8 MG/DL (ref 1.9–2.7)
NONHDLC SERPL-MCNC: 149 MG/DL
P AXIS: 80 DEGREES
POTASSIUM SERPL-SCNC: 3.2 MMOL/L (ref 3.5–5.3)
POTASSIUM SERPL-SCNC: 3.8 MMOL/L (ref 3.5–5.3)
PR INTERVAL: 144 MS
PROT SERPL-MCNC: 6.1 G/DL (ref 6.4–8.4)
QRS AXIS: 89 DEGREES
QRSD INTERVAL: 94 MS
QT INTERVAL: 510 MS
QTC INTERVAL: 517 MS
SODIUM SERPL-SCNC: 145 MMOL/L (ref 135–147)
T WAVE AXIS: 75 DEGREES
TRIGL SERPL-MCNC: 250 MG/DL
VENTRICULAR RATE: 62 BPM

## 2023-05-28 RX ORDER — POTASSIUM CHLORIDE 14.9 MG/ML
20 INJECTION INTRAVENOUS ONCE
Status: COMPLETED | OUTPATIENT
Start: 2023-05-28 | End: 2023-05-28

## 2023-05-28 RX ORDER — MAGNESIUM HYDROXIDE/ALUMINUM HYDROXICE/SIMETHICONE 120; 1200; 1200 MG/30ML; MG/30ML; MG/30ML
30 SUSPENSION ORAL EVERY 6 HOURS PRN
Status: DISCONTINUED | OUTPATIENT
Start: 2023-05-28 | End: 2023-05-29 | Stop reason: HOSPADM

## 2023-05-28 RX ORDER — ATORVASTATIN CALCIUM 40 MG/1
40 TABLET, FILM COATED ORAL
Status: DISCONTINUED | OUTPATIENT
Start: 2023-05-28 | End: 2023-05-29 | Stop reason: HOSPADM

## 2023-05-28 RX ORDER — LABETALOL HYDROCHLORIDE 5 MG/ML
10 INJECTION, SOLUTION INTRAVENOUS EVERY 6 HOURS PRN
Status: DISCONTINUED | OUTPATIENT
Start: 2023-05-28 | End: 2023-05-29 | Stop reason: HOSPADM

## 2023-05-28 RX ORDER — KETOROLAC TROMETHAMINE 30 MG/ML
15 INJECTION, SOLUTION INTRAMUSCULAR; INTRAVENOUS EVERY 6 HOURS PRN
Status: DISCONTINUED | OUTPATIENT
Start: 2023-05-28 | End: 2023-05-29 | Stop reason: HOSPADM

## 2023-05-28 RX ORDER — CYCLOBENZAPRINE HCL 10 MG
5 TABLET ORAL
Status: DISCONTINUED | OUTPATIENT
Start: 2023-05-27 | End: 2023-05-29 | Stop reason: HOSPADM

## 2023-05-28 RX ORDER — LANOLIN ALCOHOL/MO/W.PET/CERES
400 CREAM (GRAM) TOPICAL ONCE
Status: COMPLETED | OUTPATIENT
Start: 2023-05-28 | End: 2023-05-28

## 2023-05-28 RX ORDER — HYDRALAZINE HYDROCHLORIDE 20 MG/ML
10 INJECTION INTRAMUSCULAR; INTRAVENOUS EVERY 6 HOURS PRN
Status: DISCONTINUED | OUTPATIENT
Start: 2023-05-28 | End: 2023-05-29 | Stop reason: HOSPADM

## 2023-05-28 RX ORDER — ENOXAPARIN SODIUM 100 MG/ML
40 INJECTION SUBCUTANEOUS DAILY
Status: DISCONTINUED | OUTPATIENT
Start: 2023-05-28 | End: 2023-05-29 | Stop reason: HOSPADM

## 2023-05-28 RX ORDER — LOSARTAN POTASSIUM 50 MG/1
50 TABLET ORAL DAILY
Status: DISCONTINUED | OUTPATIENT
Start: 2023-05-28 | End: 2023-05-29 | Stop reason: HOSPADM

## 2023-05-28 RX ORDER — ACETAMINOPHEN 325 MG/1
650 TABLET ORAL EVERY 6 HOURS PRN
Status: DISCONTINUED | OUTPATIENT
Start: 2023-05-28 | End: 2023-05-29 | Stop reason: HOSPADM

## 2023-05-28 RX ORDER — MECLIZINE HCL 12.5 MG/1
12.5 TABLET ORAL EVERY 8 HOURS PRN
Status: DISCONTINUED | OUTPATIENT
Start: 2023-05-27 | End: 2023-05-29 | Stop reason: HOSPADM

## 2023-05-28 RX ORDER — POTASSIUM CHLORIDE 20 MEQ/1
40 TABLET, EXTENDED RELEASE ORAL ONCE
Status: COMPLETED | OUTPATIENT
Start: 2023-05-28 | End: 2023-05-28

## 2023-05-28 RX ORDER — ASPIRIN 81 MG/1
81 TABLET, CHEWABLE ORAL DAILY
Status: DISCONTINUED | OUTPATIENT
Start: 2023-05-28 | End: 2023-05-29 | Stop reason: HOSPADM

## 2023-05-28 RX ADMIN — POTASSIUM CHLORIDE 20 MEQ: 14.9 INJECTION, SOLUTION INTRAVENOUS at 02:31

## 2023-05-28 RX ADMIN — Medication 400 MG: at 00:54

## 2023-05-28 RX ADMIN — Medication 12.5 MG: at 20:09

## 2023-05-28 RX ADMIN — LOSARTAN POTASSIUM 50 MG: 50 TABLET, FILM COATED ORAL at 08:30

## 2023-05-28 RX ADMIN — Medication 12.5 MG: at 00:14

## 2023-05-28 RX ADMIN — SODIUM CHLORIDE 200 ML: 0.9 INJECTION, SOLUTION INTRAVENOUS at 00:54

## 2023-05-28 RX ADMIN — POTASSIUM CHLORIDE 40 MEQ: 1500 TABLET, EXTENDED RELEASE ORAL at 09:36

## 2023-05-28 RX ADMIN — Medication 12.5 MG: at 08:29

## 2023-05-28 RX ADMIN — HYDRALAZINE HYDROCHLORIDE 10 MG: 20 INJECTION INTRAMUSCULAR; INTRAVENOUS at 22:52

## 2023-05-28 NOTE — ASSESSMENT & PLAN NOTE
"· Patient reports earlier yesterday being at work and \"feeling off when I walked  \"  She denies it felt like dizziness/feeling off balance, but felt lightheaded and a pressure feeling in the left side of her head  · Significantly elevated blood pressure to 203/100 on arrival  · Rule out secondary to hypertensive emergency versus less likely other neurological origin  · Neurochecks every 4 hours  · Appreciate neurology consult  "

## 2023-05-28 NOTE — PLAN OF CARE
Problem: Potential for Falls  Goal: Patient will remain free of falls  Description: INTERVENTIONS:  - Educate patient/family on patient safety including physical limitations  - Instruct patient to call for assistance with activity   - Consult OT/PT to assist with strengthening/mobility   - Keep Call bell within reach  - Keep bed low and locked with side rails adjusted as appropriate  - Keep care items and personal belongings within reach  - Initiate and maintain comfort rounds  - Make Fall Risk Sign visible to staff  - Offer Toileting every 2 Hours, in advance of need  - Initiate/Maintain alarm  - Obtain necessary fall risk management equipment  - Apply yellow socks and bracelet for high fall risk patients  - Consider moving patient to room near nurses station  Outcome: Progressing     Problem: PAIN - ADULT  Goal: Verbalizes/displays adequate comfort level or baseline comfort level  Description: Interventions:  - Encourage patient to monitor pain and request assistance  - Assess pain using appropriate pain scale  - Administer analgesics based on type and severity of pain and evaluate response  - Implement non-pharmacological measures as appropriate and evaluate response  - Consider cultural and social influences on pain and pain management  - Notify physician/advanced practitioner if interventions unsuccessful or patient reports new pain  Outcome: Progressing     Problem: INFECTION - ADULT  Goal: Absence or prevention of progression during hospitalization  Description: INTERVENTIONS:  - Assess and monitor for signs and symptoms of infection  - Monitor lab/diagnostic results  - Monitor all insertion sites, i e  indwelling lines, tubes, and drains  - Monitor endotracheal if appropriate and nasal secretions for changes in amount and color  - Jerome appropriate cooling/warming therapies per order  - Administer medications as ordered  - Instruct and encourage patient and family to use good hand hygiene technique  - Identify and instruct in appropriate isolation precautions for identified infection/condition  Outcome: Progressing  Goal: Absence of fever/infection during neutropenic period  Description: INTERVENTIONS:  - Monitor WBC    Outcome: Progressing     Problem: SAFETY ADULT  Goal: Patient will remain free of falls  Description: INTERVENTIONS:  - Educate patient/family on patient safety including physical limitations  - Instruct patient to call for assistance with activity   - Consult OT/PT to assist with strengthening/mobility   - Keep Call bell within reach  - Keep bed low and locked with side rails adjusted as appropriate  - Keep care items and personal belongings within reach  - Initiate and maintain comfort rounds  - Make Fall Risk Sign visible to staff  - Offer Toileting every 2 Hours, in advance of need  - Initiate/Maintain alarm  - Obtain necessary fall risk management equipment  - Apply yellow socks and bracelet for high fall risk patients  - Consider moving patient to room near nurses station  Outcome: Progressing  Goal: Maintain or return to baseline ADL function  Description: INTERVENTIONS:  -  Assess patient's ability to carry out ADLs; assess patient's baseline for ADL function and identify physical deficits which impact ability to perform ADLs (bathing, care of mouth/teeth, toileting, grooming, dressing, etc )  - Assess/evaluate cause of self-care deficits   - Assess range of motion  - Assess patient's mobility; develop plan if impaired  - Assess patient's need for assistive devices and provide as appropriate  - Encourage maximum independence but intervene and supervise when necessary  - Involve family in performance of ADLs  - Assess for home care needs following discharge   - Consider OT consult to assist with ADL evaluation and planning for discharge  - Provide patient education as appropriate  Outcome: Progressing  Goal: Maintains/Returns to pre admission functional level  Description: INTERVENTIONS:  - Perform BMAT or MOVE assessment daily    - Set and communicate daily mobility goal to care team and patient/family/caregiver  - Collaborate with rehabilitation services on mobility goals if consulted  - Perform Range of Motion 3 times a day  - Reposition patient every 2 hours  - Dangle patient 3 times a day  - Stand patient 3 times a day  - Ambulate patient 3 times a day  - Out of bed to chair 3 times a day   - Out of bed for meals 3 times a day  - Out of bed for toileting  - Record patient progress and toleration of activity level   Outcome: Progressing     Problem: DISCHARGE PLANNING  Goal: Discharge to home or other facility with appropriate resources  Description: INTERVENTIONS:  - Identify barriers to discharge w/patient and caregiver  - Arrange for needed discharge resources and transportation as appropriate  - Identify discharge learning needs (meds, wound care, etc )  - Arrange for interpretive services to assist at discharge as needed  - Refer to Case Management Department for coordinating discharge planning if the patient needs post-hospital services based on physician/advanced practitioner order or complex needs related to functional status, cognitive ability, or social support system  Outcome: Progressing     Problem: Knowledge Deficit  Goal: Patient/family/caregiver demonstrates understanding of disease process, treatment plan, medications, and discharge instructions  Description: Complete learning assessment and assess knowledge base    Interventions:  - Provide teaching at level of understanding  - Provide teaching via preferred learning methods  Outcome: Progressing

## 2023-05-28 NOTE — ASSESSMENT & PLAN NOTE
"· BP on arrival 203/100, currently responding status post IV hydralazine 20 mg in the ED; BP now 174/81  · Patient reports she has been taking her metoprolol and losartan as prescribed  · Admits to \"feeling off with ambulation at work and feeling head pressure and lightheaded sensation  · CT head negative for acute infarct, hemorrhage or acute change, old left lacunar infarct noted  · For now closely monitor blood pressure  · Continue home metoprolol twice daily now and home losartan for later in a m    · Patient reports she feels like her heart is racing after receiving IV hydralazine dose in the ED, so will hold off on further IV hydralazine for now  "

## 2023-05-28 NOTE — ASSESSMENT & PLAN NOTE
· Potassium decreased at 2 7, hypokalemia appears to be chronic issue per review of chart  · Patient also admits she has not been taking her potassium pills as prescribed outpatient  · Given p o  potassium chloride 40 mEq once in the ED  · Will also give a dose of IV potassium chloride 20 mEq now  · Recheck CMP in a m    · Telemetry

## 2023-05-28 NOTE — ED PROVIDER NOTES
History  Chief Complaint   Patient presents with   • Dizziness     Pt complains of lightheadedness, like someone is squeezing her head, and bilateral feet tingling  Pt also complains of pain under her left breast       This is a 52-year-old female with prior history of hypertrophic cardiomyopathy and hypertension  Does not smoke or drink but does vape  Surgical history of tonsillectomy and kidney stones    She presents emergency department with intermittent dizziness/lightheadedness for about 1 week  Today she felt like her head had pressure and tightness and also she was having pressure headache so she decided come to emergency department for evaluation and treatment  History provided by:  Patient   used: No        Prior to Admission Medications   Prescriptions Last Dose Informant Patient Reported? Taking?    Klor-Con M10 10 MEQ tablet  Self Yes No   Sig: Take 10 mEq by mouth daily   Omega-3 Fatty Acids (FISH OIL) 1,000 mg  Self Yes No   Si,000 mg daily   acetaminophen (TYLENOL) 325 mg tablet  Self No No   Sig: Take 2 tablets (650 mg total) by mouth every 6 (six) hours as needed for mild pain   Patient not taking: Reported on 2022   amLODIPine (NORVASC) 5 mg tablet  Self No No   Sig: Take 1 tablet (5 mg total) by mouth daily   cyclobenzaprine (FLEXERIL) 5 mg tablet  Self Yes No   Sig: TAKE 1-2 TABLETS BY MOUTH NIGHTLY AS NEEDED FOR MUSCLE SPASMS    ibuprofen (MOTRIN) 600 mg tablet  Self Yes No   Sig: ibuprofen 600 mg tablet   TAKE 1 TABLET BY MOUTH THREE TIMES A DAY AS DIRECTED FOR 30 DAYS   losartan (COZAAR) 50 mg tablet  Self No No   Sig: Take 1 tablet (50 mg total) by mouth daily   metoprolol tartrate (LOPRESSOR) 25 mg tablet  Self No No   Sig: Take 0 5 tablets (12 5 mg total) by mouth every 12 (twelve) hours   oxybutynin (DITROPAN-XL) 10 MG 24 hr tablet   No No   Sig: Take 1 tablet (10 mg total) by mouth daily at bedtime   Patient not taking: Reported on 2022   pantoprazole (PROTONIX) 40 mg tablet  Self No No   Sig: Take 1 tablet (40 mg total) by mouth daily   Patient not taking: Reported on 11/2/2022   phenazopyridine (PYRIDIUM) 100 mg tablet  Self No No   Sig: Take 1 tablet (100 mg total) by mouth 3 (three) times a day with meals   Patient not taking: Reported on 7/20/2022   potassium chloride (MICRO-K) 10 MEQ CR capsule  Self No No   Sig: Take 4 capsules (40 mEq total) by mouth in the morning   Patient not taking: Reported on 1/18/2023   senna (SENOKOT) 8 6 mg  Self No No   Sig: Take 1 tablet (8 6 mg total) by mouth daily at bedtime for 5 days   Patient not taking: Reported on 11/2/2022   tamsulosin (FLOMAX) 0 4 mg   No No   Sig: Take 1 capsule (0 4 mg total) by mouth daily with dinner   Patient not taking: Reported on 9/20/2022      Facility-Administered Medications: None       Past Medical History:   Diagnosis Date   • Abnormal Pap smear of cervix    • Acute hip pain     tristin   • Cancer (HCC)     cervix- had chemo   • COVID     Feb 2022   • Heart palpitations     frequent -7/20 instructed to call cardiology to report   • Hypertension    • Hypokalemia 04/26/2019   • Low back pain    • Lymphadenopathy    • Pelvic pain    • Sepsis ([facility] [address] ) 06/25/2022   • Wears contact lenses    • Wears dentures     partial lower       Past Surgical History:   Procedure Laterality Date   • FL RETROGRADE PYELOGRAM  6/25/2022   • FL RETROGRADE PYELOGRAM  7/28/2022   • OR [address] INCL FLUOR GDNCE DX W/CELL WASHG SPX N/A 2/26/2020    Procedure: Reema Villalobos;  Surgeon: Roz Hart MD;  [facility];  Service: Thoracic   • OR BRONCHOSCOPY NEEDLE BX TRACHEA MAIN STEM&/BRON N/A 2/26/2020    Procedure: ENDOBRONCHIAL ULTRASOUND (EBUS);   Surgeon: Roz Hart MD;  [facility];  Service: Thoracic   • OR CYSTO BLADDER W/URETERAL CATHETERIZATION Right 6/25/2022    Procedure: CYSTOSCOPY RETROGRADE PYELOGRAM WITH INSERTION STENT URETERAL;  Surgeon: Angelia Humphrey MD;  [facility];  Service: Urology   • NC CYSTO/URETERO W/LITHOTRIPSY &INDWELL STENT INSRT Right 7/28/2022    Procedure: CYSTOSCOPY URETEROSCOPY WITH LITHOTRIPSY HOLMIUM LASER, RETROGRADE PYELOGRAM AND RIGHT INSERTION STENT URETERAL;  Surgeon: Eugene Bright MD;  [facility];  Service: Urology   • NC DILATION VAGINA W/ANESTHESIA OTHER THAN LOCAL N/A 6/20/2019    Procedure: EXAM UNDER ANESTHESIA (EUA); Surgeon: Tabitha Gregg MD;  [facility];  Service: Gynecology Oncology   • NC INSERTION VAGINAL RADIATION DEVICE N/A 6/20/2019    Procedure: PIERRE SLEEVE PLACEMENT;  Surgeon: Tabitha Gregg MD;  [facility];  Service: Gynecology Oncology   • TONSILLECTOMY     • US GUIDANCE  6/20/2019       Family History   Problem Relation Age of Onset   • Uterine cancer Maternal Grandmother    • Heart disease Mother    • Canavan disease Father    • Cancer Father      I have reviewed and agree with the history as documented  E-Cigarette/Vaping   • E-Cigarette Use Current Every Day User      E-Cigarette/Vaping Substances   • Nicotine Yes    • THC No    • CBD No    • Flavoring No    • Other No    • Unknown No      Social History     Tobacco Use   • Smoking status: Former     Packs/day: 0 50     Years: 1 00     Total pack years: 0 50     Types: Cigarettes     Quit date: 2/14/2020     Years since quitting: 3 2   • Smokeless tobacco: Never   • Tobacco comments:     vapes occ nicotene   Vaping Use   • Vaping Use: Every day   • Substances: Nicotine   Substance Use Topics   • Alcohol use: Yes     Comment: occ   • Drug use: Never       Review of Systems   Constitutional: Negative for chills and fever  HENT: Negative for ear pain and sore throat  Eyes: Negative for pain and visual disturbance  Respiratory: Negative for cough and shortness of breath  Cardiovascular: Negative for chest pain and palpitations  Gastrointestinal: Negative for abdominal pain and vomiting     Genitourinary: Negative for dysuria and hematuria  Musculoskeletal: Negative for arthralgias and back pain  Skin: Negative for color change and rash  Neurological: Positive for dizziness, light-headedness and headaches  Negative for seizures and syncope  All other systems reviewed and are negative  Physical Exam  Physical Exam  Vitals and nursing note reviewed  Constitutional:       General: She is not in acute distress  Appearance: Normal appearance  She is well-developed and normal weight  HENT:      Head: Normocephalic and atraumatic  Right Ear: External ear normal       Left Ear: External ear normal       Nose: Nose normal       Mouth/Throat:      Mouth: Mucous membranes are moist    Eyes:      Extraocular Movements: Extraocular movements intact  Conjunctiva/sclera: Conjunctivae normal       Pupils: Pupils are equal, round, and reactive to light  Cardiovascular:      Rate and Rhythm: Normal rate and regular rhythm  Heart sounds: No murmur heard  Pulmonary:      Effort: Pulmonary effort is normal  No respiratory distress  Breath sounds: Normal breath sounds  Abdominal:      Palpations: Abdomen is soft  Tenderness: There is no abdominal tenderness  Musculoskeletal:         General: No swelling  Cervical back: Neck supple  Skin:     General: Skin is warm and dry  Capillary Refill: Capillary refill takes less than 2 seconds  Neurological:      General: No focal deficit present  Mental Status: She is alert and oriented to person, place, and time  Psychiatric:         Mood and Affect: Mood normal          Thought Content:  Thought content normal          Judgment: Judgment normal          Vital Signs  ED Triage Vitals   Temperature Pulse Respirations Blood Pressure SpO2   05/27/23 2047 05/27/23 2047 05/27/23 2047 05/27/23 2047 05/27/23 2047   98 3 °F (36 8 °C) 75 16 (!) 181/103 100 %      Temp Source Heart Rate Source Patient Position - Orthostatic VS BP Location FiO2 (%) 05/27/23 2047 05/27/23 2047 05/27/23 2047 05/27/23 2047 --   Oral Monitor Sitting Left arm       Pain Score       05/27/23 2132       5           Vitals:    05/27/23 2047 05/27/23 2139 05/27/23 2200   BP: (!) 181/103 (!) 203/100 (!) 174/81   Pulse: 75  77   Patient Position - Orthostatic VS: Sitting  Sitting         Visual Acuity      ED Medications  Medications   labetalol (NORMODYNE) injection 10 mg (10 mg Intravenous Not Given 5/27/23 2132)   aspirin chewable tablet 81 mg (has no administration in time range)   acetaminophen (TYLENOL) tablet 650 mg (650 mg Oral Given 5/27/23 2132)   hydrALAZINE (APRESOLINE) injection 20 mg (20 mg Intravenous Given 5/27/23 2139)   potassium chloride (K-DUR,KLOR-CON) CR tablet 40 mEq (40 mEq Oral Given 5/27/23 2217)       Diagnostic Studies  Results Reviewed     Procedure Component Value Units Date/Time    Basic metabolic panel [[id number]]  (Abnormal) Collected: 05/27/23 2125    Lab Status: Final result Specimen: Blood from Arm, Left Updated: 05/27/23 2217     Sodium 141 mmol/L      Potassium 2 7 mmol/L      Chloride 104 mmol/L      CO2 28 mmol/L      ANION GAP 9 mmol/L      BUN 14 mg/dL      Creatinine 0 69 mg/dL      Glucose 96 mg/dL      Calcium 10 8 mg/dL      eGFR 104 ml/min/1 73sq m     Narrative:      Snow guidelines for Chronic Kidney Disease (CKD):   •  Stage 1 with normal or high GFR (GFR > 90 mL/min/1 73 square meters)  •  Stage 2 Mild CKD (GFR = 60-89 mL/min/1 73 square meters)  •  Stage 3A Moderate CKD (GFR = 45-59 mL/min/1 73 square meters)  •  Stage 3B Moderate CKD (GFR = 30-44 mL/min/1 73 square meters)  •  Stage 4 Severe CKD (GFR = 15-29 mL/min/1 73 square meters)  •  Stage 5 End Stage CKD (GFR <15 mL/min/1 73 square meters)  Note: GFR calculation is accurate only with a steady state creatinine    HS Troponin 0hr (reflex protocol) [[id number]]  (Normal) Collected: 05/27/23 2125    Lab Status: Final result Specimen: Blood from Arm, Left Updated: 05/27/23 2202     hs TnI 0hr 21 ng/L     HS Troponin I 2hr [[id number]]     Lab Status: No result Specimen: Blood     CBC and differential [[id number]]  (Abnormal) Collected: 05/27/23 2125    Lab Status: Final result Specimen: Blood from Arm, Left Updated: 05/27/23 2131     WBC 6 02 Thousand/uL      RBC 5 08 Million/uL      Hemoglobin 13 3 g/dL      Hematocrit 39 2 %      MCV 77 fL      MCH 26 2 pg      MCHC 33 9 g/dL      RDW 13 4 %      MPV 9 8 fL      Platelets 517 Thousands/uL      nRBC 0 /100 WBCs      Neutrophils Relative 55 %      Immat GRANS % 0 %      Lymphocytes Relative 32 %      Monocytes Relative 9 %      Eosinophils Relative 3 %      Basophils Relative 1 %      Neutrophils Absolute 3 29 Thousands/µL      Immature Grans Absolute 0 01 Thousand/uL      Lymphocytes Absolute 1 94 Thousands/µL      Monocytes Absolute 0 54 Thousand/µL      Eosinophils Absolute 0 20 Thousand/µL      Basophils Absolute 0 04 Thousands/µL                  CT head without contrast   Final Result by Enrique Dempsey MD (05/27 2227)      1  No acute intracranial abnormality   2  Findings consistent with an old, left, lacunar infarct in the insula                  Workstation performed: [id number]                    Procedures  Procedures         ED Course  ED Course as of 05/27/23 2255   Sat May 27, 2023   2142 WBC: 6 02   2142 Hemoglobin: 13 3   2142 HCT: 39 2   2220 hs TnI 0hr: 21                               SBIRT 22yo+    Flowsheet Row Most Recent Value   Initial Alcohol Screen: US AUDIT-C     1  How often do you have a drink containing alcohol? 0 Filed at: 05/27/2023 2049   2  How many drinks containing alcohol do you have on a typical day you are drinking? 0 Filed at: 05/27/2023 2049   3b  FEMALE Any Age, or MALE 65+: How often do you have 4 or more drinks on one occassion? 0 Filed at: 05/27/2023 2049   Audit-C Score 0 Filed at: 05/27/2023 2049   GRISELDA: How many times in the past year have you        Used an illegal drug or used a prescription medication for non-medical reasons? Never Filed at: 05/27/2023 2049                    Medical Decision Making  Laboratory results revealed a   WBC   Normal  HH   Normal  PLT   Normal  Kidney Function  Normal  Electrolytes  AbNormal  - very low K of 2 7 replaced PO due to lack of EKG changes  EKG: Normal sinus rhythm rate of 62 bpm   Bilateral atrial enlargement  LVH changes and prolonged QT  Compared to the EKG done in January 5, 2023      Hypertension: chronic illness or injury with exacerbation, progression, or side effects of treatment  Hypokalemia: acute illness or injury  Lacunar infarction [facility]): undiagnosed new problem with uncertain prognosis  Amount and/or Complexity of Data Reviewed  Labs: ordered  Decision-making details documented in ED Course  Radiology: ordered  Discussion of management or test interpretation with external provider(s): Patient has presented to the Emergency Department with exacerbation of chronic condition / pt with new illness-injury that may poses a threat to life or body function  At least 3 different tests have been ordered on this patient and reviewed the results  hypokalemia    Old laboratory data was reviewed from the medical records and compared to today's results  Discussion with patient and or family members of results (normal and abnormal) and the implications for immediate and long term treatment/management  10:50 PM  I discussed the case with the hospitalist  We reviewed the HPI, pertinent PMH, ED course and workup  Hospitalist agreed with plan and will admit the patient to the hospital           Risk  OTC drugs  Prescription drug management            Disposition  Final diagnoses:   Hypertension   Hypokalemia   Lacunar infarction [facility])     Time reflects when diagnosis was documented in both MDM as applicable and the Disposition within this note     Time User Action Codes Description Comment    5/27/2023 10:54 PM Marquise Kaye Add [I10] Hypertension     5/27/2023 10:55 PM Abel Giraldo Add [E87 6] Hypokalemia     5/27/2023 10:55 PM Abel Giraldo Add [I63 81] Lacunar infarction [facility])       ED Disposition     ED Disposition   Admit    Condition   Stable    Date/Time   Sat May 27, 2023 10:54 PM    Comment   Case was discussed with hospitalist and the patient's admission status was agreed to be Admission Status: observation status to the service of Dr Lesli Castleman   Follow-up Information    None         Patient's Medications   Discharge Prescriptions    No medications on file       No discharge procedures on file      PDMP Review     None          ED Provider  Electronically Signed by           Liz Amanda MD  05/27/23 5559

## 2023-05-28 NOTE — CONSULTS
Consultation - Neurology   Della Hillman 56 y o  female MRN: [id number]  Unit/Bed#: -01 Encounter: [id number]      Assessment/Plan     Lightheadedness  Assessment & Plan  60-year-old female with history of cervical cancer status post chemo/radiation, prior work-up for hypertrophic cardiomyopathy, thyroid dysfunction, and hypertension who presented to [facility] on 5/27/2023 following recent sensation of lightheadedness with gait instability and headache/pressure-like sensation  Upon arrival to the ED, hypertension with max /100, hypokalemia 2 7, and mild hypercalcemia  BP better controlled this AM, SBP's 140's-150's    CT head negative for acute intracranial pathology however noted 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  High suspicion that symptoms are secondary to hypertensive emergency  Lower suspicion for acute intracranial abnormality, especially given normal neuro exam, but cannot fully rule out acute stroke so will get MRI brain  Plan:  - MRI brain pending  - Will defer Echo at this time  - Lipid Panel pending  - Hemoglobin A1c pending  - Recommend starting Aspirin 81 mg daily due to incidentally discovered old lacunar stroke  - Start Atorvastatin 40 mg daily due to CT findings above (patient states that she is hesitant to be on statin due to her father having bad side effects in the past)  - Normotensive goal as there is high suspicion current symptoms are related to hypertensive emergency; symptoms have improved with blood pressure trending down  - Euglycemic, normothermic goal  - Continue telemetry  - PT/OT/ST  - Frequent neuro checks  Continue to monitor and notify neurology with any changes   - STAT CT head for any acute change in neuro exam  - Medical management and supportive care per primary team  Correction of any metabolic or infectious disturbances       * Hypertensive emergency  Assessment & Plan  - Normotensive goal; avoid hypotension        Recommendations for outpatient neurological follow up have yet to be determined  History of Present Illness     Reason for Consult / Principal Problem: dizziness/lightheadedness and head pressure  Hx and PE limited by: N/A  HPI: Davon Calle is a 56 y o  female with HTN, hypertrophic cardiomyopathy, and cervical cancer s/p chemo who presented to [facility] on 5/27/2023 due to left sided head pressure and lightheadedness  Upon arrival to the ED, /103 with max   CT head negative for acute intracranial abnormalities, however did note old lacunar infarct in the left insula  Labs revealed potassium 2 7  On exam today, /89  Patient states that she still feels slightly lightheaded at times, but if she puts her hand on top of her head, this lightheadedness resolves  Denies any headache, visual disturbances, numbness, tingling, arm/leg weakness, chest pain, shortness of breath, or abdominal pain  Patient was not aware of ever having a stroke in the past   She does not take aspirin or statin  Inpatient consult to Neurology  Consult performed by: Belkis Siegel PA-C  Consult ordered by: Almaz Escalante PA-C          Review of Systems   Neurological: Positive for light-headedness  All other systems reviewed and are negative        Historical Information   Past Medical History:   Diagnosis Date   • Abnormal Pap smear of cervix    • Acute hip pain     tristin   • Cancer ([address] )     cervix- had chemo   • COVID     Feb 2022   • Heart palpitations     frequent -7/20 instructed to call cardiology to report   • Hypertension    • Hypokalemia 04/26/2019   • Low back pain    • Lymphadenopathy    • Pelvic pain    • Sepsis ([address] ) 06/25/2022   • Wears contact lenses    • Wears dentures     partial lower     Past Surgical History:   Procedure Laterality Date   • FL RETROGRADE PYELOGRAM  6/25/2022   • FL RETROGRADE PYELOGRAM  7/28/2022   • ND [address] INCL FLUOR GDNCE DX W/CELL WASHG SPX N/A 2/26/2020    Procedure: BRONCHOSCOPY FLEXIBLE; Surgeon: Florence Knox MD;  [facility];  Service: Thoracic   • NV BRONCHOSCOPY NEEDLE BX TRACHEA MAIN STEM&/BRON N/A 2/26/2020    Procedure: ENDOBRONCHIAL ULTRASOUND (EBUS); Surgeon: Florence Knox MD;  Location[facility];  Service: Thoracic   • NV CYSTO BLADDER W/URETERAL CATHETERIZATION Right 6/25/2022    Procedure: CYSTOSCOPY RETROGRADE PYELOGRAM WITH INSERTION STENT URETERAL;  Surgeon: Joaquín Cash MD;  [facility];  Service: Urology   • NV CYSTO/URETERO W/LITHOTRIPSY &INDWELL STENT INSRT Right 7/28/2022    Procedure: CYSTOSCOPY URETEROSCOPY WITH LITHOTRIPSY HOLMIUM LASER, RETROGRADE PYELOGRAM AND RIGHT INSERTION STENT URETERAL;  Surgeon: Joaquín Cash MD;  Location: [facility];  Service: Urology   • NV DILATION VAGINA W/ANESTHESIA OTHER THAN LOCAL N/A 6/20/2019    Procedure: EXAM UNDER ANESTHESIA (EUA);   Surgeon: Deniz Ludwig MD;  Location[facility];  Service: Gynecology Oncology   • NV INSERTION VAGINAL RADIATION DEVICE N/A 6/20/2019    Procedure: PIERRE SLEEVE PLACEMENT;  Surgeon: Deniz Ludwig MD;  [facility];  Service: Gynecology Oncology   • TONSILLECTOMY     • US GUIDANCE  6/20/2019     Social History   Social History     Substance and Sexual Activity   Alcohol Use Yes    Comment: occ     Social History     Substance and Sexual Activity   Drug Use Never     E-Cigarette/Vaping   • E-Cigarette Use Current Every Day User      E-Cigarette/Vaping Substances   • Nicotine Yes    • THC No    • CBD No    • Flavoring No    • Other No    • Unknown No      Social History     Tobacco Use   Smoking Status Former   • Packs/day: 0 50   • Years: 1 00   • Total pack years: 0 50   • Types: Cigarettes   • Quit date: 2/14/2020   • Years since quitting: 3 2   • Passive exposure: Past   Smokeless Tobacco Never   Tobacco Comments    vapes occ nicotene     Family History:   Family History   Problem Relation Age of Onset   • Uterine cancer Maternal Grandmother    • Heart disease Mother    • Canavan disease Father    • Cancer Father        Review of previous medical records was completed  Reviewed prior notes, labs, CT head    Meds/Allergies   all current active meds have been reviewed, current meds:   Current Facility-Administered Medications   Medication Dose Route Frequency   • acetaminophen (TYLENOL) tablet 650 mg  650 mg Oral Q6H PRN   • aluminum-magnesium hydroxide-simethicone (MYLANTA) oral suspension 30 mL  30 mL Oral Q6H PRN   • cyclobenzaprine (FLEXERIL) tablet 5 mg  5 mg Oral HS PRN   • enoxaparin (LOVENOX) subcutaneous injection 40 mg  40 mg Subcutaneous Daily   • ketorolac (TORADOL) injection 15 mg  15 mg Intravenous Q6H PRN   • losartan (COZAAR) tablet 50 mg  50 mg Oral Daily   • meclizine (ANTIVERT) tablet 12 5 mg  12 5 mg Oral Q8H PRN   • metoprolol tartrate (LOPRESSOR) partial tablet 12 5 mg  12 5 mg Oral Q12H [facility] & assisted    and PTA meds:   Prior to Admission Medications   Prescriptions Last Dose Informant Patient Reported? Taking?    Omega-3 Fatty Acids (FISH OIL) 1,000 mg Unknown Self Yes No   Si,000 mg daily   amLODIPine (NORVASC) 5 mg tablet  Self No No   Sig: Take 1 tablet (5 mg total) by mouth daily   cyclobenzaprine (FLEXERIL) 5 mg tablet Unknown Self Yes No   Sig: TAKE 1-2 TABLETS BY MOUTH NIGHTLY AS NEEDED FOR MUSCLE SPASMS    ibuprofen (MOTRIN) 600 mg tablet Unknown Self Yes No   Sig: ibuprofen 600 mg tablet   TAKE 1 TABLET BY MOUTH THREE TIMES A DAY AS DIRECTED FOR 30 DAYS   losartan (COZAAR) 50 mg tablet  Self No No   Sig: Take 1 tablet (50 mg total) by mouth daily   metoprolol tartrate (LOPRESSOR) 25 mg tablet Unknown Self No No   Sig: Take 0 5 tablets (12 5 mg total) by mouth every 12 (twelve) hours   oxybutynin (DITROPAN-XL) 10 MG 24 hr tablet   No No   Sig: Take 1 tablet (10 mg total) by mouth daily at bedtime   Patient not taking: Reported on 2022   potassium chloride (MICRO-K) 10 MEQ CR capsule Unknown Self No No   Sig: Take 4 capsules (40 mEq "total) by mouth in the morning   Patient not taking: Reported on 1/18/2023   senna (SENOKOT) 8 6 mg  Self No No   Sig: Take 1 tablet (8 6 mg total) by mouth daily at bedtime for 5 days   Patient not taking: Reported on 11/2/2022   tamsulosin (FLOMAX) 0 4 mg   No No   Sig: Take 1 capsule (0 4 mg total) by mouth daily with dinner   Patient not taking: Reported on 9/20/2022      Facility-Administered Medications: None       Allergies   Allergen Reactions   • Hydrochlorothiazide Arthralgia   • Latex Hives   • Other      Tomatoes   Vomiting and diarhhea       Objective   Vitals:Blood pressure 154/89, pulse 63, temperature 97 5 °F (36 4 °C), resp  rate 17, height 5' 1\" (1 549 m), weight 66 5 kg (146 lb 9 7 oz), last menstrual period 04/15/2019, SpO2 95 %, not currently breastfeeding  ,Body mass index is 27 7 kg/m²  Intake/Output Summary (Last 24 hours) at 5/28/2023 1259  Last data filed at 5/28/2023 1100  Gross per 24 hour   Intake 600 ml   Output --   Net 600 ml       Invasive Devices: Invasive Devices     Peripheral Intravenous Line  Duration           Peripheral IV 05/27/23 Left Antecubital <1 day          Drain  Duration           Ureteral Internal Stent 6 Fr  303 days                Physical Exam  Vitals and nursing note reviewed  Constitutional:       Appearance: Normal appearance  Comments: Patient lying comfortably in bed in no acute distress   HENT:      Head: Normocephalic and atraumatic  Mouth/Throat:      Mouth: Mucous membranes are moist       Pharynx: Oropharynx is clear  Eyes:      Extraocular Movements: Extraocular movements intact  Conjunctiva/sclera: Conjunctivae normal       Pupils: Pupils are equal, round, and reactive to light  Pulmonary:      Effort: Pulmonary effort is normal    Musculoskeletal:         General: Normal range of motion  Skin:     General: Skin is warm and dry  Neurological:      General: No focal deficit present        Mental Status: She is alert and " oriented to person, place, and time  Deep Tendon Reflexes:      Reflex Scores:       Bicep reflexes are 2+ on the right side and 2+ on the left side  Brachioradialis reflexes are 2+ on the right side and 2+ on the left side  Patellar reflexes are 2+ on the right side and 2+ on the left side  Comments: See full neuro exam below       Neurologic Exam     Mental Status   Oriented to person, place, and time  Patient awake and alert  Oriented to person, place, month, and year  No dysarthria or aphasia  Able to follow simple midline and appendicular commands  Cranial Nerves     CN III, IV, VI   Pupils are equal, round, and reactive to light  Slight ptosis of the left eyelid  Pupils 3 mm, round, reactive to light bilaterally  EOMs intact without nystagmus  No visual field deficits  Facial sensation to light touch intact throughout  Facial expressions full and symmetric  Tongue midline  Soft palate raises symmetrically  Full strength of sternocleidomastoid and trapezius muscles   Hearing grossly intact  Motor Exam   Muscle bulk: normal  Overall muscle tone: normal  Right arm pronator drift: absent  Left arm pronator drift: absent  5/5 strength in bilateral upper and lower extremities  Sensory Exam   Sensation to light touch, temperature, and vibration intact throughout  Gait, Coordination, and Reflexes     Gait  Gait: (Deferred for patient safety)    Reflexes   Right brachioradialis: 2+  Left brachioradialis: 2+  Right biceps: 2+  Left biceps: 2+  Right patellar: 2+  Left patellar: 2+  No ataxia with finger to nose bilaterally  No resting or action tremor  No ankle clonus bilaterally  Mute toes bilaterally       Lab Results:   I have personally reviewed pertinent reports    , CBC:   Results from last 7 days   Lab Units 05/27/23  2125   HEMATOCRIT % 39 2   HEMOGLOBIN g/dL 13 3   MCV fL 77*   PLATELETS Thousands/uL 252   RBC Million/uL 5 08   WBC Thousand/uL 6 02   , BMP/CMP: "  Results from last 7 days   Lab Units 05/28/23  0449 05/27/23 2125   ALK PHOS U/L 66  --    ALT U/L 12  --    AST U/L 12*  --    BUN mg/dL 15 14   CALCIUM mg/dL 10 5* 10 8*   CHLORIDE mmol/L 109* 104   CO2 mmol/L 31 28   CREATININE mg/dL 0 61 0 69   EGFR ml/min/1 73sq m 109 104   POTASSIUM mmol/L 3 2* 2 7*   SODIUM mmol/L 145 141   , Vitamin B12:   , HgBA1C:   , TSH:   , Coagulation:   , Lipid Profile:   , Ammonia:   , Urinalysis:       Invalid input(s): \"URIBILINOGEN\", Drug Screen:   , Medication Drug Levels:       Invalid input(s): \"CARBAMAZEPINE\", \"LACOSAMIDE\", \"OXCARBAZEPINE\"  Imaging Studies: I have personally reviewed pertinent reports  and I have personally reviewed pertinent films in PACS  EKG, Pathology, and Other Studies: I have personally reviewed pertinent reports  and I have personally reviewed pertinent films in PACS  VTE Prophylaxis: Sequential compression device (Venodyne)  and Enoxaparin (Lovenox)    Code Status: Level 1 - Full Code  Advance Directive and Living Will:      Power of :    POLST:      Counseling / Coordination of Care  I have spent a total time of 63 minutes on 05/28/23 in caring for this patient including Diagnostic results, Prognosis, Risks and benefits of tx options, Instructions for management, Patient and family education, Importance of tx compliance, Risk factor reductions, Impressions, Counseling / Coordination of care, Documenting in the medical record, Reviewing / ordering tests, medicine, procedures  , Obtaining or reviewing history   and Communicating with other healthcare professionals         "

## 2023-05-28 NOTE — UTILIZATION REVIEW
"Initial Clinical Review    Admission: Date/Time/Statement:   Admission Orders (From admission, onward)     Ordered        05/27/23 0910  Place in Observation  Once                      Orders Placed This Encounter   Procedures   • Place in Observation     Standing Status:   Standing     Number of Occurrences:   1     Order Specific Question:   Level of Care     Answer:   Med Surg [16]     ED Arrival Information     Expected   -    Arrival   5/27/2023 20:38    Acuity   Emergent            Means of arrival   Walk-In    Escorted by   Self    Service   Hospitalist    Admission type   Emergency            Arrival complaint   light headed-low heart rate           Chief Complaint   Patient presents with   • Dizziness     Pt complains of lightheadedness, like someone is squeezing her head, and bilateral feet tingling  Pt also complains of pain under her left breast         Initial Presentation: 56 y o  female with a PMH of HTN, hypokalemia and cervical cancer who presents to the ED from home for evaluation of sudden onset of not feeling right while she was at work early yesterday  She reports she felt like she had pressure on the left side of her head but she denies it felt like a pain or headache  She then reports when she was walking at work she felt \"off  \"  She denies she felt dizzy or feeling off balance, but felt more lightheaded  Patient was hypertensive on arrival to the ED, given IV hydralazine  Patient feels like her heart is racing since receiving IV hydralazine in the ED  PE:  AOx3      5/27 Admit to Observation for evaluation and treatment of hypertensive emergency, lightheadedness, hypokalemia: Telemetry  Continue home metoprolol BID and losartan, closely monitor blood pressure  Neurochecks, consult Neurology  Give IV potassium 20 mEq, recheck CMP in a m      5/28 Neurology consult: CT head negative for acute intracranial pathology however noted 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   High suspicion that " symptoms are secondary to hypertensive emergency  Lower suspicion for acute intracranial abnormality, especially given normal neuro exam, but cannot fully rule out acute stroke so will get MRI brain  Plan:  MRI brain pending, defer ECHO  Lipid Panel pending,  A1c pending  - Recommend starting Aspirin 81 mg daily due to incidentally discovered old lacunar stroke  Start Atorvastatin 40 mg daily due to CT findings above (patient states that she is hesitant to be on statin due to her father having bad side effects in the past)  Normotensive goal as there is high suspicion current symptoms are related to hypertensive emergency; symptoms have improved with blood pressure trending down  Euglycemic, normothermic goal   Continue telemetry  PT/OT/ST  Frequent neuro checks  PE: Patient awake and alert  Oriented to person, place, month, and year  No dysarthria or aphasia  Able to follow simple midline and appendicular commands  Slight ptosis of the left eyelid  Pupils 3 mm, round, reactive to light bilaterally  EOMs intact without nystagmus  No visual field deficits  Facial sensation to light touch intact throughout  Facial expressions full and symmetric  Tongue midline  Soft palate raises symmetrically  Full strength of sternocleidomastoid and trapezius muscles  Hearing grossly intact  5/5 strength in bilateral upper and lower extremities  Sensation to light touch, temperature, and vibration intact throughout     No ataxia with finger to nose bilaterally, no resting or action tremor  No ankle clonus bilaterally  Mute toes bilaterally        ED Triage Vitals   Temperature Pulse Respirations Blood Pressure SpO2   05/27/23 2047 05/27/23 2047 05/27/23 2047 05/27/23 2047 05/27/23 2047   98 3 °F (36 8 °C) 75 16 (!) 181/103 100 %      Temp Source Heart Rate Source Patient Position - Orthostatic VS BP Location FiO2 (%)   05/27/23 2047 05/27/23 2047 05/27/23 2047 05/27/23 2047 --   Oral Monitor Sitting Left arm       Pain Score       05/27/23 2132       5          Wt Readings from Last 1 Encounters:   05/27/23 66 5 kg (146 lb 9 7 oz)     Additional Vital Signs:     Date/Time Temp Pulse Resp BP MAP (mmHg) SpO2 O2 Device Patient Position - Orthostatic VS   05/28/23 15:11:22 98 1 °F (36 7 °C) 67 20 173/88 Abnormal  116 97 % -- --   05/28/23 0802 -- -- -- -- -- -- None (Room air) --   05/28/23 07:47:07 -- -- 17 154/89 111 -- -- --   05/28/23 0300 -- 63 15 146/80 102 95 % -- --   05/28/23 00:04:37 -- 73 17 159/78 105 98 % -- --   05/27/23 23:37:19 97 5 °F (36 4 °C) 71 16 157/89 112 97 % -- --   05/27/23 2200 98 3 °F (36 8 °C) 77 63 Abnormal  174/81  116 98 % None (Room air) Sitting   05/27/23 2155 -- -- -- -- -- -- None (Room air) --   05/27/23 2139 -- -- -- 203/100  -- -- -- --     Date and Time Eye Opening Best Verbal Response Best Motor Response James Coma Scale Score User   05/28/23 0300 4 5 6 15 ED   05/28/23 0004 4 5 6 15 ED       Pertinent Labs/Diagnostic Test Results:     CT head without contrast   Final Result by Vinicius Horn MD (05/27 2227)      1  No acute intracranial abnormality   2    Findings consistent with an old, left, lacunar infarct in the insula          5/27 EKG:    Normal sinus rhythm  Biatrial enlargement  RSR' or QR pattern in V1 suggests right ventricular conduction delay  Left ventricular hypertrophy with repolarization abnormality  Prolonged QT  Abnormal ECG  When compared with ECG of 05-JAN-2023 15:50,  Borderline criteria for Anterior infarct are no longer Present  Nonspecific T wave abnormality now evident in Lateral leads        Results from last 7 days   Lab Units 05/27/23 2125   HEMATOCRIT % 39 2   HEMOGLOBIN g/dL 13 3   NEUTROS ABS Thousands/µL 3 29   PLATELETS Thousands/uL 252   WBC Thousand/uL 6 02         Results from last 7 days   Lab Units 05/28/23  1414 05/28/23  0449 05/27/23  2125   ANION GAP mmol/L  --  5 9   BUN mg/dL  --  15 14   CALCIUM mg/dL  --  10 5* 10 8*   CHLORIDE mmol/L  --  109* 104   CO2 mmol/L  --  31 28   CREATININE mg/dL  --  0 61 0 69   EGFR ml/min/1 73sq m  --  109 104   POTASSIUM mmol/L 3 8 3 2* 2 7*   MAGNESIUM mg/dL  --   --  1 8*   SODIUM mmol/L  --  145 141     Results from last 7 days   Lab Units 05/28/23  0449   ALBUMIN g/dL 3 5   ALK PHOS U/L 66   ALT U/L 12   AST U/L 12*   TOTAL BILIRUBIN mg/dL 0 67   TOTAL PROTEIN g/dL 6 1*         Results from last 7 days   Lab Units 05/28/23 0449 05/27/23 2125   GLUCOSE RANDOM mg/dL 103 96             Results from last 7 days   Lab Units 05/28/23  0449 05/28/23  0035 05/27/23 2125   HS TNI 0HR ng/L  --   --  21   HS TNI 2HR ng/L  --  20  --    HS TNI 4HR ng/L 24  --   --    HSTNI D2 ng/L  --  -1  --    HSTNI D4 ng/L 3  --   --            ED Treatment:   Medication Administration from 05/27/2023 2038 to 05/27/2023 2326       Date/Time Order Dose Route Action Comments     05/27/2023 2132 EDT labetalol (NORMODYNE) injection 10 mg 10 mg Intravenous Not Given heart rate too low     05/27/2023 2132 EDT acetaminophen (TYLENOL) tablet 650 mg 650 mg Oral Given --     05/27/2023 2139 EDT hydrALAZINE (APRESOLINE) injection 20 mg 20 mg Intravenous Given --     05/27/2023 2217 EDT potassium chloride (K-DUR,KLOR-CON) CR tablet 40 mEq 40 mEq Oral Given --        Past Medical History:   Diagnosis Date   • Abnormal Pap smear of cervix    • Acute hip pain     tristin   • Cancer ([facility] [address] )     cervix- had chemo   • COVID     Feb 2022   • Heart palpitations     frequent -7/20 instructed to call cardiology to report   • Hypertension    • Hypokalemia 04/26/2019   • Low back pain    • Lymphadenopathy    • Pelvic pain    • Sepsis ([facility] [address] ) 06/25/2022   • Wears contact lenses    • Wears dentures     partial lower     Present on Admission:  • Hypertensive emergency  • Hypokalemia  • Lightheadedness      Admitting Diagnosis: Hypokalemia [E87 6]  Dizziness [R42]  Hypertension [I10]  Lac[facility]ar infarction ([facility] ) [I63 81]  Age/Sex: 56 y o  female       Admission Orders: Telemetry, SCD          Scheduled Medications:    enoxaparin, 40 mg, Subcutaneous, Daily  losartan, 50 mg, Oral, Daily  metoprolol tartrate, 12 5 mg, Oral, Q12H [address]  potassium chloride, 40 mEq, Oral, Once    potassium chloride 20 mEq IVPB (premix)  Dose: 20 mEq  Freq: Once Route: IV  Last Dose: 20 mEq (05/28/23 0231)  Start: 05/28/23 0015 End: 05/28/23 0431    atorvastatin (LIPITOR) tablet 40 mg  Dose: 40 mg  Freq: Daily with dinner Route: PO  Start: 05/28/23 1630    aspirin chewable tablet 81 mg  Dose: 81 mg  Freq: Daily Route: PO  Start: 05/28/23 1315      Continuous IV Infusions: None  PRN Meds:  acetaminophen, 650 mg, Oral, Q6H PRN  aluminum-magnesium hydroxide-simethicone, 30 mL, Oral, Q6H PRN  cyclobenzaprine, 5 mg, Oral, HS PRN  ketorolac, 15 mg, Intravenous, Q6H PRN  meclizine, 12 5 mg, Oral, Q8H PRN        IP CONSULT TO NEUROLOGY        Network Utilization Review Department  ATTENTION: Please call with any questions or concerns to [phone] and carefully listen to the prompts so that you are directed to the right person  All voicemails are confidential   Filomena Shipley all requests for admission clinical reviews, approved or denied determinations and any other requests to dedicated fax number below belonging to the campus where the patient is receiving treatment   List of dedicated fax numbers for the Facilities:  [address] DENIALS (Administrative/Medical Necessity) [phone]   [address] (Maternity/NICU/Pediatrics) [phone]   3 Lucy Burrell [phone]   [place] [address] [phone]   [address] [place] [address] Oscar CurryNuv[facility] Healthste[facility] [address] - [address] [phone]

## 2023-05-28 NOTE — H&P
"[facility]  H&P  Name: Cristel Florez 56 y o  female I MRN: [id number]  Unit/Bed#: -01 I Date of Admission: 5/27/2023   Date of Service: 5/28/2023 I Hospital Day: 0      Assessment/Plan   * Hypertensive emergency  Assessment & Plan  · BP on arrival 203/100, currently responding status post IV hydralazine 20 mg in the ED; BP now 174/81  · Patient reports she has been taking her metoprolol and losartan as prescribed  · Admits to \"feeling off with ambulation at work and feeling head pressure and lightheaded sensation  · CT head negative for acute infarct, hemorrhage or acute change, old left lacunar infarct noted  · For now closely monitor blood pressure  · Continue home metoprolol twice daily now and home losartan for later in a m  · Patient reports she feels like her heart is racing after receiving IV hydralazine dose in the ED, so will hold off on further IV hydralazine for now    [address]  · Patient reports earlier yesterday being at work and \"feeling off when I walked  \"  She denies it felt like dizziness/feeling off balance, but felt lightheaded and a pressure feeling in the left side of her head  · Significantly elevated blood pressure to 203/100 on arrival  · Rule out secondary to hypertensive emergency versus less likely other neurological origin  · Neurochecks every 4 hours  · Appreciate neurology consult    Hypokalemia  Assessment & Plan  · Potassium decreased at 2 7, hypokalemia appears to be chronic issue per review of chart  · Patient also admits she has not been taking her potassium pills as prescribed outpatient  · Given p o  potassium chloride 40 mEq once in the ED  · Will also give a dose of IV potassium chloride 20 mEq now  · Recheck CMP in a m  · Telemetry          VTE Pharmacologic Prophylaxis: VTE Score: 4 Moderate Risk (Score 3-4) - Pharmacological DVT Prophylaxis Ordered: enoxaparin (Lovenox)    Code Status: Level 1 - Full Code " "  Discussion with family: pt  Anticipated Length of Stay: Patient will be admitted on an observation basis with an anticipated length of stay of less than 2 midnights secondary to see above  Total Time Spent on Date of Encounter in care of patient: 75 minutes This time was spent on one or more of the following: performing physical exam; counseling and coordination of care; obtaining or reviewing history; documenting in the medical record; reviewing/ordering tests, medications or procedures; communicating with other healthcare professionals and discussing with patient's family/caregivers  Chief Complaint:    Chief Complaint   Patient presents with   • Dizziness     Pt complains of lightheadedness, like someone is squeezing her head, and bilateral feet tingling  Pt also complains of pain under her left breast         History of Present Illness:  Kelley Behtea is a 56 y o  female with a PMH of hypertension, hypokalemia, cervical cancer who presents with complaint of sudden onset of not feeling right while she was at work early yesterday  She reports she felt like she had head pressure on the left side of her head but she denies it felt like a pain or headache  She then reports when she was walking at work she felt \"off  \"  She denies she felt dizzy or feeling off balance, but felt more lightheaded  She currently reports ambulating up to the room on the floor she did feel okay and the feeling has subsided  She  denies headache, vision changes, numbness or tingling, chest pain, shortness of breath  She does admit since receiving IV hydralazine in the ED she has felt like her heart has been racing  Review of Systems:  Review of Systems   Constitutional: Negative for activity change and fatigue  Eyes: Negative for visual disturbance  Respiratory: Negative for shortness of breath  Cardiovascular: Positive for palpitations  Negative for chest pain  Gastrointestinal: Negative for abdominal pain   " Neurological: Positive for light-headedness  Negative for dizziness  Past Medical and Surgical History:   Past Medical History:   Diagnosis Date   • Abnormal Pap smear of cervix    • Acute hip pain     tristin   • Cancer ([address] )     cervix- had chemo   • COVID     Feb 2022   • Heart palpitations     frequent -7/20 instructed to call cardiology to report   • Hypertension    • Hypokalemia 04/26/2019   • Low back pain    • Lymphadenopathy    • Pelvic pain    • Sepsis ([address] ) 06/25/2022   • Wears contact lenses    • Wears dentures     partial lower       Past Surgical History:   Procedure Laterality Date   • FL RETROGRADE PYELOGRAM  6/25/2022   • FL RETROGRADE PYELOGRAM  7/28/2022   • [place] [address] INCL FLUOR GDNCE DX W/CELL WASHG SPX N/A 2/26/2020    Procedure: Early Close;  Surgeon: Daniel Ellis MD;  [facility];  Service: Thoracic   • [place] BRONCHOSCOPY NEEDLE BX TRACHEA MAIN STEM&/BRON N/A 2/26/2020    Procedure: ENDOBRONCHIAL ULTRASOUND (EBUS); Surgeon: Daniel Ellis MD;  [facility];  Service: Thoracic   • OH CYSTO BLADDER W/URETERAL CATHETERIZATION Right 6/25/2022    Procedure: CYSTOSCOPY RETROGRADE PYELOGRAM WITH INSERTION STENT URETERAL;  Surgeon: Kierra Downing MD;  [facility];  Service: Urology   • OH CYSTO/URETERO W/LITHOTRIPSY &INDWELL STENT INSRT Right 7/28/2022    Procedure: CYSTOSCOPY URETEROSCOPY WITH LITHOTRIPSY HOLMIUM LASER, RETROGRADE PYELOGRAM AND RIGHT INSERTION STENT URETERAL;  Surgeon: Kierra Downing MD;  [facility];  Service: Urology   • OH DILATION VAGINA W/ANESTHESIA OTHER THAN LOCAL N/A 6/20/2019    Procedure: EXAM UNDER ANESTHESIA (EUA);   Surgeon: Amna Wong MD;  [facility];  Service: Gynecology Oncology   • [place] INSERTION VAGINAL RADIATION DEVICE N/A 6/20/2019    Procedure: PIERRE SLEEVE PLACEMENT;  Surgeon: Amna Wong MD;  [facility];  Service: Gynecology Oncology   • TONSILLECTOMY     • US GUIDANCE 6/20/2019       Meds/Allergies:  Prior to Admission medications    Medication Sig Start Date End Date Taking?  Authorizing Provider   amLODIPine (NORVASC) 5 mg tablet Take 1 tablet (5 mg total) by mouth daily 6/29/22   Patricia Real PA-C   cyclobenzaprine (FLEXERIL) 5 mg tablet TAKE 1-2 TABLETS BY MOUTH NIGHTLY AS NEEDED FOR MUSCLE SPASMS  1/9/23   Historical Provider, MD   ibuprofen (MOTRIN) 600 mg tablet ibuprofen 600 mg tablet   TAKE 1 TABLET BY MOUTH THREE TIMES A DAY AS DIRECTED FOR 30 DAYS    Historical Provider, MD   losartan (COZAAR) 50 mg tablet Take 1 tablet (50 mg total) by mouth daily 9/29/22 1/18/23  Jimenez Niño MD   metoprolol tartrate (LOPRESSOR) 25 mg tablet Take 0 5 tablets (12 5 mg total) by mouth every 12 (twelve) hours 6/29/22   Patricia Real PA-C   Omega-3 Fatty Acids (FISH OIL) 1,000 mg 1,000 mg daily    Historical Provider, MD   oxybutynin (DITROPAN-XL) 10 MG 24 hr tablet Take 1 tablet (10 mg total) by mouth daily at bedtime  Patient not taking: Reported on 9/20/2022 6/25/22 7/25/22  Alexander Leon MD   potassium chloride (MICRO-K) 10 MEQ CR capsule Take 4 capsules (40 mEq total) by mouth in the morning  Patient not taking: Reported on 1/18/2023 1/6/23   Jaja Cornelius DO   senna (SENOKOT) 8 6 mg Take 1 tablet (8 6 mg total) by mouth daily at bedtime for 5 days  Patient not taking: Reported on 11/2/2022 7/28/22 11/2/22  Alexander Leon MD   tamsulosin [facility]) 0 4 mg Take 1 capsule (0 4 mg total) by mouth daily with dinner  Patient not taking: Reported on 9/20/2022 6/25/22 7/25/22  Alexander Leon MD   acetaminophen (TYLENOL) 325 mg tablet Take 2 tablets (650 mg total) by mouth every 6 (six) hours as needed for mild pain  Patient not taking: Reported on 4/1/2022 2/26/20 5/28/23  [employer], PA-C   Klor-Con M10 10 MEQ tablet Take 10 mEq by mouth daily 1/10/23 5/28/23  Historical Provider, MD   pantoprazole (PROTONIX) 40 mg tablet Take 1 tablet (40 mg total) by mouth "daily  Patient not taking: Reported on 11/2/2022 6/29/22 5/28/23  Sunitha Newton PA-C   phenazopyridine (PYRIDIUM) 100 mg tablet Take 1 tablet (100 mg total) by mouth 3 (three) times a day with meals  Patient not taking: Reported on 7/20/2022 6/29/22 5/28/23  Sunitha Newton PA-C     I have reviewed her medications per review of chart  Allergies: Allergies   Allergen Reactions   • Hydrochlorothiazide Arthralgia   • Latex Hives   • Other      Tomatoes   Vomiting and diarhhea       Social History:  Marital Status: Significant Other     Patient Pre-hospital Living Situation: Home  Patient Pre-hospital Level of Mobility: walks  Patient Pre-hospital Diet Restrictions: n/a  Substance Use History:   Social History     Substance and Sexual Activity   Alcohol Use Yes    Comment: occ     Social History     Tobacco Use   Smoking Status Former   • Packs/day: 0 50   • Years: 1 00   • Total pack years: 0 50   • Types: Cigarettes   • Quit date: 2/14/2020   • Years since quitting: 3 2   Smokeless Tobacco Never   Tobacco Comments    vapes occ nicotene     Social History     Substance and Sexual Activity   Drug Use Never       Family History:  Family History   Problem Relation Age of Onset   • Uterine cancer Maternal Grandmother    • Heart disease Mother    • Canavan disease Father    • Cancer Father        Physical Exam:     Vitals:   Blood Pressure: 159/78 (05/28/23 0004)  Pulse: 73 (05/28/23 0004)  Temperature: 97 5 °F (36 4 °C) (05/27/23 2337)  Temp Source: Oral (05/27/23 2200)  Respirations: 17 (05/28/23 0004)  Height: 5' 1\" (154 9 cm) (05/27/23 2047)  Weight - Scale: 66 5 kg (146 lb 9 7 oz) (05/27/23 2047)  SpO2: 98 % (05/28/23 0004)    Physical Exam  Vitals and nursing note reviewed  Constitutional:       General: She is not in acute distress  Appearance: Normal appearance  HENT:      Head: Normocephalic and atraumatic     Eyes:      Conjunctiva/sclera: Conjunctivae normal    Cardiovascular:      Rate and Rhythm: " Normal rate and regular rhythm  Pulmonary:      Effort: Pulmonary effort is normal  No respiratory distress  Breath sounds: Normal breath sounds  Abdominal:      General: Abdomen is flat  Bowel sounds are normal       Palpations: Abdomen is soft  Musculoskeletal:      Right lower leg: No edema  Left lower leg: No edema  Skin:     General: Skin is warm and dry  Neurological:      General: No focal deficit present  Mental Status: She is alert and oriented to person, place, and time  Psychiatric:         Mood and Affect: Mood normal          Behavior: Behavior normal          Thought Content: Thought content normal           Additional Data:     Lab Results:  Results from last 7 days   Lab Units 05/27/23 2125   EOS PCT % 3   HEMATOCRIT % 39 2   HEMOGLOBIN g/dL 13 3   LYMPHS PCT % 32   MONOS PCT % 9   NEUTROS PCT % 55   PLATELETS Thousands/uL 252   WBC Thousand/uL 6 02     Results from last 7 days   Lab Units 05/27/23 2125   ANION GAP mmol/L 9   BUN mg/dL 14   CALCIUM mg/dL 10 8*   CHLORIDE mmol/L 104   CO2 mmol/L 28   CREATININE mg/dL 0 69   GLUCOSE RANDOM mg/dL 96   POTASSIUM mmol/L 2 7*   SODIUM mmol/L 141                       Lines/Drains:  Invasive Devices     Peripheral Intravenous Line  Duration           Peripheral IV 05/27/23 Left Antecubital <1 day          Drain  Duration           Ureteral Internal Stent 6 Fr  303 days                    Imaging: Reviewed radiology reports from this admission including: CT head  CT head without contrast   Final Result by Amy Gee MD (05/27 2227)      1  No acute intracranial abnormality   2  Findings consistent with an old, left, lacunar infarct in the insula                  Workstation performed: [id number]             EKG and Other Studies Reviewed on Admission:   · EKG: Prolonged QTc, normal sinus rhythm, biatrial enlargement, LVH, possible development of incomplete right bundle branch block which is not new      ** Please Note: This note has been constructed using a voice recognition system   **

## 2023-05-28 NOTE — ASSESSMENT & PLAN NOTE
81-year-old female with history of cervical cancer status post chemo/radiation, prior work-up for hypertrophic cardiomyopathy, thyroid dysfunction, and hypertension who presented to [facility] on 5/27/2023 following recent sensation of lightheadedness with gait instability and headache/pressure-like sensation  Upon arrival to the ED, hypertension with max /100, hypokalemia 2 7, and mild hypercalcemia  BP better controlled this AM, SBP's 140's-150's    CT head negative for acute intracranial pathology however noted 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  High suspicion that symptoms are secondary to hypertensive emergency  Lower suspicion for acute intracranial abnormality, especially given normal neuro exam, but cannot fully rule out acute stroke so will get MRI brain  Plan:  - MRI brain pending  - Will defer Echo at this time  - Lipid Panel pending  - Hemoglobin A1c pending  - Recommend starting Aspirin 81 mg daily due to incidentally discovered old lacunar stroke  - Start Atorvastatin 40 mg daily due to CT findings above (patient states that she is hesitant to be on statin due to her father having bad side effects in the past)  - Normotensive goal as there is high suspicion current symptoms are related to hypertensive emergency; symptoms have improved with blood pressure trending down  - Euglycemic, normothermic goal  - Continue telemetry  - PT/OT/ST  - Frequent neuro checks  Continue to monitor and notify neurology with any changes   - STAT CT head for any acute change in neuro exam  - Medical management and supportive care per primary team  Correction of any metabolic or infectious disturbances

## 2023-05-28 NOTE — PLAN OF CARE
Problem: Potential for Falls  Goal: Patient will remain free of falls  Description: INTERVENTIONS:  - Educate patient/family on patient safety including physical limitations  - Instruct patient to call for assistance with activity   - Consult OT/PT to assist with strengthening/mobility   - Keep Call bell within reach  - Keep bed low and locked with side rails adjusted as appropriate  - Keep care items and personal belongings within reach  - Initiate and maintain comfort rounds  - Make Fall Risk Sign visible to staff  - Offer Toileting every *** Hours, in advance of need  - Initiate/Maintain ***alarm  - Obtain necessary fall risk management equipment: ***  - Apply yellow socks and bracelet for high fall risk patients  - Consider moving patient to room near nurses station  Outcome: Progressing     Problem: PAIN - ADULT  Goal: Verbalizes/displays adequate comfort level or baseline comfort level  Description: Interventions:  - Encourage patient to monitor pain and request assistance  - Assess pain using appropriate pain scale  - Administer analgesics based on type and severity of pain and evaluate response  - Implement non-pharmacological measures as appropriate and evaluate response  - Consider cultural and social influences on pain and pain management  - Notify physician/advanced practitioner if interventions unsuccessful or patient reports new pain  Outcome: Progressing     Problem: INFECTION - ADULT  Goal: Absence or prevention of progression during hospitalization  Description: INTERVENTIONS:  - Assess and monitor for signs and symptoms of infection  - Monitor lab/diagnostic results  - Monitor all insertion sites, i e  indwelling lines, tubes, and drains  - Monitor endotracheal if appropriate and nasal secretions for changes in amount and color  - [place] appropriate cooling/warming therapies per order  - Administer medications as ordered  - Instruct and encourage patient and family to use good hand hygiene technique  - Identify and instruct in appropriate isolation precautions for identified infection/condition  Outcome: Progressing  Goal: Absence of fever/infection during neutropenic period  Description: INTERVENTIONS:  - Monitor WBC    Outcome: Progressing     Problem: SAFETY ADULT  Goal: Patient will remain free of falls  Description: INTERVENTIONS:  - Educate patient/family on patient safety including physical limitations  - Instruct patient to call for assistance with activity   - Consult OT/PT to assist with strengthening/mobility   - Keep Call bell within reach  - Keep bed low and locked with side rails adjusted as appropriate  - Keep care items and personal belongings within reach  - Initiate and maintain comfort rounds  - Make Fall Risk Sign visible to staff  - Offer Toileting every *** Hours, in advance of need  - Initiate/Maintain ***alarm  - Obtain necessary fall risk management equipment: ***  - Apply yellow socks and bracelet for high fall risk patients  - Consider moving patient to room near nurses station  Outcome: Progressing  Goal: Maintain or return to baseline ADL function  Description: INTERVENTIONS:  -  Assess patient's ability to carry out ADLs; assess patient's baseline for ADL function and identify physical deficits which impact ability to perform ADLs (bathing, care of mouth/teeth, toileting, grooming, dressing, etc )  - Assess/evaluate cause of self-care deficits   - Assess range of motion  - Assess patient's mobility; develop plan if impaired  - Assess patient's need for assistive devices and provide as appropriate  - Encourage maximum independence but intervene and supervise when necessary  - Involve family in performance of ADLs  - Assess for home care needs following discharge   - Consider OT consult to assist with ADL evaluation and planning for discharge  - Provide patient education as appropriate  Outcome: Progressing  Goal: Maintains/Returns to pre admission functional level  Description: INTERVENTIONS:  - Perform BMAT or MOVE assessment daily    - Set and communicate daily mobility goal to care team and patient/family/caregiver  - Collaborate with rehabilitation services on mobility goals if consulted  - Perform Range of Motion *** times a day  - Reposition patient every *** hours  - Dangle patient *** times a day  - Stand patient *** times a day  - Ambulate patient *** times a day  - Out of bed to chair *** times a day   - Out of bed for meals *** times a day  - Out of bed for toileting  - Record patient progress and toleration of activity level   Outcome: Progressing     Problem: DISCHARGE PLANNING  Goal: Discharge to home or other facility with appropriate resources  Description: INTERVENTIONS:  - Identify barriers to discharge w/patient and caregiver  - Arrange for needed discharge resources and transportation as appropriate  - Identify discharge learning needs (meds, wound care, etc )  - Arrange for interpretive services to assist at discharge as needed  - Refer to Case Management Department for coordinating discharge planning if the patient needs post-hospital services based on physician/advanced practitioner order or complex needs related to functional status, cognitive ability, or social support system  Outcome: Progressing     Problem: Knowledge Deficit  Goal: Patient/family/caregiver demonstrates understanding of disease process, treatment plan, medications, and discharge instructions  Description: Complete learning assessment and assess knowledge base    Interventions:  - Provide teaching at level of understanding  - Provide teaching via preferred learning methods  Outcome: Progressing

## 2023-05-29 VITALS
WEIGHT: 146.61 LBS | HEART RATE: 71 BPM | TEMPERATURE: 97.4 F | BODY MASS INDEX: 27.68 KG/M2 | DIASTOLIC BLOOD PRESSURE: 80 MMHG | RESPIRATION RATE: 16 BRPM | SYSTOLIC BLOOD PRESSURE: 158 MMHG | HEIGHT: 61 IN | OXYGEN SATURATION: 94 %

## 2023-05-29 LAB
EST. AVERAGE GLUCOSE BLD GHB EST-MCNC: 91 MG/DL
HBA1C MFR BLD: 4.8 %

## 2023-05-29 RX ORDER — ATORVASTATIN CALCIUM 40 MG/1
40 TABLET, FILM COATED ORAL
Qty: 30 TABLET | Refills: 0 | Status: SHIPPED | OUTPATIENT
Start: 2023-05-29 | End: 2023-06-28

## 2023-05-29 RX ORDER — ASPIRIN 81 MG/1
81 TABLET, CHEWABLE ORAL DAILY
Qty: 30 TABLET | Refills: 0 | Status: SHIPPED | OUTPATIENT
Start: 2023-05-30 | End: 2023-06-29

## 2023-05-29 RX ORDER — MECLIZINE HCL 12.5 MG/1
12.5 TABLET ORAL EVERY 8 HOURS PRN
Qty: 30 TABLET | Refills: 0 | Status: SHIPPED | OUTPATIENT
Start: 2023-05-29

## 2023-05-29 RX ADMIN — LOSARTAN POTASSIUM 50 MG: 50 TABLET, FILM COATED ORAL at 08:37

## 2023-05-29 RX ADMIN — LABETALOL HYDROCHLORIDE 10 MG: 5 INJECTION, SOLUTION INTRAVENOUS at 01:22

## 2023-05-29 RX ADMIN — ASPIRIN 81 MG: 81 TABLET, CHEWABLE ORAL at 10:19

## 2023-05-29 RX ADMIN — Medication 12.5 MG: at 08:37

## 2023-05-29 NOTE — PROGRESS NOTES
Pt states she is waiting for results from MRI before being discharged  Dr Nafisa Mckinney aware  Recheck of BP, previously 187/99 current /80

## 2023-05-29 NOTE — PLAN OF CARE
Problem: Potential for Falls  Goal: Patient will remain free of falls  Description: INTERVENTIONS:  - Educate patient/family on patient safety including physical limitations  - Instruct patient to call for assistance with activity   - Consult OT/PT to assist with strengthening/mobility   - Keep Call bell within reach  - Keep bed low and locked with side rails adjusted as appropriate  - Keep care items and personal belongings within reach  - Initiate and maintain comfort rounds  - Make Fall Risk Sign visible to staff  - Apply yellow socks and bracelet for high fall risk patients  - Consider moving patient to room near nurses station  Outcome: Progressing     Problem: PAIN - ADULT  Goal: Verbalizes/displays adequate comfort level or baseline comfort level  Description: Interventions:  - Encourage patient to monitor pain and request assistance  - Assess pain using appropriate pain scale  - Administer analgesics based on type and severity of pain and evaluate response  - Implement non-pharmacological measures as appropriate and evaluate response  - Consider cultural and social influences on pain and pain management  - Notify physician/advanced practitioner if interventions unsuccessful or patient reports new pain  Outcome: Progressing     Problem: INFECTION - ADULT  Goal: Absence or prevention of progression during hospitalization  Description: INTERVENTIONS:  - Assess and monitor for signs and symptoms of infection  - Monitor lab/diagnostic results  - Monitor all insertion sites, i e  indwelling lines, tubes, and drains  - Monitor endotracheal if appropriate and nasal secretions for changes in amount and color  - [place] appropriate cooling/warming therapies per order  - Administer medications as ordered  - Instruct and encourage patient and family to use good hand hygiene technique  - Identify and instruct in appropriate isolation precautions for identified infection/condition  Outcome: Progressing  Goal: Absence of fever/infection during neutropenic period  Description: INTERVENTIONS:  - Monitor WBC    Outcome: Progressing     Problem: SAFETY ADULT  Goal: Patient will remain free of falls  Description: INTERVENTIONS:  - Educate patient/family on patient safety including physical limitations  - Instruct patient to call for assistance with activity   - Consult OT/PT to assist with strengthening/mobility   - Keep Call bell within reach  - Keep bed low and locked with side rails adjusted as appropriate  - Keep care items and personal belongings within reach  - Initiate and maintain comfort rounds  - Make Fall Risk Sign visible to staff  - Apply yellow socks and bracelet for high fall risk patients  - Consider moving patient to room near nurses station  Outcome: Progressing  Goal: Maintain or return to baseline ADL function  Description: INTERVENTIONS:  -  Assess patient's ability to carry out ADLs; assess patient's baseline for ADL function and identify physical deficits which impact ability to perform ADLs (bathing, care of mouth/teeth, toileting, grooming, dressing, etc )  - Assess/evaluate cause of self-care deficits   - Assess range of motion  - Assess patient's mobility; develop plan if impaired  - Assess patient's need for assistive devices and provide as appropriate  - Encourage maximum independence but intervene and supervise when necessary  - Involve family in performance of ADLs  - Assess for home care needs following discharge   - Consider OT consult to assist with ADL evaluation and planning for discharge  - Provide patient education as appropriate  Outcome: Progressing  Goal: Maintains/Returns to pre admission functional level  Description: INTERVENTIONS:  - Perform BMAT or MOVE assessment daily    - Set and communicate daily mobility goal to care team and patient/family/caregiver     - Collaborate with rehabilitation services on mobility goals if consulted  - Out of bed for toileting  - Record patient progress and toleration of activity level   Outcome: Progressing     Problem: DISCHARGE PLANNING  Goal: Discharge to home or other facility with appropriate resources  Description: INTERVENTIONS:  - Identify barriers to discharge w/patient and caregiver  - Arrange for needed discharge resources and transportation as appropriate  - Identify discharge learning needs (meds, wound care, etc )  - Arrange for interpretive services to assist at discharge as needed  - Refer to Case Management Department for coordinating discharge planning if the patient needs post-hospital services based on physician/advanced practitioner order or complex needs related to functional status, cognitive ability, or social support system  Outcome: Progressing     Problem: Knowledge Deficit  Goal: Patient/family/caregiver demonstrates understanding of disease process, treatment plan, medications, and discharge instructions  Description: Complete learning assessment and assess knowledge base    Interventions:  - Provide teaching at level of understanding  - Provide teaching via preferred learning methods  Outcome: Progressing

## 2023-05-29 NOTE — PLAN OF CARE
Problem: Potential for Falls  Goal: Patient will remain free of falls  Description: INTERVENTIONS:  - Educate patient/family on patient safety including physical limitations  - Instruct patient to call for assistance with activity   - Consult OT/PT to assist with strengthening/mobility   - Keep Call bell within reach  - Keep bed low and locked with side rails adjusted as appropriate  - Keep care items and personal belongings within reach  - Initiate and maintain comfort rounds  - Make Fall Risk Sign visible to staff  - Offer Toileting every 2 Hours, in advance of need  - Initiate/Maintain alarm  - Obtain necessary fall risk management equipment  - Apply yellow socks and bracelet for high fall risk patients  - Consider moving patient to room near nurses station  Outcome: Progressing     Problem: PAIN - ADULT  Goal: Verbalizes/displays adequate comfort level or baseline comfort level  Description: Interventions:  - Encourage patient to monitor pain and request assistance  - Assess pain using appropriate pain scale  - Administer analgesics based on type and severity of pain and evaluate response  - Implement non-pharmacological measures as appropriate and evaluate response  - Consider cultural and social influences on pain and pain management  - Notify physician/advanced practitioner if interventions unsuccessful or patient reports new pain  Outcome: Progressing     Problem: INFECTION - ADULT  Goal: Absence or prevention of progression during hospitalization  Description: INTERVENTIONS:  - Assess and monitor for signs and symptoms of infection  - Monitor lab/diagnostic results  - Monitor all insertion sites, i e  indwelling lines, tubes, and drains  - Monitor endotracheal if appropriate and nasal secretions for changes in amount and color  - [place] appropriate cooling/warming therapies per order  - Administer medications as ordered  - Instruct and encourage patient and family to use good hand hygiene technique  - Identify and instruct in appropriate isolation precautions for identified infection/condition  Outcome: Progressing  Goal: Absence of fever/infection during neutropenic period  Description: INTERVENTIONS:  - Monitor WBC    Outcome: Progressing     Problem: SAFETY ADULT  Goal: Patient will remain free of falls  Description: INTERVENTIONS:  - Educate patient/family on patient safety including physical limitations  - Instruct patient to call for assistance with activity   - Consult OT/PT to assist with strengthening/mobility   - Keep Call bell within reach  - Keep bed low and locked with side rails adjusted as appropriate  - Keep care items and personal belongings within reach  - Initiate and maintain comfort rounds  - Make Fall Risk Sign visible to staff  - Offer Toileting every 2 Hours, in advance of need  - Initiate/Maintain alarm  - Obtain necessary fall risk management equipment  - Apply yellow socks and bracelet for high fall risk patients  - Consider moving patient to room near nurses station  Outcome: Progressing  Goal: Maintain or return to baseline ADL function  Description: INTERVENTIONS:  -  Assess patient's ability to carry out ADLs; assess patient's baseline for ADL function and identify physical deficits which impact ability to perform ADLs (bathing, care of mouth/teeth, toileting, grooming, dressing, etc )  - Assess/evaluate cause of self-care deficits   - Assess range of motion  - Assess patient's mobility; develop plan if impaired  - Assess patient's need for assistive devices and provide as appropriate  - Encourage maximum independence but intervene and supervise when necessary  - Involve family in performance of ADLs  - Assess for home care needs following discharge   - Consider OT consult to assist with ADL evaluation and planning for discharge  - Provide patient education as appropriate  Outcome: Progressing  Goal: Maintains/Returns to pre admission functional level  Description: INTERVENTIONS:  - Perform BMAT or MOVE assessment daily    - Set and communicate daily mobility goal to care team and patient/family/caregiver  - Collaborate with rehabilitation services on mobility goals if consulted  - Perform Range of Motion 3 times a day  - Reposition patient every 2 hours  - Dangle patient 3 times a day  - Stand patient 3 times a day  - Ambulate patient 3 times a day  - Out of bed to chair 3 times a day   - Out of bed for meals 3 times a day  - Out of bed for toileting  - Record patient progress and toleration of activity level   Outcome: Progressing     Problem: DISCHARGE PLANNING  Goal: Discharge to home or other facility with appropriate resources  Description: INTERVENTIONS:  - Identify barriers to discharge w/patient and caregiver  - Arrange for needed discharge resources and transportation as appropriate  - Identify discharge learning needs (meds, wound care, etc )  - Arrange for interpretive services to assist at discharge as needed  - Refer to Case Management Department for coordinating discharge planning if the patient needs post-hospital services based on physician/advanced practitioner order or complex needs related to functional status, cognitive ability, or social support system  Outcome: Progressing     Problem: Knowledge Deficit  Goal: Patient/family/caregiver demonstrates understanding of disease process, treatment plan, medications, and discharge instructions  Description: Complete learning assessment and assess knowledge base    Interventions:  - Provide teaching at level of understanding  - Provide teaching via preferred learning methods  Outcome: Progressing

## 2023-05-29 NOTE — ASSESSMENT & PLAN NOTE
· Blood pressure adequately controlled   · Patient reports she has been taking her metoprolol and losartan as prescribed  · CT head negative for acute infarct, hemorrhage or acute change, old left lacunar infarct noted  · MRI brain-no acute stroke noted but old infarct noted  · Discussed with patient about need for aspirin and statin and she states she does not want to take any new medications    Did discuss importance of these meds given old stroke  · Continue home metoprolol and losartan; will add on amlodipine 5 mg daily as well

## 2023-05-29 NOTE — ASSESSMENT & PLAN NOTE
· Blood pressure adequately controlled  · will add on amlodipine on discharge for blood pressure control  · Did discuss with patient about this and she said she will not take any new medications at this time although patient would need adjustment meds given blood pressure still slightly elevated

## 2023-05-29 NOTE — DISCHARGE SUMMARY
[address] [facility]  Discharge- Nader Dozier 1975, 56 y o  female MRN: [id number]  Unit/Bed#: -01 Encounter: [id number]  Primary Care Provider: Sherry Wei   Date and time admitted to hospital: 5/27/2023  8:55 PM    * Hypertensive emergency  Assessment & Plan  · Blood pressure adequately controlled   · Patient reports she has been taking her metoprolol and losartan as prescribed  · CT head negative for acute infarct, hemorrhage or acute change, old left lacunar infarct noted  · MRI brain-no acute stroke noted but old infarct noted  · Discussed with patient about need for aspirin and statin and she states she does not want to take any new medications  Did discuss importance of these meds given old stroke  · Continue home metoprolol and losartan; will add on amlodipine 5 mg daily as well    Lightheadedness  Assessment & Plan  · Likely related to blood pressure  · Neurology consulted    HTN (hypertension)  Assessment & Plan  · Blood pressure adequately controlled  · will add on amlodipine on discharge for blood pressure control  · Did discuss with patient about this and she said she will not take any new medications at this time although patient would need adjustment meds given blood pressure still slightly elevated    Hypokalemia  Assessment & Plan  · Resolved      Medical Problems     Resolved Problems  Date Reviewed: 5/28/2023   None       Discharging Physician / Practitioner: Paul Carvajal DO  PCP: Sherry Wei  Admission Date:   Admission Orders (From admission, onward)     Ordered        05/27/23 2254  Place in Observation  Once                      Discharge Date: 05/29/23    Consultations During Hospital Stay:  · Neurology    Reason for Admission: lightheadedness     Hospital Course:   Nader Dozier is a 56 y o  female patient who originally presented to the hospital on 5/27/2023 due to lightheadedness    Patient seen by neurology team and underwent MRI which was unremarkable "for any acute stroke but did note old infarct  Discussed with patient about taking aspirin and statin and she stated she did not want to take any new medications at this time  Patient's blood pressure better controlled but still elevated and will add on amlodipine 5 mg as well for discharge  Patient should continue to follow-up blood pressure medications with primary care provider  Patient stable for discharge at this time  Please see above list of diagnoses and related plan for additional information  Condition at Discharge: stable    Discharge Day Visit / Exam:   Subjective: Patient resting comfortably on examination this morning  Patient eager to go home today  Vitals: Blood Pressure: 158/80 (05/29/23 1009)  Pulse: 71 (05/29/23 1009)  Temperature: (!) 97 4 °F (36 3 °C) (05/29/23 0701)  Temp Source: Oral (05/29/23 0300)  Respirations: 16 (05/29/23 0701)  Height: 5' 1\" (154 9 cm) (05/27/23 2047)  Weight - Scale: 66 5 kg (146 lb 9 7 oz) (05/27/23 2047)  SpO2: 94 % (05/29/23 1009)  Exam:   Physical Exam  Vitals and nursing note reviewed  Constitutional:       General: She is not in acute distress  Appearance: She is well-developed  HENT:      Head: Normocephalic and atraumatic  Eyes:      General: No scleral icterus  Conjunctiva/sclera: Conjunctivae normal    Cardiovascular:      Rate and Rhythm: Normal rate and regular rhythm  Heart sounds: Normal heart sounds  No murmur heard  No friction rub  No gallop  Pulmonary:      Effort: Pulmonary effort is normal  No respiratory distress  Breath sounds: Normal breath sounds  No wheezing or rales  Abdominal:      General: Bowel sounds are normal  There is no distension  Palpations: Abdomen is soft  Tenderness: There is no abdominal tenderness  Musculoskeletal:         General: Normal range of motion  Skin:     General: Skin is warm  Findings: No rash     Neurological:      Mental Status: She is alert and " oriented to person, place, and time  Discussion with Family: Patient declined call to   Discharge instructions/Information to patient and family:   See after visit summary for information provided to patient and family  Provisions for Follow-Up Care:  See after visit summary for information related to follow-up care and any pertinent home health orders  Disposition:   Home    Planned Readmission: none     Discharge Statement:  I spent 33 minutes discharging the patient  This time was spent on the day of discharge  I had direct contact with the patient on the day of discharge  Greater than 50% of the total time was spent examining patient, answering all patient questions, arranging and discussing plan of care with patient as well as directly providing post-discharge instructions  Additional time then spent on discharge activities  Discharge Medications:  See after visit summary for reconciled discharge medications provided to patient and/or family        **Please Note: This note may have been constructed using a voice recognition system**

## 2023-05-29 NOTE — PLAN OF CARE
Problem: Potential for Falls  Goal: Patient will remain free of falls  Description: INTERVENTIONS:  - Educate patient/family on patient safety including physical limitations  - Instruct patient to call for assistance with activity   - Consult OT/PT to assist with strengthening/mobility   - Keep Call bell within reach  - Keep bed low and locked with side rails adjusted as appropriate  - Keep care items and personal belongings within reach  - Initiate and maintain comfort rounds  - Make Fall Risk Sign visible to staff  - Offer Toileting every 2 Hours, in advance of need  - Initiate/Maintain alarm  - Obtain necessary fall risk management equipment  - Apply yellow socks and bracelet for high fall risk patients  - Consider moving patient to room near nurses station  5/29/2023 0231 by Becky Zuniga  Outcome: Progressing  5/29/2023 0228 by Keke Ya  Outcome: Progressing     Problem: PAIN - ADULT  Goal: Verbalizes/displays adequate comfort level or baseline comfort level  Description: Interventions:  - Encourage patient to monitor pain and request assistance  - Assess pain using appropriate pain scale  - Administer analgesics based on type and severity of pain and evaluate response  - Implement non-pharmacological measures as appropriate and evaluate response  - Consider cultural and social influences on pain and pain management  - Notify physician/advanced practitioner if interventions unsuccessful or patient reports new pain  5/29/2023 0231 by Keke Ya  Outcome: Progressing  5/29/2023 0228 by Keke Ya  Outcome: Progressing     Problem: INFECTION - ADULT  Goal: Absence or prevention of progression during hospitalization  Description: INTERVENTIONS:  - Assess and monitor for signs and symptoms of infection  - Monitor lab/diagnostic results  - Monitor all insertion sites, i e  indwelling lines, tubes, and drains  - Monitor endotracheal if appropriate and nasal secretions for changes in amount and color  - [place] appropriate cooling/warming therapies per order  - Administer medications as ordered  - Instruct and encourage patient and family to use good hand hygiene technique  - Identify and instruct in appropriate isolation precautions for identified infection/condition  5/29/2023 0231 by Lianet Boardman Felton  Outcome: Progressing  5/29/2023 0228 by Jessica Andrews  Outcome: Progressing  Goal: Absence of fever/infection during neutropenic period  Description: INTERVENTIONS:  - Monitor WBC    5/29/2023 0231 by Lianet [facility]  Outcome: Progressing  5/29/2023 0228 by Jessica Andrews  Outcome: Progressing     Problem: SAFETY ADULT  Goal: Patient will remain free of falls  Description: INTERVENTIONS:  - Educate patient/family on patient safety including physical limitations  - Instruct patient to call for assistance with activity   - Consult OT/PT to assist with strengthening/mobility   - Keep Call bell within reach  - Keep bed low and locked with side rails adjusted as appropriate  - Keep care items and personal belongings within reach  - Initiate and maintain comfort rounds  - Make Fall Risk Sign visible to staff  - Offer Toileting every 2 Hours, in advance of need  - Initiate/Maintain alarm  - Obtain necessary fall risk management equipment  - Apply yellow socks and bracelet for high fall risk patients  - Consider moving patient to room near nurses station  5/29/2023 0231 by Jessica Andrews  Outcome: Progressing  5/29/2023 0228 by Jessica Andrews  Outcome: Progressing  Goal: Maintain or return to baseline ADL function  Description: INTERVENTIONS:  -  Assess patient's ability to carry out ADLs; assess patient's baseline for ADL function and identify physical deficits which impact ability to perform ADLs (bathing, care of mouth/teeth, toileting, grooming, dressing, etc )  - Assess/evaluate cause of self-care deficits   - Assess range of motion  - Assess patient's mobility; develop plan if impaired  - Assess patient's need for assistive devices and provide as appropriate  - Encourage maximum independence but intervene and supervise when necessary  - Involve family in performance of ADLs  - Assess for home care needs following discharge   - Consider OT consult to assist with ADL evaluation and planning for discharge  - Provide patient education as appropriate  5/29/2023 0231 by Candido Coil  Outcome: Progressing  5/29/2023 0228 by Candido Coil  Outcome: Progressing  Goal: Maintains/Returns to pre admission functional level  Description: INTERVENTIONS:  - Perform BMAT or MOVE assessment daily    - Set and communicate daily mobility goal to care team and patient/family/caregiver  - Collaborate with rehabilitation services on mobility goals if consulted  - Perform Range of Motion 3 times a day  - Reposition patient every 2 hours    - Dangle patient 3 times a day  - Stand patient 3 times a day  - Ambulate patient 3 times a day  - Out of bed to chair 3 times a day   - Out of bed for meals 3 times a day  - Out of bed for toileting  - Record patient progress and toleration of activity level   5/29/2023 0231 by Babak Ya  Outcome: Progressing  5/29/2023 0228 by Candido Hernan  Outcome: Progressing     Problem: DISCHARGE PLANNING  Goal: Discharge to home or other facility with appropriate resources  Description: INTERVENTIONS:  - Identify barriers to discharge w/patient and caregiver  - Arrange for needed discharge resources and transportation as appropriate  - Identify discharge learning needs (meds, wound care, etc )  - Arrange for interpretive services to assist at discharge as needed  - Refer to Case Management Department for coordinating discharge planning if the patient needs post-hospital services based on physician/advanced practitioner order or complex needs related to functional status, cognitive ability, or social support system  5/29/2023 0231 by Babak Ya  Outcome: Progressing  5/29/2023 0228 by Tasha Tanner  Outcome: Progressing     Problem: Knowledge Deficit  Goal: Patient/family/caregiver demonstrates understanding of disease process, treatment plan, medications, and discharge instructions  Description: Complete learning assessment and assess knowledge base    Interventions:  - Provide teaching at level of understanding  - Provide teaching via preferred learning methods  5/29/2023 0231 by Janiya Ya  Outcome: Progressing  5/29/2023 0228 by Tasha Tanner  Outcome: Progressing

## 2023-05-29 NOTE — QUICK NOTE
MRI brain negative for acute infarct  Cannot fully evaluate anterior inferior brain in diffusion and susceptibility sequences due to artifact from dental implants  High suspicion that patient's lightheadedness was secondary to hypertensive emergency  Given old left insular lacunar infarct, recommend continuing aspirin and statin on discharge  No further inpatient Neuro recommendations  Lauraalex Brambila will need follow up in 1-3 months with general attending or AP  She will not require outpatient neurological testing

## 2023-05-31 NOTE — UTILIZATION REVIEW
NOTIFICATION OF ADMISSION DISCHARGE   This is a Notification of Discharge from [address]  Please be advised that this patient has been discharge from our facility  Below you will find the admission and discharge date and time including the patient’s disposition  UTILIZATION REVIEW CONTACT:  April Barros  Utilization   Network Utilization Review Department  Phone: [phone] x carefully listen to the prompts  All voicemails are confidential   Email: [email or web address]     ADMISSION INFORMATION  PRESENTATION DATE: 5/27/2023  8:55 PM  OBERVATION ADMISSION DATE:05/27/23   INPATIENT ADMISSION DATE: N/A N/A   DISCHARGE DATE: 5/29/2023 12:00 PM   DISPOSITION:Home/Self Care    IMPORTANT INFORMATION:  Send all requests for admission clinical reviews, approved or denied determinations and any other requests to dedicated fax number below belonging to the campus where the patient is receiving treatment   List of dedicated fax numbers:  [address] DENIALS (Administrative/Medical Necessity) [phone]   [address] (Maternity/NICU/Pediatrics) [phone]   [facility] [phone]   [place] [address] [phone]   [address] [phone]   220 [facility] [phone] 90 [facility] [phone]   [address] [place] [address] 119 [phone]   [facility]  [phone]   4056 [facility] [phone] 412 [facility] 850 [address] [facility] [phone]

## 2023-06-09 ENCOUNTER — TELEPHONE (OUTPATIENT)
Dept: NEUROLOGY | Facility: CLINIC | Age: 48
End: 2023-06-09

## 2023-06-09 NOTE — TELEPHONE ENCOUNTER
1Sal attending or AP  She will not require outpatient neurT ATTEMPT,     Called pt no answer, lmom     HFU/ SL MONORE/ LIGHTHEADNESS    DC= HOME = 5/29/23    Davidson Gan will need follow up in 1-3 months with generological testing

## 2023-06-14 NOTE — TELEPHONE ENCOUNTER
Called and spoke with patient  She stated the lightheadedness was associated with her blood pressure which is now being monitored, so HFU is not needed at this time  She is aware that if Neurology was recommended in the future, she can always call us back to schedule  Not scheduling at this time, removing from discharge list     NOTES:  Tom Hilts will need follow up in 1-3 months with general attending or AP  She will not require outpatient neurological testing

## 2023-06-24 NOTE — ASSESSMENT & PLAN NOTE
Post-Op Assessment Note    CV Status:  Stable  Pain Score: 0    Pain management: adequate     Mental Status:  Awake, sleepy and arousable   Hydration Status:  Euvolemic   PONV Controlled:  Controlled   Airway Patency:  Patent      Post Op Vitals Reviewed: Yes      Staff: Anesthesiologist         No notable events documented      BP   92/51   Temp      Pulse  73   Resp   12   SpO2   92 Pt with hx of malignant neoplasm of overlapping sites of cervix   · Stage 1 B2 cervical cancer   · Sp tx with chemo radiation and now over 2 yrs out   · Pap smear reveals high grade LUIS   · On 3/3/21 pt had colposcopy which was essentially normal with exception of radiation change   · No biopsys were performed at that time  Per Dr Stephanie Garcia  · She also had right labial skin tag removed during this procedure   · Will need ongoing surveillance with gyn onc

## 2023-07-19 DIAGNOSIS — I63.81 LACUNAR INFARCTION (HCC): ICD-10-CM

## 2023-07-20 RX ORDER — ASPIRIN 81 MG
TABLET,CHEWABLE ORAL
Qty: 30 TABLET | Refills: 0 | OUTPATIENT
Start: 2023-07-20

## 2024-02-17 ENCOUNTER — HOSPITAL ENCOUNTER (EMERGENCY)
Facility: HOSPITAL | Age: 49
Discharge: HOME | End: 2024-02-17
Attending: EMERGENCY MEDICINE
Payer: COMMERCIAL

## 2024-02-17 ENCOUNTER — APPOINTMENT (EMERGENCY)
Dept: RADIOLOGY | Facility: HOSPITAL | Age: 49
End: 2024-02-17
Payer: COMMERCIAL

## 2024-02-17 VITALS
BODY MASS INDEX: 29.1 KG/M2 | TEMPERATURE: 98.4 F | RESPIRATION RATE: 21 BRPM | WEIGHT: 154 LBS | DIASTOLIC BLOOD PRESSURE: 94 MMHG | SYSTOLIC BLOOD PRESSURE: 171 MMHG | OXYGEN SATURATION: 96 % | HEART RATE: 55 BPM

## 2024-02-17 DIAGNOSIS — I10 HYPERTENSION: Primary | ICD-10-CM

## 2024-02-17 LAB
2HR DELTA HS TROPONIN: -4 NG/L
ANION GAP SERPL CALCULATED.3IONS-SCNC: 8 MMOL/L
ATRIAL RATE: 54 BPM
ATRIAL RATE: 64 BPM
BASOPHILS # BLD AUTO: 0.04 THOUSANDS/ÂΜL (ref 0–0.1)
BASOPHILS NFR BLD AUTO: 1 % (ref 0–1)
BUN SERPL-MCNC: 14 MG/DL (ref 5–25)
CALCIUM SERPL-MCNC: 10.5 MG/DL (ref 8.4–10.2)
CARDIAC TROPONIN I PNL SERPL HS: 13 NG/L
CARDIAC TROPONIN I PNL SERPL HS: 17 NG/L
CHLORIDE SERPL-SCNC: 106 MMOL/L (ref 96–108)
CO2 SERPL-SCNC: 28 MMOL/L (ref 21–32)
CREAT SERPL-MCNC: 0.57 MG/DL (ref 0.6–1.3)
EOSINOPHIL # BLD AUTO: 0.27 THOUSAND/ÂΜL (ref 0–0.61)
EOSINOPHIL NFR BLD AUTO: 5 % (ref 0–6)
ERYTHROCYTE [DISTWIDTH] IN BLOOD BY AUTOMATED COUNT: 13 % (ref 11.6–15.1)
GFR SERPL CREATININE-BSD FRML MDRD: 110 ML/MIN/1.73SQ M
GLUCOSE SERPL-MCNC: 101 MG/DL (ref 65–140)
HCT VFR BLD AUTO: 40.1 % (ref 34.8–46.1)
HGB BLD-MCNC: 13.7 G/DL (ref 11.5–15.4)
IMM GRANULOCYTES # BLD AUTO: 0.01 THOUSAND/UL (ref 0–0.2)
IMM GRANULOCYTES NFR BLD AUTO: 0 % (ref 0–2)
LYMPHOCYTES # BLD AUTO: 1.95 THOUSANDS/ÂΜL (ref 0.6–4.47)
LYMPHOCYTES NFR BLD AUTO: 33 % (ref 14–44)
MCH RBC QN AUTO: 26.8 PG (ref 26.8–34.3)
MCHC RBC AUTO-ENTMCNC: 34.2 G/DL (ref 31.4–37.4)
MCV RBC AUTO: 79 FL (ref 82–98)
MONOCYTES # BLD AUTO: 0.53 THOUSAND/ÂΜL (ref 0.17–1.22)
MONOCYTES NFR BLD AUTO: 9 % (ref 4–12)
NEUTROPHILS # BLD AUTO: 3.06 THOUSANDS/ÂΜL (ref 1.85–7.62)
NEUTS SEG NFR BLD AUTO: 52 % (ref 43–75)
NRBC BLD AUTO-RTO: 0 /100 WBCS
P AXIS: 57 DEGREES
P AXIS: 60 DEGREES
PLATELET # BLD AUTO: 212 THOUSANDS/UL (ref 149–390)
PMV BLD AUTO: 10.4 FL (ref 8.9–12.7)
POTASSIUM SERPL-SCNC: 2.9 MMOL/L (ref 3.5–5.3)
PR INTERVAL: 162 MS
PR INTERVAL: 168 MS
QRS AXIS: 67 DEGREES
QRS AXIS: 70 DEGREES
QRSD INTERVAL: 98 MS
QRSD INTERVAL: 98 MS
QT INTERVAL: 430 MS
QT INTERVAL: 462 MS
QTC INTERVAL: 438 MS
QTC INTERVAL: 443 MS
RBC # BLD AUTO: 5.11 MILLION/UL (ref 3.81–5.12)
SODIUM SERPL-SCNC: 142 MMOL/L (ref 135–147)
T WAVE AXIS: 37 DEGREES
T WAVE AXIS: 50 DEGREES
VENTRICULAR RATE: 54 BPM
VENTRICULAR RATE: 64 BPM
WBC # BLD AUTO: 5.86 THOUSAND/UL (ref 4.31–10.16)

## 2024-02-17 PROCEDURE — 80048 BASIC METABOLIC PNL TOTAL CA: CPT | Performed by: NURSE PRACTITIONER

## 2024-02-17 PROCEDURE — 99284 EMERGENCY DEPT VISIT MOD MDM: CPT

## 2024-02-17 PROCEDURE — 36415 COLL VENOUS BLD VENIPUNCTURE: CPT | Performed by: NURSE PRACTITIONER

## 2024-02-17 PROCEDURE — 85025 COMPLETE CBC W/AUTO DIFF WBC: CPT | Performed by: NURSE PRACTITIONER

## 2024-02-17 PROCEDURE — 71045 X-RAY EXAM CHEST 1 VIEW: CPT

## 2024-02-17 PROCEDURE — 84484 ASSAY OF TROPONIN QUANT: CPT | Performed by: NURSE PRACTITIONER

## 2024-02-17 PROCEDURE — 93010 ELECTROCARDIOGRAM REPORT: CPT | Performed by: INTERNAL MEDICINE

## 2024-02-17 PROCEDURE — 93005 ELECTROCARDIOGRAM TRACING: CPT

## 2024-02-17 RX ORDER — POTASSIUM CHLORIDE 20 MEQ/1
40 TABLET, EXTENDED RELEASE ORAL ONCE
Status: COMPLETED | OUTPATIENT
Start: 2024-02-17 | End: 2024-02-17

## 2024-02-17 RX ORDER — SODIUM CHLORIDE 9 MG/ML
3 INJECTION INTRAVENOUS
Status: DISCONTINUED | OUTPATIENT
Start: 2024-02-17 | End: 2024-02-17 | Stop reason: HOSPADM

## 2024-02-17 RX ADMIN — METOPROLOL TARTRATE 25 MG: 25 TABLET, FILM COATED ORAL at 17:31

## 2024-02-17 RX ADMIN — POTASSIUM CHLORIDE 40 MEQ: 1500 TABLET, EXTENDED RELEASE ORAL at 18:49

## 2024-02-17 NOTE — ED PROVIDER NOTES
History  Chief Complaint   Patient presents with    Hypertension     Went to urgent care for lightheaded, dizziness, has HTN, BP reading at urgent care was 188/123 and they sent her here.     Is a 48-year-old female who presents here for hypertension.  She initially presented to urgent care and was directed to the emergency department for evaluation.  She reports hypertension with associated dizziness and lightheadedness and occasional chest discomfort.  She denies any recent changes in her medication regimen.  Her chest pain is nonexertional.  There is no radiation.  And it is difficult for her to describe.          Prior to Admission Medications   Prescriptions Last Dose Informant Patient Reported? Taking?   Omega-3 Fatty Acids (FISH OIL) 1,000 mg  Self Yes No   Si,000 mg daily   amLODIPine (NORVASC) 5 mg tablet  Self No No   Sig: Take 1 tablet (5 mg total) by mouth daily   aspirin 81 mg chewable tablet   No No   Sig: Chew 1 tablet (81 mg total) daily Do not start before May 30, 2023.   atorvastatin (LIPITOR) 40 mg tablet   No No   Sig: Take 1 tablet (40 mg total) by mouth daily with dinner   cyclobenzaprine (FLEXERIL) 5 mg tablet  Self Yes No   Sig: TAKE 1-2 TABLETS BY MOUTH NIGHTLY AS NEEDED FOR MUSCLE SPASMS.   ibuprofen (MOTRIN) 600 mg tablet  Self Yes No   Sig: ibuprofen 600 mg tablet   TAKE 1 TABLET BY MOUTH THREE TIMES A DAY AS DIRECTED FOR 30 DAYS   losartan (COZAAR) 50 mg tablet  Self No No   Sig: Take 1 tablet (50 mg total) by mouth daily   meclizine (ANTIVERT) 12.5 MG tablet   No No   Sig: Take 1 tablet (12.5 mg total) by mouth every 8 (eight) hours as needed for dizziness   metoprolol tartrate (LOPRESSOR) 25 mg tablet  Self No No   Sig: Take 0.5 tablets (12.5 mg total) by mouth every 12 (twelve) hours      Facility-Administered Medications: None       Past Medical History:   Diagnosis Date    Abnormal Pap smear of cervix     Acute hip pain     tristin    Cancer (HCC)     cervix- had chemo    COVID      Feb 2022    Heart palpitations     frequent -7/20 instructed to call cardiology to report    Hypertension     Hypokalemia 04/26/2019    Low back pain     Lymphadenopathy     Pelvic pain     Sepsis (HCC) 06/25/2022    Wears contact lenses     Wears dentures     partial lower       Past Surgical History:   Procedure Laterality Date    FL RETROGRADE PYELOGRAM  6/25/2022    FL RETROGRADE PYELOGRAM  7/28/2022    IA BRNCHSC INCL FLUOR GDNCE DX W/CELL WASHG SPX N/A 2/26/2020    Procedure: BRONCHOSCOPY FLEXIBLE;  Surgeon: Paolo Rushing MD;  [facility];  Service: Thoracic    IA BRONCHOSCOPY NEEDLE BX TRACHEA MAIN STEM&/BRON N/A 2/26/2020    Procedure: ENDOBRONCHIAL ULTRASOUND (EBUS);  Surgeon: Paolo Rushing MD;  [facility];  Service: Thoracic    IA CYSTO BLADDER W/URETERAL CATHETERIZATION Right 6/25/2022    Procedure: CYSTOSCOPY RETROGRADE PYELOGRAM WITH INSERTION STENT URETERAL;  Surgeon: Octavio Chapman MD;  [facility];  Service: Urology    IA CYSTO/URETERO W/LITHOTRIPSY &INDWELL STENT INSRT Right 7/28/2022    Procedure: CYSTOSCOPY URETEROSCOPY WITH LITHOTRIPSY HOLMIUM LASER, RETROGRADE PYELOGRAM AND RIGHT INSERTION STENT URETERAL;  Surgeon: Octavio Chapman MD;  [facility];  Service: Urology    IA DILATION VAGINA W/ANESTHESIA OTHER THAN LOCAL N/A 6/20/2019    Procedure: EXAM UNDER ANESTHESIA (EUA);  Surgeon: Remi Singletary MD;  [facility];  Service: Gynecology Oncology    IA INSERTION VAGINAL RADIATION DEVICE N/A 6/20/2019    Procedure: PIERRE SLEEVE PLACEMENT;  Surgeon: Remi Singletary MD;  [facility];  Service: Gynecology Oncology    TONSILLECTOMY      US GUIDANCE  6/20/2019       Family History   Problem Relation Age of Onset    Uterine cancer Maternal Grandmother     Heart disease Mother     Canavan disease Father     Cancer Father      I have reviewed and agree with the history as documented.    E-Cigarette/Vaping    E-Cigarette Use Current  Every Day User      E-Cigarette/Vaping Substances    Nicotine Yes     THC No     CBD No     Flavoring No     Other No     Unknown No      Social History     Tobacco Use    Smoking status: Former     Current packs/day: 0.00     Average packs/day: 0.5 packs/day for 1 year (0.5 ttl pk-yrs)     Types: Cigarettes     Start date: 2019     Quit date: 2020     Years since quittin.0     Passive exposure: Past    Smokeless tobacco: Never    Tobacco comments:     vapes occ nicotene   Vaping Use    Vaping status: Every Day    Substances: Nicotine   Substance Use Topics    Alcohol use: Yes     Comment: occ    Drug use: Never       Review of Systems   Constitutional:  Negative for diaphoresis, fatigue and fever.   HENT:  Negative for congestion, ear pain, nosebleeds and sore throat.    Eyes:  Negative for photophobia, pain, discharge and visual disturbance.   Respiratory:  Negative for cough, choking, chest tightness, shortness of breath and wheezing.    Cardiovascular:  Positive for chest pain. Negative for palpitations.   Gastrointestinal:  Negative for abdominal distention, abdominal pain, diarrhea and vomiting.   Genitourinary:  Negative for dysuria, flank pain and frequency.   Musculoskeletal:  Negative for back pain, gait problem and joint swelling.   Skin:  Negative for color change and rash.   Neurological:  Positive for light-headedness. Negative for dizziness, syncope and headaches.   Psychiatric/Behavioral:  Negative for behavioral problems and confusion. The patient is not nervous/anxious.    All other systems reviewed and are negative.      Physical Exam  Physical Exam  Vitals and nursing note reviewed.   Constitutional:       General: She is not in acute distress.     Appearance: She is well-developed. She is not ill-appearing or toxic-appearing.   HENT:      Head: Normocephalic and atraumatic.      Nose: No rhinorrhea.      Mouth/Throat:      Mouth: Mucous membranes are moist.      Dentition: Normal  dentition.   Eyes:      General:         Right eye: No discharge.         Left eye: No discharge.   Cardiovascular:      Rate and Rhythm: Normal rate and regular rhythm.   Pulmonary:      Effort: Pulmonary effort is normal. No accessory muscle usage or respiratory distress.   Abdominal:      General: There is no distension.      Tenderness: There is no guarding.   Musculoskeletal:         General: Normal range of motion.      Cervical back: Normal range of motion and neck supple. No rigidity.   Skin:     General: Skin is warm and dry.   Neurological:      Mental Status: She is alert and oriented to person, place, and time.      Coordination: Coordination normal.   Psychiatric:         Behavior: Behavior is cooperative.         Vital Signs  ED Triage Vitals [02/17/24 1652]   Temperature Pulse Respirations Blood Pressure SpO2   98.4 °F (36.9 °C) 66 20 (!) 230/112 99 %      Temp Source Heart Rate Source Patient Position - Orthostatic VS BP Location FiO2 (%)   Temporal Monitor Sitting Right arm --      Pain Score       --           Vitals:    02/17/24 1815 02/17/24 1830 02/17/24 1845 02/17/24 2000   BP: (!) 171/83 (!) 185/86 (!) 181/92 (!) 171/94   Pulse: (!) 53 (!) 50 (!) 51 55   Patient Position - Orthostatic VS:    Sitting         Visual Acuity  Visual Acuity      Flowsheet Row Most Recent Value   L Pupil Size (mm) 3   R Pupil Size (mm) 3            ED Medications  Medications   sodium chloride (PF) 0.9 % injection 3 mL (has no administration in time range)   metoprolol tartrate (LOPRESSOR) tablet 25 mg (25 mg Oral Given 2/17/24 1731)   potassium chloride (Klor-Con M20) CR tablet 40 mEq (40 mEq Oral Given 2/17/24 1849)       Diagnostic Studies  Results Reviewed       Procedure Component Value Units Date/Time    HS Troponin I 2hr [[id number]]  (Normal) Collected: 02/17/24 1922    Lab Status: Final result Specimen: Blood from Arm, Right Updated: 02/17/24 1953     hs TnI 2hr 13 ng/L      Delta 2hr hsTnI -4 ng/L     Basic  metabolic panel [[id number]]  (Abnormal) Collected: 02/17/24 1720    Lab Status: Final result Specimen: Blood from Arm, Right Updated: 02/17/24 1813     Sodium 142 mmol/L      Potassium 2.9 mmol/L      Chloride 106 mmol/L      CO2 28 mmol/L      ANION GAP 8 mmol/L      BUN 14 mg/dL      Creatinine 0.57 mg/dL      Glucose 101 mg/dL      Calcium 10.5 mg/dL      eGFR 110 ml/min/1.73sq m     Narrative:      National Kidney Disease Foundation guidelines for Chronic Kidney Disease (CKD):     Stage 1 with normal or high GFR (GFR > 90 mL/min/1.73 square meters)    Stage 2 Mild CKD (GFR = 60-89 mL/min/1.73 square meters)    Stage 3A Moderate CKD (GFR = 45-59 mL/min/1.73 square meters)    Stage 3B Moderate CKD (GFR = 30-44 mL/min/1.73 square meters)    Stage 4 Severe CKD (GFR = 15-29 mL/min/1.73 square meters)    Stage 5 End Stage CKD (GFR <15 mL/min/1.73 square meters)  Note: GFR calculation is accurate only with a steady state creatinine    HS Troponin 0hr (reflex protocol) [[id number]]  (Normal) Collected: 02/17/24 1720    Lab Status: Final result Specimen: Blood from Arm, Right Updated: 02/17/24 1747     hs TnI 0hr 17 ng/L     CBC and differential [[id number]]  (Abnormal) Collected: 02/17/24 1720    Lab Status: Final result Specimen: Blood from Arm, Right Updated: 02/17/24 1726     WBC 5.86 Thousand/uL      RBC 5.11 Million/uL      Hemoglobin 13.7 g/dL      Hematocrit 40.1 %      MCV 79 fL      MCH 26.8 pg      MCHC 34.2 g/dL      RDW 13.0 %      MPV 10.4 fL      Platelets 212 Thousands/uL      nRBC 0 /100 WBCs      Neutrophils Relative 52 %      Immat GRANS % 0 %      Lymphocytes Relative 33 %      Monocytes Relative 9 %      Eosinophils Relative 5 %      Basophils Relative 1 %      Neutrophils Absolute 3.06 Thousands/µL      Immature Grans Absolute 0.01 Thousand/uL      Lymphocytes Absolute 1.95 Thousands/µL      Monocytes Absolute 0.53 Thousand/µL      Eosinophils Absolute 0.27 Thousand/µL      Basophils Absolute 0.04  Thousands/µL                    X-ray chest 1 view portable    (Results Pending)              Procedures  Procedures         ED Course                               SBIRT 20yo+      Flowsheet Row Most Recent Value   Initial Alcohol Screen: US AUDIT-C     1. How often do you have a drink containing alcohol? 0 Filed at: 02/17/2024 1853   2. How many drinks containing alcohol do you have on a typical day you are drinking?  0 Filed at: 02/17/2024 1853   3b. FEMALE Any Age, or MALE 65+: How often do you have 4 or more drinks on one occassion? 0 Filed at: 02/17/2024 1853   Audit-C Score 0 Filed at: 02/17/2024 1853   GRISELDA: How many times in the past year have you...    Used an illegal drug or used a prescription medication for non-medical reasons? Never Filed at: 02/17/2024 1853                      Medical Decision Making  Patient's blood pressure remained in the 170s and 180s for greater than 2 hours.  Unremarkable workup for ACS.  Patient requesting to speak with me at the time of discharge recommended increasing her amlodipine to 10 mg over the weekend and then following up with her primary care on Monday.  Patient seemed disappointed in her care and the fact that we did not attempt to lower her blood pressure further.  Reeducated that this is not the appropriate tactic in the emergency room and that she should follow-up and have her blood pressure rechecked 1 well on an outpatient basis.    Amount and/or Complexity of Data Reviewed  Labs: ordered.  Radiology: ordered.    Risk  Prescription drug management.             Disposition  Final diagnoses:   Hypertension     Time reflects when diagnosis was documented in both MDM as applicable and the Disposition within this note       Time User Action Codes Description Comment    2/17/2024  7:57 PM Delbert Chase Add [I10] Hypertension           ED Disposition       ED Disposition   Discharge    Condition   Stable    Date/Time   Sat Feb 17, 2024 1957    Comment   Della Hillman  discharge to home/self care.                   Follow-up Information       Follow up With Specialties Details Why Contact [facility] Schedule an appointment as soon as possible for a visit  For Continued Evaluation [address] [facility]  [address]  [place] [address] [phone]              Discharge Medication List as of 2/17/2024  7:59 PM        CONTINUE these medications which have NOT CHANGED    Details   amLODIPine (NORVASC) 5 mg tablet Take 1 tablet (5 mg total) by mouth daily, Starting Wed 6/29/2022, Normal      aspirin 81 mg chewable tablet Chew 1 tablet (81 mg total) daily Do not start before May 30, 2023., Starting Tue 5/30/2023, Until Thu 6/29/2023, Normal      atorvastatin (LIPITOR) 40 mg tablet Take 1 tablet (40 mg total) by mouth daily with dinner, Starting Mon 5/29/2023, Until Wed 6/28/2023, Normal      cyclobenzaprine (FLEXERIL) 5 mg tablet TAKE 1-2 TABLETS BY MOUTH NIGHTLY AS NEEDED FOR MUSCLE SPASMS., Historical Med      ibuprofen (MOTRIN) 600 mg tablet ibuprofen 600 mg tablet   TAKE 1 TABLET BY MOUTH THREE TIMES A DAY AS DIRECTED FOR 30 DAYS, Historical Med      losartan (COZAAR) 50 mg tablet Take 1 tablet (50 mg total) by mouth daily, Starting Thu 9/29/2022, Until Wed 1/18/2023, Normal      meclizine (ANTIVERT) 12.5 MG tablet Take 1 tablet (12.5 mg total) by mouth every 8 (eight) hours as needed for dizziness, Starting Mon 5/29/2023, Normal      metoprolol tartrate (LOPRESSOR) 25 mg tablet Take 0.5 tablets (12.5 mg total) by mouth every 12 (twelve) hours, Starting Wed 6/29/2022, Normal      Omega-3 Fatty Acids (FISH OIL) 1,000 mg 1,000 mg daily, Historical Med             No discharge procedures on file.    PDMP Review       None            ED Provider  Electronically Signed by             DEVEN Gustafson  02/17/24 9910

## 2024-07-09 DIAGNOSIS — Z00.6 ENCOUNTER FOR EXAMINATION FOR NORMAL COMPARISON OR CONTROL IN CLINICAL RESEARCH PROGRAM: ICD-10-CM

## 2024-07-19 ENCOUNTER — APPOINTMENT (OUTPATIENT)
Dept: LAB | Facility: HOSPITAL | Age: 49
End: 2024-07-19

## 2024-07-19 DIAGNOSIS — Z00.6 ENCOUNTER FOR EXAMINATION FOR NORMAL COMPARISON OR CONTROL IN CLINICAL RESEARCH PROGRAM: ICD-10-CM

## 2024-07-19 PROCEDURE — 36415 COLL VENOUS BLD VENIPUNCTURE: CPT

## 2024-08-05 ENCOUNTER — HOSPITAL ENCOUNTER (EMERGENCY)
Facility: HOSPITAL | Age: 49
Discharge: HOME | End: 2024-08-06
Attending: EMERGENCY MEDICINE
Payer: COMMERCIAL

## 2024-08-05 VITALS
RESPIRATION RATE: 20 BRPM | BODY MASS INDEX: 34.37 KG/M2 | OXYGEN SATURATION: 96 % | DIASTOLIC BLOOD PRESSURE: 87 MMHG | TEMPERATURE: 98.2 F | SYSTOLIC BLOOD PRESSURE: 147 MMHG | WEIGHT: 181.88 LBS | HEART RATE: 60 BPM

## 2024-08-05 DIAGNOSIS — E89.0 S/P THYROIDECTOMY: ICD-10-CM

## 2024-08-05 DIAGNOSIS — Z90.89 S/P PARATHYROIDECTOMY: ICD-10-CM

## 2024-08-05 DIAGNOSIS — R25.2 MUSCLE CRAMPS: Primary | ICD-10-CM

## 2024-08-05 DIAGNOSIS — Z98.890 S/P PARATHYROIDECTOMY: ICD-10-CM

## 2024-08-05 LAB
ALBUMIN SERPL BCG-MCNC: 4.4 G/DL (ref 3.5–5)
ALP SERPL-CCNC: 89 U/L (ref 34–104)
ALT SERPL W P-5'-P-CCNC: 34 U/L (ref 7–52)
ANION GAP SERPL CALCULATED.3IONS-SCNC: 8 MMOL/L (ref 4–13)
AST SERPL W P-5'-P-CCNC: 19 U/L (ref 13–39)
BASOPHILS # BLD AUTO: 0.05 THOUSANDS/ΜL (ref 0–0.1)
BASOPHILS NFR BLD AUTO: 1 % (ref 0–1)
BILIRUB SERPL-MCNC: 1.28 MG/DL (ref 0.2–1)
BUN SERPL-MCNC: 19 MG/DL (ref 5–25)
CA-I BLD-SCNC: 1.01 MMOL/L (ref 1.12–1.32)
CALCIUM SERPL-MCNC: 10.7 MG/DL (ref 8.4–10.2)
CHLORIDE SERPL-SCNC: 106 MMOL/L (ref 96–108)
CO2 SERPL-SCNC: 27 MMOL/L (ref 21–32)
CREAT SERPL-MCNC: 0.89 MG/DL (ref 0.6–1.3)
EOSINOPHIL # BLD AUTO: 0.22 THOUSAND/ΜL (ref 0–0.61)
EOSINOPHIL NFR BLD AUTO: 3 % (ref 0–6)
ERYTHROCYTE [DISTWIDTH] IN BLOOD BY AUTOMATED COUNT: 13.4 % (ref 11.6–15.1)
GFR SERPL CREATININE-BSD FRML MDRD: 76 ML/MIN/1.73SQ M
GLUCOSE SERPL-MCNC: 99 MG/DL (ref 65–140)
HCT VFR BLD AUTO: 43.7 % (ref 34.8–46.1)
HGB BLD-MCNC: 13.8 G/DL (ref 11.5–15.4)
IMM GRANULOCYTES # BLD AUTO: 0.03 THOUSAND/UL (ref 0–0.2)
IMM GRANULOCYTES NFR BLD AUTO: 0 % (ref 0–2)
LYMPHOCYTES # BLD AUTO: 1.36 THOUSANDS/ΜL (ref 0.6–4.47)
LYMPHOCYTES NFR BLD AUTO: 16 % (ref 14–44)
MAGNESIUM SERPL-MCNC: 1.9 MG/DL (ref 1.9–2.7)
MCH RBC QN AUTO: 27.6 PG (ref 26.8–34.3)
MCHC RBC AUTO-ENTMCNC: 31.6 G/DL (ref 31.4–37.4)
MCV RBC AUTO: 87 FL (ref 82–98)
MONOCYTES # BLD AUTO: 0.75 THOUSAND/ΜL (ref 0.17–1.22)
MONOCYTES NFR BLD AUTO: 9 % (ref 4–12)
NEUTROPHILS # BLD AUTO: 5.91 THOUSANDS/ΜL (ref 1.85–7.62)
NEUTS SEG NFR BLD AUTO: 71 % (ref 43–75)
NRBC BLD AUTO-RTO: 0 /100 WBCS
PHOSPHATE SERPL-MCNC: 3.4 MG/DL (ref 2.7–4.5)
PLATELET # BLD AUTO: 174 THOUSANDS/UL (ref 149–390)
PMV BLD AUTO: 9.6 FL (ref 8.9–12.7)
POTASSIUM SERPL-SCNC: 4.3 MMOL/L (ref 3.5–5.3)
PROT SERPL-MCNC: 6.9 G/DL (ref 6.4–8.4)
RBC # BLD AUTO: 5 MILLION/UL (ref 3.81–5.12)
SODIUM SERPL-SCNC: 141 MMOL/L (ref 135–147)
WBC # BLD AUTO: 8.32 THOUSAND/UL (ref 4.31–10.16)

## 2024-08-05 PROCEDURE — 83735 ASSAY OF MAGNESIUM: CPT | Performed by: EMERGENCY MEDICINE

## 2024-08-05 PROCEDURE — 96365 THER/PROPH/DIAG IV INF INIT: CPT

## 2024-08-05 PROCEDURE — 85025 COMPLETE CBC W/AUTO DIFF WBC: CPT | Performed by: EMERGENCY MEDICINE

## 2024-08-05 PROCEDURE — 99283 EMERGENCY DEPT VISIT LOW MDM: CPT

## 2024-08-05 PROCEDURE — 36415 COLL VENOUS BLD VENIPUNCTURE: CPT | Performed by: EMERGENCY MEDICINE

## 2024-08-05 PROCEDURE — 80053 COMPREHEN METABOLIC PANEL: CPT | Performed by: EMERGENCY MEDICINE

## 2024-08-05 PROCEDURE — 82330 ASSAY OF CALCIUM: CPT | Performed by: EMERGENCY MEDICINE

## 2024-08-05 PROCEDURE — 84100 ASSAY OF PHOSPHORUS: CPT | Performed by: EMERGENCY MEDICINE

## 2024-08-05 RX ORDER — CALCIUM GLUCONATE 20 MG/ML
1 INJECTION, SOLUTION INTRAVENOUS ONCE
Status: COMPLETED | OUTPATIENT
Start: 2024-08-05 | End: 2024-08-05

## 2024-08-05 RX ADMIN — CALCIUM GLUCONATE 1 G: 20 INJECTION, SOLUTION INTRAVENOUS at 21:52

## 2024-08-06 PROCEDURE — 99284 EMERGENCY DEPT VISIT MOD MDM: CPT | Performed by: EMERGENCY MEDICINE

## 2024-08-06 NOTE — ED PROVIDER NOTES
"History  Chief Complaint   Patient presents with    Back Pain     Pt presents with diffuse, widespread back pain on going for 2 weeks. Denies urinary involvement. States \"they say its a side effect of my medication\"     Pain all over  Diffuse muscle cramping  Started after thyroidectomy (also had 1 PT node removed)        Prior to Admission Medications   Prescriptions Last Dose Informant Patient Reported? Taking?   Omega-3 Fatty Acids (FISH OIL) 1,000 mg  Self Yes No   Si,000 mg daily   amLODIPine (NORVASC) 5 mg tablet  Self No No   Sig: Take 1 tablet (5 mg total) by mouth daily   aspirin 81 mg chewable tablet   No No   Sig: Chew 1 tablet (81 mg total) daily Do not start before May 30, 2023.   atorvastatin (LIPITOR) 40 mg tablet   No No   Sig: Take 1 tablet (40 mg total) by mouth daily with dinner   cyclobenzaprine (FLEXERIL) 5 mg tablet  Self Yes No   Sig: TAKE 1-2 TABLETS BY MOUTH NIGHTLY AS NEEDED FOR MUSCLE SPASMS.   ibuprofen (MOTRIN) 600 mg tablet  Self Yes No   Sig: ibuprofen 600 mg tablet   TAKE 1 TABLET BY MOUTH THREE TIMES A DAY AS DIRECTED FOR 30 DAYS   losartan (COZAAR) 50 mg tablet  Self No No   Sig: Take 1 tablet (50 mg total) by mouth daily   meclizine (ANTIVERT) 12.5 MG tablet   No No   Sig: Take 1 tablet (12.5 mg total) by mouth every 8 (eight) hours as needed for dizziness   metoprolol tartrate (LOPRESSOR) 25 mg tablet  Self No No   Sig: Take 0.5 tablets (12.5 mg total) by mouth every 12 (twelve) hours      Facility-Administered Medications: None       Past Medical History:   Diagnosis Date    Abnormal Pap smear of cervix     Acute hip pain     tristin    Cancer (HCC)     cervix- had chemo    COVID     2022    Heart palpitations     frequent - instructed to call cardiology to report    Hypertension     Hypokalemia 2019    Low back pain     Lymphadenopathy     Pelvic pain     Sepsis (HCC) 2022    Wears contact lenses     Wears dentures     partial lower       Past Surgical " History:   Procedure Laterality Date    FL RETROGRADE PYELOGRAM  06/25/2022    FL RETROGRADE PYELOGRAM  07/28/2022    [place] BRNCHSC INCL FLUOR GDNCE DX W/CELL WASHG SPX N/A 02/26/2020    Procedure: BRONCHOSCOPY FLEXIBLE;  Surgeon: Paolo uRshing MD;  [facility];  Service: Thoracic    WV BRONCHOSCOPY NEEDLE BX TRACHEA MAIN STEM&/BRON N/A 02/26/2020    Procedure: ENDOBRONCHIAL ULTRASOUND (EBUS);  Surgeon: Paolo Rushing MD;  [facility];  Service: Thoracic    WV CYSTO BLADDER W/URETERAL CATHETERIZATION Right 06/25/2022    Procedure: CYSTOSCOPY RETROGRADE PYELOGRAM WITH INSERTION STENT URETERAL;  Surgeon: Octavio Chapman MD;  [facility];  Service: Urology    WV CYSTO/URETERO W/LITHOTRIPSY &INDWELL STENT INSRT Right 07/28/2022    Procedure: CYSTOSCOPY URETEROSCOPY WITH LITHOTRIPSY HOLMIUM LASER, RETROGRADE PYELOGRAM AND RIGHT INSERTION STENT URETERAL;  Surgeon: Octavio Chapman MD;  [facility];  Service: Urology    WV DILATION VAGINA W/ANESTHESIA OTHER THAN LOCAL N/A 06/20/2019    Procedure: EXAM UNDER ANESTHESIA (EUA);  Surgeon: Remi Singletary MD;  [facility];  Service: Gynecology Oncology    [place] INSERTION VAGINAL RADIATION DEVICE N/A 06/20/2019    Procedure: PIERRE SLEEVE PLACEMENT;  Surgeon: Remi Singletary MD;  [facility];  Service: Gynecology Oncology    THYROIDECTOMY      TONSILLECTOMY      US GUIDANCE  06/20/2019       Family History   Problem Relation Age of Onset    Uterine cancer Maternal Grandmother     Heart disease Mother     Canavan disease Father     Cancer Father      I have reviewed and agree with the history as documented.    E-Cigarette/Vaping    E-Cigarette Use Current Every Day User      E-Cigarette/Vaping Substances    Nicotine Yes     THC No     CBD No     Flavoring No     Other No     Unknown No      Social History     Tobacco Use    Smoking status: Former     Current packs/day: 0.00     Average packs/day: 0.5 packs/day for 1 year  (0.5 ttl pk-yrs)     Types: Cigarettes     Start date: 2019     Quit date: 2020     Years since quittin.4     Passive exposure: Past    Smokeless tobacco: Never    Tobacco comments:     vapes occ nicotene   Vaping Use    Vaping status: Every Day    Substances: Nicotine   Substance Use Topics    Alcohol use: Yes     Comment: occ    Drug use: Never       Review of Systems    Physical Exam  Physical Exam    Vital Signs  ED Triage Vitals [24]   Temperature Pulse Respirations Blood Pressure SpO2   98.2 °F (36.8 °C) 62 20 147/86 98 %      Temp Source Heart Rate Source Patient Position - Orthostatic VS BP Location FiO2 (%)   Temporal Monitor Sitting Left arm --      Pain Score       --           Vitals:    24   BP: 147/86   Pulse: 62   Patient Position - Orthostatic VS: Sitting         Visual Acuity      ED Medications  Medications - No data to display    Diagnostic Studies  Results Reviewed       Procedure Component Value Units Date/Time    CBC and differential [[id number]]     Lab Status: No result Specimen: Blood     Comprehensive metabolic panel [[id number]]     Lab Status: No result Specimen: Blood     Calcium, ionized [[id number]]     Lab Status: No result Specimen: Blood     Phosphorus [[id number]]     Lab Status: No result Specimen: Blood     Magnesium [[id number]]     Lab Status: No result Specimen: Blood                    No orders to display              Procedures  Procedures         ED Course                                               Medical Decision Making  Amount and/or Complexity of Data Reviewed  Labs: ordered.                 Disposition  Final diagnoses:   None     ED Disposition       None          Follow-up Information    None         Patient's Medications   Discharge Prescriptions    No medications on file       No discharge procedures on file.    PDMP Review       None            ED Provider  Electronically Signed by

## 2024-08-06 NOTE — DISCHARGE INSTRUCTIONS
Please follow up with your surgeon and with your PCP for further evaluation. Return to the emergency department for any new or worsening symptoms.

## 2024-09-15 NOTE — ED PROVIDER NOTES
1. Muscle cramps    2. S/P thyroidectomy    3. S/P parathyroidectomy      ED Disposition       ED Disposition   Discharge    Condition   Stable    Date/Time   Tue Aug 6, 2024 12:03 AM    Comment   Della Hillman discharge to home/self care.                   Assessment & Plan       Medical Decision Making  49-year-old female presents for evaluation with muscle aches and back pain little over a month and a half after a total thyroidectomy with partial parathyroidectomy.  On exam, patient with normal vitals, no acute distress.  Patient with muscular tenderness throughout the back, otherwise unremarkable exam.  Differential diagnosis includes, but is not limited to, myalgias from viral illness, muscle cramps from hypocalcemia, other electrolyte abnormalities, or other acute pathology.  Patient evaluated with CBC, CMP, magnesium, phosphorus, and ionized calcium level.    Patient's ionized calcium level noted to be low at 1.01, although serum calcium level mildly elevated.  Patient given 1 g of calcium gluconate for treatment.  Patient is unsure if she has been referred to endocrinology yet, so referral placed at this time.  Patient instructed to follow-up with her surgeon for further evaluation and management.  Patient discharged home in stable condition with symptomatic care instructions and strict ED return precautions.    Amount and/or Complexity of Data Reviewed  Labs: ordered. Decision-making details documented in ED Course.    Risk  Prescription drug management.                ED Course as of 09/15/24 1720   Mon Aug 05, 2024   2139 Calcium, Ionized(!): 1.01       Medications   calcium gluconate 1 g in sodium chloride 0.9% 50 mL (premix) (0 g Intravenous Stopped 8/5/24 6165)       History of Present Illness       49 year old female with a past medical history of Graves disease s/p total thyroidectomy and partial parathyroidectomy on 6/14 who presents for evaluation with muscle aches. Patient reports wide spread  muscle aches, worse in her back, for the past 2 weeks. She states that she has been taking tums almost daily, but that she is still having symptoms. She denies any recent fevers, chills, chest pain, SOB, abdominal pain, nausea, vomiting, numbness or weakness in her extremities, or other concerning symptoms.         Review of Systems   Constitutional:  Negative for chills and fever.   Respiratory:  Negative for shortness of breath.    Cardiovascular:  Negative for chest pain.   Gastrointestinal:  Negative for abdominal pain, nausea and vomiting.   Musculoskeletal:  Positive for back pain and myalgias.   Neurological:  Negative for weakness and numbness.   All other systems reviewed and are negative.          Objective     ED Triage Vitals [08/05/24 1945]   Temperature Pulse Blood Pressure Respirations SpO2 Patient Position - Orthostatic VS   98.2 °F (36.8 °C) 62 147/86 20 98 % Sitting      Temp Source Heart Rate Source BP Location FiO2 (%) Pain Score    Temporal Monitor Left arm -- --        Physical Exam  Vitals and nursing note reviewed.   Constitutional:       General: She is awake. She is not in acute distress.     Appearance: She is not toxic-appearing.   HENT:      Head: Normocephalic and atraumatic.   Eyes:      General: Vision grossly intact. Gaze aligned appropriately.   Cardiovascular:      Rate and Rhythm: Normal rate and regular rhythm.   Pulmonary:      Effort: Pulmonary effort is normal. No respiratory distress.   Musculoskeletal:      Cervical back: Full passive range of motion without pain and neck supple. No bony tenderness.      Thoracic back: No bony tenderness.      Lumbar back: No bony tenderness.      Comments: Muscular tenderness noted throughout the back.    Skin:     General: Skin is warm and dry.   Neurological:      General: No focal deficit present.      Mental Status: She is alert and oriented to person, place, and time.         Labs Reviewed   COMPREHENSIVE METABOLIC PANEL - Abnormal        Result Value    Sodium 141      Potassium 4.3      Chloride 106      CO2 27      ANION GAP 8      BUN 19      Creatinine 0.89      Glucose 99      Calcium 10.7 (*)     AST 19      ALT 34      Alkaline Phosphatase 89      Total Protein 6.9      Albumin 4.4      Total Bilirubin 1.28 (*)     eGFR 76      Narrative:     National Kidney Disease Foundation guidelines for Chronic Kidney Disease (CKD):     Stage 1 with normal or high GFR (GFR > 90 mL/min/1.73 square meters)    Stage 2 Mild CKD (GFR = 60-89 mL/min/1.73 square meters)    Stage 3A Moderate CKD (GFR = 45-59 mL/min/1.73 square meters)    Stage 3B Moderate CKD (GFR = 30-44 mL/min/1.73 square meters)    Stage 4 Severe CKD (GFR = 15-29 mL/min/1.73 square meters)    Stage 5 End Stage CKD (GFR <15 mL/min/1.73 square meters)  Note: GFR calculation is accurate only with a steady state creatinine   CALCIUM, IONIZED - Abnormal    Calcium, Ionized 1.01 (*)    PHOSPHORUS - Normal    Phosphorus 3.4     MAGNESIUM - Normal    Magnesium 1.9     CBC AND DIFFERENTIAL    WBC 8.32      RBC 5.00      Hemoglobin 13.8      Hematocrit 43.7      MCV 87      MCH 27.6      MCHC 31.6      RDW 13.4      MPV 9.6      Platelets 174      nRBC 0      Segmented % 71      Immature Grans % 0      Lymphocytes % 16      Monocytes % 9      Eosinophils Relative 3      Basophils Relative 1      Absolute Neutrophils 5.91      Absolute Immature Grans 0.03      Absolute Lymphocytes 1.36      Absolute Monocytes 0.75      Eosinophils Absolute 0.22      Basophils Absolute 0.05       No orders to display       Procedures       Bushra Jacobson, DO  09/15/24 8308

## 2024-09-18 LAB
APOB+LDLR+PCSK9 GENE MUT ANL BLD/T: NOT DETECTED
BRCA1+BRCA2 DEL+DUP + FULL MUT ANL BLD/T: NOT DETECTED
MLH1+MSH2+MSH6+PMS2 GN DEL+DUP+FUL M: NOT DETECTED

## 2024-12-02 ENCOUNTER — HOSPITAL ENCOUNTER (EMERGENCY)
Facility: HOSPITAL | Age: 49
Discharge: HOME | End: 2024-12-02
Attending: EMERGENCY MEDICINE | Admitting: EMERGENCY MEDICINE
Payer: COMMERCIAL

## 2024-12-02 ENCOUNTER — APPOINTMENT (EMERGENCY)
Dept: CT IMAGING | Facility: HOSPITAL | Age: 49
End: 2024-12-02
Payer: COMMERCIAL

## 2024-12-02 VITALS
OXYGEN SATURATION: 99 % | TEMPERATURE: 97.6 F | DIASTOLIC BLOOD PRESSURE: 88 MMHG | WEIGHT: 176.37 LBS | RESPIRATION RATE: 20 BRPM | HEART RATE: 64 BPM | BODY MASS INDEX: 33.32 KG/M2 | SYSTOLIC BLOOD PRESSURE: 179 MMHG

## 2024-12-02 DIAGNOSIS — K08.89 PAIN, DENTAL: Primary | ICD-10-CM

## 2024-12-02 LAB
ALBUMIN SERPL BCG-MCNC: 4.5 G/DL (ref 3.5–5)
ALP SERPL-CCNC: 58 U/L (ref 34–104)
ALT SERPL W P-5'-P-CCNC: 17 U/L (ref 7–52)
ANION GAP SERPL CALCULATED.3IONS-SCNC: 4 MMOL/L (ref 4–13)
AST SERPL W P-5'-P-CCNC: 15 U/L (ref 13–39)
BASOPHILS # BLD AUTO: 0.05 THOUSANDS/ΜL (ref 0–0.1)
BASOPHILS NFR BLD AUTO: 1 % (ref 0–1)
BILIRUB SERPL-MCNC: 0.77 MG/DL (ref 0.2–1)
BUN SERPL-MCNC: 14 MG/DL (ref 5–25)
CALCIUM SERPL-MCNC: 10 MG/DL (ref 8.4–10.2)
CHLORIDE SERPL-SCNC: 105 MMOL/L (ref 96–108)
CO2 SERPL-SCNC: 31 MMOL/L (ref 21–32)
CREAT SERPL-MCNC: 0.83 MG/DL (ref 0.6–1.3)
EOSINOPHIL # BLD AUTO: 0.19 THOUSAND/ΜL (ref 0–0.61)
EOSINOPHIL NFR BLD AUTO: 3 % (ref 0–6)
ERYTHROCYTE [DISTWIDTH] IN BLOOD BY AUTOMATED COUNT: 12.6 % (ref 11.6–15.1)
GFR SERPL CREATININE-BSD FRML MDRD: 83 ML/MIN/1.73SQ M
GLUCOSE SERPL-MCNC: 88 MG/DL (ref 65–140)
HCT VFR BLD AUTO: 42.9 % (ref 34.8–46.1)
HGB BLD-MCNC: 13.8 G/DL (ref 11.5–15.4)
IMM GRANULOCYTES # BLD AUTO: 0.03 THOUSAND/UL (ref 0–0.2)
IMM GRANULOCYTES NFR BLD AUTO: 0 % (ref 0–2)
LYMPHOCYTES # BLD AUTO: 1.4 THOUSANDS/ΜL (ref 0.6–4.47)
LYMPHOCYTES NFR BLD AUTO: 20 % (ref 14–44)
MCH RBC QN AUTO: 26.9 PG (ref 26.8–34.3)
MCHC RBC AUTO-ENTMCNC: 32.2 G/DL (ref 31.4–37.4)
MCV RBC AUTO: 84 FL (ref 82–98)
MONOCYTES # BLD AUTO: 0.44 THOUSAND/ΜL (ref 0.17–1.22)
MONOCYTES NFR BLD AUTO: 6 % (ref 4–12)
NEUTROPHILS # BLD AUTO: 4.74 THOUSANDS/ΜL (ref 1.85–7.62)
NEUTS SEG NFR BLD AUTO: 70 % (ref 43–75)
NRBC BLD AUTO-RTO: 0 /100 WBCS
PLATELET # BLD AUTO: 222 THOUSANDS/UL (ref 149–390)
PMV BLD AUTO: 9.5 FL (ref 8.9–12.7)
POTASSIUM SERPL-SCNC: 4.1 MMOL/L (ref 3.5–5.3)
PROT SERPL-MCNC: 7.1 G/DL (ref 6.4–8.4)
RBC # BLD AUTO: 5.13 MILLION/UL (ref 3.81–5.12)
SODIUM SERPL-SCNC: 140 MMOL/L (ref 135–147)
WBC # BLD AUTO: 6.85 THOUSAND/UL (ref 4.31–10.16)

## 2024-12-02 PROCEDURE — 99285 EMERGENCY DEPT VISIT HI MDM: CPT

## 2024-12-02 PROCEDURE — 70491 CT SOFT TISSUE NECK W/DYE: CPT

## 2024-12-02 PROCEDURE — 36415 COLL VENOUS BLD VENIPUNCTURE: CPT | Performed by: EMERGENCY MEDICINE

## 2024-12-02 PROCEDURE — 96366 THER/PROPH/DIAG IV INF ADDON: CPT

## 2024-12-02 PROCEDURE — 96365 THER/PROPH/DIAG IV INF INIT: CPT

## 2024-12-02 PROCEDURE — 99285 EMERGENCY DEPT VISIT HI MDM: CPT | Performed by: EMERGENCY MEDICINE

## 2024-12-02 PROCEDURE — 96375 TX/PRO/DX INJ NEW DRUG ADDON: CPT

## 2024-12-02 PROCEDURE — 80053 COMPREHEN METABOLIC PANEL: CPT | Performed by: EMERGENCY MEDICINE

## 2024-12-02 PROCEDURE — 85025 COMPLETE CBC W/AUTO DIFF WBC: CPT | Performed by: EMERGENCY MEDICINE

## 2024-12-02 RX ORDER — ACETAMINOPHEN 10 MG/ML
1000 INJECTION, SOLUTION INTRAVENOUS ONCE
Status: COMPLETED | OUTPATIENT
Start: 2024-12-02 | End: 2024-12-02

## 2024-12-02 RX ORDER — KETOROLAC TROMETHAMINE 30 MG/ML
15 INJECTION, SOLUTION INTRAMUSCULAR; INTRAVENOUS ONCE
Status: COMPLETED | OUTPATIENT
Start: 2024-12-02 | End: 2024-12-02

## 2024-12-02 RX ADMIN — KETOROLAC TROMETHAMINE 15 MG: 30 INJECTION, SOLUTION INTRAMUSCULAR at 16:27

## 2024-12-02 RX ADMIN — IOHEXOL 85 ML: 350 INJECTION, SOLUTION INTRAVENOUS at 17:14

## 2024-12-02 RX ADMIN — ACETAMINOPHEN 1000 MG: 10 INJECTION INTRAVENOUS at 16:27

## 2024-12-04 ENCOUNTER — CONSULT (OUTPATIENT)
Dept: ENDOCRINOLOGY | Facility: CLINIC | Age: 49
End: 2024-12-04
Payer: COMMERCIAL

## 2024-12-04 VITALS
TEMPERATURE: 97.7 F | BODY MASS INDEX: 32.85 KG/M2 | RESPIRATION RATE: 18 BRPM | OXYGEN SATURATION: 99 % | SYSTOLIC BLOOD PRESSURE: 120 MMHG | HEIGHT: 61 IN | WEIGHT: 174 LBS | HEART RATE: 50 BPM | DIASTOLIC BLOOD PRESSURE: 78 MMHG

## 2024-12-04 DIAGNOSIS — R25.2 MUSCLE CRAMPS: ICD-10-CM

## 2024-12-04 DIAGNOSIS — Z98.890 S/P PARATHYROIDECTOMY: ICD-10-CM

## 2024-12-04 DIAGNOSIS — Z86.39 HISTORY OF PRIMARY HYPERPARATHYROIDISM: ICD-10-CM

## 2024-12-04 DIAGNOSIS — Z90.89 S/P PARATHYROIDECTOMY: ICD-10-CM

## 2024-12-04 DIAGNOSIS — E89.0 S/P THYROIDECTOMY: ICD-10-CM

## 2024-12-04 DIAGNOSIS — E89.0 POST-OPERATIVE HYPOTHYROIDISM: ICD-10-CM

## 2024-12-04 DIAGNOSIS — E26.9 HYPERALDOSTERONISM (HCC): ICD-10-CM

## 2024-12-04 DIAGNOSIS — E27.9 ADRENAL NODULE (HCC): Primary | ICD-10-CM

## 2024-12-04 PROCEDURE — 99204 OFFICE O/P NEW MOD 45 MIN: CPT | Performed by: STUDENT IN AN ORGANIZED HEALTH CARE EDUCATION/TRAINING PROGRAM

## 2024-12-04 RX ORDER — CALCIUM CARBONATE 500 MG/1
500 TABLET, CHEWABLE ORAL 3 TIMES DAILY PRN
COMMUNITY
Start: 2024-06-14 | End: 2025-06-14

## 2024-12-04 RX ORDER — DEXAMETHASONE 1 MG
1 TABLET ORAL ONCE
Qty: 1 TABLET | Refills: 0 | Status: SHIPPED | OUTPATIENT
Start: 2024-12-04 | End: 2024-12-04

## 2024-12-04 RX ORDER — LABETALOL 200 MG/1
200 TABLET, FILM COATED ORAL 2 TIMES DAILY
COMMUNITY

## 2024-12-04 RX ORDER — EPLERENONE 25 MG/1
25 TABLET, FILM COATED ORAL DAILY
COMMUNITY
Start: 2024-12-02 | End: 2024-12-04 | Stop reason: SDUPTHER

## 2024-12-04 RX ORDER — LEVOTHYROXINE SODIUM 100 UG/1
100 TABLET ORAL DAILY
COMMUNITY
Start: 2024-08-20 | End: 2024-12-04 | Stop reason: SDUPTHER

## 2024-12-04 RX ORDER — EPLERENONE 25 MG/1
25 TABLET, FILM COATED ORAL DAILY
Qty: 90 TABLET | Refills: 0 | Status: SHIPPED | OUTPATIENT
Start: 2024-12-04 | End: 2025-12-04

## 2024-12-04 RX ORDER — LEVOTHYROXINE SODIUM 100 UG/1
100 TABLET ORAL DAILY
Qty: 90 TABLET | Refills: 2 | Status: SHIPPED | OUTPATIENT
Start: 2024-12-04 | End: 2025-12-04

## 2024-12-04 RX ORDER — LOSARTAN POTASSIUM 100 MG/1
1 TABLET ORAL DAILY
COMMUNITY
Start: 2024-10-29

## 2024-12-04 NOTE — PATIENT INSTRUCTIONS
I ordered a test which called low-dose dexamethasone suppression test, you are going to take 1 mg of dexamethasone at 11 PM at night and go for blood work next day in the morning between 7 to 9 AM.

## 2024-12-04 NOTE — PROGRESS NOTES
Name: Della Hillman      : 1975      MRN: [id number]  Encounter Provider: Irving Kumari MD  Encounter Date: 2024   Encounter department: [facility] [place]  :  Assessment & Plan  Muscle cramps    Orders:    Ambulatory Referral to Endocrinology    S/P thyroidectomy  s/p total thyroidectomy for Graves' disease, pathology showed Lymphocytic thyroiditis.   No evidence of malignancy, currently  on levothyroxine 100 mcg daily.  She is clinically euthyroid and TSH recently at goal,   Continue levothyroxine at current dose.  Advised that levotyroxine should be taken on an empty stomach. Should avoid taking medications like iron, calcium, tums, M1rizhyvts, PPI at the same time that may decrease absorption of T4. Should separate administration by 4hrs.        Orders:    Ambulatory Referral to Endocrinology    levothyroxine 100 mcg tablet; Take 1 tablet (100 mcg total) by mouth daily    S/P parathyroidectomy    Orders:    Ambulatory Referral to Endocrinology    Adrenal nodule (HCC)  Low-density 1.3 x 1.4 cm left adrenal nodule which demonstrates unenhanced attenuation and delayed phase washout characteristics compatible with an adenoma. Right adrenal gland is unremarkable.   Ald/ renin, 21.4 /0.1 : 215, currently on eplerenone 25 mg daily, agree that she needs 4-6 wks washout to do AVS,  We will contact to replace it with appropriate antihypertensive medications given HCOM.  I ordered low dose dexamethasone test to rule out MACS.  Pheochromocytoma was ruled out.      Orders:    Dexamethasone; Future    dexamethasone (DECADRON) 1 mg tablet; Take 1 tablet (1 mg total) by mouth 1 (one) time for 1 dose Take dexamethasone at 11 PM, go for blood test next day from 7 AM to 9 AM    Cortisol Level,7-9 AM Specimen    Hyperaldosteronism (HCC)  See plan for adrenal nodule.    Orders:    eplerenone (INSPRA) 25 mg tablet; Take 1 tablet (25 mg total) by mouth daily    Post-operative  hypothyroidism  See plan for s/p thyroidectomy.    Orders:    levothyroxine 100 mcg tablet; Take 1 tablet (100 mcg total) by mouth daily    History of primary hyperparathyroidism  history of hypercalcemia ranging from 10.4 to 11.7 with elevated PTH , s/p Right inferior parathyroidectomy in 6/2024 with intra-op PTH, and final pathology showed Right inferior parathyroid gland: enlarged, monomorphic thyroid gland consistent with parathyroid adenoma (337 mg).   PTH and calcium normalized post surgery,  She was advised to keep calcium daily intake 800 - 1000 mg daily,, preferably from diet to avoid hungry bone syndrome causing hypocalcemia.             History of Present Illness     HPI  Della Hillman is a 49 y.o. female who presents to establish care with us.  She was seen in the emergency department for muscular spasm and she is referred at the request of Bushra Jacobson DO.  She has very extensive history of endocrinopathies following with [facility] endocrinology, Michell Cole DO and last visit was in October 2024.      CTAP, 2022 , Low-density small left adrenal nodule, probably stable in retrospect and likely benign. The right adrenal gland is unremarkable.   CTA abdomen, 4/24: Small left adrenal nodule measures 15 x 13 mm. This likely indicates a benign adenoma.   10/24, Ct abdomen with and without contrast : Low-density 1.3 x 1.4 cm left adrenal nodule which demonstrates unenhanced attenuation and delayed phase washout characteristics compatible with an adenoma. Right adrenal gland is unremarkable.   Ald/ renin, 21.4 /0.1 : 215    Long history of hypercalcemia ranging from 10.4 to 11.7 with elevated PTH , s/p Right inferior parathyroidectomy in 6/2024 with intra-op PTH, 106-->136.5 -->76.4 in [facility] , (Dr. Bo) and final pathology showed Right inferior parathyroid gland: enlarged, monomorphic thyroid gland consistent with parathyroid adenoma (337 mg).   She is s/p total thyroidectomy for Graves' disease,  pathology showed Lymphocytic thyroiditis.   No evidence of malignancy, current  on levothyroxine 100 mcg daily.      History obtained from: patient    Review of Systems   Constitutional:  Positive for fatigue and unexpected weight change.   Gastrointestinal:  Negative for constipation, diarrhea and nausea.   Endocrine: Negative for cold intolerance, polydipsia and polyuria.     Past Medical History   Past Medical History:   Diagnosis Date    Abnormal Pap smear of cervix     Acute hip pain     tristin    Cancer (HCC)     cervix- had chemo    COVID     Feb 2022    Heart palpitations     frequent -7/20 instructed to call cardiology to report    Hypertension     Hypokalemia 04/26/2019    Low back pain     Lymphadenopathy     Pelvic pain     Sepsis (HCC) 06/25/2022    Wears contact lenses     Wears dentures     partial lower     Past Surgical History:   Procedure Laterality Date    FL RETROGRADE PYELOGRAM  06/25/2022    FL RETROGRADE PYELOGRAM  07/28/2022    SD BRNCHSC INCL FLUOR GDNCE DX W/CELL WASHG SPX N/A 02/26/2020    Procedure: BRONCHOSCOPY FLEXIBLE;  Surgeon: Paolo Rushing MD;  [facility]: [facility];  Service: Thoracic    SD BRONCHOSCOPY NEEDLE BX TRACHEA MAIN STEM&/BRON N/A 02/26/2020    Procedure: ENDOBRONCHIAL ULTRASOUND (EBUS);  Surgeon: Paolo Rushing MD;  Location: [facility];  Service: Thoracic    SD CYSTO BLADDER W/URETERAL CATHETERIZATION Right 06/25/2022    Procedure: CYSTOSCOPY RETROGRADE PYELOGRAM WITH INSERTION STENT URETERAL;  Surgeon: Octavio Chapman MD;  Location: [facility];  Service: Urology    SD CYSTO/URETERO W/LITHOTRIPSY &INDWELL STENT INSRT Right 07/28/2022    Procedure: CYSTOSCOPY URETEROSCOPY WITH LITHOTRIPSY HOLMIUM LASER, RETROGRADE PYELOGRAM AND RIGHT INSERTION STENT URETERAL;  Surgeon: Octavio Chapman MD;  Location: [facility];  Service: Urology    SD DILATION VAGINA W/ANESTHESIA OTHER THAN LOCAL N/A 06/20/2019    Procedure: EXAM UNDER ANESTHESIA (EUA);  Surgeon: Remi Singletary  MD;  Location: [facility];  Service: Gynecology Oncology    WV INSERTION VAGINAL RADIATION DEVICE N/A 06/20/2019    Procedure: PIERRE SLEEVE PLACEMENT;  Surgeon: Remi Singletary MD;  Location: [facility];  Service: Gynecology Oncology    THYROIDECTOMY      TONSILLECTOMY      US GUIDANCE  06/20/2019     Family History   Problem Relation Age of Onset    Uterine cancer Maternal Grandmother     Heart disease Mother     Canavan disease Father     Cancer Father       reports that she quit smoking about 4 years ago. Her smoking use included cigarettes. She started smoking about 5 years ago. She has a 0.5 pack-year smoking history. She has been exposed to tobacco smoke. She has never used smokeless tobacco. She reports current alcohol use. She reports that she does not use drugs.  Current Outpatient Medications on File Prior to Visit   Medication Sig Dispense Refill    amLODIPine (NORVASC) 5 mg tablet Take 1 tablet (5 mg total) by mouth daily 30 tablet 0    aspirin 81 mg chewable tablet Chew 1 tablet (81 mg total) daily Do not start before May 30, 2023. 30 tablet 0    atorvastatin (LIPITOR) 40 mg tablet Take 1 tablet (40 mg total) by mouth daily with dinner 30 tablet 0    cyclobenzaprine (FLEXERIL) 5 mg tablet TAKE 1-2 TABLETS BY MOUTH NIGHTLY AS NEEDED FOR MUSCLE SPASMS.      ibuprofen (MOTRIN) 600 mg tablet ibuprofen 600 mg tablet   TAKE 1 TABLET BY MOUTH THREE TIMES A DAY AS DIRECTED FOR 30 DAYS      losartan (COZAAR) 50 mg tablet Take 1 tablet (50 mg total) by mouth daily 30 tablet 0    meclizine (ANTIVERT) 12.5 MG tablet Take 1 tablet (12.5 mg total) by mouth every 8 (eight) hours as needed for dizziness 30 tablet 0    metoprolol tartrate (LOPRESSOR) 25 mg tablet Take 0.5 tablets (12.5 mg total) by mouth every 12 (twelve) hours 180 tablet 0    Omega-3 Fatty Acids (FISH OIL) 1,000 mg 1,000 mg daily       No current facility-administered medications on file prior to visit.     Allergies   Allergen Reactions     Hydrochlorothiazide Arthralgia    Latex Hives    Other      Tomatoes   Vomiting and diarhhea      Medical History Reviewed by provider this encounter:     .  Current Outpatient Medications on File Prior to Visit   Medication Sig Dispense Refill    aspirin 81 mg chewable tablet Chew 1 tablet (81 mg total) daily Do not start before May 30, 2023. 30 tablet 0    calcium carbonate (Tums) 500 mg chewable tablet Chew 500 mg Three times daily as needed      Cholecalciferol (VITAMIN D3) 1,000 units tablet Take 1,000 Units by mouth daily      labetalol (NORMODYNE) 200 mg tablet Take 200 mg by mouth 2 (two) times a day      losartan (COZAAR) 100 MG tablet Take 1 tablet by mouth in the morning      MAGNESIUM PO Take by mouth in the morning      Omega-3 Fatty Acids (FISH OIL) 1,000 mg 1,000 mg daily      [DISCONTINUED] eplerenone (INSPRA) 25 mg tablet Take 25 mg by mouth daily      [DISCONTINUED] levothyroxine 100 mcg tablet Take 100 mcg by mouth daily      [DISCONTINUED] amLODIPine (NORVASC) 5 mg tablet Take 1 tablet (5 mg total) by mouth daily 30 tablet 0    [DISCONTINUED] atorvastatin (LIPITOR) 40 mg tablet Take 1 tablet (40 mg total) by mouth daily with dinner 30 tablet 0    [DISCONTINUED] cyclobenzaprine (FLEXERIL) 5 mg tablet TAKE 1-2 TABLETS BY MOUTH NIGHTLY AS NEEDED FOR MUSCLE SPASMS. (Patient not taking: Reported on 12/4/2024)      [DISCONTINUED] ibuprofen (MOTRIN) 600 mg tablet ibuprofen 600 mg tablet   TAKE 1 TABLET BY MOUTH THREE TIMES A DAY AS DIRECTED FOR 30 DAYS      [DISCONTINUED] losartan (COZAAR) 50 mg tablet Take 1 tablet (50 mg total) by mouth daily (Patient not taking: Reported on 12/4/2024) 30 tablet 0    [DISCONTINUED] meclizine (ANTIVERT) 12.5 MG tablet Take 1 tablet (12.5 mg total) by mouth every 8 (eight) hours as needed for dizziness 30 tablet 0    [DISCONTINUED] metoprolol tartrate (LOPRESSOR) 25 mg tablet Take 0.5 tablets (12.5 mg total) by mouth every 12 (twelve) hours (Patient not taking: Reported  on 12/4/2024) 180 tablet 0     No current facility-administered medications on file prior to visit.         Objective   LMP 04/15/2019 (Exact Date) Comment: had cervical cancer     Physical Exam  Vitals and nursing note reviewed.   Constitutional:       General: She is not in acute distress.     Appearance: She is well-developed.   HENT:      Head: Normocephalic and atraumatic.   Pulmonary:      Effort: Pulmonary effort is normal. No respiratory distress.   Abdominal:      Palpations: Abdomen is soft.   Musculoskeletal:      Cervical back: Neck supple.   Skin:     General: Skin is warm and dry.   Neurological:      Mental Status: She is alert.         Administrative Statements         NM parathyroid w ct loc  Order: [id number]  Impression    Impression:  Persistent focal activity probably posterior to, rather than within the lower  pole of the right thyroid lobe, probably due to a parathyroid adenoma with given  history of hyperparathyroidism. Please note that false positives can be seen in  thyroid nodules or reactive nodes            Workstation:[id number]  Narrative    Parathyroid scan.    History: Hypercalcemia [E83.52 (ICD-10-CM)]    Radiopharmaceutical and technique: 21 2 mCi of Tc-99m sestamibi was administered  intravenously for the parathyroid scan. Immediate and delayed anterior  projection of the neck and thorax were obtained. In addition, coronal, sagittal  and transaxial SPECT/CT images of the neck were obtained. Delayed pinhole  anterior image of neck was also obtained.    Comparison:    Neck ultrasound 5/2/2024    FINDINGS:.    The study shows a persistent small focus of radiotracer activity without  significant washout located probably posterior to, rather than within the lower  pole of the right thyroid lobe, likely due to a parathyroid adenoma with given  history of hyperparathyroidism.    No abnormal focus is identified in the mediastinum. Normal salivary gland,  heart, and liver activity are  noted.    US thyroid  Order: [id number]  Impression    IMPRESSION:  1. Normal size gland with abnormal heterogeneous echogenicity and  hypervascularity indicating nonspecific thyroiditis.  2. Suspect bilateral parathyroid adenomas.    Sonographic pattern and FNA recommendations are based on American Thyroid  Association (PHILL) guidelines for assessment of thyroid nodules, as agreed upon  by multidisciplinary departments at [facility].    Sonographic pattern  Benign pattern (0% risk): no biopsy  Very low suspicion pattern (<3% risk): biopsy if > or = 2 cm (or ultrasound  observation)  Low suspicion pattern (5-10% risk): biopsy if > or = 1.5 cm  Intermediate suspicion pattern (10-20% risk): biopsy if > or = 1 cm  High suspicion pattern (>70-90% risk): biopsy if > or = 1 cm (for nodules <1 cm,  biopsy could be considered if technically feasible)          Workstation:[id number]  Narrative    History: E05.90. Hyperthyroidism. Hypercalcemia. Thyroid eye disease.    Ultrasound of the soft tissues of the neck attention thyroid gland was performed  on 5/2/2024.    Comparison Studies: None    Findings:    Right thyroid lobe: 4.6 x 2.0 x 1.8 cm.  Left thyroid lobe: 3.6 x 1.6 x 1.9 cm.  Isthmus: 0.3 cm.  Thyroid gland echotexture: Heterogeneous  Thyroid gland vascularity on color Doppler: Increased    Nodules Right Lobe: No definite nodule    Nodules Left Lobe: No definite nodule    Nodules Isthmus: None    Other findings  Bilaterally separate from the posterior inferior lobes small predominantly solid  hypoechoic findings are seen possibly parathyroid adenomas.  Right 0.7 x 0.7 x 0.7 cm.  Right 0.6 x 0.6 x 0.6 cm.  Left 0.5 x 0.3 x 0.4 cm.  Left 1.1 x 0.7 x 0.8 cm.  Correlation with PTH level and if indicated sestamibi nuclear medicine exam.

## 2024-12-05 NOTE — ASSESSMENT & PLAN NOTE
history of hypercalcemia ranging from 10.4 to 11.7 with elevated PTH , s/p Right inferior parathyroidectomy in 6/2024 with intra-op PTH, and final pathology showed Right inferior parathyroid gland: enlarged, monomorphic thyroid gland consistent with parathyroid adenoma (337 mg).   PTH and calcium normalized post surgery,  She was advised to keep calcium daily intake 800 - 1000 mg daily,, preferably from diet to avoid hungry bone syndrome causing hypocalcemia.

## 2025-01-07 ENCOUNTER — HOSPITAL ENCOUNTER (EMERGENCY)
Facility: HOSPITAL | Age: 50
Discharge: HOME | End: 2025-01-07
Attending: EMERGENCY MEDICINE
Payer: COMMERCIAL

## 2025-01-07 VITALS
OXYGEN SATURATION: 100 % | DIASTOLIC BLOOD PRESSURE: 86 MMHG | RESPIRATION RATE: 19 BRPM | TEMPERATURE: 98.7 F | SYSTOLIC BLOOD PRESSURE: 178 MMHG | HEART RATE: 69 BPM

## 2025-01-07 DIAGNOSIS — R19.7 DIARRHEA: Primary | ICD-10-CM

## 2025-01-07 LAB
ALBUMIN SERPL BCG-MCNC: 4 G/DL (ref 3.5–5)
ALP SERPL-CCNC: 53 U/L (ref 34–104)
ALT SERPL W P-5'-P-CCNC: 14 U/L (ref 7–52)
ANION GAP SERPL CALCULATED.3IONS-SCNC: 6 MMOL/L (ref 4–13)
AST SERPL W P-5'-P-CCNC: 13 U/L (ref 13–39)
BASOPHILS # BLD AUTO: 0.02 THOUSANDS/ΜL (ref 0–0.1)
BASOPHILS NFR BLD AUTO: 0 % (ref 0–1)
BILIRUB SERPL-MCNC: 0.71 MG/DL (ref 0.2–1)
BUN SERPL-MCNC: 15 MG/DL (ref 5–25)
CALCIUM SERPL-MCNC: 9.6 MG/DL (ref 8.4–10.2)
CHLORIDE SERPL-SCNC: 107 MMOL/L (ref 96–108)
CO2 SERPL-SCNC: 27 MMOL/L (ref 21–32)
CREAT SERPL-MCNC: 0.8 MG/DL (ref 0.6–1.3)
EOSINOPHIL # BLD AUTO: 0.08 THOUSAND/ΜL (ref 0–0.61)
EOSINOPHIL NFR BLD AUTO: 2 % (ref 0–6)
ERYTHROCYTE [DISTWIDTH] IN BLOOD BY AUTOMATED COUNT: 12.1 % (ref 11.6–15.1)
GFR SERPL CREATININE-BSD FRML MDRD: 87 ML/MIN/1.73SQ M
GLUCOSE SERPL-MCNC: 92 MG/DL (ref 65–140)
HCT VFR BLD AUTO: 41.1 % (ref 34.8–46.1)
HGB BLD-MCNC: 13.4 G/DL (ref 11.5–15.4)
IMM GRANULOCYTES # BLD AUTO: 0.03 THOUSAND/UL (ref 0–0.2)
IMM GRANULOCYTES NFR BLD AUTO: 1 % (ref 0–2)
LIPASE SERPL-CCNC: 11 U/L (ref 11–82)
LYMPHOCYTES # BLD AUTO: 1.23 THOUSANDS/ΜL (ref 0.6–4.47)
LYMPHOCYTES NFR BLD AUTO: 25 % (ref 14–44)
MCH RBC QN AUTO: 26.4 PG (ref 26.8–34.3)
MCHC RBC AUTO-ENTMCNC: 32.6 G/DL (ref 31.4–37.4)
MCV RBC AUTO: 81 FL (ref 82–98)
MONOCYTES # BLD AUTO: 0.4 THOUSAND/ΜL (ref 0.17–1.22)
MONOCYTES NFR BLD AUTO: 8 % (ref 4–12)
NEUTROPHILS # BLD AUTO: 3.08 THOUSANDS/ΜL (ref 1.85–7.62)
NEUTS SEG NFR BLD AUTO: 64 % (ref 43–75)
NRBC BLD AUTO-RTO: 0 /100 WBCS
PLATELET # BLD AUTO: 222 THOUSANDS/UL (ref 149–390)
PMV BLD AUTO: 9.8 FL (ref 8.9–12.7)
POTASSIUM SERPL-SCNC: 4.4 MMOL/L (ref 3.5–5.3)
PROT SERPL-MCNC: 6.7 G/DL (ref 6.4–8.4)
RBC # BLD AUTO: 5.07 MILLION/UL (ref 3.81–5.12)
SODIUM SERPL-SCNC: 140 MMOL/L (ref 135–147)
WBC # BLD AUTO: 4.84 THOUSAND/UL (ref 4.31–10.16)

## 2025-01-07 PROCEDURE — 96360 HYDRATION IV INFUSION INIT: CPT

## 2025-01-07 PROCEDURE — 80053 COMPREHEN METABOLIC PANEL: CPT | Performed by: EMERGENCY MEDICINE

## 2025-01-07 PROCEDURE — 85025 COMPLETE CBC W/AUTO DIFF WBC: CPT | Performed by: EMERGENCY MEDICINE

## 2025-01-07 PROCEDURE — 36415 COLL VENOUS BLD VENIPUNCTURE: CPT | Performed by: EMERGENCY MEDICINE

## 2025-01-07 PROCEDURE — 99283 EMERGENCY DEPT VISIT LOW MDM: CPT

## 2025-01-07 PROCEDURE — 99284 EMERGENCY DEPT VISIT MOD MDM: CPT | Performed by: EMERGENCY MEDICINE

## 2025-01-07 PROCEDURE — 83690 ASSAY OF LIPASE: CPT | Performed by: EMERGENCY MEDICINE

## 2025-01-07 RX ORDER — LOPERAMIDE HYDROCHLORIDE 2 MG/1
2 CAPSULE ORAL ONCE
Status: COMPLETED | OUTPATIENT
Start: 2025-01-07 | End: 2025-01-07

## 2025-01-07 RX ORDER — DICYCLOMINE HCL 20 MG
20 TABLET ORAL 2 TIMES DAILY PRN
Qty: 20 TABLET | Refills: 0 | Status: SHIPPED | OUTPATIENT
Start: 2025-01-07

## 2025-01-07 RX ADMIN — LOPERAMIDE HYDROCHLORIDE 2 MG: 2 CAPSULE ORAL at 18:59

## 2025-01-07 RX ADMIN — SODIUM CHLORIDE 1000 ML: 0.9 INJECTION, SOLUTION INTRAVENOUS at 19:20

## 2025-01-07 NOTE — DISCHARGE INSTRUCTIONS
Please follow up PCP.  Recommend brat diet for the next couple of days.  Can use Imodium every 2 hours while having diarrhea up to 6 pills a day.  Recommend trying to limit Imodium to 3 pills daily.  Can use Bentyl if not using Imodium.  Recommend tylenol 650 mg and ibuprofen 600 mg every 6 hours as needed for pain. Please return for severe chest pain, significant shortness of breath, severely worsening symptoms, or any other concerning signs or symptoms. Please refer to the following documents for additional instructions and return precautions.

## 2025-01-07 NOTE — ED PROVIDER NOTES
Time reflects when diagnosis was documented in both MDM as applicable and the Disposition within this note       Time User Action Codes Description Comment    1/7/2025  6:45 PM Abebedustin Javier Add [R19.7] Diarrhea           ED Disposition       ED Disposition   Discharge    Condition   Stable    Date/Time   Tue Jan 7, 2025  7:47 PM    Comment   Della Hillman discharge to home/self care.                   Assessment & Plan       Medical Decision Making  49-year-old female history of hypertension presenting with diarrhea.  Persistent diarrhea after recent viral syndrome with benign exam including benign abdominal exam.  Plan for labs including abdominal labs.  IV fluids.  Oral antidiarrheal medication.  Reassess.    Labs interpreted by me without significant acute process.  Prescription sent to pharmacy.  Dietary recommendations. Discussed results and recommendations. Advised follow up PCP. Medication recommendations. Given instructions and return precautions. Patient/family at bedside acknowledged understanding of all written and verbal instructions and return precautions. Discharged.     Amount and/or Complexity of Data Reviewed  Labs: ordered.    Risk  Prescription drug management.             Medications   sodium chloride 0.9 % bolus 1,000 mL (0 mL Intravenous Stopped 1/7/25 2033)   loperamide (IMODIUM) capsule 2 mg (2 mg Oral Given 1/7/25 1859)       ED Risk Strat Scores                          SBIRT 20yo+      Flowsheet Row Most Recent Value   Initial Alcohol Screen: US AUDIT-C     1. How often do you have a drink containing alcohol? 0 Filed at: 01/07/2025 1910   2. How many drinks containing alcohol do you have on a typical day you are drinking?  0 Filed at: 01/07/2025 1910   3b. FEMALE Any Age, or MALE 65+: How often do you have 4 or more drinks on one occassion? 0 Filed at: 01/07/2025 1910   Audit-C Score 0 Filed at: 01/07/2025 1910   GRISELDA: How many times in the past year have you...    Used an illegal drug or  used a prescription medication for non-medical reasons? Never Filed at: 01/07/2025 1910                            History of Present Illness       Chief Complaint   Patient presents with    Diarrhea     States diarrhea x 10, started with flu like sx and progressed to GI sx. Pt states she hasn't been around anyone sick.       Past Medical History:   Diagnosis Date    Abnormal Pap smear of cervix     Acute hip pain     tristin    Cancer (HCC)     cervix- had chemo    COVID     Feb 2022    Heart palpitations     frequent -7/20 instructed to call cardiology to report    Hypertension     Hypokalemia 04/26/2019    Low back pain     Lymphadenopathy     Pelvic pain     Sepsis (HCC) 06/25/2022    Wears contact lenses     Wears dentures     partial lower      Past Surgical History:   Procedure Laterality Date    FL RETROGRADE PYELOGRAM  06/25/2022    FL RETROGRADE PYELOGRAM  07/28/2022    WY BRNCHSC INCL FLUOR GDNCE DX W/CELL WASHG SPX N/A 02/26/2020    Procedure: BRONCHOSCOPY FLEXIBLE;  Surgeon: Paolo Rushing MD;  Location: [facility];  Service: Thoracic    WY BRONCHOSCOPY NEEDLE BX TRACHEA MAIN STEM&/BRON N/A 02/26/2020    Procedure: ENDOBRONCHIAL ULTRASOUND (EBUS);  Surgeon: Paolo Rushing MD;  Location: [facility];  Service: Thoracic    WY CYSTO BLADDER W/URETERAL CATHETERIZATION Right 06/25/2022    Procedure: CYSTOSCOPY RETROGRADE PYELOGRAM WITH INSERTION STENT URETERAL;  Surgeon: Octavio Chapman MD;  Location: [facility];  Service: Urology    WY CYSTO/URETERO W/LITHOTRIPSY &INDWELL STENT INSRT Right 07/28/2022    Procedure: CYSTOSCOPY URETEROSCOPY WITH LITHOTRIPSY HOLMIUM LASER, RETROGRADE PYELOGRAM AND RIGHT INSERTION STENT URETERAL;  Surgeon: Octavio Chapman MD;  Location: [facility];  Service: Urology    WY DILATION VAGINA W/ANESTHESIA OTHER THAN LOCAL N/A 06/20/2019    Procedure: EXAM UNDER ANESTHESIA (EUA);  Surgeon: Remi Singletary MD;  Location: [facility];  Service: Gynecology Oncology    WY INSERTION  VAGINAL RADIATION DEVICE N/A 2019    Procedure: PIERRE SLEEVE PLACEMENT;  Surgeon: Remi Singletary MD;  Location: [facility];  Service: Gynecology Oncology    THYROIDECTOMY      TONSILLECTOMY      US GUIDANCE  2019      Family History   Problem Relation Age of Onset    Uterine cancer Maternal Grandmother     Heart disease Mother     Canavan disease Father     Cancer Father     Diabetes unspecified Paternal Grandmother       Social History     Tobacco Use    Smoking status: Former     Current packs/day: 0.00     Average packs/day: 0.5 packs/day for 1 year (0.5 ttl pk-yrs)     Types: Cigarettes     Start date: 2019     Quit date: 2020     Years since quittin.9     Passive exposure: Past    Smokeless tobacco: Never    Tobacco comments:     vapes occ nicotene   Vaping Use    Vaping status: Every Day    Substances: Nicotine   Substance Use Topics    Alcohol use: Not Currently     Comment: occ    Drug use: Not Currently     Types: Marijuana     Comment: THC gummies at bedside      E-Cigarette/Vaping    E-Cigarette Use Current Every Day User       E-Cigarette/Vaping Substances    Nicotine Yes     THC No     CBD No     Flavoring No     Other No     Unknown No       I have reviewed and agree with the history as documented.     49-year-old female history of hypertension presenting with diarrhea.  Patient reports recent viral syndrome which has all but resolved except for diarrhea.  Patient reports diarrhea resolved for a few days once her viral symptoms resolved but reports recurrence of diarrhea.  Patient reports a few episodes of loose to liquidy nonbloody diarrhea per day.  Denies any abdominal pain.  Denies any nausea vomiting.  Denies any chest pain shortness of breath.  Denies previous abdominal surgery.  Denies any other complaints.  Chart reviewed.    Past Medical History:  No date: Abnormal Pap smear of cervix  No date: Acute hip pain      Comment:  tristin  No date: Cancer (HCC)      Comment:   cervix- had chemo  No date: COVID      Comment:  Feb 2022  No date: Heart palpitations      Comment:  frequent -7/20 instructed to call cardiology to report  No date: Hypertension  04/26/2019: Hypokalemia  No date: Low back pain  No date: Lymphadenopathy  No date: Pelvic pain  06/25/2022: Sepsis (HCC)  No date: Wears contact lenses  No date: Wears dentures      Comment:  partial lower  Family History: non-contributory  Social History          Review of Systems   Constitutional:  Negative for appetite change, chills, diaphoresis, fever and unexpected weight change.   HENT:  Negative for congestion and rhinorrhea.    Eyes:  Negative for photophobia and visual disturbance.   Respiratory:  Negative for cough, chest tightness and shortness of breath.    Cardiovascular:  Negative for chest pain, palpitations and leg swelling.   Gastrointestinal:  Positive for diarrhea. Negative for abdominal distention, abdominal pain, blood in stool, constipation, nausea and vomiting.   Genitourinary:  Negative for dysuria and hematuria.   Musculoskeletal:  Negative for back pain, joint swelling, neck pain and neck stiffness.   Skin:  Negative for color change, pallor, rash and wound.   Neurological:  Negative for dizziness, syncope, weakness, light-headedness and headaches.   Psychiatric/Behavioral:  Negative for agitation.    All other systems reviewed and are negative.          Objective       ED Triage Vitals [01/07/25 1809]   Temperature Pulse Blood Pressure Respirations SpO2 Patient Position - Orthostatic VS   98.7 °F (37.1 °C) 69 162/87 20 95 % Sitting      Temp Source Heart Rate Source BP Location FiO2 (%) Pain Score    Oral Monitor Left arm -- --      Vitals      Date and Time Temp Pulse SpO2 Resp BP Pain Score FACES Pain Rating User   01/07/25 2017 -- 69 100 % 19 178/86 -- -- VMW   01/07/25 1809 98.7 °F (37.1 °C) 69 95 % 20 162/87 -- -- AM            Physical Exam  Vitals and nursing note reviewed.   Constitutional:        General: She is not in acute distress.     Appearance: Normal appearance. She is well-developed. She is not ill-appearing, toxic-appearing or diaphoretic.   HENT:      Head: Normocephalic and atraumatic.      Nose: Nose normal. No congestion or rhinorrhea.      Mouth/Throat:      Mouth: Mucous membranes are moist.      Pharynx: Oropharynx is clear. No oropharyngeal exudate or posterior oropharyngeal erythema.   Eyes:      General: No scleral icterus.        Right eye: No discharge.         Left eye: No discharge.      Extraocular Movements: Extraocular movements intact.      Conjunctiva/sclera: Conjunctivae normal.      Pupils: Pupils are equal, round, and reactive to light.   Neck:      Vascular: No JVD.      Trachea: No tracheal deviation.      Comments: Supple. Normal range of motion.   Cardiovascular:      Rate and Rhythm: Normal rate and regular rhythm.      Heart sounds: Normal heart sounds. No murmur heard.     No friction rub. No gallop.      Comments: Normal rate and regular rhythm  Pulmonary:      Effort: Pulmonary effort is normal. No respiratory distress.      Breath sounds: Normal breath sounds. No stridor. No wheezing or rales.      Comments: Clear to auscultation bilaterally  Chest:      Chest wall: No tenderness.   Abdominal:      General: Bowel sounds are normal. There is no distension.      Palpations: Abdomen is soft.      Tenderness: There is no abdominal tenderness. There is no right CVA tenderness, left CVA tenderness, guarding or rebound.      Comments: Soft, nontender, nondistended.  Normal bowel sounds throughout   Musculoskeletal:         General: No swelling, tenderness, deformity or signs of injury. Normal range of motion.      Cervical back: Normal range of motion and neck supple. No rigidity. No muscular tenderness.      Right lower leg: No edema.      Left lower leg: No edema.   Lymphadenopathy:      Cervical: No cervical adenopathy.   Skin:     General: Skin is warm and dry.       Coloration: Skin is not pale.      Findings: No erythema or rash.   Neurological:      General: No focal deficit present.      Mental Status: She is alert. Mental status is at baseline.      Sensory: No sensory deficit.      Motor: No weakness or abnormal muscle tone.      Coordination: Coordination normal.      Gait: Gait normal.      Comments: Alert.  Strength and sensation grossly intact.  Ambulatory without difficulty at baseline.    Psychiatric:         Behavior: Behavior normal.         Thought Content: Thought content normal.         Results Reviewed       Procedure Component Value Units Date/Time    Comprehensive metabolic panel [[id number]] Collected: 01/07/25 1858    Lab Status: Final result Specimen: Blood from Arm, Right Updated: 01/07/25 1946     Sodium 140 mmol/L      Potassium 4.4 mmol/L      Chloride 107 mmol/L      CO2 27 mmol/L      ANION GAP 6 mmol/L      BUN 15 mg/dL      Creatinine 0.80 mg/dL      Glucose 92 mg/dL      Calcium 9.6 mg/dL      AST 13 U/L      ALT 14 U/L      Alkaline Phosphatase 53 U/L      Total Protein 6.7 g/dL      Albumin 4.0 g/dL      Total Bilirubin 0.71 mg/dL      eGFR 87 ml/min/1.73sq m     Narrative:      [employer] guidelines for Chronic Kidney Disease (CKD):     Stage 1 with normal or high GFR (GFR > 90 mL/min/1.73 square meters)    Stage 2 Mild CKD (GFR = 60-89 mL/min/1.73 square meters)    Stage 3A Moderate CKD (GFR = 45-59 mL/min/1.73 square meters)    Stage 3B Moderate CKD (GFR = 30-44 mL/min/1.73 square meters)    Stage 4 Severe CKD (GFR = 15-29 mL/min/1.73 square meters)    Stage 5 End Stage CKD (GFR <15 mL/min/1.73 square meters)  Note: GFR calculation is accurate only with a steady state creatinine    Lipase [[id number]]  (Normal) Collected: 01/07/25 1858    Lab Status: Final result Specimen: Blood from Arm, Right Updated: 01/07/25 1946     Lipase 11 u/L     CBC and differential [[id number]]  (Abnormal) Collected: 01/07/25 1858    Lab  Status: Final result Specimen: Blood from Arm, Right Updated: 25     WBC 4.84 Thousand/uL      RBC 5.07 Million/uL      Hemoglobin 13.4 g/dL      Hematocrit 41.1 %      MCV 81 fL      MCH 26.4 pg      MCHC 32.6 g/dL      RDW 12.1 %      MPV 9.8 fL      Platelets 222 Thousands/uL      nRBC 0 /100 WBCs      Segmented % 64 %      Immature Grans % 1 %      Lymphocytes % 25 %      Monocytes % 8 %      Eosinophils Relative 2 %      Basophils Relative 0 %      Absolute Neutrophils 3.08 Thousands/µL      Absolute Immature Grans 0.03 Thousand/uL      Absolute Lymphocytes 1.23 Thousands/µL      Absolute Monocytes 0.40 Thousand/µL      Eosinophils Absolute 0.08 Thousand/µL      Basophils Absolute 0.02 Thousands/µL             No orders to display       Procedures    ED Medication and Procedure Management   Prior to Admission Medications   Prescriptions Last Dose Informant Patient Reported? Taking?   Cholecalciferol (VITAMIN D3) 1,000 units tablet   Yes No   Sig: Take 1,000 Units by mouth daily   MAGNESIUM PO   Yes No   Sig: Take by mouth in the morning   Omega-3 Fatty Acids (FISH OIL) 1,000 mg  Self Yes No   Si,000 mg daily   aspirin 81 mg chewable tablet   No No   Sig: Chew 1 tablet (81 mg total) daily Do not start before May 30, 2023.   calcium carbonate (Tums) 500 mg chewable tablet   Yes No   Sig: Chew 500 mg Three times daily as needed   eplerenone (INSPRA) 25 mg tablet   No No   Sig: Take 1 tablet (25 mg total) by mouth daily   labetalol (NORMODYNE) 200 mg tablet   Yes No   Sig: Take 200 mg by mouth 2 (two) times a day   levothyroxine 100 mcg tablet   No No   Sig: Take 1 tablet (100 mcg total) by mouth daily   losartan (COZAAR) 100 MG tablet   Yes No   Sig: Take 1 tablet by mouth in the morning      Facility-Administered Medications: None     Discharge Medication List as of 2025  8:06 PM        START taking these medications    Details   dicyclomine (BENTYL) 20 mg tablet Take 1 tablet (20 mg total) by  mouth 2 (two) times a day as needed (diarrhea), Starting Tue 1/7/2025, Normal           CONTINUE these medications which have NOT CHANGED    Details   aspirin 81 mg chewable tablet Chew 1 tablet (81 mg total) daily Do not start before May 30, 2023., Starting Tue 5/30/2023, Until Wed 12/4/2024, Normal      calcium carbonate (Tums) 500 mg chewable tablet Chew 500 mg Three times daily as needed, Starting Fri 6/14/2024, Until Sat 6/14/2025 at 2359, Historical Med      Cholecalciferol (VITAMIN D3) 1,000 units tablet Take 1,000 Units by mouth daily, Historical Med      eplerenone (INSPRA) 25 mg tablet Take 1 tablet (25 mg total) by mouth daily, Starting Wed 12/4/2024, Until Thu 12/4/2025, Normal      labetalol (NORMODYNE) 200 mg tablet Take 200 mg by mouth 2 (two) times a day, Historical Med      levothyroxine 100 mcg tablet Take 1 tablet (100 mcg total) by mouth daily, Starting Wed 12/4/2024, Until Thu 12/4/2025, Normal      losartan (COZAAR) 100 MG tablet Take 1 tablet by mouth in the morning, Starting Tue 10/29/2024, Historical Med      MAGNESIUM PO Take by mouth in the morning, Historical Med      Omega-3 Fatty Acids (FISH OIL) 1,000 mg 1,000 mg daily, Historical Med           No discharge procedures on file.  ED SEPSIS DOCUMENTATION   Time reflects when diagnosis was documented in both MDM as applicable and the Disposition within this note       Time User Action Codes Description Comment    1/7/2025  6:45 PM Javier Ortega Add [R19.7] Diarrhea                  Javier Ortega MD  01/07/25 2035

## 2025-02-10 ENCOUNTER — APPOINTMENT (OUTPATIENT)
Dept: LAB | Facility: HOSPITAL | Age: 50
End: 2025-02-10
Attending: STUDENT IN AN ORGANIZED HEALTH CARE EDUCATION/TRAINING PROGRAM
Payer: COMMERCIAL

## 2025-02-10 ENCOUNTER — RESULTS FOLLOW-UP (OUTPATIENT)
Dept: ENDOCRINOLOGY | Facility: CLINIC | Age: 50
End: 2025-02-10

## 2025-02-10 DIAGNOSIS — E27.9 ADRENAL NODULE (HCC): ICD-10-CM

## 2025-02-10 LAB — CORTIS AM PEAK SERPL-MCNC: 1.2 UG/DL (ref 6.7–22.6)

## 2025-02-10 PROCEDURE — 80299 QUANTITATIVE ASSAY DRUG: CPT

## 2025-02-10 PROCEDURE — 36415 COLL VENOUS BLD VENIPUNCTURE: CPT

## 2025-02-18 LAB — DEXAMETHASONE SERPL-MCNC: 639 NG/DL

## 2025-03-10 DIAGNOSIS — E27.9 ADRENAL NODULE (HCC): Primary | ICD-10-CM

## 2025-04-03 ENCOUNTER — TELEPHONE (OUTPATIENT)
Age: 50
End: 2025-04-03

## 2025-04-03 NOTE — TELEPHONE ENCOUNTER
Patient called asking if labs are ordered.Made aware yes labs are ordered and:    This is a patient instruction: Patient should be ambulatory 30 minutes before specimen collection for this test. Patient should be taken off medications at least three weeks prior to sample collection.     Patient verbalize understanding. States she spoke to provider and does not need to stop any medications.

## 2025-04-05 ENCOUNTER — APPOINTMENT (OUTPATIENT)
Dept: LAB | Facility: HOSPITAL | Age: 50
End: 2025-04-05
Payer: COMMERCIAL

## 2025-04-05 ENCOUNTER — APPOINTMENT (EMERGENCY)
Dept: CT IMAGING | Facility: HOSPITAL | Age: 50
End: 2025-04-05
Payer: COMMERCIAL

## 2025-04-05 ENCOUNTER — APPOINTMENT (EMERGENCY)
Dept: RADIOLOGY | Facility: HOSPITAL | Age: 50
End: 2025-04-05
Payer: COMMERCIAL

## 2025-04-05 ENCOUNTER — HOSPITAL ENCOUNTER (EMERGENCY)
Facility: HOSPITAL | Age: 50
Discharge: HOME | End: 2025-04-05
Attending: EMERGENCY MEDICINE
Payer: COMMERCIAL

## 2025-04-05 VITALS
OXYGEN SATURATION: 98 % | RESPIRATION RATE: 18 BRPM | DIASTOLIC BLOOD PRESSURE: 83 MMHG | SYSTOLIC BLOOD PRESSURE: 165 MMHG | TEMPERATURE: 98 F | HEART RATE: 58 BPM

## 2025-04-05 DIAGNOSIS — M54.50 LOW BACK PAIN: Primary | ICD-10-CM

## 2025-04-05 DIAGNOSIS — M25.562 LEFT KNEE PAIN: ICD-10-CM

## 2025-04-05 DIAGNOSIS — E27.9 ADRENAL NODULE (HCC): ICD-10-CM

## 2025-04-05 PROCEDURE — 82088 ASSAY OF ALDOSTERONE: CPT

## 2025-04-05 PROCEDURE — 36415 COLL VENOUS BLD VENIPUNCTURE: CPT

## 2025-04-05 PROCEDURE — 72131 CT LUMBAR SPINE W/O DYE: CPT

## 2025-04-05 PROCEDURE — 84244 ASSAY OF RENIN: CPT

## 2025-04-05 PROCEDURE — 99284 EMERGENCY DEPT VISIT MOD MDM: CPT

## 2025-04-05 PROCEDURE — 73564 X-RAY EXAM KNEE 4 OR MORE: CPT

## 2025-04-05 RX ORDER — IBUPROFEN 600 MG/1
600 TABLET, FILM COATED ORAL EVERY 6 HOURS PRN
Qty: 30 TABLET | Refills: 0 | Status: SHIPPED | OUTPATIENT
Start: 2025-04-05

## 2025-04-05 RX ORDER — METHOCARBAMOL 500 MG/1
500 TABLET, FILM COATED ORAL 2 TIMES DAILY
Qty: 20 TABLET | Refills: 0 | Status: SHIPPED | OUTPATIENT
Start: 2025-04-05

## 2025-04-05 RX ORDER — ACETAMINOPHEN 500 MG
1000 TABLET ORAL EVERY 8 HOURS
Qty: 40 TABLET | Refills: 0 | Status: SHIPPED | OUTPATIENT
Start: 2025-04-05

## 2025-04-05 NOTE — DISCHARGE INSTRUCTIONS
Today I provided prescription for Tylenol, ibuprofen, Robaxin.  Take as directed for low back pain, left knee pain.  For the left knee pain provided referral to orthopedics.  Follow-up with orthopedics for further evaluation.    For the low back pain provided referral to the comprehensive spine program.  Modify activity for comfort.

## 2025-04-05 NOTE — ED PROVIDER NOTES
Time reflects when diagnosis was documented in both MDM as applicable and the Disposition within this note       Time User Action Codes Description Comment    4/5/2025  3:35 PM Nigel Siu Add [M54.50] Low back pain     4/5/2025  3:36 PM Nigel Siu Add [M25.562] Left knee pain           ED Disposition       ED Disposition   Discharge    Condition   Stable    Date/Time   Sat Apr 5, 2025  3:35 PM    Comment   Della Hillman discharge to home/self care.                   Assessment & Plan       Medical Decision Making  49-year-old female presents to ED for evaluation of left knee pain, low back pain as seen in HPI.  On physical examination patient vital signs stable.  Normotensive.  Afebrile.  Nontachycardic.  Nonhypoxic.  Appears uncomfortable secondary to low back pain, left knee pain.  Nontoxic-appearing.  No murmur.  Normal breath sounds.  Tender to palpation of the lumbar midline.  No overlying skin changes at site of pain.  Left knee with tenderness to palpation over anterior aspect of knee.  Full passive and active range of motion of left knee.  Patient visibly in pain with extension of left knee.  Left knee without erythema, warmth.  Ambulating independently.  Moving all 4 extremities.  Full strength of all 4 extremities.  Denies acute loss of bowel or bladder continence.  Denies saddle anesthesia.  Obtaining workup consisting of x-ray of left knee, CT lumbar spine without contrast.   Differential diagnosis includes but not limited to acute on chronic low back pain, back strain, vertebral contusion, knee contusion, meniscal injury, ACL injury, MCL injury, LCL injury    X-ray of left knee without acute osseous abnormality.  CT lumbar spine without contrast returned without acute findings.  Does have some degenerative changes of the low back which could explain her recurrent low back pain.  Discussed results of workup with patient.  Plan to discharge with referral to comprehensive spine program,  "orthopedics.  Providing prescription for Tylenol, ibuprofen, Robaxin.     Prior to discharge, discharge instructions were discussed with patient at bedside. Patient was provided both verbal and written instructions. Patient is understanding of the discharge instructions and is agreeable to plan of care. Return precautions were discussed with patient bedside, patient verbalized understanding of signs and symptoms that would necessitate return to the ED. All questions were answered. Patient was comfortable with the plan of care and discharged to home.    Portions of this chart may have been written with voice recognition software.  Occasional grammatical errors, wrong word or \"sound a like\" substitutions may have occurred due to software limitations.  Please read carefully and use context to recognize where substitutions have occurred.     Amount and/or Complexity of Data Reviewed  Radiology: ordered and independent interpretation performed.    Risk  OTC drugs.  Prescription drug management.             Medications - No data to display    ED Risk Strat Scores                                                History of Present Illness       Chief Complaint   Patient presents with    Knee Pain     States that she fell in January on ice. States that the last few days her left knee has been bothering her as well as lower lumbar back pain.        Past Medical History:   Diagnosis Date    Abnormal Pap smear of cervix     Acute hip pain     tristin    Cancer (HCC)     cervix- had chemo    COVID     Feb 2022    Heart palpitations     frequent -7/20 instructed to call cardiology to report    Hypertension     Hypokalemia 04/26/2019    Low back pain     Lymphadenopathy     Pelvic pain     Sepsis (HCC) 06/25/2022    Wears contact lenses     Wears dentures     partial lower      Past Surgical History:   Procedure Laterality Date    FL RETROGRADE PYELOGRAM  06/25/2022    FL RETROGRADE PYELOGRAM  07/28/2022    DC [facility] INCL FLUOR GDNCE " DX W/CELL WASHG SPX N/A 2020    Procedure: BRONCHOSCOPY FLEXIBLE;  Surgeon: Paolo Rushing MD;  [facility]: [facility];  Service: Thoracic    IN BRONCHOSCOPY NEEDLE BX TRACHEA MAIN STEM&/BRON N/A 2020    Procedure: ENDOBRONCHIAL ULTRASOUND (EBUS);  Surgeon: Paolo Rushing MD;  Location: [facility];  Service: Thoracic    IN CYSTO/URETERO W/LITHOTRIPSY &INDWELL STENT INSRT Right 2022    Procedure: CYSTOSCOPY URETEROSCOPY WITH LITHOTRIPSY HOLMIUM LASER, RETROGRADE PYELOGRAM AND RIGHT INSERTION STENT URETERAL;  Surgeon: Octavio Chapman MD;  Location: [facility];  Service: Urology    IN CYSTOURETHROSCOPY W/URETERAL CATHETERIZATION Right 2022    Procedure: CYSTOSCOPY RETROGRADE PYELOGRAM WITH INSERTION STENT URETERAL;  Surgeon: Octavio Chapman MD;  [facility];  Service: Urology    IN DILATION VAGINA W/ANESTHESIA OTHER THAN LOCAL N/A 2019    Procedure: EXAM UNDER ANESTHESIA (EUA);  Surgeon: Remi Singletary MD;  [facility];  Service: Gynecology Oncology    IN INSERTION VAGINAL RADIATION DEVICE N/A 2019    Procedure: PIERRE SLEEVE PLACEMENT;  Surgeon: Remi Singletary MD;  [facility];  Service: Gynecology Oncology    THYROIDECTOMY      TONSILLECTOMY      US GUIDANCE  2019      Family History   Problem Relation Age of Onset    Uterine cancer Maternal Grandmother     Heart disease Mother     Canavan disease Father     Cancer Father     Diabetes unspecified Paternal Grandmother       Social History     Tobacco Use    Smoking status: Former     Current packs/day: 0.00     Average packs/day: 0.5 packs/day for 1 year (0.5 ttl pk-yrs)     Types: Cigarettes     Start date: 2019     Quit date: 2020     Years since quittin.1     Passive exposure: Past    Smokeless tobacco: Never    Tobacco comments:     vapes occ nicotene   Vaping Use    Vaping status: Every Day    Substances: Nicotine   Substance Use Topics    Alcohol use: Not Currently      Comment: occ    Drug use: Not Currently     Types: Marijuana     Comment: THC gummies at bedside      E-Cigarette/Vaping    E-Cigarette Use Current Every Day User       E-Cigarette/Vaping Substances    Nicotine Yes     THC No     CBD No     Flavoring No     Other No     Unknown No       I have reviewed and agree with the history as documented.     49-year-old female with history of chronic low back pain, hypertension presents to ED for evaluation of low back pain, left knee pain.  Patient states that in January she slipped and fell on ice.  She landed directly on her buttocks.  Subsequently had low back pain, left knee pain after the fall.  Has been managing supportively at home.  In the past couple days the low back pain and left knee pain has flared up.  Has taken ibuprofen for the pain.  Denies acute loss of bowel or bladder continence.  Denies seizure.  Denies fever, chills, shortness of breath, chest pain, dysuria, hematuria, increased urinary frequency.  The pain is located in the anterior aspect of the knee.  States it feels like the causes in the inside of the knee.  Pain worsens with knee extension. Last night the pain was bad enough to where she could not sleep which prompted presentation to ED. Denies any redness, swelling, warmth of the knee.  No other concerns today.         Review of Systems   Musculoskeletal:  Positive for back pain.        Positive left knee pain   All other systems reviewed and are negative.          Objective       ED Triage Vitals [04/05/25 1208]   Temperature Pulse Blood Pressure Respirations SpO2 Patient Position - Orthostatic VS   98 °F (36.7 °C) 58 165/83 18 98 % --      Temp Source Heart Rate Source BP Location FiO2 (%) Pain Score    Oral Monitor Left arm -- --      Vitals      Date and Time Temp Pulse SpO2 Resp BP Pain Score FACES Pain Rating User   04/05/25 1208 98 °F (36.7 °C) 58 98 % 18 165/83 -- -- MI            Physical Exam  Vitals and nursing note reviewed.    Constitutional:       General: She is not in acute distress.     Appearance: She is well-developed. She is not toxic-appearing or diaphoretic.      Comments: Patient laying on her right side for comfort.   HENT:      Head: Normocephalic and atraumatic.   Eyes:      Conjunctiva/sclera: Conjunctivae normal.   Cardiovascular:      Rate and Rhythm: Normal rate and regular rhythm.      Pulses: Normal pulses.      Heart sounds: Normal heart sounds. No murmur heard.     No friction rub. No gallop.   Pulmonary:      Effort: Pulmonary effort is normal. No respiratory distress.      Breath sounds: Normal breath sounds. No stridor. No wheezing, rhonchi or rales.   Abdominal:      Palpations: Abdomen is soft.      Tenderness: There is no abdominal tenderness.   Musculoskeletal:      Cervical back: Neck supple.      Lumbar back: Tenderness and bony tenderness present.      Right knee: Normal.      Left knee: Bony tenderness present. No swelling, deformity, effusion, erythema, ecchymosis, lacerations or crepitus. Normal range of motion. Tenderness present.   Skin:     General: Skin is warm and dry.      Capillary Refill: Capillary refill takes less than 2 seconds.      Coloration: Skin is not jaundiced or pale.      Findings: No rash.   Neurological:      Mental Status: She is alert.   Psychiatric:         Mood and Affect: Mood normal.         Results Reviewed       None            CT lumbar spine without contrast   Final Interpretation by Yunier Montes MD (04/05 1530)      1. No acute fracture or traumatic malalignment.      2. Mild lumbar spine degenerative disc disease, most prominent at L4-L5 and L5-S1. No canal or foraminal stenosis.      3. Nonobstructing bilateral tiny (sub-3 mm) renal calculi.      Resident: SUNDEEP CMAPOS I, the attending radiologist, have reviewed the images and agree with the final report above.      Workstation performed: [id number]         XR knee 4+ views left injury   ED  Interpretation by Nigel Siu PA-C ( [id number])   No fracture on personal interpretation.       Final Interpretation by Zbigniew Cosby MD ( [id number])      No acute osseous abnormality.         Computerized Assisted Algorithm (CAA) may have been used to analyze all applicable images.         Workstation performed: [id number]             Procedures    ED Medication and Procedure Management   Prior to Admission Medications   Prescriptions Last Dose Informant Patient Reported? Taking?   Cholecalciferol (VITAMIN D3) 1,000 units tablet   Yes No   Sig: Take 1,000 Units by mouth daily   MAGNESIUM PO   Yes No   Sig: Take by mouth in the morning   Omega-3 Fatty Acids (FISH OIL) 1,000 mg  Self Yes No   Si,000 mg daily   aspirin 81 mg chewable tablet   No No   Sig: Chew 1 tablet (81 mg total) daily Do not start before May 30, 2023.   calcium carbonate (Tums) 500 mg chewable tablet   Yes No   Sig: Chew 500 mg Three times daily as needed   dicyclomine (BENTYL) 20 mg tablet   No No   Sig: Take 1 tablet (20 mg total) by mouth 2 (two) times a day as needed (diarrhea)   eplerenone (INSPRA) 25 mg tablet   No No   Sig: Take 1 tablet (25 mg total) by mouth daily   labetalol (NORMODYNE) 200 mg tablet   Yes No   Sig: Take 200 mg by mouth 2 (two) times a day   levothyroxine 100 mcg tablet   No No   Sig: Take 1 tablet (100 mcg total) by mouth daily   losartan (COZAAR) 100 MG tablet   Yes No   Sig: Take 1 tablet by mouth in the morning      Facility-Administered Medications: None     Discharge Medication List as of 2025  3:38 PM        START taking these medications    Details   acetaminophen (TYLENOL) 500 mg tablet Take 2 tablets (1,000 mg total) by mouth every 8 (eight) hours, Starting Sat 2025, Normal      ibuprofen (MOTRIN) 600 mg tablet Take 1 tablet (600 mg total) by mouth every 6 (six) hours as needed for mild pain, Starting Sat 2025, Normal      methocarbamol (ROBAXIN) 500 mg tablet Take 1 tablet  (500 mg total) by mouth 2 (two) times a day, Starting Sat 4/5/2025, Normal           CONTINUE these medications which have NOT CHANGED    Details   aspirin 81 mg chewable tablet Chew 1 tablet (81 mg total) daily Do not start before May 30, 2023., Starting Tue 5/30/2023, Until Wed 12/4/2024, Normal      calcium carbonate (Tums) 500 mg chewable tablet Chew 500 mg Three times daily as needed, Starting Fri 6/14/2024, Until Sat 6/14/2025 at 2359, Historical Med      Cholecalciferol (VITAMIN D3) 1,000 units tablet Take 1,000 Units by mouth daily, Historical Med      dicyclomine (BENTYL) 20 mg tablet Take 1 tablet (20 mg total) by mouth 2 (two) times a day as needed (diarrhea), Starting Tue 1/7/2025, Normal      eplerenone (INSPRA) 25 mg tablet Take 1 tablet (25 mg total) by mouth daily, Starting Wed 12/4/2024, Until Thu 12/4/2025, Normal      labetalol (NORMODYNE) 200 mg tablet Take 200 mg by mouth 2 (two) times a day, Historical Med      levothyroxine 100 mcg tablet Take 1 tablet (100 mcg total) by mouth daily, Starting Wed 12/4/2024, Until Thu 12/4/2025, Normal      losartan (COZAAR) 100 MG tablet Take 1 tablet by mouth in the morning, Starting Tue 10/29/2024, Historical Med      MAGNESIUM PO Take by mouth in the morning, Historical Med      Omega-3 Fatty Acids (FISH OIL) 1,000 mg 1,000 mg daily, Historical Med             ED SEPSIS DOCUMENTATION   Time reflects when diagnosis was documented in both MDM as applicable and the Disposition within this note       Time User Action Codes Description Comment    4/5/2025  3:35 PM Nigel Siu [M54.50] Low back pain     4/5/2025  3:36 PM Nigel Siu [M25.562] Left knee pain                  Nigel Siu PA-C  04/07/25 0222

## 2025-04-07 ENCOUNTER — TELEPHONE (OUTPATIENT)
Dept: PHYSICAL THERAPY | Facility: OTHER | Age: 50
End: 2025-04-07

## 2025-04-07 NOTE — TELEPHONE ENCOUNTER
Call placed to the patient per [facility] referral.    Spoke to the patient. She is interested in PT for her back, however does not have transportation. I encouraged her to keep our phone number should she change her mind. She may call for triage at any time    closed

## 2025-04-10 ENCOUNTER — TELEPHONE (OUTPATIENT)
Age: 50
End: 2025-04-10

## 2025-04-10 LAB — ALDOST SERPL-MCNC: 12 NG/DL (ref 0–30)

## 2025-04-10 NOTE — TELEPHONE ENCOUNTER
"Patient calling in regards to virtual appointment request for 4/11    States \"I don't have a way there tomorrow. I live over an hour away and my car is not good. I also saw my labs are not even back so I wasn't sure if my appointment would still happen.\"    Please advise patient if virtual appointment is approved or please assist patient with rescheduling as she is unable to make it physically.  "

## 2025-04-11 ENCOUNTER — RESULTS FOLLOW-UP (OUTPATIENT)
Dept: ENDOCRINOLOGY | Facility: CLINIC | Age: 50
End: 2025-04-11

## 2025-04-11 ENCOUNTER — TELEMEDICINE (OUTPATIENT)
Dept: ENDOCRINOLOGY | Facility: CLINIC | Age: 50
End: 2025-04-11
Payer: COMMERCIAL

## 2025-04-11 DIAGNOSIS — Z90.89 S/P THYROIDECTOMY: ICD-10-CM

## 2025-04-11 DIAGNOSIS — E89.0 POST-OPERATIVE HYPOTHYROIDISM: ICD-10-CM

## 2025-04-11 DIAGNOSIS — Z86.39 HISTORY OF PRIMARY HYPERPARATHYROIDISM: ICD-10-CM

## 2025-04-11 DIAGNOSIS — Z98.890 S/P THYROIDECTOMY: ICD-10-CM

## 2025-04-11 DIAGNOSIS — D35.02 ADENOMA OF LEFT ADRENAL GLAND: Primary | ICD-10-CM

## 2025-04-11 LAB — RENIN PLAS-CCNC: 0.33 NG/ML/HR (ref 0.17–5.38)

## 2025-04-11 PROCEDURE — 99214 OFFICE O/P EST MOD 30 MIN: CPT | Performed by: STUDENT IN AN ORGANIZED HEALTH CARE EDUCATION/TRAINING PROGRAM

## 2025-04-11 RX ORDER — LEVOTHYROXINE SODIUM 100 UG/1
100 TABLET ORAL DAILY
Qty: 90 TABLET | Refills: 2 | Status: SHIPPED | OUTPATIENT
Start: 2025-04-11 | End: 2026-04-11

## 2025-04-11 NOTE — PROGRESS NOTES
Virtual Regular VisitName: Della Hillman      : 1975      MRN: [id number]  Encounter Provider: Irving Kumari MD  Encounter Date: 2025   Encounter department: [facility] [place]  :  Assessment & Plan  S/P thyroidectomy    Orders:  •  levothyroxine 100 mcg tablet; Take 1 tablet (100 mcg total) by mouth daily    Post-operative hypothyroidism  She is currently on levothyroxine 100 mcg daily. The last blood work from last year showed normal TSH levels, indicating the current dose is effective.    Orders:  •  levothyroxine 100 mcg tablet; Take 1 tablet (100 mcg total) by mouth daily    Adenoma of left adrenal gland  Low-density 1.3 x 1.4 cm left adrenal nodule which demonstrates unenhanced attenuation and delayed phase washout characteristics compatible with an adenoma. Right adrenal gland is unremarkable.   Mild autonomous cortisol secretion was ruled out, as low dose dexamethasone suppression test showed an appropriate response, indicating no excess cortisol production.   Evaluation for hyperaldosteronism is in process although it may interfered by aldosterone antagonist, eplerenone, ( could not get hold of the team to get clearance switching to other class with minimal effect on renin angiotension system)  The results will be reviewed upon receipt, and further tests will be ordered if necessary. If the adrenal adenoma is confirmed to be producing excess aldosterone, surgical removal may be considered. If not, alternative medications will be discussed with her primary care physician and cardiologist.        Follow-up  The patient will follow up in 6 months.      History of primary hyperparathyroidism  She has a history of high calcium and hyperparathyroidism, for which she underwent right inferior parathyroid gland removal in 2024. Pathology revealed a parathyroid adenoma. Post-surgery, her calcium and PTH levels have normalized, indicating effective treatment.                 Chief Complaint   Patient presents with   • Follow-up   • Virtual Regular Visit   :      History of Present Illness   History of Present Illness    Della Hillman is a 49 y.o. female presents via virtual visit for evaluation of post operative hypothyroidism, history of primary hyperparathyroidism, and adrenal nodule.    She expresses a desire to discontinue her current medication regimen Eplerenone due to the adverse effects she is experiencing. She believes that eplerenone induces joint stiffness, significantly impairing her mobility to the extent that she is unable to walk around the block or maintain employment. She was previously on spironolactone before transitioning to eplerenone. She attributes her inability to perform daily household chores without experiencing pain to her medication, as there have been no other changes in her life. She was following with Dr Morillo, and last visit was in 2023, who initiated her on eplerenone therapy due to non-obstructive cardiomyopathy and or possible hyperaldosteronism given adrenal adenoma.  She has a history of Graves' disease and underwent thyroidectomy. She is currently on levothyroxine 100 mcg daily.     She also has a history of hypercalcemia and  primary hyperparathyroidism, for which she underwent right inferior parathyroid gland removal in 06/2024.      Pertinent Medical History           Review of Systems as per [facility]  Medical History Reviewed by provider this encounter:     .  Current Outpatient Medications on File Prior to Visit   Medication Sig Dispense Refill   • aspirin 81 mg chewable tablet Chew 1 tablet (81 mg total) daily Do not start before May 30, 2023. 30 tablet 0   • calcium carbonate (Tums) 500 mg chewable tablet Chew 500 mg Three times daily as needed     • dicyclomine (BENTYL) 20 mg tablet Take 1 tablet (20 mg total) by mouth 2 (two) times a day as needed (diarrhea) 20 tablet 0   • eplerenone (INSPRA) 25 mg tablet Take 1 tablet (25 mg  total) by mouth daily 90 tablet 0   • labetalol (NORMODYNE) 200 mg tablet Take 200 mg by mouth 2 (two) times a day     • losartan (COZAAR) 100 MG tablet Take 1 tablet by mouth in the morning     • methocarbamol (ROBAXIN) 500 mg tablet Take 1 tablet (500 mg total) by mouth 2 (two) times a day 20 tablet 0   • Omega-3 Fatty Acids (FISH OIL) 1,000 mg 1,000 mg daily     • [DISCONTINUED] levothyroxine 100 mcg tablet Take 1 tablet (100 mcg total) by mouth daily 90 tablet 2   • acetaminophen (TYLENOL) 500 mg tablet Take 2 tablets (1,000 mg total) by mouth every 8 (eight) hours (Patient not taking: Reported on 4/11/2025) 40 tablet 0   • Cholecalciferol (VITAMIN D3) 1,000 units tablet Take 1,000 Units by mouth daily (Patient not taking: Reported on 4/11/2025)     • ibuprofen (MOTRIN) 600 mg tablet Take 1 tablet (600 mg total) by mouth every 6 (six) hours as needed for mild pain (Patient not taking: Reported on 4/11/2025) 30 tablet 0   • MAGNESIUM PO Take by mouth in the morning (Patient not taking: Reported on 4/11/2025)       No current facility-administered medications on file prior to visit.         Medical History Reviewed by provider this encounter:     .    Objective   LMP 04/15/2019 (Exact Date) Comment: had cervical cancer     There is no height or weight on file to calculate BMI.  Wt Readings from Last 3 Encounters:   12/04/24 78.9 kg (174 lb)   12/02/24 80 kg (176 lb 5.9 oz)   08/05/24 82.5 kg (181 lb 14.1 oz)     Physical Exam  Physical Exam      Results  Laboratory Studies  Aldosterone level is 12. TSH was normal last year. Calcium and PTH levels have normalized.    Imaging  CAT scan from October 2024 showed a small low density benign 1.4 cm left-sided nodule which is stable and benign.  Labs:   Lab Results   Component Value Date    HGBA1C 5.2 03/15/2024    HGBA1C 4.8 05/28/2023     Lab Results   Component Value Date    CREATININE 0.80 01/07/2025    CREATININE 0.83 12/02/2024    CREATININE 0.95 10/21/2024    BUN  15 01/07/2025    K 4.4 01/07/2025     01/07/2025    CO2 27 01/07/2025     GFR, Calculated   Date Value Ref Range Status   03/20/2019 96 >60 mL/min/1.73m2 Final     Comment:     mL/min per 1.73 square meters                                            Normal Function or Mild Renal    Disease (if clinically at risk):  >or=60  Moderately Decreased:                30-59  Severely Decreased:                  15-29  Renal Failure:                         <15                                            -American GFR: multiply reported GFR by 1.16    Please note that the eGFR is based on the CKD-EPI calculation, and is not intended to be used for drug dosing.                                            Note: Calculated GFR may not be an accurate indicator of renal function if the patient's renal function is not in a steady state.     eGFRcr   Date Value Ref Range Status   10/21/2024 74 >59 Final     eGFR   Date Value Ref Range Status   01/07/2025 87 ml/min/1.73sq m Final     Lab Results   Component Value Date    HDL 40 (L) 05/28/2023    TRIG 250 (H) 05/28/2023     Lab Results   Component Value Date    ALT 14 01/07/2025    AST 13 01/07/2025    ALKPHOS 53 01/07/2025     Lab Results   Component Value Date    PQT9IIQGMXJH 0.031 (L) 07/04/2022    KQU0ARULYTIG 0.017 (L) 06/27/2022    XZG3FCEVIWKP 2.096 04/14/2022     Lab Results   Component Value Date    FREET4 1.39 (H) 08/19/2024       There are no Patient Instructions on file for this visit.    Discussed with the patient and all questioned fully answered. She will call me if any problems arise.        Administrative Statements   Encounter provider Irving Kumari MD    The Patient is located at Home and in the following state in which I hold an active license [place].    The patient was identified by name and date of birth. Della Hillman was informed that this is a telemedicine visit and that the visit is being conducted through the Epic Embedded platform. She agrees to  proceed..  My office door was closed. No one else was in the room.  She acknowledged consent and understanding of privacy and security of the video platform. The patient has agreed to participate and understands they can discontinue the visit at any time.

## 2025-04-12 NOTE — ASSESSMENT & PLAN NOTE
Low-density 1.3 x 1.4 cm left adrenal nodule which demonstrates unenhanced attenuation and delayed phase washout characteristics compatible with an adenoma. Right adrenal gland is unremarkable.   Mild autonomous cortisol secretion was ruled out, as low dose dexamethasone suppression test showed an appropriate response, indicating no excess cortisol production.   Evaluation for hyperaldosteronism is in process although it may interfered by aldosterone antagonist, eplerenone, ( could not get hold of the team to get clearance switching to other class with minimal effect on renin angiotension system)  The results will be reviewed upon receipt, and further tests will be ordered if necessary. If the adrenal adenoma is confirmed to be producing excess aldosterone, surgical removal may be considered. If not, alternative medications will be discussed with her primary care physician and cardiologist.        Follow-up  The patient will follow up in 6 months.

## 2025-04-12 NOTE — ASSESSMENT & PLAN NOTE
She has a history of high calcium and hyperparathyroidism, for which she underwent right inferior parathyroid gland removal in June 2024. Pathology revealed a parathyroid adenoma. Post-surgery, her calcium and PTH levels have normalized, indicating effective treatment.

## 2025-04-15 LAB
ALDOST SERPL-MCNC: 5.6 NG/DL (ref 0–30)
ALDOST/RENIN PLAS-RTO: 28.9 {RATIO} (ref 0–30)
RENIN PLAS-CCNC: 0.19 NG/ML/HR (ref 0.17–5.38)

## 2025-04-22 NOTE — TELEPHONE ENCOUNTER
Patient called back after receiving [employer] message. She states she is very confused about the doctors message about medication. She was under the impression she would have surgery and be able to D/C medication  . Patient is asking if Dr. Kumari can call her to discuss.  Please advise

## 2025-05-06 DIAGNOSIS — I10 PRIMARY HYPERTENSION: Primary | ICD-10-CM

## 2025-05-06 RX ORDER — DILTIAZEM HYDROCHLORIDE 90 MG/1
90 CAPSULE, EXTENDED RELEASE ORAL
Qty: 90 CAPSULE | Refills: 0 | Status: SHIPPED | OUTPATIENT
Start: 2025-05-06 | End: 2025-05-06

## 2025-05-06 RX ORDER — DILTIAZEM HYDROCHLORIDE 60 MG/1
60 CAPSULE, EXTENDED RELEASE ORAL
Qty: 90 CAPSULE | Refills: 0 | Status: SHIPPED | OUTPATIENT
Start: 2025-05-06 | End: 2025-05-12

## 2025-05-08 ENCOUNTER — TELEPHONE (OUTPATIENT)
Age: 50
End: 2025-05-08

## 2025-05-08 NOTE — TELEPHONE ENCOUNTER
PA for Diltiazem 60mg 12 hr capsule SUBMITTED to [employer]    via    [x]CMM-KEY: [id number]  []Surescripts-Case ID #   []Availity-Auth ID # NDC #   []Faxed to plan   []Other website   []Phone call Case ID #     [x]PA sent as URGENT    All office notes, labs and other pertaining documents and studies sent. Clinical questions answered. Awaiting determination from insurance company.     Turnaround time for your insurance to make a decision on your Prior Authorization can take 7-21 business days.

## 2025-05-12 DIAGNOSIS — I10 PRIMARY HYPERTENSION: Primary | ICD-10-CM

## 2025-05-12 RX ORDER — DILTIAZEM HCL 90 MG
90 TABLET ORAL
Qty: 90 TABLET | Refills: 0 | Status: SHIPPED | OUTPATIENT
Start: 2025-05-12

## 2025-05-12 NOTE — TELEPHONE ENCOUNTER
Patient called asking what the status of her Diltiazem medication was? I informed her the PA was submitted on 5/8 and we are waiting for a response from her insurance company with a decision. She said her bp is jumping all over the place and is asking if the provider has any suggestions or can speed up this process?

## 2025-05-13 NOTE — TELEPHONE ENCOUNTER
Called and went over recommendations. Expressed understanding no further concern  She will keep us posted of if she can afford medication and monitor her vitals

## 2025-06-23 ENCOUNTER — TELEPHONE (OUTPATIENT)
Age: 50
End: 2025-06-23

## 2025-06-23 DIAGNOSIS — E87.6 HYPOKALEMIA: Primary | ICD-10-CM

## 2025-06-23 NOTE — TELEPHONE ENCOUNTER
Received a call from the patient stating Dr. Kumari informed her that about a month after changing her medication, there would be addition testing to be done. It has been about 2 weeks after the medication change and she is asking for any additional testing to be ordered. Once done, it is ok to notify her via her MyChart. Thanks!

## 2025-07-17 ENCOUNTER — TELEPHONE (OUTPATIENT)
Dept: INFUSION CENTER | Facility: CLINIC | Age: 50
End: 2025-07-17

## 2025-07-17 NOTE — TELEPHONE ENCOUNTER
New patient phone call made. Reviewed appointment date/time, policies, procedures and no show policy of infusion center. All questions answered at this time.

## 2025-07-21 ENCOUNTER — HOSPITAL ENCOUNTER (OUTPATIENT)
Dept: INFUSION CENTER | Facility: CLINIC | Age: 50
Discharge: HOME | End: 2025-07-21
Attending: STUDENT IN AN ORGANIZED HEALTH CARE EDUCATION/TRAINING PROGRAM
Payer: COMMERCIAL

## 2025-07-21 VITALS
HEART RATE: 58 BPM | DIASTOLIC BLOOD PRESSURE: 57 MMHG | RESPIRATION RATE: 18 BRPM | SYSTOLIC BLOOD PRESSURE: 106 MMHG | OXYGEN SATURATION: 97 % | TEMPERATURE: 98.5 F

## 2025-07-21 DIAGNOSIS — E87.6 HYPOKALEMIA: Primary | ICD-10-CM

## 2025-07-21 PROCEDURE — 96361 HYDRATE IV INFUSION ADD-ON: CPT

## 2025-07-21 PROCEDURE — 96360 HYDRATION IV INFUSION INIT: CPT

## 2025-07-21 PROCEDURE — 84244 ASSAY OF RENIN: CPT | Performed by: STUDENT IN AN ORGANIZED HEALTH CARE EDUCATION/TRAINING PROGRAM

## 2025-07-21 PROCEDURE — 82088 ASSAY OF ALDOSTERONE: CPT | Performed by: STUDENT IN AN ORGANIZED HEALTH CARE EDUCATION/TRAINING PROGRAM

## 2025-07-21 RX ADMIN — SODIUM CHLORIDE 2000 ML: 0.9 INJECTION, SOLUTION INTRAVENOUS at 08:37

## 2025-07-21 NOTE — PROGRESS NOTES
Pt into clinic for saline stimulation test. Pt offers no complaints.     Pt sitting for 15 minutes prior to drawing baseline labs. Pt educated to not stand up during duration of 4 hour saline infusion except for using the restroom. Pt tolerated infusion without reaction. Labs collected while pt is in sitting position after 4 hour saline infusion was completed. PIV removed.     Pt aware this is her only infusion appointment. AVS declined.

## 2025-07-24 ENCOUNTER — OFFICE VISIT (OUTPATIENT)
Dept: OBGYN CLINIC | Facility: CLINIC | Age: 50
End: 2025-07-24
Payer: COMMERCIAL

## 2025-07-24 VITALS — BODY MASS INDEX: 35.91 KG/M2 | WEIGHT: 190.2 LBS | HEIGHT: 61 IN

## 2025-07-24 DIAGNOSIS — G89.29 CHRONIC LOW BACK PAIN WITHOUT SCIATICA, UNSPECIFIED BACK PAIN LATERALITY: ICD-10-CM

## 2025-07-24 DIAGNOSIS — M54.50 CHRONIC LOW BACK PAIN WITHOUT SCIATICA, UNSPECIFIED BACK PAIN LATERALITY: ICD-10-CM

## 2025-07-24 DIAGNOSIS — M25.562 LEFT KNEE PAIN: ICD-10-CM

## 2025-07-24 DIAGNOSIS — M22.2X2 PATELLOFEMORAL PAIN SYNDROME OF LEFT KNEE: Primary | ICD-10-CM

## 2025-07-24 PROCEDURE — 99203 OFFICE O/P NEW LOW 30 MIN: CPT | Performed by: ORTHOPAEDIC SURGERY

## 2025-07-24 NOTE — PROGRESS NOTES
Name: Della Hillman      : 1975      MRN: [id number]  Encounter Provider: Paolo Cole DO  Encounter Date: 2025   Encounter department: [facility] [place]  :  Assessment & Plan  Left knee pain  Physical therapy ordered.  Follow-up in 3 months.  Orders:    Ambulatory Referral to Orthopedic Surgery    Ambulatory referral to Physical Therapy; Future    Patellofemoral pain syndrome of left knee  Physical therapy ordered.  Follow-up in 3 months.  Orders:    Ambulatory referral to Physical Therapy; Future    Chronic low back pain without sciatica, unspecified back pain laterality  Physical therapy ordered.  Follow-up in 3 months.  Orders:    Ambulatory referral to Physical Therapy; Future        Left knee patellofemoral pain, axial low back pain with early degenerative changes  X-rays and CT scan reviewed in office today with patient  Nonoperative treatments were discussed including oral analgesics, activity modification, formal physical therapy.  Prescription was placed for formal physical therapy to work on range of motion, core, and short arc quadricep strengthening.  I will see the patient back in 3 months for repeat evaluation.  If symptoms persist or worsen we will consider further diagnostic imaging including weightbearing x-rays of the left knee.    Chief Complaint     Left knee and low back pain    History of the Present Illness   History of Present Illness   HPI   Della Hillman is a 49 y.o. female with Left knee and low back pain.  She states that she had a slip and fall on ice in January which exacerbated her symptoms.  Back pain is located in the center of her lower back.  Pain is worse with forward flexion of her lumbar spine.  She denies any radiation of pain.  She denies any numbness or tingling or weakness.  Patient reports her knee pain is primarily in the anterior aspect of her knee.  She denies any locking or sense of instability.  Pain is worse with  "pushing off the left knee.  She does have pain with ascending and descending stairs as well.  She reports recent weight gain due to endocrine issues which she believes is aggravating both her low back and her left knee.  She does take over-the-counter analgesics intermittently for pain.  No other new complaints.    Review of Systems     Review of Systems    Physical Exam   Objective   Ht 5' 1\" (1.549 m)   Wt 86.3 kg (190 lb 3.2 oz)   LMP 04/15/2019 (Exact Date) Comment: had cervical cancer  BMI 35.94 kg/m²        Left  Knee  Range of motion from 0 to 130.    There is no effusion.    There is no tenderness over the medial or lateral joint lines.    There is 4+/5 quadriceps strength and normal tone.    The patient is able to perform a straight leg raise.    positive  patellar grind test.  Anterior drawer tests is negative  negative Lachman Test.   Posterior drawer test is   negative   Varus stress testing reveals no instability at 0 and 30 degrees   Valgus stress testing reveals no instability at 0 and 30 degrees  Lesvia's testing is negative   The patient is neurovascular intact distally.    Lumbar spine  There is no midline tenderness present.    There is mild bilateral paraspinal tenderness.    Sensation intact to light touch left L2 through S1 dermatomes.   Sensation intact to light touch right L2 through S1 dermatomes   Left Motor: 5/5 iliopsoas, 5/5 quadriceps, 5/5 tibialis anterior, 5/5 extensor hallucis longus, and 5/5 gastrocsoleus.   Right Motor: 5/5 iliopsoas, 5/5 quadriceps, /5 tibialis anterior, 5/5 extensor hallucis longus, and 5/5 gastrocsoleus.   Straight leg raise test is negative Bilaterally  The patient is well perfused distally.        Eyes:  Anicteric sclerae.  Neck:  Supple.  Lungs:  Normal respiratory effort.  Cardiovascular:  Capillary refill is less than 2 seconds.  Skin:  Intact without erythema.  Neurologic:  Sensation grossly intact to light touch.  Psychiatric:  Mood and affect are " appropriate.    Data Review     I have personally reviewed pertinent films in PACS, and my interpretation follows:    Nonweightbearing x-rays taken 4/5/2025 of Right knee independently reviewed and demonstrate no fracture or dislocation.  Joint spaces well-maintained.    CT of lumbar spine from 4/5/2025 independently reviewed displays no fracture or dislocation.  Mild lower lumbar degenerative changes noted.    ER notes from 4/5/2025 reviewed.    Lab Results   Component Value Date/Time    HGBA1C 5.2 03/15/2024 12:18 PM    ESR 20 11/27/2019 11:34 AM    CRP <3.0 11/27/2019 11:34 AM       Past Medical History[1]    Past Surgical History[2]    Allergies[3]    Medications Ordered Prior to Encounter[4]    Social History[5]    Family History[6]          Procedures Performed     Procedures  None      Paolo Cole DO          [1]   Past Medical History:  Diagnosis Date    Abnormal Pap smear of cervix     Acute hip pain     tristin    Cancer (HCC)     cervix- had chemo    COVID     Feb 2022    Heart palpitations     frequent -7/20 instructed to call cardiology to report    Hypertension     Hypokalemia 04/26/2019    Low back pain     Lymphadenopathy     Pelvic pain     Sepsis (HCC) 06/25/2022    Wears contact lenses     Wears dentures     partial lower   [2]   Past Surgical History:  Procedure Laterality Date    FL RETROGRADE PYELOGRAM  06/25/2022    FL RETROGRADE PYELOGRAM  07/28/2022    VA BRNCHSC INCL FLUOR GDNCE DX W/CELL WASHG SPX N/A 02/26/2020    Procedure: BRONCHOSCOPY FLEXIBLE;  Surgeon: Paolo Rushing MD;  [facility]: [facility];  Service: Thoracic    VA BRONCHOSCOPY NEEDLE BX TRACHEA MAIN STEM&/BRON N/A 02/26/2020    Procedure: ENDOBRONCHIAL ULTRASOUND (EBUS);  Surgeon: Paolo Rushing MD;  Location: [facility];  Service: Thoracic    VA CYSTO/URETERO W/LITHOTRIPSY &INDWELL STENT INSRT Right 07/28/2022    Procedure: CYSTOSCOPY URETEROSCOPY WITH LITHOTRIPSY HOLMIUM LASER, RETROGRADE PYELOGRAM AND RIGHT INSERTION  STENT URETERAL;  Surgeon: Octavio Chapman MD;  Location: [facility];  Service: Urology    [place] CYSTOURETHROSCOPY W/URETERAL CATHETERIZATION Right 06/25/2022    Procedure: CYSTOSCOPY RETROGRADE PYELOGRAM WITH INSERTION STENT URETERAL;  Surgeon: Octavio Chapman MD;  [facility];  Service: Urology    [place] DILATION VAGINA W/ANESTHESIA OTHER THAN LOCAL N/A 06/20/2019    Procedure: EXAM UNDER ANESTHESIA (EUA);  Surgeon: Remi Singletary MD;  [facility]: [facility];  Service: Gynecology Oncology    [place] INSERTION VAGINAL RADIATION DEVICE N/A 06/20/2019    Procedure: PIERRE SLEEVE PLACEMENT;  Surgeon: Remi Singletary MD;  [facility]: [facility];  Service: Gynecology Oncology    THYROIDECTOMY      TONSILLECTOMY      US GUIDANCE  06/20/2019   [3]   Allergies  Allergen Reactions    Hydrochlorothiazide Arthralgia    Latex Hives    Other      Tomatoes   Vomiting and diarhhea   [4]   Current Outpatient Medications on File Prior to Visit   Medication Sig Dispense Refill    dicyclomine (BENTYL) 20 mg tablet Take 1 tablet (20 mg total) by mouth 2 (two) times a day as needed (diarrhea) 20 tablet 0    diltiazem (CARDIZEM) 90 mg tablet Take 1 tablet (90 mg total) by mouth daily after breakfast 90 tablet 0    labetalol (NORMODYNE) 200 mg tablet Take 200 mg by mouth in the morning and 200 mg in the evening.      levothyroxine 100 mcg tablet Take 1 tablet (100 mcg total) by mouth daily 90 tablet 2    losartan (COZAAR) 100 MG tablet Take 1 tablet by mouth in the morning      methocarbamol (ROBAXIN) 500 mg tablet Take 1 tablet (500 mg total) by mouth 2 (two) times a day 20 tablet 0    Omega-3 Fatty Acids (FISH OIL) 1,000 mg 1,000 mg in the morning.      acetaminophen (TYLENOL) 500 mg tablet Take 2 tablets (1,000 mg total) by mouth every 8 (eight) hours (Patient not taking: Reported on 4/11/2025) 40 tablet 0    aspirin 81 mg chewable tablet Chew 1 tablet (81 mg total) daily Do not start before May 30, 2023. 30 tablet 0    Cholecalciferol  (VITAMIN D3) 1,000 units tablet Take 1,000 Units by mouth daily (Patient not taking: Reported on 2025)      ibuprofen (MOTRIN) 600 mg tablet Take 1 tablet (600 mg total) by mouth every 6 (six) hours as needed for mild pain (Patient not taking: Reported on 2025) 30 tablet 0    MAGNESIUM PO Take by mouth in the morning (Patient not taking: Reported on 2025)      [DISCONTINUED] eplerenone (INSPRA) 25 mg tablet Take 1 tablet (25 mg total) by mouth daily 90 tablet 0     No current facility-administered medications on file prior to visit.   [5]   Social History  Tobacco Use    Smoking status: Former     Current packs/day: 0.00     Average packs/day: 0.5 packs/day for 1 year (0.5 ttl pk-yrs)     Types: Cigarettes     Start date: 2019     Quit date: 2020     Years since quittin.4     Passive exposure: Past    Smokeless tobacco: Never    Tobacco comments:     vapes occ nicotene   Vaping Use    Vaping status: Every Day    Substances: Nicotine   Substance Use Topics    Alcohol use: Not Currently     Comment: occ    Drug use: Not Currently     Types: Marijuana     Comment: THC gummies at bedside   [6]   Family History  Problem Relation Name Age of Onset    Uterine cancer Maternal Grandmother      Heart disease Mother      Canavan disease Father      Cancer Father      Diabetes unspecified Paternal Grandmother Freida

## 2025-07-25 LAB
ALDOST SERPL-MCNC: 17.5 NG/DL (ref 0–30)
ALDOST SERPL-MCNC: 6.4 NG/DL (ref 0–30)
RENIN PLAS-CCNC: 0.53 NG/ML/HR (ref 0.17–5.38)
RENIN PLAS-CCNC: 0.63 NG/ML/HR (ref 0.17–5.38)

## 2025-07-29 DIAGNOSIS — E26.9 HYPERALDOSTERONISM (HCC): Primary | ICD-10-CM

## 2025-07-30 ENCOUNTER — EVALUATION (OUTPATIENT)
Dept: PHYSICAL THERAPY | Facility: CLINIC | Age: 50
End: 2025-07-30
Payer: COMMERCIAL

## 2025-07-30 DIAGNOSIS — G89.29 CHRONIC LOW BACK PAIN WITHOUT SCIATICA, UNSPECIFIED BACK PAIN LATERALITY: Primary | ICD-10-CM

## 2025-07-30 DIAGNOSIS — M54.50 CHRONIC LOW BACK PAIN WITHOUT SCIATICA, UNSPECIFIED BACK PAIN LATERALITY: Primary | ICD-10-CM

## 2025-07-30 DIAGNOSIS — M22.2X2 PATELLOFEMORAL PAIN SYNDROME OF LEFT KNEE: ICD-10-CM

## 2025-07-30 PROCEDURE — 97112 NEUROMUSCULAR REEDUCATION: CPT

## 2025-07-30 PROCEDURE — 97110 THERAPEUTIC EXERCISES: CPT

## 2025-07-30 PROCEDURE — 97161 PT EVAL LOW COMPLEX 20 MIN: CPT

## 2025-08-05 ENCOUNTER — OFFICE VISIT (OUTPATIENT)
Dept: PHYSICAL THERAPY | Facility: CLINIC | Age: 50
End: 2025-08-05
Attending: ORTHOPAEDIC SURGERY
Payer: COMMERCIAL

## 2025-08-05 DIAGNOSIS — M54.50 CHRONIC LOW BACK PAIN WITHOUT SCIATICA, UNSPECIFIED BACK PAIN LATERALITY: ICD-10-CM

## 2025-08-05 DIAGNOSIS — M22.2X2 PATELLOFEMORAL PAIN SYNDROME OF LEFT KNEE: Primary | ICD-10-CM

## 2025-08-05 DIAGNOSIS — G89.29 CHRONIC LOW BACK PAIN WITHOUT SCIATICA, UNSPECIFIED BACK PAIN LATERALITY: ICD-10-CM

## 2025-08-05 PROCEDURE — 97110 THERAPEUTIC EXERCISES: CPT

## 2025-08-05 PROCEDURE — 97112 NEUROMUSCULAR REEDUCATION: CPT

## 2025-08-07 ENCOUNTER — OFFICE VISIT (OUTPATIENT)
Dept: PHYSICAL THERAPY | Facility: CLINIC | Age: 50
End: 2025-08-07
Attending: ORTHOPAEDIC SURGERY
Payer: COMMERCIAL

## 2025-08-07 DIAGNOSIS — M54.50 CHRONIC LOW BACK PAIN WITHOUT SCIATICA, UNSPECIFIED BACK PAIN LATERALITY: ICD-10-CM

## 2025-08-07 DIAGNOSIS — G89.29 CHRONIC LOW BACK PAIN WITHOUT SCIATICA, UNSPECIFIED BACK PAIN LATERALITY: ICD-10-CM

## 2025-08-07 DIAGNOSIS — M22.2X2 PATELLOFEMORAL PAIN SYNDROME OF LEFT KNEE: Primary | ICD-10-CM

## 2025-08-07 PROCEDURE — 97110 THERAPEUTIC EXERCISES: CPT

## 2025-08-07 PROCEDURE — 97112 NEUROMUSCULAR REEDUCATION: CPT

## 2025-08-12 ENCOUNTER — OFFICE VISIT (OUTPATIENT)
Dept: PHYSICAL THERAPY | Facility: CLINIC | Age: 50
End: 2025-08-12
Attending: ORTHOPAEDIC SURGERY
Payer: COMMERCIAL

## 2025-08-16 DIAGNOSIS — I10 PRIMARY HYPERTENSION: ICD-10-CM

## 2025-08-18 RX ORDER — DILTIAZEM HCL 90 MG
90 TABLET ORAL
Qty: 90 TABLET | Refills: 0 | Status: SHIPPED | OUTPATIENT
Start: 2025-08-18

## 2025-08-19 ENCOUNTER — OFFICE VISIT (OUTPATIENT)
Dept: PHYSICAL THERAPY | Facility: CLINIC | Age: 50
End: 2025-08-19
Attending: ORTHOPAEDIC SURGERY
Payer: COMMERCIAL

## 2025-08-19 DIAGNOSIS — M54.50 CHRONIC LOW BACK PAIN WITHOUT SCIATICA, UNSPECIFIED BACK PAIN LATERALITY: ICD-10-CM

## 2025-08-19 DIAGNOSIS — M22.2X2 PATELLOFEMORAL PAIN SYNDROME OF LEFT KNEE: Primary | ICD-10-CM

## 2025-08-19 DIAGNOSIS — G89.29 CHRONIC LOW BACK PAIN WITHOUT SCIATICA, UNSPECIFIED BACK PAIN LATERALITY: ICD-10-CM

## 2025-08-19 PROCEDURE — 97112 NEUROMUSCULAR REEDUCATION: CPT

## 2025-08-19 PROCEDURE — 97110 THERAPEUTIC EXERCISES: CPT

## 2025-08-21 ENCOUNTER — OFFICE VISIT (OUTPATIENT)
Dept: PHYSICAL THERAPY | Facility: CLINIC | Age: 50
End: 2025-08-21
Attending: ORTHOPAEDIC SURGERY
Payer: COMMERCIAL

## 2025-08-21 DIAGNOSIS — G89.29 CHRONIC LOW BACK PAIN WITHOUT SCIATICA, UNSPECIFIED BACK PAIN LATERALITY: ICD-10-CM

## 2025-08-21 DIAGNOSIS — M22.2X2 PATELLOFEMORAL PAIN SYNDROME OF LEFT KNEE: Primary | ICD-10-CM

## 2025-08-21 DIAGNOSIS — M54.50 CHRONIC LOW BACK PAIN WITHOUT SCIATICA, UNSPECIFIED BACK PAIN LATERALITY: ICD-10-CM

## 2025-08-21 PROCEDURE — 97112 NEUROMUSCULAR REEDUCATION: CPT

## 2025-08-21 PROCEDURE — 97110 THERAPEUTIC EXERCISES: CPT

## (undated) DEVICE — 2000CC GUARDIAN II: Brand: GUARDIAN

## (undated) DEVICE — BASKET SPECIMEN RETRIVAL 1.9FR 120CM

## (undated) DEVICE — CATH URETERAL 5FR X 70 CM FLEX TIP POLYUR BARD

## (undated) DEVICE — 1820 FOAM BLOCK NEEDLE COUNTER: Brand: DEVON

## (undated) DEVICE — SHEATH URETERAL ACCESS 12/14FR 35CM PROXIS

## (undated) DEVICE — SYRINGE 10ML SLIP TIP LF

## (undated) DEVICE — MEDI-VAC YANK SUCT HNDL W/TPRD BULBOUS TIP: Brand: CARDINAL HEALTH

## (undated) DEVICE — TUBING SUCTION 5MM X 12 FT

## (undated) DEVICE — GLOVE SRG BIOGEL ECLIPSE 7.5

## (undated) DEVICE — GUIDEWIRE STRGHT TIP 0.035 IN  SOLO PLUS

## (undated) DEVICE — TRAY FOLEY 16FR URIMETER SILICONE SURESTEP

## (undated) DEVICE — BETHLEHEM UNIVERSAL MINOR VAG: Brand: CARDINAL HEALTH

## (undated) DEVICE — HEAVY DUTY TABLE COVER: Brand: CONVERTORS

## (undated) DEVICE — UTILITY MARKER,BLACK WITH LABELS: Brand: DEVON

## (undated) DEVICE — INVIEW CLEAR LEGGINGS: Brand: CONVERTORS

## (undated) DEVICE — GLOVE INDICATOR PI UNDERGLOVE SZ 8.5 BLUE

## (undated) DEVICE — PACK TUR

## (undated) DEVICE — SINGLE USE BIOPSY VALVE MAJ-210: Brand: SINGLE USE BIOPSY VALVE (STERILE)

## (undated) DEVICE — SYRINGE 10ML LL

## (undated) DEVICE — FIRST STEP BEDSIDE KIT - STAND-UP POUCH, ENDOSCOPIC CLEANING PAD - 1 POUCH: Brand: FIRST STEP BEDSIDE KIT - STAND-UP POUCH, ENDOSCOPIC CLEANING PAD

## (undated) DEVICE — UROCATCH BAG

## (undated) DEVICE — CURITY PLAIN PACKING STRIP: Brand: CURITY

## (undated) DEVICE — ADAPTOR TRACH SWIVEL

## (undated) DEVICE — 3000CC GUARDIAN II: Brand: GUARDIAN

## (undated) DEVICE — IV EXTENSION TUBING 33 IN

## (undated) DEVICE — GLOVE PI ULTRA TOUCH SZ.8.5

## (undated) DEVICE — LASER FIBER HOLMIUM 272MICRON

## (undated) DEVICE — GLOVE INDICATOR PI UNDERGLOVE SZ 8 BLUE

## (undated) DEVICE — STOPCOCK 3-WAY

## (undated) DEVICE — SPECIMEN CONTAINER STERILE PEEL PACK

## (undated) DEVICE — SPONGE 4 X 4 XRAY 16 PLY STRL LF RFD

## (undated) DEVICE — SYRINGE 20ML LL

## (undated) DEVICE — SINGLE USE ASPIRATION NEEDLE: Brand: SINGLE USE ASPIRATION NEEDLE

## (undated) DEVICE — SINGLE USE SUCTION VALVE MAJ-209: Brand: SINGLE USE SUCTION VALVE (STERILE)

## (undated) DEVICE — CATH FOLEY 16FR 5ML 2 WAY UNCOATED SILICONE

## (undated) DEVICE — Device: Brand: CERVICAL SLEEVE, 3 MM APERTURE, 60 MM LENGTH, SINGLE USE

## (undated) DEVICE — CHLORHEXIDINE 4PCT 4 OZ

## (undated) DEVICE — SUT PROLENE 2-0 CT-2 30 IN 8411H

## (undated) DEVICE — GAUZE SPONGES,16 PLY: Brand: CURITY

## (undated) DEVICE — Device: Brand: BALLOON

## (undated) DEVICE — PREMIUM DRY TRAY LF: Brand: MEDLINE INDUSTRIES, INC.

## (undated) DEVICE — MAYO STAND COVER: Brand: CONVERTORS

## (undated) DEVICE — ASTOUND STANDARD SURGICAL GOWN, XL: Brand: CONVERTORS

## (undated) DEVICE — LUBRICANT SURGILUBE TUBE 4 OZ  FLIP TOP